# Patient Record
Sex: FEMALE | Race: WHITE | NOT HISPANIC OR LATINO | Employment: OTHER | URBAN - METROPOLITAN AREA
[De-identification: names, ages, dates, MRNs, and addresses within clinical notes are randomized per-mention and may not be internally consistent; named-entity substitution may affect disease eponyms.]

---

## 2017-03-08 ENCOUNTER — OFFICE VISIT (OUTPATIENT)
Dept: FAMILY MEDICINE | Facility: CLINIC | Age: 72
End: 2017-03-08
Payer: COMMERCIAL

## 2017-03-08 VITALS
HEIGHT: 64 IN | SYSTOLIC BLOOD PRESSURE: 121 MMHG | TEMPERATURE: 96.8 F | DIASTOLIC BLOOD PRESSURE: 71 MMHG | WEIGHT: 148 LBS | HEART RATE: 78 BPM | BODY MASS INDEX: 25.27 KG/M2 | OXYGEN SATURATION: 96 %

## 2017-03-08 DIAGNOSIS — Z12.11 SCREEN FOR COLON CANCER: ICD-10-CM

## 2017-03-08 DIAGNOSIS — Z86.39 H/O HYPOGLYCEMIA: ICD-10-CM

## 2017-03-08 DIAGNOSIS — J31.0 CHRONIC RHINITIS: ICD-10-CM

## 2017-03-08 DIAGNOSIS — B00.9 HERPES SIMPLEX VIRUS INFECTION: ICD-10-CM

## 2017-03-08 DIAGNOSIS — Z12.11 COLON CANCER SCREENING: ICD-10-CM

## 2017-03-08 DIAGNOSIS — M25.552 HIP PAIN, LEFT: ICD-10-CM

## 2017-03-08 DIAGNOSIS — H60.62 CHRONIC OTITIS EXTERNA OF LEFT EAR, UNSPECIFIED TYPE: ICD-10-CM

## 2017-03-08 DIAGNOSIS — Z12.31 VISIT FOR SCREENING MAMMOGRAM: ICD-10-CM

## 2017-03-08 DIAGNOSIS — R14.0 ABDOMINAL BLOATING: Primary | ICD-10-CM

## 2017-03-08 DIAGNOSIS — Z13.21 ENCOUNTER FOR VITAMIN DEFICIENCY SCREENING: ICD-10-CM

## 2017-03-08 DIAGNOSIS — F34.1 DYSTHYMIC DISORDER: ICD-10-CM

## 2017-03-08 LAB
ANION GAP SERPL CALCULATED.3IONS-SCNC: 7 MMOL/L (ref 3–14)
BUN SERPL-MCNC: 19 MG/DL (ref 7–30)
CALCIUM SERPL-MCNC: 8.8 MG/DL (ref 8.5–10.1)
CHLORIDE SERPL-SCNC: 107 MMOL/L (ref 94–109)
CO2 SERPL-SCNC: 30 MMOL/L (ref 20–32)
CREAT SERPL-MCNC: 0.72 MG/DL (ref 0.52–1.04)
DEPRECATED CALCIDIOL+CALCIFEROL SERPL-MC: 38 UG/L (ref 20–75)
GFR SERPL CREATININE-BSD FRML MDRD: 79 ML/MIN/1.7M2
GLUCOSE SERPL-MCNC: 87 MG/DL (ref 70–99)
POTASSIUM SERPL-SCNC: 4.2 MMOL/L (ref 3.4–5.3)
SODIUM SERPL-SCNC: 144 MMOL/L (ref 133–144)
TSH SERPL DL<=0.005 MIU/L-ACNC: 0.81 MU/L (ref 0.4–4)

## 2017-03-08 PROCEDURE — 99214 OFFICE O/P EST MOD 30 MIN: CPT | Performed by: PHYSICIAN ASSISTANT

## 2017-03-08 PROCEDURE — 99000 SPECIMEN HANDLING OFFICE-LAB: CPT | Performed by: PHYSICIAN ASSISTANT

## 2017-03-08 PROCEDURE — 36415 COLL VENOUS BLD VENIPUNCTURE: CPT | Performed by: PHYSICIAN ASSISTANT

## 2017-03-08 PROCEDURE — 82306 VITAMIN D 25 HYDROXY: CPT | Performed by: PHYSICIAN ASSISTANT

## 2017-03-08 PROCEDURE — 84443 ASSAY THYROID STIM HORMONE: CPT | Performed by: PHYSICIAN ASSISTANT

## 2017-03-08 PROCEDURE — 83825 ASSAY OF MERCURY: CPT | Mod: 90 | Performed by: PHYSICIAN ASSISTANT

## 2017-03-08 PROCEDURE — 80048 BASIC METABOLIC PNL TOTAL CA: CPT | Performed by: PHYSICIAN ASSISTANT

## 2017-03-08 RX ORDER — VALACYCLOVIR HYDROCHLORIDE 500 MG/1
500 TABLET, FILM COATED ORAL DAILY
Qty: 90 TABLET | Refills: 3 | Status: SHIPPED | OUTPATIENT
Start: 2017-03-08 | End: 2018-08-20

## 2017-03-08 RX ORDER — CITALOPRAM HYDROBROMIDE 20 MG/1
20 TABLET ORAL DAILY
Qty: 90 TABLET | Refills: 3 | Status: SHIPPED | OUTPATIENT
Start: 2017-03-08 | End: 2018-08-20

## 2017-03-08 RX ORDER — SIMETHICONE 125 MG
1 CAPSULE ORAL 2 TIMES DAILY PRN
Qty: 60 CAPSULE | Refills: 0 | Status: SHIPPED | OUTPATIENT
Start: 2017-03-08 | End: 2017-10-04

## 2017-03-08 RX ORDER — CIPROFLOXACIN AND DEXAMETHASONE 3; 1 MG/ML; MG/ML
4 SUSPENSION/ DROPS AURICULAR (OTIC) 2 TIMES DAILY
Qty: 7.5 ML | Refills: 0 | Status: SHIPPED | OUTPATIENT
Start: 2017-03-08 | End: 2018-08-20

## 2017-03-08 ASSESSMENT — ANXIETY QUESTIONNAIRES
1. FEELING NERVOUS, ANXIOUS, OR ON EDGE: NOT AT ALL
5. BEING SO RESTLESS THAT IT IS HARD TO SIT STILL: NOT AT ALL
2. NOT BEING ABLE TO STOP OR CONTROL WORRYING: NOT AT ALL
IF YOU CHECKED OFF ANY PROBLEMS ON THIS QUESTIONNAIRE, HOW DIFFICULT HAVE THESE PROBLEMS MADE IT FOR YOU TO DO YOUR WORK, TAKE CARE OF THINGS AT HOME, OR GET ALONG WITH OTHER PEOPLE: NOT DIFFICULT AT ALL
7. FEELING AFRAID AS IF SOMETHING AWFUL MIGHT HAPPEN: NOT AT ALL
6. BECOMING EASILY ANNOYED OR IRRITABLE: NOT AT ALL
GAD7 TOTAL SCORE: 0
3. WORRYING TOO MUCH ABOUT DIFFERENT THINGS: NOT AT ALL

## 2017-03-08 ASSESSMENT — PATIENT HEALTH QUESTIONNAIRE - PHQ9: 5. POOR APPETITE OR OVEREATING: NOT AT ALL

## 2017-03-08 NOTE — MR AVS SNAPSHOT
After Visit Summary   3/8/2017    Cely Ontiveros    MRN: [id number]           Patient Information     Date Of Birth          1945        Visit Information        Provider Department      3/8/2017 7:40 AM Sunita Andres PA-C [facility]        Today's Diagnoses     H/O hypoglycemia    -  1    Otalgia, left        Dysthymic disorder        Herpes simplex        Chronic rhinitis        Screen for colon cancer        Visit for screening mammogram        Need for prophylactic vaccination against Streptococcus pneumoniae (pneumococcus)        Abdominal bloating        Colon cancer screening        Hip pain, left          Care Instructions    Based on your medical history and these are the current health maintenance or preventive care services that you are due for (some may have been done at this visit)  Health Maintenance Due   Topic Date Due     PNEUMOCOCCAL (2 of 2 - PCV13) 03/09/2011     DEPRESSION ACTION PLAN Q1 YR (NO INBASKET)  02/24/2016     PHQ-9 Q6 MONTHS (NO INBASKET)  04/07/2016     FIT Q1 YR (NO INBASKET)  10/03/2016     MAMMO SCREEN Q2 YR (SYSTEM ASSIGNED)  10/06/2016     FALL RISK ASSESSMENT  10/07/2016         At [facility], we strive to deliver an exceptional experience to you, every time we see you.    If you receive a survey in the mail, please send us back your thoughts. We really do value your feedback.    Your care team's suggested websites for health information:  Www.[facility]org : Up to date and easily searchable information on multiple topics.  Www.medlineplus.gov : medication info, interactive tutorials, watch real surgeries online  Www.familydoctor.org : good info from the Academy of Family Physicians  Www.cdc.gov : public health info, travel advisories, epidemics (H1N1)  Www.aap.org : children's health info, normal development, vaccinations  Www.health.state[place]us : [place] dept of health, public health issues in [place], N1N1    How to  contact your care team:   Team [place] [phone]         Pharmacy [phone]    Dr. Caraballo, Venita Plaza PA-C, Dr. Espinosa, Li VENTURA CNP, Sunita Andres PA-C, Dr. Rosa, and AUDREY Edwards CNP    Team RNs: Jacque & Lacy      Clinic hours  M-Th 7 am-7 pm   Fri 7 am-5 pm.   Urgent care M-F 11 am-9 pm,   Sat/Sun 9 am-5 pm.  Pharmacy M-Th 8 am-8 pm Fri 8 am-6 pm  Sat/Sun 9 am-5 pm.     All password changes, disabled accounts, or ID changes in [employer]/MyHealth will be done by our Access Services Department.    If you need help with your account or password, call: [phone]. Clinic staff no longer has the ability to change passwords.           Follow-ups after your visit        Additional Services     GASTROENTEROLOGY ADULT REF PROCEDURE ONLY       Last Lab Result: Creatinine (mg/dL)       Date                     Value                 06/04/2013               0.64             ----------  Body mass index is 25.27 kg/(m^2).      Patient will be contacted to schedule procedure.     Please be aware that coverage of these services is subject to the terms and limitations of your health insurance plan.  Call member services at your health plan with any benefit or coverage questions.  Any procedures must be performed at a [facility] facility OR coordinated by your clinic's referral office.    Please bring the following with you to your appointment:    (1) Any X-Rays, CTs or MRIs which have been performed.  Contact the facility where they were done to arrange for  prior to your scheduled appointment.    (2) List of current medications   (3) This referral request   (4) Any documents/labs given to you for this referral            [facility], HAND, AND CHIR[facility]RACTIC REFERRAL       **This order will print in the [facility] Scheduling Office**    Physical Therapy, Hand Therapy and Chiropractic Care are available through:    *[facility]  *[facility]  *[facility]    Call one number to schedule at any of the above locations: [phone].    Your provider has referred you to: Physical Therapy at [facility] or [facility]    Indication/Reason for Referral: IT band tightening  Onset of Illness: few months  Therapy Orders: Evaluate and Treat  Special Programs: None  Special Request: None    Yesy Hayward      Additional Comments for the Therapist or Chiropractor:     Please be aware that coverage of these services is subject to the terms and limitations of your health insurance plan.  Call member services at your health plan with any benefit or coverage questions.      Please bring the following to your appointment:    *Your personal calendar for scheduling future appointments  *Comfortable clothing                  Future tests that were ordered for you today     Open Future Orders        Priority Expected Expires Ordered    MA SCREENING DIGITAL BILAT - Future  (s+30) Routine  3/8/2018 3/8/2017            Who to contact     If you have questions or need follow up information about today's clinic visit or your schedule please contact [facility] directly at [phone].  Normal or non-critical lab and imaging results will be communicated to you by evolsohart, letter or phone within 4 business days after the clinic has received the results. If you do not hear from us within 7 days, please contact the clinic through evolsohart or phone. If you have a critical or abnormal lab result, we will notify you by phone as soon as possible.  Submit refill requests through [employer] or call your pharmacy and they will forward the refill request to us. Please allow 3 business days for your refill to be completed.          Additional Information About Your Visit        evolsoharSolid Sound Information     [employer]ers App gives you secure access to your electronic health record. If you see a primary care provider, you can also send messages to your care team and make appointments. If you  "have questions, please call your primary care clinic.  If you do not have a primary care provider, please call [phone] and they will assist you.        Care EveryWhere ID     This is your Care EveryWhere ID. This could be used by other organizations to access your [facility] medical records  [id number]        Your Vitals Were     Pulse Temperature Height Pulse Oximetry Breastfeeding? BMI (Body Mass Index)    78 96.8  F (36  C) (Oral) 5' 4.17\" (1.63 m) 96% No 25.27 kg/m2       Blood Pressure from Last 3 Encounters:   03/08/17 121/71   04/17/16 126/66   01/22/16 127/67    Weight from Last 3 Encounters:   03/08/17 148 lb (67.1 kg)   04/17/16 156 lb 6.4 oz (70.9 kg)   01/22/16 156 lb (70.8 kg)              We Performed the Following     Basic metabolic panel     DEPRESSION ACTION PLAN (DAP) Order [[id number]]     GASTROENTEROLOGY ADULT REF PROCEDURE ONLY     [facility], AND CH[facility]RACTIC REFERRAL     Mercury, blood     TSH with free T4 reflex     Vitamin D Deficiency          Today's Medication Changes          These changes are accurate as of: 3/8/17  8:48 AM.  If you have any questions, ask your nurse or doctor.               Start taking these medicines.        Dose/Directions    Simethicone 125 MG Caps   Used for:  Abdominal bloating   Started by:  Sunita Andres PA-C        Dose:  1 capsule   Take 1 capsule by mouth 2 times daily as needed   Quantity:  60 capsule   Refills:  0            Where to get your medicines      These medications were sent to [id number] [place], [place] - [address]  [address] [address]     Phone:  [phone]     ciprofloxacin-dexamethasone otic suspension    citalopram 20 MG tablet    Simethicone 125 MG Caps    valACYclovir 500 MG tablet         Some of these will need a paper prescription and others can be bought over the counter.  Ask your nurse if you have questions.     Bring a paper prescription for each of these medications     " loratadine-pseudoePHEDrine  MG per 24 hr tablet                Primary Care Provider Office Phone # Fax #    Sunita Andres PA-C [phone]       [facility] [address]  [facility] [address]        Thank you!     Thank you for choosing [facility]  for your care. Our goal is always to provide you with excellent care. Hearing back from our patients is one way we can continue to improve our services. Please take a few minutes to complete the written survey that you may receive in the mail after your visit with us. Thank you!             Your Updated Medication List - Protect others around you: Learn how to safely use, store and throw away your medicines at www.disposemymeds.org.          This list is accurate as of: 3/8/17  8:48 AM.  Always use your most recent med list.                   Brand Name Dispense Instructions for use    ciprofloxacin-dexamethasone otic suspension    CIPRODEX    7.5 mL    Place 4 drops Into the left ear 2 times daily       citalopram 20 MG tablet    celeXA    90 tablet    Take 1 tablet (20 mg) by mouth daily       clotrimazole-betamethasone cream    LOTRISONE    15 g    Apply topically 2 times daily       fluticasone 50 MCG/ACT spray    FLONASE    48 g    Spray 1-2 sprays into both nostrils daily       loratadine-pseudoePHEDrine  MG per 24 hr tablet    EQL ALLERGY/CONGESTION RELIEF    90 tablet    Take 1 tablet by mouth daily       phenazopyridine 200 MG tablet    PYRIDIUM    6 tablet    Take 1 tablet (200 mg) by mouth 3 times daily as needed for irritation Expect your urine to appear bright orange       Simethicone 125 MG Caps     60 capsule    Take 1 capsule by mouth 2 times daily as needed       valACYclovir 500 MG tablet    VALTREX    90 tablet    Take 1 tablet (500 mg) by mouth daily

## 2017-03-08 NOTE — PROGRESS NOTES
SUBJECTIVE:                                                    Cely Ontiveros is a 71 year old female who presents to clinic today for the following health issues:      Would like to check for diabetes, she has a h/o hypoglycemic episodes.      Patient is checking blood sugars: not at all    Diabetic concerns: other - wants to make sure doesn't have diabetes     Symptoms of hypoglycemia (low blood sugar): very smelling under arm pit sweats, cold hands and cold fits and sometimes tingling sensations on hands and feet - saw those symptoms over the Internet.     Paresthesias (numbness or burning in feet) or sores: No     Date of last diabetic eye exam: no       Amount of exercise or physical activity: 6-7 days/week for an average of 45-60 minutes    Problems taking medications regularly: No    Medication side effects: none  Diet: regular (no restrictions)    Has h/o hypoglycemic episodes (started after pelvic surgery due to an ovarian torsion and the physicians at the time thought the blood sugar lows were due to trauma/manipulation from that surgery).  Symptoms have been stable and she controls it with diet by balancing protein/fat/carbs.  Has seen nutritionist for this in the past.     Valtrex:  Takes daily, if she does not she will get a blister on her left hip.     Left IT band tightens after sitting for long periods of time, wants some stretching exercises to try.    Follow up on the : Dysthymic disorder, symptoms stable on Celexa and she would like to continue that as is.       ABDOMINAL PAIN     Onset: 6 months     Description:   Character: Cramping  Location: right lower quadrant  Radiation: None    Intensity: moderate    Progression of Symptoms:  worsening and intermittent    Accompanying Signs & Symptoms:  Fever/Chills?: no   Gas/Bloating: YES  Nausea: no   Vomitting: no   Diarrhea?: no   Constipation:no   Dysuria or Hematuria: no    History:   Trauma: no   Previous similar pain: no    Previous tests done:  none    Precipitating factors:   Does the pain change with:     Food: YES     BM: no     Urination: no     Alleviating factors:  na    Therapies Tried and outcome: na    LMP:  not applicable     She c/o gas pain after eating (worse at night).  She c/o LLQ bloating sensation.  She does not eat after 5 pm.  No blood or black in stool.  She has noticed many dietary triggers:  Artificial sugar, popcorn, tilapia, hot peppers, caffeine, some fruits, nuts, bread, cereal, ect.    Pain is mild and relieved with heat.    Has tried a probiotic (acidophillus) with some improvement.      Sometimes if she eats chocolate she will get excessive gas/burping.      She c/o bumps with the deodorant she uses.      Wants to check for mercury because she eats fish almost everyday.      She has had a complete hysterectomy many years ago.         Problem list and histories reviewed & adjusted, as indicated.  Additional history: as documented    Patient Active Problem List   Diagnosis     Allergic rhinitis     Herpes simplex     CARDIOVASCULAR SCREENING; LDL GOAL LESS THAN 160     Degenerative joint disease     Advance care planning     Posterior vitreous detachment of both eyes     Gross hematuria     Right kidney stone     Seasonal allergic rhinitis     Hypermetropia     Astigmatism with presbyopia     Blepharitis of both eyes     Cataracts, bilateral     Plugged tear duct, od > os     Epiphora     H/O hypoglycemia     Chronic otitis externa of left ear, unspecified type     Hip pain, left     Abdominal bloating     Chronic rhinitis     Dysthymic disorder     Past Surgical History   Procedure Laterality Date     Hc enlarge breast with implant       Hc removal of breast implant       Hc lap,fulgurate/excise lesions       Hysterectomy, pap no longer indicated  1998     ovaries and uterus out for benign reasons(fibroids and ovarian cyst)       Social History   Substance Use Topics     Smoking status: Never Smoker     Smokeless tobacco: Never  Used     Alcohol use No     Family History   Problem Relation Age of Onset     Hypertension Mother      Arthritis Mother      Cardiovascular Mother      Circulatory Mother      HEART DISEASE Mother      Lipids Mother      Obesity Mother      OSTEOPOROSIS Mother      CANCER Mother      skin     Glaucoma Mother      Macular Degeneration Mother      Cancer - colorectal Father      CANCER Father      Macular Degeneration Father      DIABETES No family hx of      CEREBROVASCULAR DISEASE No family hx of      Thyroid Disease No family hx of          Current Outpatient Prescriptions   Medication Sig Dispense Refill     ciprofloxacin-dexamethasone (CIPRODEX) otic suspension Place 4 drops Into the left ear 2 times daily 7.5 mL 0     citalopram (CELEXA) 20 MG tablet Take 1 tablet (20 mg) by mouth daily 90 tablet 3     valACYclovir (VALTREX) 500 MG tablet Take 1 tablet (500 mg) by mouth daily 90 tablet 3     loratadine-pseudoePHEDrine (EQL ALLERGY/CONGESTION RELIEF)  MG per 24 hr tablet Take 1 tablet by mouth daily 90 tablet 1     Simethicone 125 MG CAPS Take 1 capsule by mouth 2 times daily as needed 60 capsule 0     fluticasone (FLONASE) 50 MCG/ACT nasal spray Spray 1-2 sprays into both nostrils daily 48 g 3     clotrimazole-betamethasone (LOTRISONE) cream Apply topically 2 times daily 15 g 1     phenazopyridine (PYRIDIUM) 200 MG tablet Take 1 tablet (200 mg) by mouth 3 times daily as needed for irritation Expect your urine to appear bright orange 6 tablet 0     [DISCONTINUED] citalopram (CELEXA) 20 MG tablet Take 1 tablet (20 mg) by mouth daily 90 tablet 3     [DISCONTINUED] valACYclovir (VALTREX) 500 MG tablet Take 1 tablet (500 mg) by mouth daily 90 tablet 3     BP Readings from Last 3 Encounters:   03/08/17 121/71   04/17/16 126/66   01/22/16 127/67    Wt Readings from Last 3 Encounters:   03/08/17 148 lb (67.1 kg)   04/17/16 156 lb 6.4 oz (70.9 kg)   01/22/16 156 lb (70.8 kg)                    Reviewed and updated  "as needed this visit by clinical staff  Tobacco  Meds  Med Hx  Surg Hx  Fam Hx  Soc Hx      Reviewed and updated as needed this visit by Provider         ROS:  Constitutional, HEENT, cardiovascular, pulmonary, GI, , musculoskeletal, neuro, skin, endocrine and psych systems are negative, except as otherwise noted.    OBJECTIVE:                                                    /71 (BP Location: Right arm, Patient Position: Chair, Cuff Size: Adult Regular)  Pulse 78  Temp 96.8  F (36  C) (Oral)  Ht 5' 4.17\" (1.63 m)  Wt 148 lb (67.1 kg)  SpO2 96%  Breastfeeding? No  BMI 25.27 kg/m2  Body mass index is 25.27 kg/(m^2).  GENERAL: healthy, alert and no distress  NECK: no adenopathy, no asymmetry, masses, or scars and thyroid normal to palpation  RESP: lungs clear to auscultation - no rales, rhonchi or wheezes  CV: regular rate and rhythm, normal S1 S2, no S3 or S4, no murmur, click or rub, no peripheral edema and peripheral pulses strong  ABDOMEN: soft, nontender, no hepatosplenomegaly, no masses and bowel sounds normal  MS: no gross musculoskeletal defects noted, no edema  SKIN: no suspicious lesions or rashes  NEURO: Normal strength and tone, mentation intact and speech normal  PSYCH: mentation appears normal, affect normal/bright    Diagnostic Test Results:  No results found for this or any previous visit (from the past 24 hour(s)).     ASSESSMENT/PLAN:                                                        1. Abdominal bloating  As she is s/p ALEJA, differentials at this time are limited to the large colon (given the location of her pain/bloating sensation in the LLQ).  Symptoms may be due to excessive gas, will try GasX to help symptomatically.  Her dietary triggers seem random and I do not see a pattern to suggest a specific intolerance (such as gluten/lactose/ect).  I do suggest we do a colonoscopy for a further look inside the large intestine, as she may have a colonic mass or diverticulitis.      She " reports eating excessive amounts of fish and would like to have a mercury level checked as well.   - Mercury, blood  - Simethicone 125 MG CAPS; Take 1 capsule by mouth 2 times daily as needed  Dispense: 60 capsule; Refill: 0    2. H/O hypoglycemia  Continue with present diet.   - Basic metabolic panel  - Mercury, blood    3. Herpes simplex  Stable on valtrex, however if she misses a dose it will flare (on her left hip).   - valACYclovir (VALTREX) 500 MG tablet; Take 1 tablet (500 mg) by mouth daily  Dispense: 90 tablet; Refill: 3    4. Dysthymic disorder  Stable.   - citalopram (CELEXA) 20 MG tablet; Take 1 tablet (20 mg) by mouth daily  Dispense: 90 tablet; Refill: 3  - DEPRESSION ACTION PLAN (DAP) Order [[id number]]  - TSH with free T4 reflex    5. Chronic rhinitis  Stable.   - loratadine-pseudoePHEDrine (EQL ALLERGY/CONGESTION RELIEF)  MG per 24 hr tablet; Take 1 tablet by mouth daily  Dispense: 90 tablet; Refill: 1    7. Hip pain, left  F/u with physical therapy for stretching exercises.   - [facility]RACT[facility] REFERRAL    8. Chronic otitis externa of left ear, unspecified type  Stable.   - ciprofloxacin-dexamethasone (CIPRODEX) otic suspension; Place 4 drops Into the left ear 2 times daily  Dispense: 7.5 mL; Refill: 0    9. Colon cancer screening  I discussed the importance of colorectal cancer screening, including the risks and benefits of the various procedures, including colonoscopy and annual FOB immunoassay test.  Given that she is having symptoms of bloating, I would prefer she do a colonoscopy versus a FIT test.      She voiced concern over her hypoglycemia episodes and is not sure if she wants to go through the prep for this.  I reassured her that we can still do the procedure, she may need to speak with the specialist first on how to do the prep with her history of hypoglycemia, I would think she could drink apple juice if sugars go low during the prep     - GASTROENTEROLOGY ADULT REF  PROCEDURE ONLY    10. Visit for screening mammogram  Due.   - MA SCREENING DIGITAL BILAT - Future  (s+30); Future    11. Encounter for vitamin deficiency screening  She would like to have this checked as well.   - Vitamin D Deficiency    FUTURE APPOINTMENTS:       - Follow-up visit in 3-6 months, sooner TWAN Andres PA-C  [facility]

## 2017-03-08 NOTE — PROGRESS NOTES
Faxed Rx for the Loratadine-pseudoephedrine to [facility], Kira Valles, [phone], right fax confirmed at 11:56 am today.  Leslie Louis MA/  For Teams [facility] and [place]

## 2017-03-08 NOTE — NURSING NOTE
"Chief Complaint   Patient presents with     RECHECK     Dysthymic disorder        Initial /71 (BP Location: Right arm, Patient Position: Chair, Cuff Size: Adult Regular)  Pulse 78  Temp 96.8  F (36  C) (Oral)  Ht 5' 4.17\" (1.63 m)  Wt 148 lb (67.1 kg)  SpO2 96%  Breastfeeding? No  BMI 25.27 kg/m2 Estimated body mass index is 25.27 kg/(m^2) as calculated from the following:    Height as of this encounter: 5' 4.17\" (1.63 m).    Weight as of this encounter: 148 lb (67.1 kg).  Medication Reconciliation: complete   An,CMA      "

## 2017-03-08 NOTE — PATIENT INSTRUCTIONS
Based on your medical history and these are the current health maintenance or preventive care services that you are due for (some may have been done at this visit)  Health Maintenance Due   Topic Date Due     PNEUMOCOCCAL (2 of 2 - PCV13) 03/09/2011     DEPRESSION ACTION PLAN Q1 YR (NO INBASKET)  02/24/2016     PHQ-9 Q6 MONTHS (NO INBASKET)  04/07/2016     FIT Q1 YR (NO INBASKET)  10/03/2016     MAMMO SCREEN Q2 YR (SYSTEM ASSIGNED)  10/06/2016     FALL RISK ASSESSMENT  10/07/2016         At [facility], we strive to deliver an exceptional experience to you, every time we see you.    If you receive a survey in the mail, please send us back your thoughts. We really do value your feedback.    Your care team's suggested websites for health information:  Www.[employer] : Up to date and easily searchable information on multiple topics.  Www.[employer].gov : medication info, interactive tutorials, watch real surgeries online  Www.familydoctor.org : good info from the Academy of Family Physicians  Www.cdc.gov : public health info, travel advisories, epidemics (H1N1)  Www.aap.org : children's health info, normal development, vaccinations  Www.health.[facility].[place].us : [place] dept of health, public health issues in [place], N1N1    How to contact your care team:   [facility] [place] [phone]         Pharmacy [phone]    Dr. Caraballo, Venita Plaza PA-C, Dr. Espinosa, Li VENTURA CNP, Sunita Andres PA-C, Dr. Rosa, and AUDREY Edwards CNP    Team RNs: Jacque & Lacy      Clinic hours  M-Th 7 am-7 pm   Fri 7 am-5 pm.   Urgent care M-F 11 am-9 pm,   Sat/Sun 9 am-5 pm.  Pharmacy M-Th 8 am-8 pm Fri 8 am-6 pm  Sat/Sun 9 am-5 pm.     All password changes, disabled accounts, or ID changes in [employer]/MyHealth will be done by our Access Services Department.    If you need help with your account or password, call: [phone]. Clinic staff no longer has the ability to change passwords.

## 2017-03-09 ASSESSMENT — ANXIETY QUESTIONNAIRES: GAD7 TOTAL SCORE: 0

## 2017-03-09 ASSESSMENT — PATIENT HEALTH QUESTIONNAIRE - PHQ9: SUM OF ALL RESPONSES TO PHQ QUESTIONS 1-9: 0

## 2017-03-10 LAB — MERCURY BLD-MCNC: 11 NG/ML

## 2017-03-29 ENCOUNTER — RADIANT APPOINTMENT (OUTPATIENT)
Dept: MAMMOGRAPHY | Facility: CLINIC | Age: 72
End: 2017-03-29
Attending: PHYSICIAN ASSISTANT
Payer: COMMERCIAL

## 2017-03-29 DIAGNOSIS — Z12.31 VISIT FOR SCREENING MAMMOGRAM: ICD-10-CM

## 2017-03-29 PROCEDURE — G0202 SCR MAMMO BI INCL CAD: HCPCS | Performed by: RADIOLOGY

## 2017-04-03 ENCOUNTER — THERAPY VISIT (OUTPATIENT)
Dept: PHYSICAL THERAPY | Facility: CLINIC | Age: 72
End: 2017-04-03
Payer: COMMERCIAL

## 2017-04-03 DIAGNOSIS — M25.552 HIP PAIN, LEFT: Primary | ICD-10-CM

## 2017-04-03 PROCEDURE — 97112 NEUROMUSCULAR REEDUCATION: CPT | Mod: GP | Performed by: PHYSICAL THERAPIST

## 2017-04-03 PROCEDURE — 97161 PT EVAL LOW COMPLEX 20 MIN: CPT | Mod: GP | Performed by: PHYSICAL THERAPIST

## 2017-04-03 PROCEDURE — 97110 THERAPEUTIC EXERCISES: CPT | Mod: GP | Performed by: PHYSICAL THERAPIST

## 2017-04-03 ASSESSMENT — ACTIVITIES OF DAILY LIVING (ADL)
PUTTING_ON_SOCKS_AND_SHOES: NO DIFFICULTY AT ALL
HOS_ADL_COUNT: 17
HEAVY_WORK: NO DIFFICULTY AT ALL
HOS_ADL_SCORE(%): 97.06
GETTING_INTO_AND_OUT_OF_A_BATHTUB: NO DIFFICULTY AT ALL
HOW_WOULD_YOU_RATE_YOUR_CURRENT_LEVEL_OF_FUNCTION_DURING_YOUR_USUAL_ACTIVITIES_OF_DAILY_LIVING_FROM_0_TO_100_WITH_100_BEING_YOUR_LEVEL_OF_FUNCTION_PRIOR_TO_YOUR_HIP_PROBLEM_AND_0_BEING_THE_INABILITY_TO_PERFORM_ANY_OF_YOUR_USUAL_DAILY_ACTIVITIES?: 90
WALKING_UP_STEEP_HILLS: NO DIFFICULTY AT ALL
DEEP_SQUATTING: MODERATE DIFFICULTY
WALKING_DOWN_STEEP_HILLS: NO DIFFICULTY AT ALL
STEPPING_UP_AND_DOWN_CURBS: NO DIFFICULTY AT ALL
GOING_UP_1_FLIGHT_OF_STAIRS: NO DIFFICULTY AT ALL
GOING_DOWN_1_FLIGHT_OF_STAIRS: NO DIFFICULTY AT ALL
WALKING_INITIALLY: NO DIFFICULTY AT ALL
STANDING_FOR_15_MINUTES: NO DIFFICULTY AT ALL
HOS_ADL_ITEM_SCORE_TOTAL: 66
TWISTING/PIVOTING_ON_INVOLVED_LEG: NO DIFFICULTY AT ALL
LIGHT_TO_MODERATE_WORK: NO DIFFICULTY AT ALL
SITTING_FOR_15_MINUTES: NO DIFFICULTY AT ALL
HOS_ADL_HIGHEST_POTENTIAL_SCORE: 68
RECREATIONAL_ACTIVITIES: NO DIFFICULTY AT ALL
ROLLING_OVER_IN_BED: NO DIFFICULTY AT ALL
GETTING_INTO_AND_OUT_OF_AN_AVERAGE_CAR: NO DIFFICULTY AT ALL
WALKING_15_MINUTES_OR_GREATER: NO DIFFICULTY AT ALL
WALKING_APPROXIMATELY_10_MINUTES: NO DIFFICULTY AT ALL

## 2017-04-03 NOTE — MR AVS SNAPSHOT
After Visit Summary   4/3/2017    Cely Ontiveros    MRN: [id number]           Patient Information     Date Of Birth          1945        Visit Information        Provider Department      4/3/2017 10:20 AM Mae Huerta PT [facility]        Today's Diagnoses     Hip pain, left    -  1       Follow-ups after your visit        Your next 10 appointments already scheduled     Apr 26, 2017 10:10 AM CDT   CANDIDO Extremity with Mae Huerta PT   [facility] ([place]O [place]  )    [address]  [facility] [id number] [phone]              Who to contact     If you have questions or need follow up information about today's clinic visit or your schedule please contact [facility] directly at [phone].  Normal or non-critical lab and imaging results will be communicated to you by MyChart, letter or phone within 4 business days after the clinic has received the results. If you do not hear from us within 7 days, please contact the clinic through [employer] or phone. If you have a critical or abnormal lab result, we will notify you by phone as soon as possible.  Submit refill requests through [employer] or call your pharmacy and they will forward the refill request to us. Please allow 3 business days for your refill to be completed.          Additional Information About Your Visit        MyChart Information     [employer] gives you secure access to your electronic health record. If you see a primary care provider, you can also send messages to your care team and make appointments. If you have questions, please call your primary care clinic.  If you do not have a primary care provider, please call [phone] and they will assist you.        Care EveryWhere ID     This is your Care EveryWhere ID. This could be used by other organizations to access your [facility]  records  [id number]         Blood Pressure from Last 3 Encounters:   03/08/17 121/71   04/17/16 126/66   01/22/16 127/67    Weight from Last 3 Encounters:   03/08/17 67.1 kg (148 lb)   04/17/16 70.9 kg (156 lb 6.4 oz)   01/22/16 70.8 kg (156 lb)              We Performed the Following     HC PT EVAL, LOW COMPLEXITY     CANDIDO INITIAL EVAL REPORT     NEUROMUSCULAR RE-EDUCATION     THERAPEUTIC EXERCISES        Primary Care Provider Office Phone # Fax #    Sunita Andres PA-C [phone]       [facility] [address]  [facility] [address]        Thank you!     Thank you for choosing [place] [facility]  for your care. Our goal is always to provide you with excellent care. Hearing back from our patients is one way we can continue to improve our services. Please take a few minutes to complete the written survey that you may receive in the mail after your visit with us. Thank you!             Your Updated Medication List - Protect others around you: Learn how to safely use, store and throw away your medicines at www.disposemymeds.org.          This list is accurate as of: 4/3/17 11:19 AM.  Always use your most recent med list.                   Brand Name Dispense Instructions for use    ciprofloxacin-dexamethasone otic suspension    CIPRODEX    7.5 mL    Place 4 drops Into the left ear 2 times daily       citalopram 20 MG tablet    celeXA    90 tablet    Take 1 tablet (20 mg) by mouth daily       clotrimazole-betamethasone cream    LOTRISONE    15 g    Apply topically 2 times daily       fluticasone 50 MCG/ACT spray    FLONASE    48 g    Spray 1-2 sprays into both nostrils daily       loratadine-pseudoePHEDrine  MG per 24 hr tablet    EQL ALLERGY/CONGESTION RELIEF    90 tablet    Take 1 tablet by mouth daily       phenazopyridine 200 MG tablet    PYRIDIUM    6 tablet    Take 1 tablet (200 mg) by mouth 3 times daily as needed for irritation Expect your  urine to appear bright orange       Simethicone 125 MG Caps     60 capsule    Take 1 capsule by mouth 2 times daily as needed       valACYclovir 500 MG tablet    VALTREX    90 tablet    Take 1 tablet (500 mg) by mouth daily

## 2017-04-03 NOTE — PROGRESS NOTES
[facility] Initial Evaluation      Subjective:    Cely Ontiveros is a 71 year old female with a left hip condition.  Condition occurred with:  Insidious onset.  Condition occurred: for unknown reasons.  This is a chronic condition  Pt notes that she has been having problems in her L hip for several months, ever since retiring and sitting more than usual.  She does yoga 3x/wk and massage therapy every other week, both of which help a lot.  She did consult w/her PCP on 3/8/17 for other issues and mentioned it to her, so did get a referral to PT..    Patient reports pain:  Lateral and greater trochanter.  Radiates to:  No radiation.  Pain is described as aching (more of a tightness than pain) and is intermittent and reported as 1/10 (3/10 at worst).  Associated symptoms:  Loss of motion/stiffness. Pain is the same all the time.  Symptoms are exacerbated by sitting and lying on extremity and relieved by activity/movement, heat and other (massage, sitting differently).  Since onset symptoms are gradually improving.        General health as reported by patient is excellent.  Pertinent medical history includes:  Osteoarthritis and menopausal.  Medical allergies: yes (listed in Epic).  Other surgeries include:  None reported.  Current medications:  Other (allergy med every day).  Current occupation is Retired.    Primary job tasks include:  Prolonged sitting (cleaning, laundry, works out on an elliptical machine, yoga 3x/wk, other days does other things at the gym).    Barriers include:  None as reported by the patient.    Red flags:  None as reported by the patient.                        Objective:      Gait:    Gait Type:  Normal   Assistive Devices:  None                                                   Hip Evaluation  HIP AROM:    Flexion: Left: WNL    Right:  WNL      Abduction: Left:  Mildly restricted    Right:  WNL      Internal Rotation: Left: WNL    Right: WNL  External Rotation: Left: WNL     Right: WNL        Hip Strength:    Flexion:   Left: 4-/5   -  Pain:  Right: 4-/5   -  Pain:                      Abduction:  Left: 4/5    -   Pain:    Internal Rotation:  Left: 4+/5   -   Pain:Right: 4+/5   -  Pain:  External Rotation:  Left: 4-/5   -  Pain:  Right: 4-/5   -  Pain:  Knee Flexion:  Left: 5-/5   -  Pain:Right: 5-/5   -  Pain:  Knee Extension:  Left: 4+/5   -  Pain:Right: 4+/5    -  Pain:        Hip Special Testing:      Left hip negative for the following special tests:  Ritchie; Fadir/Labrum or Janice's      Hip Palpation:    Left hip tenderness present at:   IT Band and Abductors  Left hip tenderness not present at:  Greater Trachanter; hip flexors; Piriformis; Gluteus Medius or Bursa               General     ROS    Assessment/Plan:      Patient is a 71 year old female with left side hip complaints.    Patient has the following significant findings with corresponding treatment plan.                Diagnosis 1:  L ITB syndrome  Pain -  hot/cold therapy, self management, education and home program  Decreased ROM/flexibility - therapeutic exercise  Decreased strength - therapeutic exercise and therapeutic activities  Impaired muscle performance - neuro re-education  Decreased function - therapeutic activities    Therapy Evaluation Codes:   1) History comprised of:   Personal factors that impact the plan of care:      None.    Comorbidity factors that impact the plan of care are:      Osteoarthritis.     Medications impacting care: None.  2) Examination of Body Systems comprised of:   Body structures and functions that impact the plan of care:      Hip.   Activity limitations that impact the plan of care are:      Sitting and Laying down.  3) Clinical presentation characteristics are:   Stable/Uncomplicated.  4) Decision-Making    Low complexity using standardized patient assessment instrument and/or measureable assessment of functional outcome.  Cumulative Therapy Evaluation is: Low  complexity.    Previous and current functional limitations:  (See Goal Flow Sheet for this information)    Short term and Long term goals: (See Goal Flow Sheet for this information)     Communication ability:  Patient appears to be able to clearly communicate and understand verbal and written communication and follow directions correctly.  Treatment Explanation - The following has been discussed with the patient:   RX ordered/plan of care  Anticipated outcomes  Possible risks and side effects  This patient would benefit from PT intervention to resume normal activities.   Rehab potential is excellent.    Frequency:  2 X a month, once daily  Duration:  for 2 months  Discharge Plan:  Achieve all LTG.  Independent in home treatment program.  Reach maximal therapeutic benefit.    Please refer to the daily flowsheet for treatment today, total treatment time and time spent performing 1:1 timed codes.

## 2017-04-26 ENCOUNTER — THERAPY VISIT (OUTPATIENT)
Dept: PHYSICAL THERAPY | Facility: CLINIC | Age: 72
End: 2017-04-26
Payer: COMMERCIAL

## 2017-04-26 DIAGNOSIS — M25.552 HIP PAIN, LEFT: ICD-10-CM

## 2017-04-26 PROCEDURE — 97110 THERAPEUTIC EXERCISES: CPT | Mod: GP | Performed by: PHYSICAL THERAPIST

## 2017-04-26 PROCEDURE — 97112 NEUROMUSCULAR REEDUCATION: CPT | Mod: GP | Performed by: PHYSICAL THERAPIST

## 2017-04-26 PROCEDURE — 97530 THERAPEUTIC ACTIVITIES: CPT | Mod: GP | Performed by: PHYSICAL THERAPIST

## 2017-04-26 NOTE — MR AVS SNAPSHOT
After Visit Summary   4/26/2017    Cely Ontiveros    MRN: [id number]           Patient Information     Date Of Birth          1945        Visit Information        Provider Department      4/26/2017 10:10 AM Mae Huerta PT [facility]        Today's Diagnoses     Hip pain, left           Follow-ups after your visit        Who to contact     If you have questions or need follow up information about today's clinic visit or your schedule please contact [place] [facility] directly at [phone].  Normal or non-critical lab and imaging results will be communicated to you by [employer] System[employer], letter or phone within 4 business days after the clinic has received the results. If you do not hear from us within 7 days, please contact the clinic through [employer] or phone. If you have a critical or abnormal lab result, we will notify you by phone as soon as possible.  Submit refill requests through [employer] or call your pharmacy and they will forward the refill request to us. Please allow 3 business days for your refill to be completed.          Additional Information About Your Visit        MyChart Information     [employer] gives you secure access to your electronic health record. If you see a primary care provider, you can also send messages to your care team and make appointments. If you have questions, please call your primary care clinic.  If you do not have a primary care provider, please call [phone] and they will assist you.        Care EveryWhere ID     This is your Care EveryWhere ID. This could be used by other organizations to access your [place] medical records  [id number]         Blood Pressure from Last 3 Encounters:   03/08/17 121/71   04/17/16 126/66   01/22/16 127/67    Weight from Last 3 Encounters:   03/08/17 67.1 kg (148 lb)   04/17/16 70.9 kg (156 lb 6.4 oz)   01/22/16 70.8 kg (156 lb)              We Performed the Following      CANDIDO PROGRESS NOTES REPORT     NEUROMUSCULAR RE-EDUCATION     THERAPEUTIC ACTIVITIES     THERAPEUTIC EXERCISES        Primary Care Provider Office Phone # Fax #    Sunita Andres PA-C [phone]       [facility] [address]  [facility] [address]        Thank you!     Thank you for choosing [place] [facility]  for your care. Our goal is always to provide you with excellent care. Hearing back from our patients is one way we can continue to improve our services. Please take a few minutes to complete the written survey that you may receive in the mail after your visit with us. Thank you!             Your Updated Medication List - Protect others around you: Learn how to safely use, store and throw away your medicines at www.disposemymeds.org.          This list is accurate as of: 4/26/17  1:37 PM.  Always use your most recent med list.                   Brand Name Dispense Instructions for use    ciprofloxacin-dexamethasone otic suspension    CIPRODEX    7.5 mL    Place 4 drops Into the left ear 2 times daily       citalopram 20 MG tablet    celeXA    90 tablet    Take 1 tablet (20 mg) by mouth daily       clotrimazole-betamethasone cream    LOTRISONE    15 g    Apply topically 2 times daily       fluticasone 50 MCG/ACT spray    FLONASE    48 g    Spray 1-2 sprays into both nostrils daily       loratadine-pseudoePHEDrine  MG per 24 hr tablet    EQL ALLERGY/CONGESTION RELIEF    90 tablet    Take 1 tablet by mouth daily       phenazopyridine 200 MG tablet    PYRIDIUM    6 tablet    Take 1 tablet (200 mg) by mouth 3 times daily as needed for irritation Expect your urine to appear bright orange       Simethicone 125 MG Caps     60 capsule    Take 1 capsule by mouth 2 times daily as needed       valACYclovir 500 MG tablet    VALTREX    90 tablet    Take 1 tablet (500 mg) by mouth daily

## 2017-04-26 NOTE — PROGRESS NOTES
Subjective:    HPI                    Objective:    System    Physical Exam    General     ROS    Assessment/Plan:      DISCHARGE REPORT    Progress reporting period is from 4/3/17 to 4/26/17.       SUBJECTIVE  Subjective changes noted by patient:   Pt notes vast improvements, really thinks the ex's have helped a lot.  She has been able to sit through longer programs w/o any pain in her hip since starting the ex's.    Current pain level is  0/10.     Previous pain level was   Initial Pain level: 3/10 (at worst).   Changes in function:  Yes (See Goal flowsheet attached for changes in current functional level)  Adverse reaction to treatment or activity: None    OBJECTIVE  Changes noted in objective findings:    Pt able to perform all ex's (past and new) w/o difficulty or pain in hip, but knee bending ex's did cause some knee pain. Pt was cautioned to not let knees go beyond the ankles to prevent aggravating her knees.      ASSESSMENT/PLAN  Updated problem list and treatment plan: Diagnosis 1:  L ITB syndrome Decreased ROM/flexibility - home program  Decreased strength - home program  Impaired muscle performance - home program  STG/LTGs have been met or progress has been made towards goals:  Yes (See Goal flow sheet completed today.)  Assessment of Progress: The patient's condition is improving.  The patient's condition has potential to improve.  Patient is meeting short term goals and is progressing towards long term goals.  Self Management Plans:  Patient has been instructed in a home treatment program.  Patient is independent in a home treatment program.  Patient  has been instructed in self management of symptoms.  Patient is independent in self management of symptoms.    Cely continues to require the following intervention to meet STG and LTG's:  PT intervention is no longer required to meet STG/LTG.    Recommendations:  This patient is ready to be discharged from therapy and continue their home treatment  program.    Please refer to the daily flowsheet for treatment today, total treatment time and time spent performing 1:1 timed codes.

## 2017-04-26 NOTE — Clinical Note
Please see the discharge summary report completed in PT today.  Thank you for referring Cely to us! Cordially,  TEO Huerta, MPT

## 2017-04-28 ENCOUNTER — TELEPHONE (OUTPATIENT)
Dept: FAMILY MEDICINE | Facility: CLINIC | Age: 72
End: 2017-04-28

## 2017-08-02 ENCOUNTER — OFFICE VISIT (OUTPATIENT)
Dept: FAMILY MEDICINE | Facility: CLINIC | Age: 72
End: 2017-08-02
Payer: COMMERCIAL

## 2017-08-02 VITALS
OXYGEN SATURATION: 95 % | SYSTOLIC BLOOD PRESSURE: 124 MMHG | TEMPERATURE: 97 F | HEART RATE: 82 BPM | DIASTOLIC BLOOD PRESSURE: 64 MMHG | BODY MASS INDEX: 24.82 KG/M2 | WEIGHT: 145.4 LBS

## 2017-08-02 DIAGNOSIS — B35.6 TINEA CRURIS: Primary | ICD-10-CM

## 2017-08-02 DIAGNOSIS — Z12.11 SCREEN FOR COLON CANCER: ICD-10-CM

## 2017-08-02 PROCEDURE — 99213 OFFICE O/P EST LOW 20 MIN: CPT | Performed by: PHYSICIAN ASSISTANT

## 2017-08-02 RX ORDER — CLOTRIMAZOLE 1 %
CREAM (GRAM) TOPICAL 2 TIMES DAILY
Qty: 30 G | Refills: 1 | Status: SHIPPED | OUTPATIENT
Start: 2017-08-02 | End: 2018-10-25

## 2017-08-02 ASSESSMENT — PAIN SCALES - GENERAL: PAINLEVEL: NO PAIN (0)

## 2017-08-02 NOTE — NURSING NOTE
"Chief Complaint   Patient presents with     Derm Problem       Initial /64 (BP Location: Left arm, Patient Position: Sitting, Cuff Size: Adult Regular)  Pulse 82  Temp 97  F (36.1  C) (Oral)  Wt 145 lb 6.4 oz (66 kg)  SpO2 95%  Breastfeeding? No  BMI 24.82 kg/m2 Estimated body mass index is 24.82 kg/(m^2) as calculated from the following:    Height as of 3/8/17: 5' 4.17\" (1.63 m).    Weight as of this encounter: 145 lb 6.4 oz (66 kg).  Medication Reconciliation: complete   Claudio STERLING    "

## 2017-08-02 NOTE — MR AVS SNAPSHOT
After Visit Summary   8/2/2017    Cely Ontiveros    MRN: [id number]           Patient Information     Date Of Birth          1945        Visit Information        Provider Department      8/2/2017 7:20 AM Sunita Andres PA-C [facility]        Today's Diagnoses     Screen for colon cancer    -  1    Tinea cruris          Care Instructions    Based on your medical history and these are the current health maintenance or preventive care services that you are due for (some may have been done at this visit)  Health Maintenance Due   Topic Date Due     FIT Q1 YR  10/03/2016     FALL RISK ASSESSMENT  10/07/2016         At [facility], we strive to deliver an exceptional experience to you, every time we see you.    If you receive a survey in the mail, please send us back your thoughts. We really do value your feedback.    Your care team's suggested websites for health information:  Www.[employer].MediaPlatform : Up to date and easily searchable information on multiple topics.  Www.medlineplus.gov : medication info, interactive tutorials, watch real surgeries online  Www.familydoctor.org : good info from the Academy of Family Physicians  Www.cdc.gov : public health info, travel advisories, epidemics (H1N1)  Www.aap.org : children's health info, normal development, vaccinations  Www.health.[facility].[place].us : [place] dept of health, public health issues in [place], N1N1    How to contact your care team:   Team [place] [phone]         Pharmacy [phone]    Dr. Caraballo, Venita Plaza PA-C, Dr. Espinosa, Li Moeller APRN CNP, Sunita Andres PA-C, Dr. Rosa, and AUDREY Edwards CNP    Team RNs: Jacque & Lacy      Clinic hours  M-Th 7 am-7 pm   Fri 7 am-5 pm.   Urgent care M-F 11 am-9 pm,   Sat/Sun 9 am-5 pm.  Pharmacy M-Th 8 am-8 pm Fri 8 am-6 pm  Sat/Sun 9 am-5 pm.     All password changes, disabled accounts, or ID changes in MyChart/MyHealth will be done by  our Access Services Department.    If you need help with your account or password, call: [phone]. Clinic staff no longer has the ability to change passwords.     Understanding Tinea Cruris  Tinea cruris is a type of fungal infection. The fungus infects the skin in the groin. It is more commonly called jock itch. Men are more prone to it than women.  How to say it  TIN-ee-a CROO-ris   What causes tinea cruris?  Tinea cruris occurs when certain types of fungus grow in the groin area. The infection often spreads from the feet to the groin. The fungal infection on the foot is called athlete s foot (tinea pedis). The infection can also be passed from person to person. You can get it if you touch an infected surface, such as a towel or bench in a locker room. It is more common if you have unusual heat, sweating, or friction in the groin, or are overweight.  What are the symptoms of tinea cruris?  Symptoms include:    Red patches along the upper thigh, butt, and groin. The patches have a well-marked border, and sometimes a clear central area.    Itchiness    Scaling skin along the border of the rash    Red bumps (pustules and papules)    Burning or stinging sensation  How is tinea cruris treated?  With proper treatment, tinea cruris will go away in 2 to 3 weeks. Treatments include:    Oral or skin medicines. These may help reduce itching.    Over-the-counter or prescription antifungal medicines for the skin. Thesedestroy the fungus and ease symptoms. You may need to take other fungal medicine by mouth if the infection does not go away.    Good hygiene.  Keep the groin area clean and dry. Take a bath or shower as soon as possible after physical activity. Don t wear sweaty clothes for an extended time. Wear loose cotton underwear. Drying powders may be helpful if you sweat a lot.  What are the possible complications of tinea cruris?  Possible complications include:    Repeated fungal infections    Bacterial infection  of the affected skin  When should I call my healthcare provider?  Call your healthcare provider right away if you have any of these:    Pain that gets worse    Symptoms that don t get better, or get worse    New symptoms   Date Last Reviewed: 3/30/2016    7027-6580 [employer]. [address] [place] [address]. All rights reserved. This information is not intended as a substitute for professional medical care. Always follow your healthcare professional's instructions.                Follow-ups after your visit        Who to contact     If you have questions or need follow up information about today's clinic visit or your schedule please contact [facility] directly at [phone].  Normal or non-critical lab and imaging results will be communicated to you by MyChart, letter or phone within 4 business days after the clinic has received the results. If you do not hear from us within 7 days, please contact the clinic through [employer] or phone. If you have a critical or abnormal lab result, we will notify you by phone as soon as possible.  Submit refill requests through [employer] or call your pharmacy and they will forward the refill request to us. Please allow 3 business days for your refill to be completed.          Additional Information About Your Visit        MyChart Information     [employer] gives you secure access to your electronic health record. If you see a primary care provider, you can also send messages to your care team and make appointments. If you have questions, please call your primary care clinic.  If you do not have a primary care provider, please call [phone] and they will assist you.        Care EveryWhere ID     This is your Care EveryWhere ID. This could be used by other organizations to access your [facility] medical records  [id number]        Your Vitals Were     Pulse Temperature Pulse Oximetry Breastfeeding? BMI (Body Mass Index)       82 97  F (36.1   C) (Oral) 95% No 24.82 kg/m2        Blood Pressure from Last 3 Encounters:   08/02/17 124/64   03/08/17 121/71   04/17/16 126/66    Weight from Last 3 Encounters:   08/02/17 145 lb 6.4 oz (66 kg)   03/08/17 148 lb (67.1 kg)   04/17/16 156 lb 6.4 oz (70.9 kg)              Today, you had the following     No orders found for display         Today's Medication Changes          These changes are accurate as of: 8/2/17  7:37 AM.  If you have any questions, ask your nurse or doctor.               Start taking these medicines.        Dose/Directions    clotrimazole 1 % cream   Commonly known as:  LOTRIMIN   Used for:  Tinea cruris   Started by:  Sunita Andres PA-C        Apply topically 2 times daily   Quantity:  30 g   Refills:  1            Where to get your medicines      These medications were sent to [id number] - [place], [place] - [address] [address]     Phone:  [phone]     clotrimazole 1 % cream                Primary Care Provider Office Phone # Fax #    Sunita Andres PA-C [phone]       [facility] [address]  [facility] [address]        Equal Access to Services     HAYDEE STOCK : Hadii aad ku hadasho Soomaali, waaxda luqadaha, qaybta kaalmada adeegyada, iona garrett. So [facility] [phone].    ATENCIÓN: Si habla español, tiene a carnes disposición servicios gratuitos de asistencia lingüística. Llame al [phone].    We comply with applicable federal civil rights laws and [place] laws. We do not discriminate on the basis of race, color, national origin, age, disability sex, sexual orientation or gender identity.            Thank you!     Thank you for choosing [facility]  for your care. Our goal is always to provide you with excellent care. Hearing back from our patients is one way we can continue to improve our services. Please take a few minutes to complete  the written survey that you may receive in the mail after your visit with us. Thank you!             Your Updated Medication List - Protect others around you: Learn how to safely use, store and throw away your medicines at www.disposemymeds.org.          This list is accurate as of: 8/2/17  7:37 AM.  Always use your most recent med list.                   Brand Name Dispense Instructions for use Diagnosis    ciprofloxacin-dexamethasone otic suspension    CIPRODEX    7.5 mL    Place 4 drops Into the left ear 2 times daily    Chronic otitis externa of left ear, unspecified type       citalopram 20 MG tablet    celeXA    90 tablet    Take 1 tablet (20 mg) by mouth daily    Dysthymic disorder       clotrimazole 1 % cream    LOTRIMIN    30 g    Apply topically 2 times daily    Tinea cruris       clotrimazole-betamethasone cream    LOTRISONE    15 g    Apply topically 2 times daily    Special screening for malignant neoplasms, colon       fluticasone 50 MCG/ACT spray    FLONASE    48 g    Spray 1-2 sprays into both nostrils daily    Chronic rhinitis       loratadine-pseudoePHEDrine  MG per 24 hr tablet    EQL ALLERGY/CONGESTION RELIEF    90 tablet    Take 1 tablet by mouth daily    Chronic rhinitis       phenazopyridine 200 MG tablet    PYRIDIUM    6 tablet    Take 1 tablet (200 mg) by mouth 3 times daily as needed for irritation Expect your urine to appear bright orange    Acute cystitis with hematuria       Simethicone 125 MG Caps     60 capsule    Take 1 capsule by mouth 2 times daily as needed    Abdominal bloating       valACYclovir 500 MG tablet    VALTREX    90 tablet    Take 1 tablet (500 mg) by mouth daily    Herpes simplex virus infection

## 2017-08-02 NOTE — PROGRESS NOTES
SUBJECTIVE:                                                    Cely Ontiveros is a 71 year old female who presents to clinic today for the following health issues:      Rash      Duration: 6 months    Description  Location: in the folds (inguinal, breast, gluteal, axilla)  Itching: mild    Intensity:  mild    Accompanying signs and symptoms: White raised itchy bumps    History (similar episodes/previous evaluation): None    Precipitating or alleviating factors:  New exposures:  None  Recent travel: no      Therapies tried and outcome: lotrisone and metrogel have helped, rash looks better today she states.             Problem list and histories reviewed & adjusted, as indicated.  Additional history: as documented    Patient Active Problem List   Diagnosis     Allergic rhinitis     Herpes simplex     CARDIOVASCULAR SCREENING; LDL GOAL LESS THAN 160     Degenerative joint disease     Advance care planning     Posterior vitreous detachment of both eyes     Gross hematuria     Right kidney stone     Seasonal allergic rhinitis     Hypermetropia     Astigmatism with presbyopia     Blepharitis of both eyes     Cataracts, bilateral     Plugged tear duct, od > os     Epiphora     H/O hypoglycemia     Chronic otitis externa of left ear, unspecified type     Abdominal bloating     Chronic rhinitis     Dysthymic disorder     Past Surgical History:   Procedure Laterality Date     HC ENLARGE BREAST WITH IMPLANT       HC LAP,FULGURATE/EXCISE LESIONS       HC REMOVAL OF BREAST IMPLANT       HYSTERECTOMY, PAP NO LONGER INDICATED  1998    ovaries and uterus out for benign reasons(fibroids and ovarian cyst)       Social History   Substance Use Topics     Smoking status: Never Smoker     Smokeless tobacco: Never Used     Alcohol use No     Family History   Problem Relation Age of Onset     Hypertension Mother      Arthritis Mother      Cardiovascular Mother      Circulatory Mother      HEART DISEASE Mother      Lipids Mother       Obesity Mother      OSTEOPOROSIS Mother      CANCER Mother      skin     Glaucoma Mother      Macular Degeneration Mother      Cancer - colorectal Father      CANCER Father      Macular Degeneration Father      DIABETES No family hx of      CEREBROVASCULAR DISEASE No family hx of      Thyroid Disease No family hx of          Current Outpatient Prescriptions   Medication Sig Dispense Refill     clotrimazole (LOTRIMIN) 1 % cream Apply topically 2 times daily 30 g 1     ciprofloxacin-dexamethasone (CIPRODEX) otic suspension Place 4 drops Into the left ear 2 times daily 7.5 mL 0     citalopram (CELEXA) 20 MG tablet Take 1 tablet (20 mg) by mouth daily 90 tablet 3     valACYclovir (VALTREX) 500 MG tablet Take 1 tablet (500 mg) by mouth daily 90 tablet 3     loratadine-pseudoePHEDrine (EQL ALLERGY/CONGESTION RELIEF)  MG per 24 hr tablet Take 1 tablet by mouth daily 90 tablet 1     fluticasone (FLONASE) 50 MCG/ACT nasal spray Spray 1-2 sprays into both nostrils daily 48 g 3     clotrimazole-betamethasone (LOTRISONE) cream Apply topically 2 times daily 15 g 1     Simethicone 125 MG CAPS Take 1 capsule by mouth 2 times daily as needed (Patient not taking: Reported on 8/2/2017) 60 capsule 0     BP Readings from Last 3 Encounters:   08/02/17 124/64   03/08/17 121/71   04/17/16 126/66    Wt Readings from Last 3 Encounters:   08/02/17 145 lb 6.4 oz (66 kg)   03/08/17 148 lb (67.1 kg)   04/17/16 156 lb 6.4 oz (70.9 kg)                        Reviewed and updated as needed this visit by clinical staffTobacco       Reviewed and updated as needed this visit by Provider         ROS:  Constitutional, HEENT, cardiovascular, pulmonary, gi and gu systems are negative, except as otherwise noted.      OBJECTIVE:   /64 (BP Location: Left arm, Patient Position: Sitting, Cuff Size: Adult Regular)  Pulse 82  Temp 97  F (36.1  C) (Oral)  Wt 145 lb 6.4 oz (66 kg)  SpO2 95%  Breastfeeding? No  BMI 24.82 kg/m2  Body mass index  is 24.82 kg/(m^2).  GENERAL: healthy, alert and no distress  Skin:  Scaling, raised/irritated flesh toned bumps noted in inguinal folds and axilla.  Gluteal cleft with an erythematous scaly rash.  No signs of secondary bacterial infection noted.Diagnostic Test Results:  none     ASSESSMENT/PLAN:       1. Tinea cruris    Will start antifungal cream to be applied BID x 14 days.  she is to keep area clean and dry, she is to avoid contact of this area with others, as it is contagious.  Patient education materials given during visit today (Adult Health Advisor-Jona Cabello).        - clotrimazole (LOTRIMIN) 1 % cream; Apply topically 2 times daily  Dispense: 30 g; Refill: 1    2. Screen for colon cancer  Due for screening  - Fecal colorectal cancer screen FIT; Future    FUTURE APPOINTMENTS:       - Follow-up visit if symptoms worsen or fail to improve as anticipated.     Sunita Andres PA-C  [facility]

## 2017-08-02 NOTE — PATIENT INSTRUCTIONS
Based on your medical history and these are the current health maintenance or preventive care services that you are due for (some may have been done at this visit)  Health Maintenance Due   Topic Date Due     FIT Q1 YR  10/03/2016     FALL RISK ASSESSMENT  10/07/2016         At [facility], we strive to deliver an exceptional experience to you, every time we see you.    If you receive a survey in the mail, please send us back your thoughts. We really do value your feedback.    Your care team's suggested websites for health information:  Www.[employer].org : Up to date and easily searchable information on multiple topics.  Www.medlineplus.gov : medication info, interactive tutorials, watch real surgeries online  Www.familydoctor.org : good info from the Academy of Family Physicians  Www.cdc.gov : public health info, travel advisories, epidemics (H1N1)  Www.aap.org : children's health info, normal development, vaccinations  Www.health.[facility].[place].us : [place] dept of health, public health issues in [place], N1N1    How to contact your care team:   Team [place] [phone]         Pharmacy [phone]    Dr. Caraballo, Venita Plaza PA-C, Dr. Espinosa, Li VENTURA CNP, Sunita Andres PA-C, Dr. Rosa, and AUDREY Edwards CNP    Team RNs: Jacque & Lacy      Clinic hours  M-Th 7 am-7 pm   Fri 7 am-5 pm.   Urgent care M-F 11 am-9 pm,   Sat/Sun 9 am-5 pm.  Pharmacy M-Th 8 am-8 pm Fri 8 am-6 pm  Sat/Sun 9 am-5 pm.     All password changes, disabled accounts, or ID changes in [employer] Indext/MyHealth will be done by our Access Services Department.    If you need help with your account or password, call: [phone]. Clinic staff no longer has the ability to change passwords.     Understanding Tinea Cruris  Tinea cruris is a type of fungal infection. The fungus infects the skin in the groin. It is more commonly called jock itch. Men are more prone to it than women.  How to say it  TIN-ee-a  MARTHAO-zeus   What causes tinea cruris?  Tinea cruris occurs when certain types of fungus grow in the groin area. The infection often spreads from the feet to the groin. The fungal infection on the foot is called athlete s foot (tinea pedis). The infection can also be passed from person to person. You can get it if you touch an infected surface, such as a towel or bench in a locker room. It is more common if you have unusual heat, sweating, or friction in the groin, or are overweight.  What are the symptoms of tinea cruris?  Symptoms include:    Red patches along the upper thigh, butt, and groin. The patches have a well-marked border, and sometimes a clear central area.    Itchiness    Scaling skin along the border of the rash    Red bumps (pustules and papules)    Burning or stinging sensation  How is tinea cruris treated?  With proper treatment, tinea cruris will go away in 2 to 3 weeks. Treatments include:    Oral or skin medicines. These may help reduce itching.    Over-the-counter or prescription antifungal medicines for the skin. Thesedestroy the fungus and ease symptoms. You may need to take other fungal medicine by mouth if the infection does not go away.    Good hygiene.  Keep the groin area clean and dry. Take a bath or shower as soon as possible after physical activity. Don t wear sweaty clothes for an extended time. Wear loose cotton underwear. Drying powders may be helpful if you sweat a lot.  What are the possible complications of tinea cruris?  Possible complications include:    Repeated fungal infections    Bacterial infection of the affected skin  When should I call my healthcare provider?  Call your healthcare provider right away if you have any of these:    Pain that gets worse    Symptoms that don t get better, or get worse    New symptoms   Date Last Reviewed: 3/30/2016    5330-6903 [employer]. [address] [place] [address], [place] [address]. All rights reserved. This information is not  intended as a substitute for professional medical care. Always follow your healthcare professional's instructions.

## 2017-08-04 PROCEDURE — 82274 ASSAY TEST FOR BLOOD FECAL: CPT | Performed by: PHYSICIAN ASSISTANT

## 2017-08-06 DIAGNOSIS — Z12.11 SCREEN FOR COLON CANCER: ICD-10-CM

## 2017-08-07 LAB — HEMOCCULT STL QL IA: NEGATIVE

## 2017-10-04 ENCOUNTER — OFFICE VISIT (OUTPATIENT)
Dept: FAMILY MEDICINE | Facility: CLINIC | Age: 72
End: 2017-10-04
Payer: COMMERCIAL

## 2017-10-04 VITALS
WEIGHT: 142.6 LBS | HEART RATE: 82 BPM | TEMPERATURE: 97.1 F | OXYGEN SATURATION: 96 % | DIASTOLIC BLOOD PRESSURE: 62 MMHG | SYSTOLIC BLOOD PRESSURE: 133 MMHG | BODY MASS INDEX: 24.35 KG/M2

## 2017-10-04 DIAGNOSIS — L91.8 SKIN TAG: Primary | ICD-10-CM

## 2017-10-04 DIAGNOSIS — J31.0 CHRONIC RHINITIS, UNSPECIFIED TYPE: ICD-10-CM

## 2017-10-04 PROCEDURE — 11200 RMVL SKIN TAGS UP TO&INC 15: CPT | Performed by: FAMILY MEDICINE

## 2017-10-04 PROCEDURE — 99207 ZZC NO CHARGE LOS: CPT | Performed by: FAMILY MEDICINE

## 2017-10-04 ASSESSMENT — ANXIETY QUESTIONNAIRES
6. BECOMING EASILY ANNOYED OR IRRITABLE: NOT AT ALL
2. NOT BEING ABLE TO STOP OR CONTROL WORRYING: NOT AT ALL
1. FEELING NERVOUS, ANXIOUS, OR ON EDGE: NOT AT ALL
GAD7 TOTAL SCORE: 0
7. FEELING AFRAID AS IF SOMETHING AWFUL MIGHT HAPPEN: NOT AT ALL
5. BEING SO RESTLESS THAT IT IS HARD TO SIT STILL: NOT AT ALL
3. WORRYING TOO MUCH ABOUT DIFFERENT THINGS: NOT AT ALL

## 2017-10-04 ASSESSMENT — PATIENT HEALTH QUESTIONNAIRE - PHQ9
5. POOR APPETITE OR OVEREATING: NOT AT ALL
SUM OF ALL RESPONSES TO PHQ QUESTIONS 1-9: 0

## 2017-10-04 NOTE — NURSING NOTE
"Chief Complaint   Patient presents with     Derm Problem     moles/skin tags       Initial /62 (BP Location: Left arm, Patient Position: Chair, Cuff Size: Adult Regular)  Pulse 82  Temp 97.1  F (36.2  C) (Oral)  Wt 142 lb 9.6 oz (64.7 kg)  SpO2 96%  BMI 24.35 kg/m2 Estimated body mass index is 24.35 kg/(m^2) as calculated from the following:    Height as of 3/8/17: 5' 4.17\" (1.63 m).    Weight as of this encounter: 142 lb 9.6 oz (64.7 kg).  Medication Reconciliation: complete   Lynette Engle CMA      "

## 2017-10-04 NOTE — MR AVS SNAPSHOT
After Visit Summary   10/4/2017    Cely Ontiveros    MRN: [id number]           Patient Information     Date Of Birth          1945        Visit Information        Provider Department      10/4/2017 9:40 AM Lewis Whitten MD [facility]        Today's Diagnoses     Skin tag    -  1    Chronic rhinitis, unspecified type           Follow-ups after your visit        Who to contact     If you have questions or need follow up information about today's clinic visit or your schedule please contact [facility] directly at [phone].  Normal or non-critical lab and imaging results will be communicated to you by [employer], letter or phone within 4 business days after the clinic has received the results. If you do not hear from us within 7 days, please contact the clinic through [employer] or phone. If you have a critical or abnormal lab result, we will notify you by phone as soon as possible.  Submit refill requests through [employer] or call your pharmacy and they will forward the refill request to us. Please allow 3 business days for your refill to be completed.          Additional Information About Your Visit        MyChart Information     [employer] gives you secure access to your electronic health record. If you see a primary care provider, you can also send messages to your care team and make appointments. If you have questions, please call your primary care clinic.  If you do not have a primary care provider, please call [phone] and they will assist you.        Care EveryWhere ID     This is your Care EveryWhere ID. This could be used by other organizations to access your [facility] medical records  [id number]        Your Vitals Were     Pulse Temperature Pulse Oximetry BMI (Body Mass Index)          82 97.1  F (36.2  C) (Oral) 96% 24.35 kg/m2         Blood Pressure from Last 3 Encounters:   10/04/17 133/62   08/02/17 124/64   03/08/17 121/71    Weight from Last 3  Encounters:   10/04/17 142 lb 9.6 oz (64.7 kg)   08/02/17 145 lb 6.4 oz (66 kg)   03/08/17 148 lb (67.1 kg)              We Performed the Following     REMOVAL OF SKIN TAGS, FIRST 15          Where to get your medicines      Some of these will need a paper prescription and others can be bought over the counter.  Ask your nurse if you have questions.     Bring a paper prescription for each of these medications     loratadine-pseudoePHEDrine  MG per 24 hr tablet          Primary Care Provider Office Phone # Fax #    Sunita Andres PA-C [phone]       [address]  [facility] [address]        Equal Access to Services     HAYDEE STOCK : Hadii aad ku hadasho Somiltonali, waaxda luqadaha, qaybta kaalmada adeegyada, iona garrett. So [facility] [phone].    ATENCIÓN: Si habla español, tiene a carnes disposición servicios gratuitos de asistencia lingüística. Llame al [phone].    We comply with applicable federal civil rights laws and [place] laws. We do not discriminate on the basis of race, color, national origin, age, disability, sex, sexual orientation, or gender identity.            Thank you!     Thank you for choosing [facility]  for your care. Our goal is always to provide you with excellent care. Hearing back from our patients is one way we can continue to improve our services. Please take a few minutes to complete the written survey that you may receive in the mail after your visit with us. Thank you!             Your Updated Medication List - Protect others around you: Learn how to safely use, store and throw away your medicines at www.disposemymeds.org.          This list is accurate as of: 10/4/17 10:15 AM.  Always use your most recent med list.                   Brand Name Dispense Instructions for use Diagnosis    ciprofloxacin-dexamethasone otic suspension    CIPRODEX    7.5 mL    Place 4 drops Into the left ear 2 times daily     Chronic otitis externa of left ear, unspecified type       citalopram 20 MG tablet    celeXA    90 tablet    Take 1 tablet (20 mg) by mouth daily    Dysthymic disorder       clotrimazole 1 % cream    LOTRIMIN    30 g    Apply topically 2 times daily    Tinea cruris       clotrimazole-betamethasone cream    LOTRISONE    15 g    Apply topically 2 times daily    Special screening for malignant neoplasms, colon       fluticasone 50 MCG/ACT spray    FLONASE    48 g    Spray 1-2 sprays into both nostrils daily    Chronic rhinitis       loratadine-pseudoePHEDrine  MG per 24 hr tablet    EQL ALLERGY/CONGESTION RELIEF    90 tablet    Take 1 tablet by mouth daily    Chronic rhinitis, unspecified type       valACYclovir 500 MG tablet    VALTREX    90 tablet    Take 1 tablet (500 mg) by mouth daily    Herpes simplex virus infection

## 2017-10-04 NOTE — PROGRESS NOTES
SUBJECTIVE:   Cely Ontiveros is a 71 year old female who presents to clinic today for the following health issues:  Moles/Skin tags  Onset: 1 year- gotten worse    Description:   Location: middle back, neck, abdominal area  Number of mole: 1 mole  Painful: no   Itching: mole on the back only    Accompanying Signs & Symptoms:  Signs of infection: no    History:   History of trauma: no  Prior mole: YES  Therapies Tried and outcome: none      Problem list and histories reviewed & adjusted, as indicated.  Additional history: as documented    Patient Active Problem List   Diagnosis     Allergic rhinitis     Herpes simplex     CARDIOVASCULAR SCREENING; LDL GOAL LESS THAN 160     Degenerative joint disease     Advance care planning     Posterior vitreous detachment of both eyes     Gross hematuria     Right kidney stone     Seasonal allergic rhinitis     Hypermetropia     Astigmatism with presbyopia     Blepharitis of both eyes     Cataracts, bilateral     Plugged tear duct, od > os     Epiphora     H/O hypoglycemia     Chronic otitis externa of left ear, unspecified type     Abdominal bloating     Chronic rhinitis     Dysthymic disorder     Past Surgical History:   Procedure Laterality Date     HC ENLARGE BREAST WITH IMPLANT       HC LAP,FULGURATE/EXCISE LESIONS       HC REMOVAL OF BREAST IMPLANT       HYSTERECTOMY, PAP NO LONGER INDICATED  1998    ovaries and uterus out for benign reasons(fibroids and ovarian cyst)       Social History   Substance Use Topics     Smoking status: Never Smoker     Smokeless tobacco: Never Used     Alcohol use No     Family History   Problem Relation Age of Onset     Hypertension Mother      Arthritis Mother      Cardiovascular Mother      Circulatory Mother      HEART DISEASE Mother      Lipids Mother      Obesity Mother      OSTEOPOROSIS Mother      CANCER Mother      skin     Glaucoma Mother      Macular Degeneration Mother      Cancer - colorectal Father      CANCER Father      Macular  Degeneration Father      DIABETES No family hx of      CEREBROVASCULAR DISEASE No family hx of      Thyroid Disease No family hx of              Reviewed and updated as needed this visit by clinical staffTobacco  Allergies  Med Hx  Surg Hx  Fam Hx  Soc Hx      Reviewed and updated as needed this visit by Provider         ROS:  Constitutional, HEENT, cardiovascular, pulmonary, GI, , musculoskeletal, neuro, skin, endocrine and psych systems are negative, except as otherwise noted.      OBJECTIVE:   /62 (BP Location: Left arm, Patient Position: Chair, Cuff Size: Adult Regular)  Pulse 82  Temp 97.1  F (36.2  C) (Oral)  Wt 142 lb 9.6 oz (64.7 kg)  SpO2 96%  BMI 24.35 kg/m2  Body mass index is 24.35 kg/(m^2).  GENERAL: healthy, alert and no distress  NECK: no adenopathy, no asymmetry, masses, or scars and thyroid normal to palpation  RESP: lungs clear to auscultation - no rales, rhonchi or wheezes  CV: regular rate and rhythm, normal S1 S2, no S3 or S4, no murmur, click or rub, no peripheral edema and peripheral pulses strong  ABDOMEN: soft, nontender, no hepatosplenomegaly, no masses and bowel sounds normal  MS: no gross musculoskeletal defects noted, no edema  SKIN: 1 skin tag right periumbilical area, 1 skin tag mid-back  Diagnostic Test Results:  none     ASSESSMENT/PLAN:     1. Skin tag  2 tags removed by Dermablade w/o complications.   - REMOVAL OF SKIN TAGS, FIRST 15    2. Chronic rhinitis, unspecified type  Needed refills.  - loratadine-pseudoePHEDrine (EQL ALLERGY/CONGESTION RELIEF)  MG per 24 hr tablet; Take 1 tablet by mouth daily  Dispense: 90 tablet; Refill: 3    See Patient Instructions    Lewis Whitten MD, MD  [facility]

## 2017-10-05 ASSESSMENT — ANXIETY QUESTIONNAIRES: GAD7 TOTAL SCORE: 0

## 2018-05-04 ENCOUNTER — PRE VISIT (OUTPATIENT)
Dept: UROLOGY | Facility: CLINIC | Age: 73
End: 2018-05-04

## 2018-05-04 NOTE — TELEPHONE ENCOUNTER
PREVISIT INFORMATION                                                    Cely Ontiveros scheduled for future visit at [facility] specialty clinics.  Patient is scheduled to see Dr. Rueda on 5/8/18  Reason for visit: Incontinence  Referring provider: None  Has patient seen previous specialist? No  Medical Records:  Available in chart.  Patient was previously seen at a [facility] or [facility] facility.     REVIEW                                                      New patient packet mailed to patient: Yes  Medication reconciliation complete: No      PLAN/FOLLOW-UP NEEDED                                                      Patient Reminders Given:    Informed patient to bring an updated list of allergies, medications, pharmacy details and insurance information. Directed patient to come to the 2nd floor, check-in D for their appointment. Informed patient to call back if appointment needs to be cancelled or rescheduled at [phone].    Reminded patient to bring any outside records regarding this appointment or have them faxed to clinic at [phone].    Reminded patient to complete and bring in urology questionnaire. Also for patient to please come with a full bladder and to ask , if early to get staff member for sample.        No answer. VM not set up. Will attempt to call back at a later time.     Sunita Bowen LPN

## 2018-05-08 ENCOUNTER — OFFICE VISIT (OUTPATIENT)
Dept: UROLOGY | Facility: CLINIC | Age: 73
End: 2018-05-08
Payer: COMMERCIAL

## 2018-05-08 VITALS — DIASTOLIC BLOOD PRESSURE: 59 MMHG | OXYGEN SATURATION: 96 % | SYSTOLIC BLOOD PRESSURE: 112 MMHG | HEART RATE: 77 BPM

## 2018-05-08 DIAGNOSIS — N39.41 URGE INCONTINENCE: ICD-10-CM

## 2018-05-08 DIAGNOSIS — N94.10 DYSPAREUNIA IN FEMALE: ICD-10-CM

## 2018-05-08 DIAGNOSIS — R35.1 NOCTURIA: ICD-10-CM

## 2018-05-08 DIAGNOSIS — N32.81 URGENCY-FREQUENCY SYNDROME: Primary | ICD-10-CM

## 2018-05-08 DIAGNOSIS — R82.90 NONSPECIFIC FINDING ON EXAMINATION OF URINE: ICD-10-CM

## 2018-05-08 DIAGNOSIS — M62.89 PELVIC FLOOR DYSFUNCTION: ICD-10-CM

## 2018-05-08 DIAGNOSIS — M79.18 MYALGIA OF PELVIC FLOOR: ICD-10-CM

## 2018-05-08 LAB
ALBUMIN UR-MCNC: NEGATIVE MG/DL
APPEARANCE UR: CLEAR
BACTERIA #/AREA URNS HPF: ABNORMAL /HPF
BILIRUB UR QL STRIP: NEGATIVE
COLOR UR AUTO: ABNORMAL
GLUCOSE UR STRIP-MCNC: NEGATIVE MG/DL
HGB UR QL STRIP: ABNORMAL
KETONES UR STRIP-MCNC: NEGATIVE MG/DL
LEUKOCYTE ESTERASE UR QL STRIP: ABNORMAL
MUCOUS THREADS #/AREA URNS LPF: PRESENT /LPF
NITRATE UR QL: NEGATIVE
NON-SQ EPI CELLS #/AREA URNS LPF: ABNORMAL /LPF
PH UR STRIP: 6 PH (ref 5–7)
RBC #/AREA URNS AUTO: ABNORMAL /HPF
SOURCE: ABNORMAL
SP GR UR STRIP: 1.01 (ref 1–1.03)
UROBILINOGEN UR STRIP-MCNC: NORMAL MG/DL (ref 0–2)
WBC #/AREA URNS AUTO: ABNORMAL /HPF

## 2018-05-08 PROCEDURE — 81001 URINALYSIS AUTO W/SCOPE: CPT | Performed by: UROLOGY

## 2018-05-08 PROCEDURE — 87109 MYCOPLASMA: CPT | Performed by: UROLOGY

## 2018-05-08 PROCEDURE — 87086 URINE CULTURE/COLONY COUNT: CPT | Performed by: UROLOGY

## 2018-05-08 PROCEDURE — 99203 OFFICE O/P NEW LOW 30 MIN: CPT | Performed by: UROLOGY

## 2018-05-08 ASSESSMENT — PAIN SCALES - GENERAL: PAINLEVEL: NO PAIN (0)

## 2018-05-08 NOTE — NURSING NOTE
"Cely Ontiveros's goals for this visit include:   Chief Complaint   Patient presents with     Consult     Frequency/Urgency       She requests these members of her care team be copied on today's visit information: Yes    PCP: Sunita Andres    Referring Provider:  No referring provider defined for this encounter.    Vital signs:      BP: 112/59 Pulse: 77     SpO2: 96 %          Estimated body mass index is 24.35 kg/(m^2) as calculated from the following:    Height as of 3/8/17: 1.63 m (5' 4.17\").    Weight as of 10/4/17: 64.7 kg (142 lb 9.6 oz).            Do you need any medication refills at today's visit? Yes    post void residual  18mL    "

## 2018-05-08 NOTE — PATIENT INSTRUCTIONS
We will let you know if there is blood in the urine and you need more testing    Websites with free information:    American Urogynec[employer] Society patient website: www.voicesforpfd.org    Total Control Program: www.totalcontrolprogram.com    Please see one of the dedicated pelvic floor physical therapist ([facility] [phone])    Please return to see me in 4 months, sooner if needed or if there is blood on the urine test    It was a pleasure meeting with you today.  Thank you for allowing me and my team the privilege of caring for you today.  YOU are the reason we are here, and I truly hope we provided you with the excellent service you deserve.  Please let us know if there is anything else we can do for you so that we can be sure you are leaving completely satisfied with your care experience.

## 2018-05-08 NOTE — LETTER
Patient:  Cely Ontiveros  :   1945  MRN:     [id number]        Ms.Carolyn JESSICA Ontiveros  [address]  [place] [facility] [address]        May 9, 2018    Dear ,    We are writing to inform you of your test results.  There is no significant blood in your urine today.  Please plan on doing the physical therapy and following up as scheduled.  We will wait until all the cultures come back to see if there is anything more to do.  Please let us know if you have any other questions or concerns    Resulted Orders   UA reflex to Microscopic and Culture   Result Value Ref Range    Color Urine Straw     Appearance Urine Clear     Glucose Urine Negative NEG^Negative mg/dL    Bilirubin Urine Negative NEG^Negative    Ketones Urine Negative NEG^Negative mg/dL    Specific Gravity Urine 1.015 1.003 - 1.035    Blood Urine Small (A) NEG^Negative    pH Urine 6.0 5.0 - 7.0 pH    Protein Albumin Urine Negative NEG^Negative mg/dL    Urobilinogen mg/dL Normal 0.0 - 2.0 mg/dL    Nitrite Urine Negative NEG^Negative    Leukocyte Esterase Urine Moderate (A) NEG^Negative    Source Midstream Urine    Urine Microscopic   Result Value Ref Range    WBC Urine 10-25 (A) OTO5^0 - 5 /HPF    RBC Urine O - 2 OTO2^O - 2 /HPF    Bacteria Urine Few (A) NEG^Negative /HPF    Squamous Epithelial /LPF Urine Moderate (A) FEW^Few /LPF    Mucous Urine Present (A) NEG^Negative /LPF

## 2018-05-08 NOTE — MR AVS SNAPSHOT
After Visit Summary   5/8/2018    Cely Ontiveros    MRN: [id number]           Patient Information     Date Of Birth          1945        Visit Information        Provider Department      5/8/2018 1:30 PM Dayan Rueda MD [facility]        Today's Diagnoses     Urgency-frequency syndrome    -  1    Dyspareunia in female        Nocturia        Urge incontinence        Myalgia of pelvic floor        Pelvic floor dysfunction        Nonspecific finding on examination of urine          Care Instructions    We will let you know if there is blood in the urine and you need more testing    Websites with free information:    American Urogynecologic Society patient website: www.voicesforpfd.org    Total Control Program: www.totalcontrolprogram.com    Please see one of the dedicated pelvic floor physical therapist ([facility] [phone])    Please return to see me in 4 months, sooner if needed or if there is blood on the urine test    It was a pleasure meeting with you today.  Thank you for allowing me and my team the privilege of caring for you today.  YOU are the reason we are here, and I truly hope we provided you with the excellent service you deserve.  Please let us know if there is anything else we can do for you so that we can be sure you are leaving completely satisfied with your care experience.              Follow-ups after your visit        Additional Services     [facility] Physical Therapy Referral       **This order will print in the [facility] Scheduling Office**    Physical Therapy, Hand Therapy and Chiropractic Care are available through:    *[facility]  *[facility]  *[facility]    Call one number to schedule at any of the above locations: [phone].    Your provider has referred you to: Physical Therapy at [facility] or [facility]    Indication/Reason for Referral: Women's Health (Please  Complete Special Programs SmartList)  Onset of Illness: years  Therapy Orders: Evaluate and Treat  Special Programs: None and Women's Health: Pelvic Dysfunction: dyspareunia, myofascial tenderness of the pelvic floor, pelvic floor dysfunction  Pelvic Floor Weakness: urgency frequency, urge incontinence and  Biofeedback, E-Stim/TENS/TENS Rental if deemed appropriate by therapist, Exercise/HEP and Myofascial Release/Massage (internal)  Special Request: Exercise: Home Exercise Program  Manual Therapy: Myofascial Release/Massage (internal)  Modalities: As Indicated E-Stim/TENS    Additional Comments for the Therapist or Chiropractor: No tayo please.  Core strengthening    Please be aware that coverage of these services is subject to the terms and limitations of your health insurance plan.  Call member services at your health plan with any benefit or coverage questions.      Please bring the following to your appointment:    *Your personal calendar for scheduling future appointments  *Comfortable clothing                  Follow-up notes from your care team     Return in about 4 months (around 9/8/2018).      Your next 10 appointments already scheduled     Sep 11, 2018  1:00 PM CDT   Return Visit with Dayan Rueda MD   [facility]    [id number] [place] [address] [phone]              Who to contact     If you have questions or need follow up information about today's clinic visit or your schedule please contact [facility] directly at [phone].  Normal or non-critical lab and imaging results will be communicated to you by MyChart, letter or phone within 4 business days after the clinic has received the results. If you do not hear from us within 7 days, please contact the clinic through MyChart or phone. If you have a critical or abnormal lab result, we will notify you by phone as soon as possible.  Submit refill  requests through SQMOS or call your pharmacy and they will forward the refill request to us. Please allow 3 business days for your refill to be completed.          Additional Information About Your Visit        SQMOS Information     SQMOS gives you secure access to your electronic health record. If you see a primary care provider, you can also send messages to your care team and make appointments. If you have questions, please call your primary care clinic.  If you do not have a primary care provider, please call [phone] and they will assist you.      SQMOS is an electronic gateway that provides easy, online access to your medical records. With SQMOS, you can request a clinic appointment, read your test results, renew a prescription or communicate with your care team.     To access your existing account, please contact your [facility] or call [phone] for assistance.        Care EveryWhere ID     This is your Care EveryWhere ID. This could be used by other organizations to access your [place] medical records  [id number]        Your Vitals Were     Pulse Pulse Oximetry                77 96%           Blood Pressure from Last 3 Encounters:   05/08/18 112/59   10/04/17 133/62   08/02/17 124/64    Weight from Last 3 Encounters:   10/04/17 64.7 kg (142 lb 9.6 oz)   08/02/17 66 kg (145 lb 6.4 oz)   03/08/17 67.1 kg (148 lb)              We Performed the Following     [facility] Referral     Mycoplasma large colony culture     UA reflex to Microscopic and Culture     Ureaplasma culture [[id number]]     Urine Culture Aerobic Bacterial     Urine Microscopic        Primary Care Provider Office Phone # Fax #    Sunita Andres PA-C [phone] [address] [place] [address]        Equal Access to Services     HAYDEE STOCK AH: Marley Dueñas, manjit winn, iona butler  ah. So [facility] [phone].    ATENCIÓN: Si meliton yeboah, tiene a carnes disposición servicios gratuitos de asistencia lingüística. Amy alicia [phone].    We comply with applicable federal civil rights laws and [place] laws. We do not discriminate on the basis of race, color, national origin, age, disability, sex, sexual orientation, or gender identity.            Thank you!     Thank you for choosing [facility]  for your care. Our goal is always to provide you with excellent care. Hearing back from our patients is one way we can continue to improve our services. Please take a few minutes to complete the written survey that you may receive in the mail after your visit with us. Thank you!             Your Updated Medication List - Protect others around you: Learn how to safely use, store and throw away your medicines at www.disposemymeds.org.          This list is accurate as of 5/8/18  1:50 PM.  Always use your most recent med list.                   Brand Name Dispense Instructions for use Diagnosis    ciprofloxacin-dexamethasone otic suspension    CIPRODEX    7.5 mL    Place 4 drops Into the left ear 2 times daily    Chronic otitis externa of left ear, unspecified type       citalopram 20 MG tablet    celeXA    90 tablet    Take 1 tablet (20 mg) by mouth daily    Dysthymic disorder       clotrimazole 1 % cream    LOTRIMIN    30 g    Apply topically 2 times daily    Tinea cruris       clotrimazole-betamethasone cream    LOTRISONE    15 g    Apply topically 2 times daily    Special screening for malignant neoplasms, colon       fluticasone 50 MCG/ACT spray    FLONASE    48 g    Spray 1-2 sprays into both nostrils daily    Chronic rhinitis       loratadine-pseudoePHEDrine  MG per 24 hr tablet    EQL ALLERGY/CONGESTION RELIEF    90 tablet    Take 1 tablet by mouth daily    Chronic rhinitis, unspecified type       valACYclovir 500 MG tablet    VALTREX    90 tablet    Take 1 tablet (500 mg) by  mouth daily    Herpes simplex virus infection

## 2018-05-08 NOTE — PROGRESS NOTES
May 8, 2018    Referring Provider: Self referred    Primary Care Provider: Sunita Andres    CC: Urinary incontinence    HPI:  Cely Ontiveros is a 72 year old  female retired  who presents for evaluation of her pelvic floor symptoms.  She has a long history of urgency frequency, urge incontinence and nocturia.  She saw Dr Carballo in about  for UTIs and hematuria.  She was then diagnosed with a 0.5cm kidney stone, was recommended ESWL and she did not do it.      Drinks about 64 oz of water.  Not currently sexually active - history of dyspareunia.    Denies gross hematuria, UTI, stress incontinence, vaginal bleeding, vaginal bulge, constipation.    2 .  ALEJA for a torsed ovary    Past Medical History:   Diagnosis Date     Actinic keratosis      Allergic rhinitis      Cataract      Degenerative joint disease     cervical disease     Depression with anxiety      Herpes simplex      Hypoglycemia     eats small meals and is fine     Impingement syndrome, shoulder      Past Surgical History:   Procedure Laterality Date     HC ENLARGE BREAST WITH IMPLANT       HC LAP,FULGURATE/EXCISE LESIONS       HC REMOVAL OF BREAST IMPLANT       HYSTERECTOMY, PAP NO LONGER INDICATED      ovaries and uterus out for benign reasons(fibroids and ovarian cyst)     Social History     Social History     Marital status: Single     Spouse name: N/A     Number of children: N/A     Years of education: N/A     Occupational History      [employer]     Social History Main Topics     Smoking status: Never Smoker     Smokeless tobacco: Never Used     Alcohol use No     Drug use: No     Sexual activity: Not Currently     Other Topics Concern     Parent/Sibling W/ Cabg, Mi Or Angioplasty Before 65f 55m? No     Social History Narrative     Family History   Problem Relation Age of Onset     Hypertension Mother      Arthritis Mother      Cardiovascular Mother      Circulatory Mother      HEART DISEASE Mother      Lipids  Mother      Obesity Mother      OSTEOPOROSIS Mother      CANCER Mother      skin     Glaucoma Mother      Macular Degeneration Mother      Cancer - colorectal Father      CANCER Father      Macular Degeneration Father      DIABETES No family hx of      CEREBROVASCULAR DISEASE No family hx of      Thyroid Disease No family hx of      ROS Negative x 16 aside from what mentioned in the HPI    Allergies   Allergen Reactions     Sulfa Drugs Swelling     Cats Swelling     Lips swollen     Wool Fiber      Current Outpatient Prescriptions   Medication     ciprofloxacin-dexamethasone (CIPRODEX) otic suspension     citalopram (CELEXA) 20 MG tablet     clotrimazole (LOTRIMIN) 1 % cream     clotrimazole-betamethasone (LOTRISONE) cream     fluticasone (FLONASE) 50 MCG/ACT nasal spray     loratadine-pseudoePHEDrine (EQL ALLERGY/CONGESTION RELIEF)  MG per 24 hr tablet     valACYclovir (VALTREX) 500 MG tablet     No current facility-administered medications for this visit.      /59 (BP Location: Left arm, Patient Position: Sitting, Cuff Size: Adult Regular)  Pulse 77  SpO2 96% No LMP recorded. Patient has had a hysterectomy. There is no height or weight on file to calculate BMI.  She is alert and oriented.  She is well groomed, comfortable in no acute distress. Normal mood and affect.   Non-labored breathing. Normocephalic without masses, lesions, obvious abnormalities. Abdomen is soft, non-tender, non-distended, no CVAT.  Normal external female genitalia with some evidence slight loss in labial architecture to suggest question of lichen sclerosis.  Negative ESST.  .  She has excellent support on supine strain.  0/5 kegels.  Skin dry, warm to touch. No lower extremity edema.  Full ROM in extremities with normal gait.      PVR 18 mL by bladder scan    A/P: Cely Ontiveros is a 72 year old F with urinary urgency frequency, dyspareunia, nocturia, urge incontinence, myofascial tenderness of the pelvic floor and pelvic  floor dysfunction with history of microscopic hematuria and nephrolithiasis    We discussed how her pelvic floor symptoms are related to the physical exam findings and her pelvic floor myofascial dysfunction.  We discussed how the recommended treatment is dedicated pelvic floor therapy.  We discussed how the pelvic floor physical therapy works and patient is agreeable.  Referral was placed.      Given the history of microscopic hematuria and kidney stone will get a formal urinalysis today.  If 3-5 rbc/hpf then will recommend hematuria evaluation with cytology given her age, CT urogram to assess kidney stone and return for an office cystoscopy    Ureaplasma, mycoplasma today    RTC 4 months unless microscopic hematuria on urinalysis today    35 minutes were spent with the patient today, > 50% in counseling and coordination of care    Dayan Rueda MD MPH    Urology    CC  Patient Care Team:  Sunita Andres PA-C as PCP - General (Physician Assistant)

## 2018-05-09 LAB
BACTERIA SPEC CULT: NORMAL
SPECIMEN SOURCE: NORMAL

## 2018-05-15 LAB
BACTERIA SPEC CULT: ABNORMAL
BACTERIA SPEC CULT: ABNORMAL
Lab: ABNORMAL
Lab: ABNORMAL
SPECIMEN SOURCE: ABNORMAL
SPECIMEN SOURCE: ABNORMAL

## 2018-05-16 ENCOUNTER — TELEPHONE (OUTPATIENT)
Dept: UROLOGY | Facility: CLINIC | Age: 73
End: 2018-05-16

## 2018-05-16 NOTE — TELEPHONE ENCOUNTER
Attempted to reach patient by phone, but no answer. Left generic message requesting a return call to clinic. When patient returns call, will inform her of the 5/8/18 ureaplasma and mycoplasma results, result note and recommendations from Dr. Rueda.     Lacy Nayak RN, BSN

## 2018-05-18 NOTE — TELEPHONE ENCOUNTER
5/8/18 mycoplasma and ureaplasma results and result note reviewed by patient via my chart. Attempted to reach patient by phone, but no answer. Left message for patient stating that writer was following up and to contact the clinic with any questions or concerns. Will close encounter at this time.    Lacy Nayak RN, BSN

## 2018-06-04 ENCOUNTER — THERAPY VISIT (OUTPATIENT)
Dept: PHYSICAL THERAPY | Facility: CLINIC | Age: 73
End: 2018-06-04
Payer: COMMERCIAL

## 2018-06-04 DIAGNOSIS — M99.05 SOMATIC DYSFUNCTION OF PELVIS REGION: Primary | ICD-10-CM

## 2018-06-04 DIAGNOSIS — N39.41 URGENCY INCONTINENCE: ICD-10-CM

## 2018-06-04 DIAGNOSIS — R35.0 URINARY FREQUENCY: ICD-10-CM

## 2018-06-04 DIAGNOSIS — R39.15 URINARY URGENCY: ICD-10-CM

## 2018-06-04 PROBLEM — R10.2 PELVIC PAIN IN FEMALE: Status: ACTIVE | Noted: 2018-06-04

## 2018-06-04 PROCEDURE — G8987 SELF CARE CURRENT STATUS: HCPCS | Mod: GP | Performed by: PHYSICAL THERAPIST

## 2018-06-04 PROCEDURE — 97112 NEUROMUSCULAR REEDUCATION: CPT | Mod: GP | Performed by: PHYSICAL THERAPIST

## 2018-06-04 PROCEDURE — 97161 PT EVAL LOW COMPLEX 20 MIN: CPT | Mod: GP | Performed by: PHYSICAL THERAPIST

## 2018-06-04 PROCEDURE — G8988 SELF CARE GOAL STATUS: HCPCS | Mod: GP | Performed by: PHYSICAL THERAPIST

## 2018-06-04 PROCEDURE — 97110 THERAPEUTIC EXERCISES: CPT | Mod: GP | Performed by: PHYSICAL THERAPIST

## 2018-06-04 PROCEDURE — 97535 SELF CARE MNGMENT TRAINING: CPT | Mod: GP | Performed by: PHYSICAL THERAPIST

## 2018-06-04 NOTE — MR AVS SNAPSHOT
After Visit Summary   6/4/2018    Cely Ontiveros    MRN: [id number]           Patient Information     Date Of Birth          1945        Visit Information        Provider Department      6/4/2018 10:50 AM Kathy Unger PT [facility]        Today's Diagnoses     Somatic dysfunction of pelvis region    -  1    Urinary urgency        Urinary frequency        Urgency incontinence           Follow-ups after your visit        Your next 10 appointments already scheduled     Jun 18, 2018 10:50 AM CDT   CANDIDO For Women Only with Kathy Unger PT   [facility]] [facility] ([facility])    [address]  [facility] [address]   [phone]            Sep 11, 2018  1:00 PM CDT   Return Visit with Dayan Rueda MD   [facility]    [address] [place] [id number] [phone]              Who to contact     If you have questions or need follow up information about today's clinic visit or your schedule please contact [facility] PHYSICAL THERAPY directly at [phone].  Normal or non-critical lab and imaging results will be communicated to you by MyChart, letter or phone within 4 business days after the clinic has received the results. If you do not hear from us within 7 days, please contact the clinic through MyChart or phone. If you have a critical or abnormal lab result, we will notify you by phone as soon as possible.  Submit refill requests through [employer] or call your pharmacy and they will forward the refill request to us. Please allow 3 business days for your refill to be completed.          Additional Information About Your Visit        Skydeckhart Information     [employer] gives you secure access to your electronic health record. If you see a primary care provider, you can also send messages to  your care team and make appointments. If you have questions, please call your primary care clinic.  If you do not have a primary care provider, please call [phone] and they will assist you.        Care EveryWhere ID     This is your Care EveryWhere ID. This could be used by other organizations to access your [place] medical records  [id number]         Blood Pressure from Last 3 Encounters:   05/08/18 112/59   10/04/17 133/62   08/02/17 124/64    Weight from Last 3 Encounters:   10/04/17 64.7 kg (142 lb 9.6 oz)   08/02/17 66 kg (145 lb 6.4 oz)   03/08/17 67.1 kg (148 lb)              We Performed the Following     CANDIDO Inital Eval Report     Neuromuscular Re-Education     PT Bola, Low Complexity (23547)     Self Care Management Training     Therapeutic Exercises        Primary Care Provider Office Phone # Fax #    Sunita Prema Andres PA-C [phone]       [address] [place] [address]        Equal Access to Services     MARCELO STOCK : Hadii aad ku hadasho Soomaali, waaxda luqadaha, qaybta kaalmada adeegyada, waxay harmony haytasha vicente . So [facility] [phone].    ATENCIÓN: Si habla español, tiene a carnes disposición servicios gratuitos de asistencia lingüística. LlUniversity [facility] [phone].    We comply with applicable federal civil rights laws and [place] laws. We do not discriminate on the basis of race, color, national origin, age, disability, sex, sexual orientation, or gender identity.            Thank you!     Thank you for choosing [facility] PHYSICAL THERAP[facility]  for your care. Our goal is always to provide you with excellent care. Hearing back from our patients is one way we can continue to improve our services. Please take a few minutes to complete the written survey that you may receive in the mail after your visit with us. Thank you!             Your Updated Medication List - Protect others around you: Learn how to safely use, store and  throw away your medicines at www.disposemymeds.org.          This list is accurate as of 6/4/18 12:08 PM.  Always use your most recent med list.                   Brand Name Dispense Instructions for use Diagnosis    ciprofloxacin-dexamethasone otic suspension    CIPRODEX    7.5 mL    Place 4 drops Into the left ear 2 times daily    Chronic otitis externa of left ear, unspecified type       citalopram 20 MG tablet    celeXA    90 tablet    Take 1 tablet (20 mg) by mouth daily    Dysthymic disorder       clotrimazole 1 % cream    LOTRIMIN    30 g    Apply topically 2 times daily    Tinea cruris       clotrimazole-betamethasone cream    LOTRISONE    15 g    Apply topically 2 times daily    Special screening for malignant neoplasms, colon       fluticasone 50 MCG/ACT spray    FLONASE    48 g    Spray 1-2 sprays into both nostrils daily    Chronic rhinitis       loratadine-pseudoePHEDrine  MG per 24 hr tablet    EQL ALLERGY/CONGESTION RELIEF    90 tablet    Take 1 tablet by mouth daily    Chronic rhinitis, unspecified type       valACYclovir 500 MG tablet    VALTREX    90 tablet    Take 1 tablet (500 mg) by mouth daily    Herpes simplex virus infection

## 2018-06-04 NOTE — PROGRESS NOTES
[facility] Initial Evaluation  Subjective:  Patient is a 72 year old female presenting with rehab pelvic hpi. The history is provided by the patient. No  was used.   Cely Ontiveros is a 72 year old female with a pelvic dysfunction ( frequency/urgency) condition.  Condition occurred with:  Insidious onset.  Condition occurred: for unknown reasons.  This is a chronic condition  Reports onset of increased bladder urgency/frequency about 1 year ago. She reports voiding about 20 x daily and 3x per night. She did have urge incontinence about 1x per week, moderate amount of leakage however this has been better in the past month. No leakage for past month since trying to do the kegals. MD order dated 5/8/18.    Patient reports pain:  Other (none).           Symptoms are exacerbated by other (diet/ fluid amounts) and relieved by other (doing kegals).  Since onset symptoms are gradually improving.        General health as reported by patient is excellent.  Pertinent medical history includes:  None.  Medical allergies: none.  Other surgeries include:  Other (hysterectomy 1998).  Current medications:  Anti-depressants.  Current occupation is retired.            Red flags:  None as reported by the patient.                        Objective:  System                                 Pelvic Dysfunction Evaluation:    Bladder/Pelvic Problems:    Storage Problem:  Frequency, urgency, urge incontinence and nocturia  Emptying Problem:  Postvoid dribbling      Diagnostic Tests:      UA/Urine Sample:  No infections in past year                    Flexibility:  normal      Abdominal Wall:  Abdominal wall pelvic: fair TrA recruitment in supine.  Diastasis Recti:  Normal      Pelvic Clock Exam:    Ischiocavernosis pain:  -  Bulbocavernosis pain:  -  Transverse Perineal:  +/-  Levator ANI:  +/-  Perineal Body:  -      External Assessment:    Skin Condition:  Normal    Bearing Down/Coughing:   "Normal  Tissue Symmetry:  Normal  Introitus:  Normal  Muscle Contraction/Perineal Mobility:  No movement and substitution  Internal Assessment:    Sensory Exam:  Normal  Contraction/Grade:  Fllicker muscles stretched (1)  Accessory Muscle use-Abdominals:  Yes  Accessory Muscle use-Gluteals:  Yes  Accessory Muscle use-Adductors:  Yes    SEMG Biofeedback:    Equipment:  Orphazyme electrode placement--Perianal:  Yes  Baseline EMG PM:  1.2 mv    Peak pelvic muscle contraction:  4.2 mv  Sustained contraction:  <2-3\"    Position:  SupineAdditional History:  Delivery History:  Vaginal delivery  Number of Pregnancies: 2  Number of Live Births: 2  Caffeine Consumption:  None                     General     ROS    Assessment/Plan:    Patient is a 72 year old female with pelvic complaints.    Patient has the following significant findings with corresponding treatment plan.                Diagnosis 1:  PFM dysfunction, bladder urgency/frequency, urge incontinence  Decreased strength - therapeutic exercise, therapeutic activities and home program  Decreased proprioception - neuro re-education, therapeutic activities and home program  Impaired muscle performance - biofeedback and neuro re-education home program  Decreased function - therapeutic activities, home program Impaired posture - neuro re-education and home program    Therapy Evaluation Codes:   1) History comprised of:   Personal factors that impact the plan of care:      None.    Comorbidity factors that impact the plan of care are:      None.     Medications impacting care: None.  2) Examination of Body Systems comprised of:   Body structures and functions that impact the plan of care:      Pelvis.   Activity limitations that impact the plan of care are:      Frequency, Urgency and Urge incontinence.  3) Clinical presentation characteristics are:   Stable/Uncomplicated.  4) Decision-Making    Low complexity using standardized patient assessment instrument " and/or measureable assessment of functional outcome.  Cumulative Therapy Evaluation is: Low complexity.    Previous and current functional limitations:  (See Goal Flow Sheet for this information)    Short term and Long term goals: (See Goal Flow Sheet for this information)     Communication ability:  Patient appears to be able to clearly communicate and understand verbal and written communication and follow directions correctly.  Treatment Explanation - The following has been discussed with the patient:   RX ordered/plan of care  Anticipated outcomes  Possible risks and side effects  This patient would benefit from PT intervention to resume normal activities.   Rehab potential is good.    Frequency:  1 X week, once daily  Duration:  for 2 weeks then 2x month for 2 months  Discharge Plan:  Achieve all LTG.  Independent in home treatment program.  Reach maximal therapeutic benefit.    Please refer to the daily flowsheet for treatment today, total treatment time and time spent performing 1:1 timed codes.

## 2018-06-18 ENCOUNTER — THERAPY VISIT (OUTPATIENT)
Dept: PHYSICAL THERAPY | Facility: CLINIC | Age: 73
End: 2018-06-18
Payer: COMMERCIAL

## 2018-06-18 DIAGNOSIS — R10.2 PELVIC PAIN IN FEMALE: ICD-10-CM

## 2018-06-18 DIAGNOSIS — R39.15 URINARY URGENCY: ICD-10-CM

## 2018-06-18 DIAGNOSIS — N39.41 URGENCY INCONTINENCE: ICD-10-CM

## 2018-06-18 DIAGNOSIS — M99.05 SOMATIC DYSFUNCTION OF PELVIS REGION: ICD-10-CM

## 2018-06-18 DIAGNOSIS — R35.0 URINARY FREQUENCY: ICD-10-CM

## 2018-06-18 PROCEDURE — 97110 THERAPEUTIC EXERCISES: CPT | Mod: GP | Performed by: PHYSICAL THERAPIST

## 2018-06-18 PROCEDURE — 97112 NEUROMUSCULAR REEDUCATION: CPT | Mod: GP | Performed by: PHYSICAL THERAPIST

## 2018-06-18 PROCEDURE — 97530 THERAPEUTIC ACTIVITIES: CPT | Mod: GP | Performed by: PHYSICAL THERAPIST

## 2018-06-18 NOTE — PROGRESS NOTES
Subjective:  HPI                    Objective:  System    Physical Exam    General     ROS    Assessment/Plan:    SUBJECTIVE  Subjective changes as noted by pt:  No urine leakage in the past 2 weeks. I'm doing the kegal 5x daily and think I'm doing it correctly.     Current pain level: NA     Changes in function:  Yes (See Goal flowsheet attached for changes in current functional level)     Adverse reaction to treatment or activity:  None    OBJECTIVE  Changes in objective findings:  Minimal change in PFM strength with MMT : 1/5  Advanced to core training exercises( good TrA engagement in supine, roll outs/plie and instructed in fluid diet management.         ASSESSMENT  Cely continues to require intervention to meet STG and LTG's: PT  Patient is progressing as expected.  Patient is becoming more independent in home exercise program  Response to therapy has shown an improvement in  incontinence and function  Progress made towards STG/LTG?  Yes (See Goal flowsheet attached for updates on achievement of STG and LTG)    PLAN  Current treatment program is being advanced to more complex exercises.  The following procedures have been added:  strengthening, neuromuscular re-education and therapeutic activities    PTA/ATC plan:  N/A    Please refer to the daily flowsheet for treatment today, total treatment time and time spent performing 1:1 timed codes.

## 2018-06-18 NOTE — MR AVS SNAPSHOT
After Visit Summary   6/18/2018    Cely Ontiveros    MRN: [id number]           Patient Information     Date Of Birth          1945        Visit Information        Provider Department      6/18/2018 10:50 AM Kathy Unger, PT [facility]        Today's Diagnoses     Somatic dysfunction of pelvis region        Pelvic pain in female        Urinary urgency        Urinary frequency        Urgency incontinence           Follow-ups after your visit        Your next 10 appointments already scheduled     Jul 02, 2018 10:50 AM CDT   CANDIDO For Women Only with Kathy Unger PT   [facility] Physical Therapy ([facility])    [address]  [facility] [address]   [phone]            Jul 16, 2018 10:10 AM CDT   CANDIDO For Women Only with Kathy Unger PT   [facility] Therapy ([facility])    [address]  [facility] [address]   [phone]            Sep 11, 2018  1:00 PM CDT   Return Visit with Dayan Rueda MD   [facility] ([facility])    [address] [place] [address] [phone]              Who to contact     If you have questions or need follow up information about today's clinic visit or your schedule please contact [facility] directly at [phone].  Normal or non-critical lab and imaging results will be communicated to you by MyChart, letter or phone within 4 business days after the clinic has received the results. If you do not hear from us within 7 days, please contact the clinic through MyChart or phone. If you have a critical or abnormal lab result, we will notify you by phone as soon as possible.  Submit refill requests through EverySignal or call your pharmacy and they will forward the refill request to us. Please allow  3 business days for your refill to be completed.          Additional Information About Your Visit        MyChart Information     [facility]hart gives you secure access to your electronic health record. If you see a primary care provider, you can also send messages to your care team and make appointments. If you have questions, please call your primary care clinic.  If you do not have a primary care provider, please call [phone] and they will assist you.        Care EveryWhere ID     This is your Care EveryWhere ID. This could be used by other organizations to access your [place] medical records  [id number]         Blood Pressure from Last 3 Encounters:   05/08/18 112/59   10/04/17 133/62   08/02/17 124/64    Weight from Last 3 Encounters:   10/04/17 64.7 kg (142 lb 9.6 oz)   08/02/17 66 kg (145 lb 6.4 oz)   03/08/17 67.1 kg (148 lb)              We Performed the Following     Neuromuscular Re-Education     Therapeutic Activities     Therapeutic Exercises        Primary Care Provider Office Phone # Fax #    Sunita Andres PA-C [phone] [address] [place] [address]        Equal Access to Services     [facility]: Hadii aad ku hadasho Somiltonali, waaxda luqadaha, qaybta kaalmada adeegyada, iona garrett. So [facility] [phone].    ATENCIÓN: Si habla español, tiene a carnes disposición servicios gratuitos de asistencia lingüística. Llame al [phone].    We comply with applicable federal civil rights laws and [place] laws. We do not discriminate on the basis of race, color, national origin, age, disability, sex, sexual orientation, or gender identity.            Thank you!     Thank you for choosing [facility] PHYSICAL THERAPY  for your care. Our goal is always to provide you with excellent care. Hearing back from our patients is one way we can continue to improve our services. Please take a few minutes to complete the  written survey that you may receive in the mail after your visit with us. Thank you!             Your Updated Medication List - Protect others around you: Learn how to safely use, store and throw away your medicines at www.disposemymeds.org.          This list is accurate as of 6/18/18 11:40 AM.  Always use your most recent med list.                   Brand Name Dispense Instructions for use Diagnosis    ciprofloxacin-dexamethasone otic suspension    CIPRODEX    7.5 mL    Place 4 drops Into the left ear 2 times daily    Chronic otitis externa of left ear, unspecified type       citalopram 20 MG tablet    celeXA    90 tablet    Take 1 tablet (20 mg) by mouth daily    Dysthymic disorder       clotrimazole 1 % cream    LOTRIMIN    30 g    Apply topically 2 times daily    Tinea cruris       clotrimazole-betamethasone cream    LOTRISONE    15 g    Apply topically 2 times daily    Special screening for malignant neoplasms, colon       fluticasone 50 MCG/ACT spray    FLONASE    48 g    Spray 1-2 sprays into both nostrils daily    Chronic rhinitis       loratadine-pseudoePHEDrine  MG per 24 hr tablet    EQL ALLERGY/CONGESTION RELIEF    90 tablet    Take 1 tablet by mouth daily    Chronic rhinitis, unspecified type       valACYclovir 500 MG tablet    VALTREX    90 tablet    Take 1 tablet (500 mg) by mouth daily    Herpes simplex virus infection

## 2018-07-02 ENCOUNTER — THERAPY VISIT (OUTPATIENT)
Dept: PHYSICAL THERAPY | Facility: CLINIC | Age: 73
End: 2018-07-02
Payer: COMMERCIAL

## 2018-07-02 DIAGNOSIS — M99.05 SOMATIC DYSFUNCTION OF PELVIS REGION: ICD-10-CM

## 2018-07-02 DIAGNOSIS — R10.2 PELVIC PAIN IN FEMALE: ICD-10-CM

## 2018-07-02 DIAGNOSIS — R39.15 URINARY URGENCY: ICD-10-CM

## 2018-07-02 DIAGNOSIS — N39.41 URGENCY INCONTINENCE: ICD-10-CM

## 2018-07-02 DIAGNOSIS — R35.0 URINARY FREQUENCY: ICD-10-CM

## 2018-07-02 PROCEDURE — 97112 NEUROMUSCULAR REEDUCATION: CPT | Mod: GP | Performed by: PHYSICAL THERAPIST

## 2018-07-02 PROCEDURE — 97110 THERAPEUTIC EXERCISES: CPT | Mod: GP | Performed by: PHYSICAL THERAPIST

## 2018-07-02 NOTE — PROGRESS NOTES
Subjective:  HPI                    Objective:  System    Physical Exam    General     ROS    Assessment/Plan:    SUBJECTIVE  Subjective changes as noted by pt:  No urine leakage in past 2 weeks and more time between voids/less urgency. HEP consistent on daily basis.     Current pain level: NA     Changes in function:  Yes (See Goal flowsheet attached for changes in current functional level)     Adverse reaction to treatment or activity:  None    OBJECTIVE  Changes in objective findings: yes, increased peak PFM strength with biofeedback in supine and trial in sitting  MMT PFM's: 1+/5 - 2-/5    Advanced with core strengthening and added gluteal strength ex. with bridge- ayesha well        ASSESSMENT  Cely continues to require intervention to meet STG and LTG's: PT  Patient's symptoms are resolving.  Patient is progressing as expected.  Patient is becoming more independent in home exercise program  Response to therapy has shown an improvement in  muscle control, posture, incontinence and function  Progress made towards STG/LTG?  Yes (See Goal flowsheet attached for updates on achievement of STG and LTG)    PLAN  Current treatment program is being advanced to more complex exercises.  The following procedures have been added:  strengthening and neuromuscular re-education    PTA/ATC plan:  N/A    Please refer to the daily flowsheet for treatment today, total treatment time and time spent performing 1:1 timed codes.

## 2018-07-02 NOTE — MR AVS SNAPSHOT
After Visit Summary   7/2/2018    Cely Ontiveros    MRN: [id number]           Patient Information     Date Of Birth          1945        Visit Information        Provider Department      7/2/2018 10:50 AM Kathy Unger PT [facility] Therapy        Today's Diagnoses     Somatic dysfunction of pelvis region        Pelvic pain in female        Urinary urgency        Urinary frequency        Urgency incontinence           Follow-ups after your visit        Your next 10 appointments already scheduled     Jul 16, 2018 10:10 AM CDT   CANDIDO For Women Only with Kathy Unger PT   [facility] Athletic [facility] Therapy ([facility])    [address]  [facility] [address]   [phone]            Sep 11, 2018  1:00 PM CDT   Return Visit with Dayan Rueda MD   [facility] ([facility])    [address] [place] [id number] [phone]              Who to contact     If you have questions or need follow up information about today's clinic visit or your schedule please contact [facility] ATHLETIC [facility] PHYSICAL THERAPY directly at [phone].  Normal or non-critical lab and imaging results will be communicated to you by MyChart, letter or phone within 4 business days after the clinic has received the results. If you do not hear from us within 7 days, please contact the clinic through MyChart or phone. If you have a critical or abnormal lab result, we will notify you by phone as soon as possible.  Submit refill requests through [employer] or call your pharmacy and they will forward the refill request to us. Please allow 3 business days for your refill to be completed.          Additional Information About Your Visit        [employer]hart Information     [employer] gives you secure access to your electronic health record. If you see a primary care provider, you  can also send messages to your care team and make appointments. If you have questions, please call your primary care clinic.  If you do not have a primary care provider, please call [phone] and they will assist you.        Care EveryWhere ID     This is your Care EveryWhere ID. This could be used by other organizations to access your [place] medical records  [id number]         Blood Pressure from Last 3 Encounters:   05/08/18 112/59   10/04/17 133/62   08/02/17 124/64    Weight from Last 3 Encounters:   10/04/17 64.7 kg (142 lb 9.6 oz)   08/02/17 66 kg (145 lb 6.4 oz)   03/08/17 67.1 kg (148 lb)              We Performed the Following     Neuromuscular Re-Education     Therapeutic Exercises        Primary Care Provider Office Phone # Fax #    Sunita Andres PA-C [phone] [address] [place] [address]        Equal Access to Services     [facility]: Hadii aad ku hadasho Soomaali, waaxda luqadaha, qaybta kaalmada adeegyada, waxay idiin haycatrachitan radha vicente . So [facility] [phone].    ATENCIÓN: Si habla español, tiene a carnes disposición servicios gratuitos de asistencia lingüística. LlDayton [facility] Medical [facility] [phone].    We comply with applicable federal civil rights laws and [place] laws. We do not discriminate on the basis of race, color, national origin, age, disability, sex, sexual orientation, or gender identity.            Thank you!     Thank you for choosing [facility] PHYSICAL THERAP[facility]  for your care. Our goal is always to provide you with excellent care. Hearing back from our patients is one way we can continue to improve our services. Please take a few minutes to complete the written survey that you may receive in the mail after your visit with us. Thank you!             Your Updated Medication List - Protect others around you: Learn how to safely use, store and throw away your medicines at www.disposemymeds.org.          This list is  accurate as of 7/2/18 11:34 AM.  Always use your most recent med list.                   Brand Name Dispense Instructions for use Diagnosis    ciprofloxacin-dexamethasone otic suspension    CIPRODEX    7.5 mL    Place 4 drops Into the left ear 2 times daily    Chronic otitis externa of left ear, unspecified type       citalopram 20 MG tablet    celeXA    90 tablet    Take 1 tablet (20 mg) by mouth daily    Dysthymic disorder       clotrimazole 1 % cream    LOTRIMIN    30 g    Apply topically 2 times daily    Tinea cruris       clotrimazole-betamethasone cream    LOTRISONE    15 g    Apply topically 2 times daily    Special screening for malignant neoplasms, colon       fluticasone 50 MCG/ACT spray    FLONASE    48 g    Spray 1-2 sprays into both nostrils daily    Chronic rhinitis       loratadine-pseudoePHEDrine  MG per 24 hr tablet    EQL ALLERGY/CONGESTION RELIEF    90 tablet    Take 1 tablet by mouth daily    Chronic rhinitis, unspecified type       valACYclovir 500 MG tablet    VALTREX    90 tablet    Take 1 tablet (500 mg) by mouth daily    Herpes simplex virus infection

## 2018-07-16 ENCOUNTER — THERAPY VISIT (OUTPATIENT)
Dept: PHYSICAL THERAPY | Facility: CLINIC | Age: 73
End: 2018-07-16
Payer: COMMERCIAL

## 2018-07-16 DIAGNOSIS — M99.05 SOMATIC DYSFUNCTION OF PELVIS REGION: ICD-10-CM

## 2018-07-16 DIAGNOSIS — R39.15 URINARY URGENCY: ICD-10-CM

## 2018-07-16 DIAGNOSIS — R10.2 PELVIC PAIN IN FEMALE: ICD-10-CM

## 2018-07-16 DIAGNOSIS — N39.41 URGENCY INCONTINENCE: ICD-10-CM

## 2018-07-16 DIAGNOSIS — R35.0 URINARY FREQUENCY: ICD-10-CM

## 2018-07-16 PROCEDURE — 97110 THERAPEUTIC EXERCISES: CPT | Mod: GP | Performed by: PHYSICAL THERAPIST

## 2018-07-16 PROCEDURE — 97530 THERAPEUTIC ACTIVITIES: CPT | Mod: GP | Performed by: PHYSICAL THERAPIST

## 2018-07-16 PROCEDURE — 97112 NEUROMUSCULAR REEDUCATION: CPT | Mod: GP | Performed by: PHYSICAL THERAPIST

## 2018-07-16 NOTE — PROGRESS NOTES
Subjective:  HPI                    Objective:  System    Physical Exam    General     ROS    Assessment/Plan:    SUBJECTIVE  Subjective changes as noted by pt:  No urine leakage in the past month and voiding every 2 hours. HEP is going well.  Doing the kegel exercise about 4x daily not in standing though sit or laying.    Current pain level: NA     Changes in function:  Yes (See Goal flowsheet attached for changes in current functional level)     Adverse reaction to treatment or activity:  None    OBJECTIVE  Changes in objective findings:  Yes, improved TrA recruitment and pelvic stabilization with core and bridging exercises, advanced with standing side step and squats, body mechanics with bending/lifting.  Advised 5x daily with kegel exercise.  Re-check with biofeedback next visit with AR      ASSESSMENT  Cely continues to require intervention to meet STG and LTG's: PT  Patient's symptoms are resolving.  Patient is progressing as expected.  Patient is becoming more independent in home exercise program  Response to therapy has shown an improvement in  strength, muscle control, posture, incontinence and function  Progress made towards STG/LTG?  Yes (See Goal flowsheet attached for updates on achievement of STG and LTG)    PLAN  Current treatment program is being advanced to more complex exercises.  The following procedures have been added:  strengthening, neuromuscular re-education, body mechanics and therapeutic activities    PTA/ATC plan:  N/A    Please refer to the daily flowsheet for treatment today, total treatment time and time spent performing 1:1 timed codes.

## 2018-07-16 NOTE — MR AVS SNAPSHOT
After Visit Summary   7/16/2018    Cely Ontiveros    MRN: [id number]           Patient Information     Date Of Birth          1945        Visit Information        Provider Department      7/16/2018 10:10 AM Kathy Unger PT [facility] Physical Therapy        Today's Diagnoses     Somatic dysfunction of pelvis region        Pelvic pain in female        Urinary urgency        Urinary frequency        Urgency incontinence           Follow-ups after your visit        Your next 10 appointments already scheduled     Aug 08, 2018 10:10 AM CDT   [facility] For Women Only with Kathy Unger PT   [facility] Physical Therapy ([facility])    [address]  [facility] [id number] [phone]            Sep 11, 2018  1:00 PM CDT   [facility] Visit with Dayan [facility]ueda MD   [facility]    [address] [place] [address] [phone]              Who to contact     If you have questions or need follow up information about today's clinic visit or your schedule please contact [facility] PHYSICAL THERAPY directly at [phone].  Normal or non-critical lab and imaging results will be communicated to you by MyChart, letter or phone within 4 business days after the clinic has received the results. If you do not hear from us within 7 days, please contact the clinic through [employer] or phone. If you have a critical or abnormal lab result, we will notify you by phone as soon as possible.  Submit refill requests through [employer] or call your pharmacy and they will forward the refill request to us. Please allow 3 business days for your refill to be completed.          Additional Information About Your Visit        [employer] Information     [employer] gives you secure access to your electronic health record. If you see a primary care provider,  you can also send messages to your care team and make appointments. If you have questions, please call your primary care clinic.  If you do not have a primary care provider, please call [phone] and they will assist you.        Care EveryWhere ID     This is your Care EveryWhere ID. This could be used by other organizations to access your [facility] medical records  [id number]         Blood Pressure from Last 3 Encounters:   05/08/18 112/59   10/04/17 133/62   08/02/17 124/64    Weight from Last 3 Encounters:   10/04/17 64.7 kg (142 lb 9.6 oz)   08/02/17 66 kg (145 lb 6.4 oz)   03/08/17 67.1 kg (148 lb)              We Performed the Following     Neuromuscular Re-Education     Therapeutic Activities     Therapeutic Exercises        Primary Care Provider Office Phone # Fax #    Sunita Andres PA-C [phone] [address] [place] [address]        Equal Access to Services     MARCELO STOCK : Hadii aad ku hadasho Soomaali, waaxda luqadaha, qaybta kaalmada adeegyada, waxay idiin haycatrachitan radha vicente . So [facility] [phone].    ATENCIÓN: Si habla español, tiene a carnes disposición servicios gratuitos de asistencia lingüística. [facility] [phone].    We comply with applicable federal civil rights laws and [place] laws. We do not discriminate on the basis of race, color, national origin, age, disability, sex, sexual orientation, or gender identity.            Thank you!     Thank you for choosing [facility] PHYSICAL THERAP[facility]  for your care. Our goal is always to provide you with excellent care. Hearing back from our patients is one way we can continue to improve our services. Please take a few minutes to complete the written survey that you may receive in the mail after your visit with us. Thank you!             Your Updated Medication List - Protect others around you: Learn how to safely use, store and throw away your medicines at  www.disposemymeds.org.          This list is accurate as of 7/16/18 10:51 AM.  Always use your most recent med list.                   Brand Name Dispense Instructions for use Diagnosis    ciprofloxacin-dexamethasone otic suspension    CIPRODEX    7.5 mL    Place 4 drops Into the left ear 2 times daily    Chronic otitis externa of left ear, unspecified type       citalopram 20 MG tablet    celeXA    90 tablet    Take 1 tablet (20 mg) by mouth daily    Dysthymic disorder       clotrimazole 1 % cream    LOTRIMIN    30 g    Apply topically 2 times daily    Tinea cruris       clotrimazole-betamethasone cream    LOTRISONE    15 g    Apply topically 2 times daily    Special screening for malignant neoplasms, colon       fluticasone 50 MCG/ACT spray    FLONASE    48 g    Spray 1-2 sprays into both nostrils daily    Chronic rhinitis       loratadine-pseudoePHEDrine  MG per 24 hr tablet    EQL ALLERGY/CONGESTION RELIEF    90 tablet    Take 1 tablet by mouth daily    Chronic rhinitis, unspecified type       valACYclovir 500 MG tablet    VALTREX    90 tablet    Take 1 tablet (500 mg) by mouth daily    Herpes simplex virus infection

## 2018-08-08 ENCOUNTER — THERAPY VISIT (OUTPATIENT)
Dept: PHYSICAL THERAPY | Facility: CLINIC | Age: 73
End: 2018-08-08
Payer: COMMERCIAL

## 2018-08-08 DIAGNOSIS — R10.2 PELVIC PAIN IN FEMALE: ICD-10-CM

## 2018-08-08 DIAGNOSIS — R39.15 URINARY URGENCY: ICD-10-CM

## 2018-08-08 DIAGNOSIS — M99.05 SOMATIC DYSFUNCTION OF PELVIS REGION: ICD-10-CM

## 2018-08-08 DIAGNOSIS — R35.0 URINARY FREQUENCY: ICD-10-CM

## 2018-08-08 DIAGNOSIS — N39.41 URGENCY INCONTINENCE: ICD-10-CM

## 2018-08-08 PROCEDURE — 97112 NEUROMUSCULAR REEDUCATION: CPT | Mod: GP | Performed by: PHYSICAL THERAPIST

## 2018-08-08 PROCEDURE — G8987 SELF CARE CURRENT STATUS: HCPCS | Mod: GP | Performed by: PHYSICAL THERAPIST

## 2018-08-08 PROCEDURE — 97110 THERAPEUTIC EXERCISES: CPT | Mod: GP | Performed by: PHYSICAL THERAPIST

## 2018-08-08 PROCEDURE — G8988 SELF CARE GOAL STATUS: HCPCS | Mod: GP | Performed by: PHYSICAL THERAPIST

## 2018-08-08 NOTE — PROGRESS NOTES
"Subjective:  HPI                    Objective:  System    Physical Exam    General     ROS    Assessment/Plan:    PROGRESS  REPORT    Progress reporting period is from 6/4/18 to 8/8/18.       SUBJECTIVE  Subjective changes noted by patient:  Patient reports less bladder urgency, and frequency of voiding in day and night. She usually voids 1-2 times per night and every 2-3 hours in the day. She denies any pelvic discomfort or urge incontinence in the past 2 months. She is doing the home exercises consistently and feels more control of her pelvic floor muscles. She is also doing the core exercises 3 times per week.       Current pain level is NA  .     Previous pain level was  NA  .   Changes in function:  Yes (See Goal flowsheet attached for changes in current functional level)  Adverse reaction to treatment or activity: None    OBJECTIVE  Changes noted in objective findings:  Yes, increased pelvic floor muscle awareness on internal assessment- MMT levator ani 2-/5 with 2-3\" hold and good relaxation phase with PFMC.  Biofeedback assessment showed slight improvement/strength of the pelvic floor muscles and improved ability to relax the pelvic floor muscles.  Minimal point tenderness with internal palpation of the levator ani.  Pt displays good neutral sitting and standing posture with good recruitment of the transversus abdominus with bending and lifting.        ASSESSMENT/PLAN  Updated problem list and treatment plan: Diagnosis 1:  PFM dysfunction, urgency/frequency  Decreased strength - therapeutic exercise, therapeutic activities and home program  Impaired muscle performance - biofeedback, neuro re-education and home program  Decreased function - therapeutic activities and home program  STG/LTGs have been met or progress has been made towards goals:  Yes (See Goal flow sheet completed today.)  Assessment of Progress: The patient's condition is improving.  Patient is meeting short term goals and is progressing " towards long term goals.  Self Management Plans:  Patient is independent in a home treatment program.  Patient is independent in self management of symptoms.  I have re-evaluated this patient and find that the nature, scope, duration and intensity of the therapy is appropriate for the medical condition of the patient.  Cely continues to require the following intervention to meet STG and LTG's:  PT    Recommendations:  Patient will continue HEP and Follow-up with MD in September.  discontinue with HEP  Please refer to the daily flowsheet for treatment today, total treatment time and time spent performing 1:1 timed codes.

## 2018-08-08 NOTE — MR AVS SNAPSHOT
After Visit Summary   8/8/2018    Cely Ontiveros    MRN: [id number]           Patient Information     Date Of Birth          1945        Visit Information        Provider Department      8/8/2018 10:10 AM Kathy Unger PT [place] [facility] Physical Therapy        Today's Diagnoses     Somatic dysfunction of pelvis region        Pelvic pain in female        Urinary urgency        Urinary frequency        Urgency incontinence           Follow-ups after your visit        Your next 10 appointments already scheduled     Sep 11, 2018  1:00 PM CDT   Return Visit with Dayan Rueda MD   [facility]    [address] [place] [address] [phone]              Who to contact     If you have questions or need follow up information about today's clinic visit or your schedule please contact [place] [facility] PHYSICAL THERAP[facility] directly at [phone].  Normal or non-critical lab and imaging results will be communicated to you by MyChart, letter or phone within 4 business days after the clinic has received the results. If you do not hear from us within 7 days, please contact the clinic through [employer] or phone. If you have a critical or abnormal lab result, we will notify you by phone as soon as possible.  Submit refill requests through [employer] or call your pharmacy and they will forward the refill request to us. Please allow 3 business days for your refill to be completed.          Additional Information About Your Visit        MyChart Information     [employer] gives you secure access to your electronic health record. If you see a primary care provider, you can also send messages to your care team and make appointments. If you have questions, please call your primary care clinic.  If you do not have a primary care provider, please call [phone] and they will assist you.        Care  EveryWhere ID     This is your Care EveryWhere ID. This could be used by other organizations to access your [facility] medical records  [id number]         Blood Pressure from Last 3 Encounters:   05/08/18 112/59   10/04/17 133/62   08/02/17 124/64    Weight from Last 3 Encounters:   10/04/17 64.7 kg (142 lb 9.6 oz)   08/02/17 66 kg (145 lb 6.4 oz)   03/08/17 67.1 kg (148 lb)              We Performed the Following     [facility] Progress Notes Report     Neuromuscular Re-Education     Therapeutic Exercises        Primary Care Provider Office Phone # Fax #    Sunita Prema Andres PA-C [phone] [address] [place] [address]        Equal Access to Services     HAYDEE STOCK : Hadii johnathon asho Sojenna, waaxda luqadaha, qaybta kaalmada adeegyada, waxay harmony haytasha vicente . So [facility] [phone].    ATENCIÓN: Si habla español, tiene a carnes disposición servicios gratuitos de asistencia lingüística. [facility] [phone].    We comply with applicable federal civil rights laws and [place] laws. We do not discriminate on the basis of race, color, national origin, age, disability, sex, sexual orientation, or gender identity.            Thank you!     Thank you for choosing [facility] PHYSICAL THERAPY  for your care. Our goal is always to provide you with excellent care. Hearing back from our patients is one way we can continue to improve our services. Please take a few minutes to complete the written survey that you may receive in the mail after your visit with us. Thank you!             Your Updated Medication List - Protect others around you: Learn how to safely use, store and throw away your medicines at www.disposemymeds.org.          This list is accurate as of 8/8/18 12:40 PM.  Always use your most recent med list.                   Brand Name Dispense Instructions for use Diagnosis    ciprofloxacin-dexamethasone otic suspension    CIPRODEX     7.5 mL    Place 4 drops Into the left ear 2 times daily    Chronic otitis externa of left ear, unspecified type       citalopram 20 MG tablet    celeXA    90 tablet    Take 1 tablet (20 mg) by mouth daily    Dysthymic disorder       clotrimazole 1 % cream    LOTRIMIN    30 g    Apply topically 2 times daily    Tinea cruris       clotrimazole-betamethasone cream    LOTRISONE    15 g    Apply topically 2 times daily    Special screening for malignant neoplasms, colon       fluticasone 50 MCG/ACT spray    FLONASE    48 g    Spray 1-2 sprays into both nostrils daily    Chronic rhinitis       loratadine-pseudoePHEDrine  MG per 24 hr tablet    EQL ALLERGY/CONGESTION RELIEF    90 tablet    Take 1 tablet by mouth daily    Chronic rhinitis, unspecified type       valACYclovir 500 MG tablet    VALTREX    90 tablet    Take 1 tablet (500 mg) by mouth daily    Herpes simplex virus infection

## 2018-08-16 ENCOUNTER — TELEPHONE (OUTPATIENT)
Dept: UROLOGY | Facility: CLINIC | Age: 73
End: 2018-08-16

## 2018-08-16 ENCOUNTER — OFFICE VISIT (OUTPATIENT)
Dept: UROLOGY | Facility: CLINIC | Age: 73
End: 2018-08-16
Payer: COMMERCIAL

## 2018-08-16 VITALS
OXYGEN SATURATION: 98 % | TEMPERATURE: 98.3 F | DIASTOLIC BLOOD PRESSURE: 71 MMHG | SYSTOLIC BLOOD PRESSURE: 142 MMHG | HEART RATE: 84 BPM

## 2018-08-16 DIAGNOSIS — R82.90 NONSPECIFIC FINDING ON EXAMINATION OF URINE: ICD-10-CM

## 2018-08-16 DIAGNOSIS — N20.1 RIGHT URETERAL STONE: Primary | ICD-10-CM

## 2018-08-16 DIAGNOSIS — N20.0 RIGHT KIDNEY STONE: Primary | ICD-10-CM

## 2018-08-16 LAB
ALBUMIN UR-MCNC: 30 MG/DL
APPEARANCE UR: ABNORMAL
BACTERIA #/AREA URNS HPF: ABNORMAL /HPF
BILIRUB UR QL STRIP: NEGATIVE
COLOR UR AUTO: YELLOW
GLUCOSE UR STRIP-MCNC: NEGATIVE MG/DL
HGB UR QL STRIP: ABNORMAL
KETONES UR STRIP-MCNC: NEGATIVE MG/DL
LEUKOCYTE ESTERASE UR QL STRIP: ABNORMAL
NITRATE UR QL: NEGATIVE
NON-SQ EPI CELLS #/AREA URNS LPF: ABNORMAL /LPF
PH UR STRIP: 6.5 PH (ref 5–7)
RBC #/AREA URNS AUTO: ABNORMAL /HPF
SOURCE: ABNORMAL
SP GR UR STRIP: 1.02 (ref 1–1.03)
TRANS CELLS #/AREA URNS HPF: ABNORMAL /HPF
UROBILINOGEN UR STRIP-MCNC: NORMAL MG/DL (ref 0–2)
WBC #/AREA URNS AUTO: ABNORMAL /HPF

## 2018-08-16 PROCEDURE — 87088 URINE BACTERIA CULTURE: CPT | Performed by: UROLOGY

## 2018-08-16 PROCEDURE — 87086 URINE CULTURE/COLONY COUNT: CPT | Performed by: UROLOGY

## 2018-08-16 PROCEDURE — 81001 URINALYSIS AUTO W/SCOPE: CPT | Performed by: UROLOGY

## 2018-08-16 PROCEDURE — 87186 SC STD MICRODIL/AGAR DIL: CPT | Performed by: UROLOGY

## 2018-08-16 PROCEDURE — 99214 OFFICE O/P EST MOD 30 MIN: CPT | Performed by: UROLOGY

## 2018-08-16 RX ORDER — TAMSULOSIN HYDROCHLORIDE 0.4 MG/1
0.4 CAPSULE ORAL DAILY
Qty: 30 CAPSULE | Refills: 0 | Status: SHIPPED | OUTPATIENT
Start: 2018-08-16 | End: 2018-10-25

## 2018-08-16 RX ORDER — DOCUSATE SODIUM 100 MG/1
100 CAPSULE, LIQUID FILLED ORAL 2 TIMES DAILY
Qty: 60 CAPSULE | Refills: 0 | Status: SHIPPED | OUTPATIENT
Start: 2018-08-16 | End: 2018-08-20

## 2018-08-16 RX ORDER — OXYCODONE HYDROCHLORIDE 5 MG/1
5 TABLET ORAL EVERY 6 HOURS PRN
Qty: 9 TABLET | Refills: 0 | Status: SHIPPED | OUTPATIENT
Start: 2018-08-16 | End: 2018-08-23

## 2018-08-16 ASSESSMENT — PAIN SCALES - GENERAL: PAINLEVEL: SEVERE PAIN (7)

## 2018-08-16 NOTE — NURSING NOTE
Cely Ontiveros's goals for this visit include:   Chief Complaint   Patient presents with     Consult     Kidney stones       She requests these members of her care team be copied on today's visit information: Yes    PCP: Sunita Andres    Referring Provider:  Referred Self, MD  No address on file    /71 (BP Location: Left arm, Patient Position: Sitting, Cuff Size: Adult Regular)  Pulse 84  Temp 98.3  F (36.8  C) (Oral)  SpO2 98%    Do you need any medication refills at today's visit? No

## 2018-08-16 NOTE — TELEPHONE ENCOUNTER
Procedure: right ureteroscopy with laser lithotripsy and stent placement  Facility: [facility]  Length: 60minute(s)  Surgeon: Pino Hawk MD  Anesthesia: General  BMI: There is no height or weight on file to calculate BMI. (If over 43 for general or 45 for MAC cannot be scheduled at [facility])   Pre-op Appointments needed:   Post-op appointments needed:   needed:  No  Surgery packet/instructions given to patient?  Yes   Special Considerations: Holmium laser needed

## 2018-08-16 NOTE — PROGRESS NOTES
Urology Consult History and Physical    Name: Cely Ontiveros    MRN: [id number]   YOB: 1945       We were asked to see Cely Ontiveros at the request of Self for evaluation and treatment of Right ureteral stone.        Chief Complaint:   Right ureteral stone    History is obtained from the patient          History of Present Illness:   Cely Ontiveros is a 72 year old female who is being seen for evaluation of a right ureteral stone. She presented to the [facility] ER on 8/11 with right flank pain and was found to have a right 4mm proximal ureteral stone. She does have history of ureteral stone which required surgical intervention 10 years ago. She denies fever. She is having some chills but equates that to the cold exam room - reports being fine outside.            Past Medical History:     Past Medical History:   Diagnosis Date     Actinic keratosis      Allergic rhinitis      Cataract      Degenerative joint disease     cervical disease     Depression with anxiety      Herpes simplex      Hypoglycemia     eats small meals and is fine     Impingement syndrome, shoulder             Past Surgical History:     Past Surgical History:   Procedure Laterality Date     HC ENLARGE BREAST WITH IMPLANT       HC LAP,FULGURATE/EXCISE LESIONS       HC REMOVAL OF BREAST IMPLANT       HYSTERECTOMY, PAP NO LONGER INDICATED  1998    ovaries and uterus out for benign reasons(fibroids and ovarian cyst)            Social History:     Social History   Substance Use Topics     Smoking status: Never Smoker     Smokeless tobacco: Never Used     Alcohol use No            Family History:     Family History   Problem Relation Age of Onset     Hypertension Mother      Arthritis Mother      Cardiovascular Mother      Circulatory Mother      HEART DISEASE Mother      Lipids Mother      Obesity Mother      Osteoperosis Mother      Cancer Mother      skin     Glaucoma Mother      Macular Degeneration Mother      Cancer -  colorectal Father      Cancer Father      Macular Degeneration Father      Diabetes No family hx of      Cerebrovascular Disease No family hx of      Thyroid Disease No family hx of               Allergies:     Allergies   Allergen Reactions     Sulfa Drugs Swelling     Cats Swelling     Lips swollen     Wool Fiber             Medications:     Current Outpatient Prescriptions   Medication Sig     ciprofloxacin-dexamethasone (CIPRODEX) otic suspension Place 4 drops Into the left ear 2 times daily     citalopram (CELEXA) 20 MG tablet Take 1 tablet (20 mg) by mouth daily     clotrimazole (LOTRIMIN) 1 % cream Apply topically 2 times daily     clotrimazole-betamethasone (LOTRISONE) cream Apply topically 2 times daily     docusate sodium (COLACE) 100 MG capsule Take 1 capsule (100 mg) by mouth 2 times daily Take while using narcotic pain medicine.     fluticasone (FLONASE) 50 MCG/ACT nasal spray Spray 1-2 sprays into both nostrils daily     loratadine-pseudoePHEDrine (EQL ALLERGY/CONGESTION RELIEF)  MG per 24 hr tablet Take 1 tablet by mouth daily     oxyCODONE IR (ROXICODONE) 5 MG tablet Take 1 tablet (5 mg) by mouth every 6 hours as needed for breakthrough pain     tamsulosin (FLOMAX) 0.4 MG capsule Take 1 capsule (0.4 mg) by mouth daily     valACYclovir (VALTREX) 500 MG tablet Take 1 tablet (500 mg) by mouth daily     No current facility-administered medications for this visit.              Review of Systems:     Skin: negative  Eyes: negative  Ears/Nose/Throat: negative  Respiratory: No shortness of breath, dyspnea on exertion, cough, or hemoptysis  Cardiovascular: negative  Gastrointestinal: negative  Genitourinary: as above  Musculoskeletal: negative  Neurologic: negative  Psychiatric: negative  Hematologic/Lymphatic/Immunologic: negative  Endocrine: negative          Physical Exam:     Patient Vitals for the past 24 hrs:   BP Temp Temp src Pulse SpO2   08/16/18 1001 142/71 98.3  F (36.8  C) Oral 84 98 %      There is no height or weight on file to calculate BMI.     General: age-appropriate appearing female in NAD  HEENT: Head AT/NC, EOMI, CN Grossly intact  Lungs: CTAB  Heart: RRR, S1 and S2  Back: no bony midline tenderness, no CVAT bilaterally.  Abdomen: soft, non-distended, non-tender. No organomegaly  Flank: Right CVA tenderness to gentle percussion  Lymph: no palpable inguinal lymphadenopathy.  LE: no edema.   Musculoskeltal: extremities normal, no peripheral edema  Skin: no suspicious lesions or rashes  Neuro: Alert, oriented, speech and mentation normal;  moving all 4 extremities equally.  Psych: affect and mood normal          Data:   All laboratory data reviewed:    UA RESULTS:  Recent Labs   Lab Test  08/16/18   1005   01/22/16   0843   COLOR  Yellow   < >  Yellow   APPEARANCE  Slightly Cloudy   < >  Clear   URINEGLC  Negative   < >  Negative   URINEBILI  Negative   < >  Negative   URINEKETONE  Negative   < >  Trace*   SG  1.017   < >  1.015   UBLD  Moderate*   < >  Small*   URINEPH  6.5   < >  7.5*   PROTEIN  30*   < >  Negative   UROBILINOGEN   --    --   0.2   NITRITE  Negative   < >  Negative   LEUKEST  Large*   < >  Moderate*   RBCU  2-5*   < >  5-10*   WBCU  *   < >  5-10*    < > = values in this interval not displayed.     Lab Results   Component Value Date    CR 0.72 03/08/2017       All pertinent imaging reviewed:  OUTSIDE CT record from 8/11:  Imaging:   CT Abdomen & Pelvis w/o Contrast:  IMPRESSION:  1.  Stone measuring 4 mm in short axis in the proximal right ureter about 2 cm beyond the ureteropelvic junction with slight right hydronephrosis and slight perinephric fat stranding associated.  2.  Aside from right kidney, no significant solid organ abnormality.  3.  No biliary tract abnormality.  4.  Colonic diverticulosis without evidence of diverticulitis. Appendix not seen but no suggestion of acute appendicitis.  5.  Chronic bilateral L5 pars defects with grade 1 anterolisthesis  associated.  Reading per radiology.            Impression and Plan:   Impression:   71 y/o female with symptomatic right ureteral stone. We discussed treatment options which include MET vs ureteroscopy. We discussed that based on her stone size and location she has a ~60% chance of spontaneous stone passage in 4 weeks. We discussed the risks and benefits of surgery and the need for a ureteral stent. She would like to proceed with surgery at the first available date.      Plan:   Right ureteral stone  - Plan for ureteroscopy with laser lithotripsy and stent placement on 8/21  - U/A today Nitrite neg, Leuk Est pos - UCx ordered  - Script for Flomax, Oxycodone, and colace written  - Pt instructed to obtain a CD of her CT from [facility]     Time spent: 45 of which >50% was spent counseling.    Pino Hawk MD   Urology  [facility]  [facility] Phone: [phone]  [facility] Phone: [phone]

## 2018-08-16 NOTE — PATIENT INSTRUCTIONS
Discharge Medications/instructions:     - Flomax (tamsulosin) to be taken daily until stent is removed    - Take Tylenol 1000mg every 6 hours for pain    - Take Ibuprofen 600mg every 6 hours as needed for additional pain control    - Take Oxycodone 5mg every 4-6 hours only for break through pain    - Take Colace while taking Oxycodone to prevent constipation  [facility]: [phone]

## 2018-08-16 NOTE — MR AVS SNAPSHOT
After Visit Summary   8/16/2018    Cely Ontiveros    MRN: [id number]           Patient Information     Date Of Birth          1945        Visit Information        Provider Department      8/16/2018 10:00 AM Pino Hawk MD [facility]        Today's Diagnoses     Right ureteral stone    -  1      Care Instructions      Discharge Medications/instructions:     - Flomax (tamsulosin) to be taken daily until stent is removed    - Take Tylenol 1000mg every 6 hours for pain    - Take Ibuprofen 600mg every 6 hours as needed for additional pain control    - Take Oxycodone 5mg every 4-6 hours only for break through pain    - Take Colace while taking Oxycodone to prevent constipation  [facility]: [phone]            Follow-ups after your visit        Your next 10 appointments already scheduled     Sep 11, 2018  1:00 PM CDT   Return Visit with Dayan Rueda MD   [facility] ([facility])    [address] [place] [id number] [phone]              Who to contact     If you have questions or need follow up information about today's clinic visit or your schedule please contact [facility] directly at [phone].  Normal or non-critical lab and imaging results will be communicated to you by [employer], letter or phone within 4 business days after the clinic has received the results. If you do not hear from us within 7 days, please contact the clinic through [employer] or phone. If you have a critical or abnormal lab result, we will notify you by phone as soon as possible.  Submit refill requests through [employer] or call your pharmacy and they will forward the refill request to us. Please allow 3 business days for your refill to be completed.          Additional Information About Your Visit        [employer] Information     [employer] gives you secure access to your electronic health record. If you see a  primary care provider, you can also send messages to your care team and make appointments. If you have questions, please call your primary care clinic.  If you do not have a primary care provider, please call [phone] and they will assist you.      eleni is an electronic gateway that provides easy, online access to your medical records. With eleni, you can request a clinic appointment, read your test results, renew a prescription or communicate with your care team.     To access your existing account, please contact your [facility] or call [phone] for assistance.        Care EveryWhere ID     This is your Care EveryWhere ID. This could be used by other organizations to access your [facility] medical records  [id number]        Your Vitals Were     Pulse Temperature Pulse Oximetry             84 98.3  F (36.8  C) (Oral) 98%          Blood Pressure from Last 3 Encounters:   08/16/18 142/71   05/08/18 112/59   10/04/17 133/62    Weight from Last 3 Encounters:   10/04/17 64.7 kg (142 lb 9.6 oz)   08/02/17 66 kg (145 lb 6.4 oz)   03/08/17 67.1 kg (148 lb)              We Performed the Following     [facility]-Operative Worksheet Urology          Today's Medication Changes          These changes are accurate as of 8/16/18 10:23 AM.  If you have any questions, ask your nurse or doctor.               Start taking these medicines.        Dose/Directions    docusate sodium 100 MG capsule   Commonly known as:  COLACE   Used for:  Right ureteral stone   Started by:  Pino Hawk MD        Dose:  100 mg   Take 1 capsule (100 mg) by mouth 2 times daily Take while using narcotic pain medicine.   Quantity:  60 capsule   Refills:  0       oxyCODONE IR 5 MG tablet   Commonly known as:  ROXICODONE   Used for:  Right ureteral stone   Started by:  Pino Hawk MD        Dose:  5 mg   Take 1 tablet (5 mg) by mouth every 6 hours as needed for breakthrough pain   Quantity:  9 tablet   Refills:  0        tamsulosin 0.4 MG capsule   Commonly known as:  FLOMAX   Used for:  Right ureteral stone   Started by:  Pino Hawk MD        Dose:  0.4 mg   Take 1 capsule (0.4 mg) by mouth daily   Quantity:  30 capsule   Refills:  0            Where to get your medicines      These medications were sent to [facility] - [place], [place] - [address]  [address], [address], [facility] [address]     Phone:  [phone]     docusate sodium 100 MG capsule    tamsulosin 0.4 MG capsule         Some of these will need a paper prescription and others can be bought over the counter.  Ask your nurse if you have questions.     Bring a paper prescription for each of these medications     oxyCODONE IR 5 MG tablet               Information about OPIOIDS     PRESCRIPTION OPIOIDS: WHAT YOU NEED TO KNOW   We gave you an opioid (narcotic) pain medicine. It is important to manage your pain, but opioids are not always the best choice. You should first try all the other options your care team gave you. Take this medicine for as short a time (and as few doses) as possible.    Some activities can increase your pain, such as bandage changes or therapy sessions. It may help to take your pain medicine 30 to 60 minutes before these activities. Reduce your stress by getting enough sleep, working on hobbies you enjoy and practicing relaxation or meditation. Talk to your care team about ways to manage your pain beyond prescription opioids.    These medicines have risks:    DO NOT drive when on new or higher doses of pain medicine. These medicines can affect your alertness and reaction times, and you could be arrested for driving under the influence (DUI). If you need to use opioids long-term, talk to your care team about driving.    DO NOT operate heavy machinery    DO NOT do any other dangerous activities while taking these medicines.    DO NOT drink any alcohol while taking these medicines.     If the  opioid prescribed includes acetaminophen, DO NOT take with any other medicines that contain acetaminophen. Read all labels carefully. Look for the word  acetaminophen  or  Tylenol.  Ask your pharmacist if you have questions or are unsure.    You can get addicted to pain medicines, especially if you have a history of addiction (chemical, alcohol or substance dependence). Talk to your care team about ways to reduce this risk.    All opioids tend to cause constipation. Drink plenty of water and eat foods that have a lot of fiber, such as fruits, vegetables, prune juice, apple juice and high-fiber cereal. Take a laxative (Miralax, milk of magnesia, Colace, Senna) if you don t move your bowels at least every other day. Other side effects include upset stomach, sleepiness, dizziness, throwing up, tolerance (needing more of the medicine to have the same effect), physical dependence and slowed breathing.    Store your pills in a secure place, locked if possible. We will not replace any lost or stolen medicine. If you don t finish your medicine, please throw away (dispose) as directed by your pharmacist. The [place] Pollution Control Agency has more information about safe disposal: https://www.pca.[facility].[place]/living-green/managing-unwanted-medications         Primary Care Provider Office Phone # Fax #    Sunita Andres PA-C [phone] [address] [facility] [address]        Equal Access to Services     HAYDEE STOCK : Marley Dueñas, waaxda luqadaha, qaybta kaalmaiona aj. So [facility] [phone].    ATENCIÓN: Si habla español, tiene a carnes disposición servicios gratuitos de asistencia lingüística. Amy alicia [phone].    We comply with applicable federal civil rights laws and [place] laws. We do not discriminate on the basis of race, color, national origin, age, disability, sex, sexual orientation, or gender identity.             Thank you!     Thank you for choosing [facility]  for your care. Our goal is always to provide you with excellent care. Hearing back from our patients is one way we can continue to improve our services. Please take a few minutes to complete the written survey that you may receive in the mail after your visit with us. Thank you!             Your Updated Medication List - Protect others around you: Learn how to safely use, store and throw away your medicines at www.disposemymeds.org.          This list is accurate as of 8/16/18 10:23 AM.  Always use your most recent med list.                   Brand Name Dispense Instructions for use Diagnosis    ciprofloxacin-dexamethasone otic suspension    CIPRODEX    7.5 mL    Place 4 drops Into the left ear 2 times daily    Chronic otitis externa of left ear, unspecified type       citalopram 20 MG tablet    celeXA    90 tablet    Take 1 tablet (20 mg) by mouth daily    Dysthymic disorder       clotrimazole 1 % cream    LOTRIMIN    30 g    Apply topically 2 times daily    Tinea cruris       clotrimazole-betamethasone cream    LOTRISONE    15 g    Apply topically 2 times daily    Special screening for malignant neoplasms, colon       docusate sodium 100 MG capsule    COLACE    60 capsule    Take 1 capsule (100 mg) by mouth 2 times daily Take while using narcotic pain medicine.    Right ureteral stone       fluticasone 50 MCG/ACT spray    FLONASE    48 g    Spray 1-2 sprays into both nostrils daily    Chronic rhinitis       loratadine-pseudoePHEDrine  MG per 24 hr tablet    EQL ALLERGY/CONGESTION RELIEF    90 tablet    Take 1 tablet by mouth daily    Chronic rhinitis, unspecified type       oxyCODONE IR 5 MG tablet    ROXICODONE    9 tablet    Take 1 tablet (5 mg) by mouth every 6 hours as needed for breakthrough pain    Right ureteral stone       tamsulosin 0.4 MG capsule    FLOMAX    30 capsule    Take 1 capsule (0.4 mg) by mouth daily    Right ureteral  stone       valACYclovir 500 MG tablet    VALTREX    90 tablet    Take 1 tablet (500 mg) by mouth daily    Herpes simplex virus infection

## 2018-08-17 ENCOUNTER — TELEPHONE (OUTPATIENT)
Dept: UROLOGY | Facility: CLINIC | Age: 73
End: 2018-08-17

## 2018-08-17 DIAGNOSIS — N20.1 CALCULUS OF URETER: Primary | ICD-10-CM

## 2018-08-17 RX ORDER — CIPROFLOXACIN 500 MG/1
500 TABLET, FILM COATED ORAL 2 TIMES DAILY
Qty: 14 TABLET | Refills: 0 | Status: SHIPPED | OUTPATIENT
Start: 2018-08-17 | End: 2018-08-24

## 2018-08-17 NOTE — TELEPHONE ENCOUNTER
Cely Ontiveros  [id number]  August 17, 2018  3:59 PM    I called the patient to discuss the preliminary urine culture result from yesterday which showed >100k GN rods. She reports feeling ok, pain is controlled primarily with NSAIDs and Tylenol. She has had intermittent chills but no fever. No N/V. We discussed that a script for Cirpo 500mg BID for 7 days was sent to her pharmacy and that she should begin this today. Planning for surgery for ureteroscopy on 8/23. We discussed that should she develop worsening fever >101 or N/V that she should present to an ER for possible urgent stent placement. She expressed understand and was thankful for the call.    Pino Hawk MD

## 2018-08-17 NOTE — TELEPHONE ENCOUNTER
Date Scheduled: 8-23-18  Facility: [facility]  Surgeon: Dr. Hawk   Post-op appointment scheduled:    scheduled?: No  Surgery packet/instructions confirmed received?  No  Special Considerations:   Libertad Dorado, Surgery Scheduling Coordinator

## 2018-08-18 LAB
BACTERIA SPEC CULT: ABNORMAL
BACTERIA SPEC CULT: ABNORMAL
SPECIMEN SOURCE: ABNORMAL

## 2018-08-20 ENCOUNTER — OFFICE VISIT (OUTPATIENT)
Dept: FAMILY MEDICINE | Facility: CLINIC | Age: 73
End: 2018-08-20
Payer: COMMERCIAL

## 2018-08-20 VITALS
OXYGEN SATURATION: 96 % | DIASTOLIC BLOOD PRESSURE: 71 MMHG | BODY MASS INDEX: 24.28 KG/M2 | WEIGHT: 142.2 LBS | HEART RATE: 91 BPM | SYSTOLIC BLOOD PRESSURE: 139 MMHG | HEIGHT: 64 IN | TEMPERATURE: 98.6 F

## 2018-08-20 DIAGNOSIS — B00.9 HERPES SIMPLEX VIRUS INFECTION: ICD-10-CM

## 2018-08-20 DIAGNOSIS — Z86.39 H/O HYPOGLYCEMIA: ICD-10-CM

## 2018-08-20 DIAGNOSIS — F34.1 DYSTHYMIC DISORDER: ICD-10-CM

## 2018-08-20 DIAGNOSIS — N20.1 CALCULUS OF URETER: ICD-10-CM

## 2018-08-20 DIAGNOSIS — J31.0 CHRONIC RHINITIS, UNSPECIFIED TYPE: ICD-10-CM

## 2018-08-20 DIAGNOSIS — Z01.818 PREOP GENERAL PHYSICAL EXAM: Primary | ICD-10-CM

## 2018-08-20 DIAGNOSIS — Z12.11 SCREEN FOR COLON CANCER: ICD-10-CM

## 2018-08-20 PROCEDURE — 93000 ELECTROCARDIOGRAM COMPLETE: CPT | Performed by: NURSE PRACTITIONER

## 2018-08-20 PROCEDURE — 99214 OFFICE O/P EST MOD 30 MIN: CPT | Performed by: NURSE PRACTITIONER

## 2018-08-20 RX ORDER — CITALOPRAM HYDROBROMIDE 20 MG/1
20 TABLET ORAL DAILY
Qty: 90 TABLET | Refills: 3 | Status: SHIPPED | OUTPATIENT
Start: 2018-08-20 | End: 2019-09-09

## 2018-08-20 RX ORDER — VALACYCLOVIR HYDROCHLORIDE 500 MG/1
500 TABLET, FILM COATED ORAL DAILY
Qty: 90 TABLET | Refills: 3 | Status: SHIPPED | OUTPATIENT
Start: 2018-08-20 | End: 2019-09-09

## 2018-08-20 ASSESSMENT — ANXIETY QUESTIONNAIRES
IF YOU CHECKED OFF ANY PROBLEMS ON THIS QUESTIONNAIRE, HOW DIFFICULT HAVE THESE PROBLEMS MADE IT FOR YOU TO DO YOUR WORK, TAKE CARE OF THINGS AT HOME, OR GET ALONG WITH OTHER PEOPLE: NOT DIFFICULT AT ALL
1. FEELING NERVOUS, ANXIOUS, OR ON EDGE: NOT AT ALL
6. BECOMING EASILY ANNOYED OR IRRITABLE: NOT AT ALL
3. WORRYING TOO MUCH ABOUT DIFFERENT THINGS: NOT AT ALL
5. BEING SO RESTLESS THAT IT IS HARD TO SIT STILL: NOT AT ALL
7. FEELING AFRAID AS IF SOMETHING AWFUL MIGHT HAPPEN: NOT AT ALL
GAD7 TOTAL SCORE: 0
2. NOT BEING ABLE TO STOP OR CONTROL WORRYING: NOT AT ALL

## 2018-08-20 ASSESSMENT — PATIENT HEALTH QUESTIONNAIRE - PHQ9: 5. POOR APPETITE OR OVEREATING: NOT AT ALL

## 2018-08-20 NOTE — PATIENT INSTRUCTIONS
At [facility], we strive to deliver an exceptional experience to you, every time we see you.  If you receive a survey in the mail, please send us back your thoughts. We really do value your feedback.    Based on your medical history, these are the current health maintenance/preventive care services that you are due for (some may have been done at this visit.)  Health Maintenance Due   Topic Date Due     EYE EXAM Q1 YEAR  10/11/2017     DEPRESSION ACTION PLAN Q1 YR  03/08/2018     PHQ-9 Q6 MONTHS  04/04/2018     FALL RISK ASSESSMENT  08/02/2018     FIT Q1 YR  08/04/2018       Suggested websites for health information:  Www.[place].org : Up to date and easily searchable information on multiple topics.  Www.medlineplus.gov : medication info, interactive tutorials, watch real surgeries online  Www.familydoctor.org : good info from the Academy of Family Physicians  Www.cdc.gov : public health info, travel advisories, epidemics (H1N1)  Www.aap.org : children's health info, normal development, vaccinations  Www.health.[facility].[place] : [place] dept of health, public health issues in [place], N1N1    Your care team:                            Family Medicine Internal Medicine   MD Mao Duran MD Shantel Branch-Fleming, MD Katya Georgiev PA-C Megan Hill, APRBRAN Whitten MD Pediatrics   Jefferson Gallardo, MICHELLE Varma, MD Li Tompkins APRN CNP   MD Johanne Hannon MD Deborah Mielke, MD Kim Thein, APRN [facility]      Clinic hours: Monday - Thursday 7 am-7 pm; Fridays 7 am-5 pm.   Urgent care: Monday - Friday 11 am-9 pm; Saturday and Sunday 9 am-5 pm.  Pharmacy : Monday -Thursday 8 am-8 pm; Friday 8 am-6 pm; Saturday and Sunday 9 am-5 pm.     Clinic: [phone]   Pharmacy: [phone]      Before Your Surgery      Call your surgeon if there is any change in your health. This includes signs of a cold or flu (such as a sore throat, runny nose,  cough, rash or fever).    Do not smoke, drink alcohol or take over the counter medicine (unless your surgeon or primary care doctor tells you to) for the 24 hours before and after surgery.    If you take prescribed drugs: Follow your doctor s orders about which medicines to take and which to stop until after surgery.    Eating and drinking prior to surgery: follow the instructions from your surgeon    Take a shower or bath the night before surgery. Use the soap your surgeon gave you to gently clean your skin. If you do not have soap from your surgeon, use your regular soap. Do not shave or scrub the surgery site.  Wear clean pajamas and have clean sheets on your bed.

## 2018-08-20 NOTE — PROGRESS NOTES
[address] [address]  [phone]  Dept: [phone]    PRE-OP EVALUATION:  Today's date: 2018    Cely Ontiveros (: 1945) presents for pre-operative evaluation assessment as requested by Dr. Hawk.  She requires evaluation and anesthesia risk assessment prior to undergoing surgery/procedure for treatment of Kidney .    Proposed Surgery/ Procedure: COMBINED CYSTOSCOPY, URETEROSCOPY, LASER HOLMIUM LITHOTRIPSY URETER  Date of Surgery/ Procedure: 18  Time of Surgery/ Procedure: 11:30am  Hospital/Surgical Facility: [facility]  Primary Physician: Sunita Andres  Type of Anesthesia Anticipated: to be determined    Patient has a Health Care Directive or Living Will:  YES     1. NO - DO YOU HAVE A HISTORY OF HEART ATTACK, STROKE, STENT, BYPASS OR SURGERY ON AN ARTERY IN THE HEAD, NECK, HEART OR LEG?   1. NO - Do you have a history of heart attack, stroke, stent, bypass or surgery on an artery in the head, neck, heart or legs?  2. NO - Do you ever have any pain or discomfort in your chest?  3. NO - Do you have a history of  Heart Failure?  4. NO - Are you troubled by shortness of breath when: walking on the level, up a slight hill or at night?  5. NO - Do you currently have a cold, bronchitis or other respiratory infection?  6. NO - Do you have a cough, shortness of breath or wheezing?  7. NO - Do you sometimes get pains in the calves of your legs when you walk?  8. NO - Do you or anyone in your family have previous history of blood clots?  9. NO - Do you or does anyone in your family have a serious bleeding problem such as prolonged bleeding following surgeries or cuts?  10. NO - Have you ever had problems with anemia or been told to take iron pills?  11. NO - Have you had any abnormal blood loss such as black, tarry or bloody stools, or abnormal vaginal bleeding?  12. NO - Have you ever had a blood transfusion?  13. NO -  Have you or any of your relatives ever had problems with anesthesia?  14. NO - Do you have sleep apnea, excessive snoring or daytime drowsiness?  15. NO - Do you have any prosthetic heart valves?  16. NO - Do you have prosthetic joints?  17. NO - Is there any chance that you may be pregnant?      HPI:     HPI related to upcoming procedure: Patient has 4 mm proximal right ureteral stone.  She has history of ureteral stone requiring surgical intervention 10 years ago. She continues to have nausea but denies chills ,fever, abdominal pain, vomiting.      See problem list for active medical problems.  Problems all longstanding and stable, except as noted/documented.  See ROS for pertinent symptoms related to these conditions.                                                                                                                                                          .  DEPRESSION - Patient has a long history of Depression of moderate severity requiring medication for control with recent symptoms being stable..Current symptoms of depression include none.                                                                                                                                                                                    .    MEDICAL HISTORY:     Patient Active Problem List    Diagnosis Date Noted     Somatic dysfunction of pelvis region 06/04/2018     Priority: Medium     Pelvic pain in female 06/04/2018     Priority: Medium     Urinary urgency 06/04/2018     Priority: Medium     Urinary frequency 06/04/2018     Priority: Medium     Urgency incontinence 06/04/2018     Priority: Medium     H/O hypoglycemia 03/08/2017     Priority: Medium     Chronic otitis externa of left ear, unspecified type 03/08/2017     Priority: Medium     Abdominal bloating 03/08/2017     Priority: Medium     Chronic rhinitis 03/08/2017     Priority: Medium     Dysthymic disorder 03/08/2017     Priority: Medium     Plugged tear  "duct, od > os 10/12/2014     Priority: Medium     Epiphora 10/12/2014     Priority: Medium     Hypermetropia 10/10/2013     Priority: Medium     Astigmatism with presbyopia 10/10/2013     Priority: Medium     Blepharitis of both eyes 10/10/2013     Priority: Medium     Cataracts, bilateral 10/10/2013     Priority: Medium     Seasonal allergic rhinitis 08/16/2013     Priority: Medium     Gross hematuria 01/02/2013     Priority: Medium     Right kidney stone 01/02/2013     Priority: Medium     Posterior vitreous detachment of both eyes 10/09/2012     Priority: Medium     Advance care planning 05/20/2011     Priority: Medium     Advance Care Planning 01/22/2015: ACP Review and Resources Provided:  Reviewed chart for advance care plan.  Cely LOPEZ Weston has no plan or code status on file. Mailed available resources and provided with information. Confirmed code status reflects current choices pending further ACP discussions.  Confirmed/documented designated decision maker(s). See permanent comments section of demographics in clinical tab. Added by Madonna Marques on 1/22/2015  Advance Care Planning 05/20/2011: Patient does not have an Advance/Health Care Directive (HCD), given \"What is Advance Care Planning?\" flyer. Mailed to patient.Jacqui Scherer.May 20, 2011       Degenerative joint disease      Priority: Medium     CARDIOVASCULAR SCREENING; LDL GOAL LESS THAN 160 10/31/2010     Priority: Medium     Herpes simplex      Priority: Medium     Recurs left buttock  Zostavax 2/10  IMO update changed this record. Please review for accuracy       Allergic rhinitis      Priority: Medium      Past Medical History:   Diagnosis Date     Actinic keratosis      Allergic rhinitis      Cataract      Degenerative joint disease     cervical disease     Depression with anxiety      Herpes simplex      Hypoglycemia     eats small meals and is fine     Impingement syndrome, shoulder      Past Surgical History:   Procedure Laterality Date     " HC ENLARGE BREAST WITH IMPLANT       HC LAP,FULGURATE/EXCISE LESIONS       HC REMOVAL OF BREAST IMPLANT       HYSTERECTOMY, PAP NO LONGER INDICATED  1998    ovaries and uterus out for benign reasons(fibroids and ovarian cyst)     SINUS SURGERY       Current Outpatient Prescriptions   Medication Sig Dispense Refill     ciprofloxacin (CIPRO) 500 MG tablet Take 1 tablet (500 mg) by mouth 2 times daily for 7 days 14 tablet 0     ciprofloxacin-dexamethasone (CIPRODEX) otic suspension Place 4 drops Into the left ear 2 times daily 7.5 mL 0     citalopram (CELEXA) 20 MG tablet Take 1 tablet (20 mg) by mouth daily 90 tablet 3     clotrimazole (LOTRIMIN) 1 % cream Apply topically 2 times daily 30 g 1     clotrimazole-betamethasone (LOTRISONE) cream Apply topically 2 times daily 15 g 1     loratadine-pseudoePHEDrine (EQL ALLERGY/CONGESTION RELIEF)  MG per 24 hr tablet Take 1 tablet by mouth daily 90 tablet 3     oxyCODONE IR (ROXICODONE) 5 MG tablet Take 1 tablet (5 mg) by mouth every 6 hours as needed for breakthrough pain 9 tablet 0     tamsulosin (FLOMAX) 0.4 MG capsule Take 1 capsule (0.4 mg) by mouth daily 30 capsule 0     valACYclovir (VALTREX) 500 MG tablet Take 1 tablet (500 mg) by mouth daily 90 tablet 3     [DISCONTINUED] citalopram (CELEXA) 20 MG tablet Take 1 tablet (20 mg) by mouth daily 90 tablet 3     [DISCONTINUED] valACYclovir (VALTREX) 500 MG tablet Take 1 tablet (500 mg) by mouth daily 90 tablet 3     OTC products: None, except as noted above.    Allergies   Allergen Reactions     Sulfa Drugs Swelling     Cats Swelling     Lips swollen     Wool Fiber       Latex Allergy: NO    Social History   Substance Use Topics     Smoking status: Never Smoker     Smokeless tobacco: Never Used     Alcohol use No     History   Drug Use No       REVIEW OF SYSTEMS:   Constitutional, HEENT, cardiovascular, pulmonary, gi and gu systems are negative, except as otherwise noted.    EXAM:   /71 (BP Location: Left arm,  "Patient Position: Chair, Cuff Size: Adult Regular)  Pulse 91  Temp 98.6  F (37  C) (Oral)  Ht 5' 4\" (1.626 m)  Wt 142 lb 3.2 oz (64.5 kg)  SpO2 96%  BMI 24.41 kg/m2    GENERAL APPEARANCE: healthy, alert and no distress     EYES: EOMI, PERRL     HENT: ear canals and TM's normal and nose and mouth without ulcers or lesions     NECK: no adenopathy, no asymmetry, masses, or scars and thyroid normal to palpation     RESP: lungs clear to auscultation - no rales, rhonchi or wheezes     CV: regular rates and rhythm, normal S1 S2, no S3 or S4 and no murmur, click or rub     ABDOMEN:  soft, nontender, no HSM or masses and bowel sounds normal     MS: extremities normal- no gross deformities noted, no evidence of inflammation in joints, FROM in all extremities.     SKIN: no suspicious lesions or rashes     NEURO: Normal strength and tone, sensory exam grossly normal, mentation intact and speech normal     PSYCH: mentation appears normal. and affect normal/bright     LYMPHATICS: No cervical adenopathy    DIAGNOSTICS:   EKG:   Patient Name Sex Cely Bishop ([id number]) Female 1945   Results   EKG 12-lead complete w/read - Clinics (Order [id number])   Result Information   Status Provider Status      Final result (Resulted: 2018 10:32 AM) Reviewed    Impression   Sinus rhythm, rate 76, left atrial enlargement, negative precordial T waves- probably normal, consider anteroseptal ischemia, borderline EKG.  No prior EKG's available for comparison.    Allyson VENTURA, CNP           Recent Labs   Lab Test  17   0856  13   0821  13   0820   12   1452  08/27/10   1517   HGB   --    --   12.8   --   13.4  13.3   PLT   --    --    --    --   274  237   NA  144  141   --    --    --    --    POTASSIUM  4.2  4.2   --    --    --    --    CR  0.72  0.64   --    < >   --    --     < > = values in this interval not displayed.      She had BMP, CBC, UA done 18 at [facility].  Hgb was " normal at 13.1m BUN slightly elevated at 22, Cr normal at 0.93 with normal electrolytes.  IMPRESSION:   Reason for surgery/procedure: Right ureteral calculus/ COMBINED CYSTOSCOPY, URETEROSCOPY, LASER HOLMIUM LITHOTRIPSY URETER  Diagnosis/reason for consult: preoperative exam    The proposed surgical procedure is considered INTERMEDIATE risk.    REVISED CARDIAC RISK INDEX  The patient has the following serious cardiovascular risks for perioperative complications such as (MI, PE, VFib and 3  AV Block):  No serious cardiac risks  INTERPRETATION: 0 risks: Class I (very low risk - 0.4% complication rate)    The patient has the following additional risks for perioperative complications:  No identified additional risks      ICD-10-CM    1. Preop general physical exam Z01.818 EKG 12-lead complete w/read - Clinics   2. Calculus of ureter N20.1    3. H/O hypoglycemia Z86.39    4. Dysthymic disorder F34.1 citalopram (CELEXA) 20 MG tablet   5. Herpes simplex B00.9 valACYclovir (VALTREX) 500 MG tablet   6. Chronic rhinitis, unspecified type J31.0 loratadine-pseudoePHEDrine (EQL ALLERGY/CONGESTION RELIEF)  MG per 24 hr tablet   7. Screen for colon cancer Z12.11        RECOMMENDATIONS:       --Patient is to take all scheduled medications on the day of surgery EXCEPT for modifications listed below.    APPROVAL GIVEN to proceed with proposed procedure, without further diagnostic evaluation       Signed Electronically by: AUDREY Nixon CNP    Copy of this evaluation report is provided to requesting physician.    [facility] Preop Guidelines    Revised Cardiac Risk Index

## 2018-08-20 NOTE — LETTER
My Depression Action Plan  Name: Cely Ontiveros   Date of Birth 1945  Date: 8/20/2018    My doctor: Sunita Andres   My clinic: [address] [id number] [phone]          GREEN    ZONE   Good Control    What it looks like:     Things are going generally well. You have normal up s and down s. You may even feel depressed from time to time, but bad moods usually last less than a day.   What you need to do:  1. Continue to care for yourself (see self care plan)  2. Check your depression survival kit and update it as needed  3. Follow your physician s recommendations including any medication.  4. Do not stop taking medication unless you consult with your physician first.           YELLOW         ZONE Getting Worse    What it looks like:     Depression is starting to interfere with your life.     It may be hard to get out of bed; you may be starting to isolate yourself from others.    Symptoms of depression are starting to last most all day and this has happened for several days.     You may have suicidal thoughts but they are not constant.   What you need to do:     1. Call your care team, your response to treatment will improve if you keep your care team informed of your progress. Yellow periods are signs an adjustment may need to be made.     2. Continue your self-care, even if you have to fake it!    3. Talk to someone in your support network    4. Open up your depression survival kit           RED    ZONE Medical Alert - Get Help    What it looks like:     Depression is seriously interfering with your life.     You may experience these or other symptoms: You can t get out of bed most days, can t work or engage in other necessary activities, you have trouble taking care of basic hygiene, or basic responsibilities, thoughts of suicide or death that will not go away, self-injurious behavior.     What you need to do:  1. Call your  care team and request a same-day appointment. If they are not available (weekends or after hours) call your local crisis line, emergency room or 911.            Depression Self Care Plan / Survival Kit    Self-Care for Depression  Here s the deal. Your body and mind are really not as separate as most people think.  What you do and think affects how you feel and how you feel influences what you do and think. This means if you do things that people who feel good do, it will help you feel better.  Sometimes this is all it takes.  There is also a place for medication and therapy depending on how severe your depression is, so be sure to consult with your medical provider and/ or Behavioral Health Consultant if your symptoms are worsening or not improving.     In order to better manage my stress, I will:    Exercise  Get some form of exercise, every day. This will help reduce pain and release endorphins, the  feel good  chemicals in your brain. This is almost as good as taking antidepressants!  This is not the same as joining a gym and then never going! (they count on that by the way ) It can be as simple as just going for a walk or doing some gardening, anything that will get you moving.      Hygiene   Maintain good hygiene (Get out of bed in the morning, Make your bed, Brush your teeth, Take a shower, and Get dressed like you were going to work, even if you are unemployed).  If your clothes don't fit try to get ones that do.    Diet  I will strive to eat foods that are good for me, drink plenty of water, and avoid excessive sugar, caffeine, alcohol, and other mood-altering substances.  Some foods that are helpful in depression are: complex carbohydrates, B vitamins, flaxseed, fish or fish oil, fresh fruits and vegetables.    Psychotherapy  I agree to participate in Individual Therapy (if recommended).    Medication  If prescribed medications, I agree to take them.  Missing doses can result in serious side effects.  I  understand that drinking alcohol, or other illicit drug use, may cause potential side effects.  I will not stop my medication abruptly without first discussing it with my provider.    Staying Connected With Others  I will stay in touch with my friends, family members, and my primary care provider/team.    Use your imagination  Be creative.  We all have a creative side; it doesn t matter if it s oil painting, sand castles, or mud pies! This will also kick up the endorphins.    Witness Beauty  (AKA stop and smell the roses) Take a look outside, even in mid-winter. Notice colors, textures. Watch the squirrels and birds.     Service to others  Be of service to others.  There is always someone else in need.  By helping others we can  get out of ourselves  and remember the really important things.  This also provides opportunities for practicing all the other parts of the program.    Humor  Laugh and be silly!  Adjust your TV habits for less news and crime-drama and more comedy.    Control your stress  Try breathing deep, massage therapy, biofeedback, and meditation. Find time to relax each day.     My support system    Clinic Contact:  Phone number:    Contact 1:  Phone number:    Contact 2:  Phone number:    Confucianist/:  Phone number:    Therapist:  Phone number:    Local crisis center:    Phone number:    Other community support:  Phone number:

## 2018-08-20 NOTE — PROGRESS NOTES
Faxed Pre-op notes, Ekg and no labs to [facility], [phone], right fax confirmed at 3:43 pm today.  Leslie Louis MA/  For Teams Treva

## 2018-08-20 NOTE — LETTER
August 20, 2018      Cely Ontiveros  [address]  [facility] [id number]

## 2018-08-20 NOTE — MR AVS SNAPSHOT
After Visit Summary   8/20/2018    Cely Ontiveros    MRN: [id number]           Patient Information     Date Of Birth          1945        Visit Information        Provider Department      8/20/2018 9:40 AM Allyson Sarabia APRN CNP [facility]        Today's Diagnoses     Preop general physical exam    -  1    Calculus of ureter        H/O hypoglycemia        Dysthymic disorder        Herpes simplex        Chronic rhinitis, unspecified type        Screen for colon cancer          Care Instructions    At [facility], we strive to deliver an exceptional experience to you, every time we see you.  If you receive a survey in the mail, please send us back your thoughts. We really do value your feedback.    Based on your medical history, these are the current health maintenance/preventive care services that you are due for (some may have been done at this visit.)  Health Maintenance Due   Topic Date Due     EYE EXAM Q1 YEAR  10/11/2017     DEPRESSION ACTION PLAN Q1 YR  03/08/2018     PHQ-9 Q6 MONTHS  04/04/2018     FALL RISK ASSESSMENT  08/02/2018     FIT Q1 YR  08/04/2018       Suggested websites for health information:  Www.[employer]ist.[employer] : Up to date and easily searchable information on multiple topics.  Www.medlineplus.gov : medication info, interactive tutorials, watch real surgeries online  Www.familydoctor.org : good info from the Academy of Family Physicians  Www.cdc.gov : public health info, travel advisories, epidemics (H1N1)  Www.aap.org : children's health info, normal development, vaccinations  Www.health.state.[place].us : [place] dept of health, public health issues in [place], N1N1    Your care team:                            Family Medicine Internal Medicine   MD Mao Duran MD Shantel Branch-Fleming, MD Katya Georgiev PA-C Megan Hill, APRN CNP Nam Ho, MD Pediatrics   MICHELLE Lorenzo, MD Li Tompkins  MD Johanne Figueroa CNP, MD Deborah Mielke, MD Kim Thein, APRN CNP      Clinic hours: Monday - Thursday 7 am-7 pm; Fridays 7 am-5 pm.   Urgent care: Monday - Friday 11 am-9 pm; Saturday and Sunday 9 am-5 pm.  Pharmacy : Monday -Thursday 8 am-8 pm; Friday 8 am-6 pm; Saturday and Sunday 9 am-5 pm.     Clinic: [phone]   Pharmacy: [phone]      Before Your Surgery      Call your surgeon if there is any change in your health. This includes signs of a cold or flu (such as a sore throat, runny nose, cough, rash or fever).    Do not smoke, drink alcohol or take over the counter medicine (unless your surgeon or primary care doctor tells you to) for the 24 hours before and after surgery.    If you take prescribed drugs: Follow your doctor s orders about which medicines to take and which to stop until after surgery.    Eating and drinking prior to surgery: follow the instructions from your surgeon    Take a shower or bath the night before surgery. Use the soap your surgeon gave you to gently clean your skin. If you do not have soap from your surgeon, use your regular soap. Do not shave or scrub the surgery site.  Wear clean pajamas and have clean sheets on your bed.           Follow-ups after your visit        Your next 10 appointments already scheduled     Aug 23, 2018   Procedure with Pino Hawk MD   [facility] (--)    [address] [place], [place] [address]e [facility] [address] [phone]            Aug 23, 2018 11:30 AM CDT   XR SURGERY BRANDEN FLUORO G/T 5 MIN with [id number]   [facility] ([facility])    [id number] [place] [address] [phone]           Please bring a list of your current medicines to your exam. (Include vitamins, minerals and over-thecounter medicines.) Leave your valuables at home.  Tell your doctor if there is a chance you may be pregnant.  You do not need to do anything special for this exam.  "             Who to contact     If you have questions or need follow up information about today's clinic visit or your schedule please contact [facility] directly at [phone].  Normal or non-critical lab and imaging results will be communicated to you by MyChart, letter or phone within 4 business days after the clinic has received the results. If you do not hear from us within 7 days, please contact the clinic through Langohart or phone. If you have a critical or abnormal lab result, we will notify you by phone as soon as possible.  Submit refill requests through frestyl or call your pharmacy and they will forward the refill request to us. Please allow 3 business days for your refill to be completed.          Additional Information About Your Visit        LangoharBloggerce Information     frestyl gives you secure access to your electronic health record. If you see a primary care provider, you can also send messages to your care team and make appointments. If you have questions, please call your primary care clinic.  If you do not have a primary care provider, please call [phone] and they will assist you.        Care EveryWhere ID     This is your Care EveryWhere ID. This could be used by other organizations to access your [facility] medical records  [id number]        Your Vitals Were     Pulse Temperature Height Pulse Oximetry BMI (Body Mass Index)       91 98.6  F (37  C) (Oral) 5' 4\" (1.626 m) 96% 24.41 kg/m2        Blood Pressure from Last 3 Encounters:   08/20/18 139/71   08/16/18 142/71   05/08/18 112/59    Weight from Last 3 Encounters:   08/20/18 142 lb 3.2 oz (64.5 kg)   08/17/18 140 lb (63.5 kg)   10/04/17 142 lb 9.6 oz (64.7 kg)              We Performed the Following     EKG 12-lead complete w/read - Clinics          Today's Medication Changes          These changes are accurate as of 8/20/18 10:43 AM.  If you have any questions, ask your nurse or doctor.               Stop taking these " medicines if you haven't already. Please contact your care team if you have questions.     docusate sodium 100 MG capsule   Commonly known as:  COLACE   Stopped by:  Allyson Sarabia APRN CNP           fluticasone 50 MCG/ACT spray   Commonly known as:  FLONASE   Stopped by:  Allyson Sarabia APRN CNP                Where to get your medicines      These medications were sent to [id number] - [place], [place] - [address]  [address] [facility] [address] [facility] [address]     Phone:  [phone]     citalopram 20 MG tablet    valACYclovir 500 MG tablet         Some of these will need a paper prescription and others can be bought over the counter.  Ask your nurse if you have questions.     Bring a paper prescription for each of these medications     loratadine-pseudoePHEDrine  MG per 24 hr tablet                Primary Care Provider Office Phone # Fax #    Sunita Prema Andres PA-C [phone] [address] [place] [address]        Equal Access to Services     [facility]: Hadii aad ku hadasho Soomaali, waaxda luqadaha, qaybta kaalmada adeegyada, waxay idiin haytasha vicente . So [facility] [phone].    ATENCIÓN: Si habla español, tiene a carnes disposición servicios gratuitos de asistencia lingüística. Llame al [phone].    We comply with applicable federal civil rights laws and [place] laws. We do not discriminate on the basis of race, color, national origin, age, disability, sex, sexual orientation, or gender identity.            Thank you!     Thank you for choosing [facility]  for your care. Our goal is always to provide you with excellent care. Hearing back from our patients is one way we can continue to improve our services. Please take a few minutes to complete the written survey that you may receive in the mail after your visit with us. Thank you!             Your Updated Medication List - Protect others around you: Learn how to  safely use, store and throw away your medicines at www.disposemymeds.org.          This list is accurate as of 8/20/18 10:43 AM.  Always use your most recent med list.                   Brand Name Dispense Instructions for use Diagnosis    ciprofloxacin 500 MG tablet    CIPRO    14 tablet    Take 1 tablet (500 mg) by mouth 2 times daily for 7 days    Calculus of ureter       ciprofloxacin-dexamethasone otic suspension    CIPRODEX    7.5 mL    Place 4 drops Into the left ear 2 times daily    Chronic otitis externa of left ear, unspecified type       citalopram 20 MG tablet    celeXA    90 tablet    Take 1 tablet (20 mg) by mouth daily    Dysthymic disorder       clotrimazole 1 % cream    LOTRIMIN    30 g    Apply topically 2 times daily    Tinea cruris       clotrimazole-betamethasone cream    LOTRISONE    15 g    Apply topically 2 times daily    Special screening for malignant neoplasms, colon       loratadine-pseudoePHEDrine  MG per 24 hr tablet    EQL ALLERGY/CONGESTION RELIEF    90 tablet    Take 1 tablet by mouth daily    Chronic rhinitis, unspecified type       oxyCODONE IR 5 MG tablet    ROXICODONE    9 tablet    Take 1 tablet (5 mg) by mouth every 6 hours as needed for breakthrough pain    Right ureteral stone       tamsulosin 0.4 MG capsule    FLOMAX    30 capsule    Take 1 capsule (0.4 mg) by mouth daily    Right ureteral stone       valACYclovir 500 MG tablet    VALTREX    90 tablet    Take 1 tablet (500 mg) by mouth daily    Herpes simplex virus infection

## 2018-08-21 ASSESSMENT — PATIENT HEALTH QUESTIONNAIRE - PHQ9: SUM OF ALL RESPONSES TO PHQ QUESTIONS 1-9: 0

## 2018-08-21 ASSESSMENT — ANXIETY QUESTIONNAIRES: GAD7 TOTAL SCORE: 0

## 2018-08-22 ENCOUNTER — ANESTHESIA EVENT (OUTPATIENT)
Dept: SURGERY | Facility: AMBULATORY SURGERY CENTER | Age: 73
End: 2018-08-22

## 2018-08-23 ENCOUNTER — SURGERY (OUTPATIENT)
Age: 73
End: 2018-08-23
Payer: COMMERCIAL

## 2018-08-23 ENCOUNTER — RADIANT APPOINTMENT (OUTPATIENT)
Dept: GENERAL RADIOLOGY | Facility: CLINIC | Age: 73
End: 2018-08-23
Attending: UROLOGY
Payer: COMMERCIAL

## 2018-08-23 ENCOUNTER — HOSPITAL ENCOUNTER (OUTPATIENT)
Facility: AMBULATORY SURGERY CENTER | Age: 73
Discharge: HOME | End: 2018-08-23
Attending: UROLOGY | Admitting: UROLOGY
Payer: COMMERCIAL

## 2018-08-23 ENCOUNTER — ANESTHESIA (OUTPATIENT)
Dept: SURGERY | Facility: AMBULATORY SURGERY CENTER | Age: 73
End: 2018-08-23

## 2018-08-23 VITALS
BODY MASS INDEX: 23.9 KG/M2 | HEIGHT: 64 IN | SYSTOLIC BLOOD PRESSURE: 143 MMHG | RESPIRATION RATE: 16 BRPM | WEIGHT: 140 LBS | DIASTOLIC BLOOD PRESSURE: 68 MMHG | TEMPERATURE: 98.7 F | OXYGEN SATURATION: 96 %

## 2018-08-23 DIAGNOSIS — N20.1 RIGHT URETERAL STONE: ICD-10-CM

## 2018-08-23 DIAGNOSIS — N20.1 RIGHT URETERAL STONE: Primary | ICD-10-CM

## 2018-08-23 PROCEDURE — 52356 CYSTO/URETERO W/LITHOTRIPSY: CPT | Mod: RT

## 2018-08-23 PROCEDURE — G8916 PT W IV AB GIVEN ON TIME: HCPCS

## 2018-08-23 PROCEDURE — 74420 UROGRAPHY RTRGR +-KUB: CPT | Mod: 26 | Performed by: UROLOGY

## 2018-08-23 PROCEDURE — G8907 PT DOC NO EVENTS ON DISCHARG: HCPCS

## 2018-08-23 PROCEDURE — 99000 SPECIMEN HANDLING OFFICE-LAB: CPT | Performed by: UROLOGY

## 2018-08-23 PROCEDURE — 52356 CYSTO/URETERO W/LITHOTRIPSY: CPT | Performed by: UROLOGY

## 2018-08-23 PROCEDURE — 82365 CALCULUS SPECTROSCOPY: CPT | Mod: 90 | Performed by: UROLOGY

## 2018-08-23 DEVICE — STENT URETERAL DBL PIGTAIL INLAY 6FRX24CM 778624: Type: IMPLANTABLE DEVICE | Site: URETER | Status: FUNCTIONAL

## 2018-08-23 RX ORDER — DEXAMETHASONE SODIUM PHOSPHATE 4 MG/ML
INJECTION, SOLUTION INTRA-ARTICULAR; INTRALESIONAL; INTRAMUSCULAR; INTRAVENOUS; SOFT TISSUE PRN
Status: DISCONTINUED | OUTPATIENT
Start: 2018-08-23 | End: 2018-08-23

## 2018-08-23 RX ORDER — SODIUM CHLORIDE, SODIUM LACTATE, POTASSIUM CHLORIDE, CALCIUM CHLORIDE 600; 310; 30; 20 MG/100ML; MG/100ML; MG/100ML; MG/100ML
INJECTION, SOLUTION INTRAVENOUS CONTINUOUS
Status: DISCONTINUED | OUTPATIENT
Start: 2018-08-23 | End: 2018-08-24 | Stop reason: HOSPADM

## 2018-08-23 RX ORDER — ACETAMINOPHEN 325 MG/1
975 TABLET ORAL ONCE
Status: COMPLETED | OUTPATIENT
Start: 2018-08-23 | End: 2018-08-23

## 2018-08-23 RX ORDER — PROPOFOL 10 MG/ML
INJECTION, EMULSION INTRAVENOUS PRN
Status: DISCONTINUED | OUTPATIENT
Start: 2018-08-23 | End: 2018-08-23

## 2018-08-23 RX ORDER — PROPOFOL 10 MG/ML
INJECTION, EMULSION INTRAVENOUS CONTINUOUS PRN
Status: DISCONTINUED | OUTPATIENT
Start: 2018-08-23 | End: 2018-08-23

## 2018-08-23 RX ORDER — DEXAMETHASONE SODIUM PHOSPHATE 4 MG/ML
4 INJECTION, SOLUTION INTRA-ARTICULAR; INTRALESIONAL; INTRAMUSCULAR; INTRAVENOUS; SOFT TISSUE EVERY 10 MIN PRN
Status: DISCONTINUED | OUTPATIENT
Start: 2018-08-23 | End: 2018-08-24 | Stop reason: HOSPADM

## 2018-08-23 RX ORDER — LIDOCAINE HYDROCHLORIDE 20 MG/ML
INJECTION, SOLUTION INFILTRATION; PERINEURAL PRN
Status: DISCONTINUED | OUTPATIENT
Start: 2018-08-23 | End: 2018-08-23

## 2018-08-23 RX ORDER — LIDOCAINE 40 MG/G
CREAM TOPICAL
Status: DISCONTINUED | OUTPATIENT
Start: 2018-08-23 | End: 2018-08-24 | Stop reason: HOSPADM

## 2018-08-23 RX ORDER — FUROSEMIDE 10 MG/ML
INJECTION INTRAMUSCULAR; INTRAVENOUS PRN
Status: DISCONTINUED | OUTPATIENT
Start: 2018-08-23 | End: 2018-08-23

## 2018-08-23 RX ORDER — ONDANSETRON 2 MG/ML
4 INJECTION INTRAMUSCULAR; INTRAVENOUS EVERY 30 MIN PRN
Status: DISCONTINUED | OUTPATIENT
Start: 2018-08-23 | End: 2018-08-24 | Stop reason: HOSPADM

## 2018-08-23 RX ORDER — HYDROMORPHONE HYDROCHLORIDE 1 MG/ML
.3-.5 INJECTION, SOLUTION INTRAMUSCULAR; INTRAVENOUS; SUBCUTANEOUS EVERY 10 MIN PRN
Status: DISCONTINUED | OUTPATIENT
Start: 2018-08-23 | End: 2018-08-24 | Stop reason: HOSPADM

## 2018-08-23 RX ORDER — ALBUTEROL SULFATE 0.83 MG/ML
2.5 SOLUTION RESPIRATORY (INHALATION) EVERY 4 HOURS PRN
Status: DISCONTINUED | OUTPATIENT
Start: 2018-08-23 | End: 2018-08-24 | Stop reason: HOSPADM

## 2018-08-23 RX ORDER — CEFAZOLIN SODIUM 2 G/100ML
2 INJECTION, SOLUTION INTRAVENOUS
Status: COMPLETED | OUTPATIENT
Start: 2018-08-23 | End: 2018-08-23

## 2018-08-23 RX ORDER — OXYBUTYNIN CHLORIDE 5 MG/1
5 TABLET, EXTENDED RELEASE ORAL DAILY
Qty: 10 TABLET | Refills: 0 | Status: SHIPPED | OUTPATIENT
Start: 2018-08-23 | End: 2018-09-02

## 2018-08-23 RX ORDER — CEFAZOLIN SODIUM 1 G/3ML
1 INJECTION, POWDER, FOR SOLUTION INTRAMUSCULAR; INTRAVENOUS SEE ADMIN INSTRUCTIONS
Status: DISCONTINUED | OUTPATIENT
Start: 2018-08-23 | End: 2018-08-24 | Stop reason: HOSPADM

## 2018-08-23 RX ORDER — FENTANYL CITRATE 50 UG/ML
INJECTION, SOLUTION INTRAMUSCULAR; INTRAVENOUS PRN
Status: DISCONTINUED | OUTPATIENT
Start: 2018-08-23 | End: 2018-08-23

## 2018-08-23 RX ORDER — FENTANYL CITRATE 50 UG/ML
25-50 INJECTION, SOLUTION INTRAMUSCULAR; INTRAVENOUS
Status: DISCONTINUED | OUTPATIENT
Start: 2018-08-23 | End: 2018-08-24 | Stop reason: HOSPADM

## 2018-08-23 RX ORDER — OXYCODONE HYDROCHLORIDE 5 MG/1
5-10 TABLET ORAL EVERY 4 HOURS PRN
Status: DISCONTINUED | OUTPATIENT
Start: 2018-08-23 | End: 2018-08-24 | Stop reason: HOSPADM

## 2018-08-23 RX ORDER — OXYCODONE HYDROCHLORIDE 5 MG/1
5 TABLET ORAL EVERY 6 HOURS PRN
Qty: 9 TABLET | Refills: 0 | Status: SHIPPED | OUTPATIENT
Start: 2018-08-23 | End: 2018-10-25

## 2018-08-23 RX ORDER — NALOXONE HYDROCHLORIDE 0.4 MG/ML
.1-.4 INJECTION, SOLUTION INTRAMUSCULAR; INTRAVENOUS; SUBCUTANEOUS
Status: DISCONTINUED | OUTPATIENT
Start: 2018-08-23 | End: 2018-08-24 | Stop reason: HOSPADM

## 2018-08-23 RX ORDER — ONDANSETRON 4 MG/1
4 TABLET, ORALLY DISINTEGRATING ORAL EVERY 30 MIN PRN
Status: DISCONTINUED | OUTPATIENT
Start: 2018-08-23 | End: 2018-08-24 | Stop reason: HOSPADM

## 2018-08-23 RX ORDER — MEPERIDINE HYDROCHLORIDE 25 MG/ML
12.5 INJECTION INTRAMUSCULAR; INTRAVENOUS; SUBCUTANEOUS
Status: COMPLETED | OUTPATIENT
Start: 2018-08-23 | End: 2018-08-23

## 2018-08-23 RX ADMIN — FUROSEMIDE 20 MG: 10 INJECTION INTRAMUSCULAR; INTRAVENOUS at 12:36

## 2018-08-23 RX ADMIN — DEXAMETHASONE SODIUM PHOSPHATE 4 MG: 4 INJECTION, SOLUTION INTRA-ARTICULAR; INTRALESIONAL; INTRAMUSCULAR; INTRAVENOUS; SOFT TISSUE at 11:40

## 2018-08-23 RX ADMIN — CEFAZOLIN SODIUM 2 G: 2 INJECTION, SOLUTION INTRAVENOUS at 11:29

## 2018-08-23 RX ADMIN — ACETAMINOPHEN 975 MG: 325 TABLET ORAL at 10:58

## 2018-08-23 RX ADMIN — MEPERIDINE HYDROCHLORIDE 12.5 MG: 25 INJECTION INTRAMUSCULAR; INTRAVENOUS; SUBCUTANEOUS at 13:21

## 2018-08-23 RX ADMIN — PROPOFOL 100 MG: 10 INJECTION, EMULSION INTRAVENOUS at 11:31

## 2018-08-23 RX ADMIN — SODIUM CHLORIDE, SODIUM LACTATE, POTASSIUM CHLORIDE, CALCIUM CHLORIDE: 600; 310; 30; 20 INJECTION, SOLUTION INTRAVENOUS at 10:59

## 2018-08-23 RX ADMIN — FENTANYL CITRATE 50 MCG: 50 INJECTION, SOLUTION INTRAMUSCULAR; INTRAVENOUS at 11:49

## 2018-08-23 RX ADMIN — PROPOFOL 175 MCG/KG/MIN: 10 INJECTION, EMULSION INTRAVENOUS at 11:33

## 2018-08-23 RX ADMIN — LIDOCAINE HYDROCHLORIDE 40 MG: 20 INJECTION, SOLUTION INFILTRATION; PERINEURAL at 11:31

## 2018-08-23 RX ADMIN — FENTANYL CITRATE 50 MCG: 50 INJECTION, SOLUTION INTRAMUSCULAR; INTRAVENOUS at 11:31

## 2018-08-23 RX ADMIN — MEPERIDINE HYDROCHLORIDE 12.5 MG: 25 INJECTION INTRAMUSCULAR; INTRAVENOUS; SUBCUTANEOUS at 13:36

## 2018-08-23 ASSESSMENT — LIFESTYLE VARIABLES: TOBACCO_USE: 0

## 2018-08-23 NOTE — BRIEF OP NOTE
[facility] Brief Operative Note    Pre-operative diagnosis: Right ureteral stone   Post-operative diagnosis: Right kidney stone   Procedure: Procedure(s):   Cystoscopy,Right ureteroscopy with laser lithotripsy and stent placement - Wound Class: II-Clean Contaminated   Surgeon: Pino Hawk MD   Assistant(s): None   Anesthesia: General endotracheal anesthesia   Estimated blood loss: Less than 10 ml   Total IV fluids: (See anesthesia record)   Blood transfusion: No transfusion was given during surgery   Total urine output: Not measured   Drains: Cobian catheter   Specimens:   ID Type Source Tests Collected by Time Destination   1 : right kidney stone Calculus/Stone Kidney, Right STONE ANALYSIS Pino Hawk MD 8/23/2018 12:36 PM        Implants: 6fr x 22cm RIGHT ureteral stent   Findings: 5mm right ureteral stone displaced to the kidney, fragmented and removed    Complications: None   Condition: Stable   Comments: See dictated operative report for full details    Pino Hawk MD

## 2018-08-23 NOTE — IP AVS SNAPSHOT
[facility]    [address] [place] [address]    Phone:  [phone]                                       After Visit Summary   8/23/2018    Cely Ontiveros    MRN: [id number]           After Visit Summary Signature Page     I have received my discharge instructions, and my questions have been answered. I have discussed any challenges I see with this plan with the nurse or doctor.    ..........................................................................................................................................  Patient/Patient Representative Signature      ..........................................................................................................................................  Patient Representative Print Name and Relationship to Patient    ..................................................               ................................................  Date                                            Time    ..........................................................................................................................................  Reviewed by Signature/Title    ...................................................              ..............................................  Date                                                            Time          22EPIC Rev 08/18

## 2018-08-23 NOTE — ANESTHESIA CARE TRANSFER NOTE
Patient: Cely Ontiveros    Procedure(s):   Cystoscopy,Right ureteroscopy with laser lithotripsy and stent placement - Wound Class: II-Clean Contaminated    Diagnosis: Right ureteral stone  Diagnosis Additional Information: No value filed.    Anesthesia Type:   General, LMA     Note:  Airway :Room Air  Patient transferred to:PACU  Comments: Prior to atraumatic LMA removal, patient awake, good aeration, SpO2 99%, equal bilateral breath sounds.  Transported to PACU on room air.  Patient awake, alert, SpO2 96% in PACU No apparent anesthesia complications.         Vitals: (Last set prior to Anesthesia Care Transfer)    CRNA VITALS  8/23/2018 1217 - 8/23/2018 1253      8/23/2018             Pulse: 75    SpO2: 100 %                Electronically Signed By: AUDREY Wyatt CRNA  August 23, 2018  12:53 PM

## 2018-08-23 NOTE — ANESTHESIA PREPROCEDURE EVALUATION
Anesthesia Evaluation     . Pt has had prior anesthetic.     No history of anesthetic complications          ROS/MED HX    ENT/Pulmonary:     (+)allergic rhinitis, , . .   (-) tobacco use   Neurologic:  - neg neurologic ROS     Cardiovascular:  - neg cardiovascular ROS   (+) ----. : . . . :. . Previous cardiac testing date:results:date: results:ECG reviewed date: results:Sinus rhythm, rate 76, left atrial enlargement, negative precordial T waves- probably normal, consider anteroseptal ischemia, borderline EKG. No prior EKG's available for comparison. date: results:          METS/Exercise Tolerance:  >4 METS   Hematologic:  - neg hematologic  ROS       Musculoskeletal:   (+) arthritis, , , -       GI/Hepatic:  - neg GI/hepatic ROS       Renal/Genitourinary:  - ROS Renal section negative       Endo:  - neg endo ROS       Psychiatric:     (+) psychiatric history depression      Infectious Disease:  - neg infectious disease ROS       Malignancy:      - no malignancy   Other:    - neg other ROS                 Physical Exam  Normal systems: cardiovascular, pulmonary and dental    Airway   Mallampati: I  TM distance: >3 FB  Neck ROM: full    Dental     Cardiovascular   Rhythm and rate: regular and normal      Pulmonary    breath sounds clear to auscultation                    Anesthesia Plan      History & Physical Review  History and physical reviewed and following examination; no interval change.    ASA Status:  2 .    NPO Status:  > 8 hours    Plan for General and LMA with Intravenous and Propofol induction. Maintenance will be Balanced.    PONV prophylaxis:  Ondansetron (or other 5HT-3) and Dexamethasone or Solumedrol       Postoperative Care  Postoperative pain management:  Multi-modal analgesia and Oral pain medications.      Consents  Anesthetic plan, risks, benefits and alternatives discussed with:  Patient.  Use of blood products discussed: No .   .                          .

## 2018-08-23 NOTE — DISCHARGE INSTRUCTIONS
POSTOPERATIVE INSTRUCTIONS    Diagnosis-------------------------------   Right ureteral stone    Procedure-------------------------------  Procedure(s) (LRB):  COMBINED CYSTOSCOPY, URETEROSCOPY, LASER HOLMIUM LITHOTRIPSY URETER(S) (Right)      Findings--------------------------------  Your right ureteral stone was fragmented and removed. A stent was placed.     Home-going instructions-----------------         Activity Limitation:     - No driving or operating heavy machinery while on narcotic pain medication.     FOLLOW THESE INSTRUCTIONS AS INDICATED BELOW:  - Observe operative area for signs of excessive bleeding.  - You may shower.  - Increase fluid intake to promote clear urine.  - Resume usual diet as tolerated    What to expect while recovering-----------  - You may experience some intermittent bleeding that makes your urine pink or cherry colored. This is normal.  - However, if you are unable to urinate, passing large amount of clots, have sara blood in your urine, or have a temperature >101 degrees, call the urology nurse on call, or present to your nearest emergency department.  - You are encouraged to walk daily, and have no activity restrictions.   - A URETERAL STENT has been placed that allows urine to flow unobstructed from your kidney into your bladder.  The stent has a curl in the kidney and a curl in the bladder.  The curl in the bladder can cause some urgency and frequency of urination as well as some mild blood in the urine.  The curl in the kidney can cause some mild flank discomfort.  This may be more noticeable when you urinate.  A URETERAL STENT is meant to be left in temporarily.  It must be removed or changed no later than 3 months after it's insertion.  If it's not removed it can result in stone overgrowth on the stent that can cause pain, infection, and can be very difficult to remove.      Discharge Medications/instructions:     - Flomax (tamsulosin) to be taken daily until stent is  removed    - Ditropan daily until stent is removed    - Antibiotics - finish current prescription    - Take Tylenol 1000mg every 6 hours for pain    - Take Ibuprofen 600mg every 6 hours as needed for additional pain control    - Take Oxycodone 5mg every 4-6 hours only for break through pain    - Take Colace while taking Oxycodone to prevent constipation      Questions/concerns------------------------  [facility]: [phone]  [facility]: [phone]    Future appointments  You will return next week Thursday to have your stent removed    Pino Hawk MD      [facility]  Same-Day Surgery   Adult Discharge Orders & Instructions   For 24 hours after surgery  1. Get plenty of rest.  A responsible adult must stay with you for at least 24 hours after you leave the hospital.   2. Do not drive or use heavy equipment.  If you have weakness or tingling, don't drive or use heavy equipment until this feeling goes away.  3. Do not drink alcohol.  4. Avoid strenuous or risky activities.  Ask for help when climbing stairs.   5. You may feel lightheaded.  IF so, sit for a few minutes before standing.  Have someone help you get up.   6. If you have nausea (feel sick to your stomach): Drink only clear liquids such as apple juice, ginger ale, broth or 7-Up.  Rest may also help.  Be sure to drink enough fluids.  Move to a regular diet as you feel able.  7. You may have a slight fever. Call the doctor if your fever is over 100 F (37.7 C) (taken under the tongue) or lasts longer than 24 hours.  8. You may have a dry mouth, a sore throat, muscle aches or trouble sleeping.  These should go away after 24 hours.  9. Do not make important or legal decisions.   Call your doctor for any of the followin.  Signs of infection (fever, growing tenderness at the surgery site, a large amount of drainage or bleeding, severe pain, foul-smelling drainage, redness, swelling).    2. It  has been over 8 to 10 hours since surgery and you are still not able to urinate (pass water).    3.  Headache for over 24 hours.

## 2018-08-23 NOTE — IP AVS SNAPSHOT
MRN:[id number]                      After Visit Summary   8/23/2018    Cely Ontiveros    MRN: [id number]           Thank you!     Thank you for choosing [facility] for your care. Our goal is always to provide you with excellent care. Hearing back from our patients is one way we can continue to improve our services. Please take a few minutes to complete the written survey that you may receive in the mail after you visit with us. Thank you!        Patient Information     Date Of Birth          1945        About your hospital stay     You were admitted on:  August 23, 2018 You last received care in the:  [facility]    You were discharged on:  August 23, 2018       Who to Call     For medical emergencies, please call 911.  For non-urgent questions about your medical care, please call your primary care provider or clinic, [phone]  For questions related to your surgery, please call your surgery clinic        Attending Provider     Provider Pino Dash MD Urology       Primary Care Provider Office Phone # Fax #    Sunita Prema Andres PA-C [phone]      After Care Instructions     Discontinue indwelling urinary catheter (Cobian)                 Further instructions from your care team       POSTOPERATIVE INSTRUCTIONS    Diagnosis-------------------------------   Right ureteral stone    Procedure-------------------------------  Procedure(s) (LRB):  COMBINED CYSTOSCOPY, URETEROSCOPY, LASER HOLMIUM LITHOTRIPSY URETER(S) (Right)      Findings--------------------------------  Your right ureteral stone was fragmented and removed. A stent was placed.     Home-going instructions-----------------         Activity Limitation:     - No driving or operating heavy machinery while on narcotic pain medication.     FOLLOW THESE INSTRUCTIONS AS INDICATED BELOW:  - Observe operative area for signs of excessive bleeding.  - You may shower.  - Increase fluid intake  to promote clear urine.  - Resume usual diet as tolerated    What to expect while recovering-----------  - You may experience some intermittent bleeding that makes your urine pink or cherry colored. This is normal.  - However, if you are unable to urinate, passing large amount of clots, have sara blood in your urine, or have a temperature >101 degrees, call the urology nurse on call, or present to your nearest emergency department.  - You are encouraged to walk daily, and have no activity restrictions.   - A URETERAL STENT has been placed that allows urine to flow unobstructed from your kidney into your bladder.  The stent has a curl in the kidney and a curl in the bladder.  The curl in the bladder can cause some urgency and frequency of urination as well as some mild blood in the urine.  The curl in the kidney can cause some mild flank discomfort.  This may be more noticeable when you urinate.  A URETERAL STENT is meant to be left in temporarily.  It must be removed or changed no later than 3 months after it's insertion.  If it's not removed it can result in stone overgrowth on the stent that can cause pain, infection, and can be very difficult to remove.      Discharge Medications/instructions:     - Flomax (tamsulosin) to be taken daily until stent is removed    - Ditropan daily until stent is removed    - Antibiotics - finish current prescription    - Take Tylenol 1000mg every 6 hours for pain    - Take Ibuprofen 600mg every 6 hours as needed for additional pain control    - Take Oxycodone 5mg every 4-6 hours only for break through pain    - Take Colace while taking Oxycodone to prevent constipation      Questions/concerns------------------------  [facility]: [phone]  [facility]: [phone]    Future appointments  You will return next week Thursday to have your stent removed    Pino Hawk MD      [facility]  Same-Day Surgery   Adult  "Discharge Orders & Instructions   For 24 hours after surgery  1. Get plenty of rest.  A responsible adult must stay with you for at least 24 hours after you leave the hospital.   2. Do not drive or use heavy equipment.  If you have weakness or tingling, don't drive or use heavy equipment until this feeling goes away.  3. Do not drink alcohol.  4. Avoid strenuous or risky activities.  Ask for help when climbing stairs.   5. You may feel lightheaded.  IF so, sit for a few minutes before standing.  Have someone help you get up.   6. If you have nausea (feel sick to your stomach): Drink only clear liquids such as apple juice, ginger ale, broth or 7-Up.  Rest may also help.  Be sure to drink enough fluids.  Move to a regular diet as you feel able.  7. You may have a slight fever. Call the doctor if your fever is over 100 F (37.7 C) (taken under the tongue) or lasts longer than 24 hours.  8. You may have a dry mouth, a sore throat, muscle aches or trouble sleeping.  These should go away after 24 hours.  9. Do not make important or legal decisions.   Call your doctor for any of the followin.  Signs of infection (fever, growing tenderness at the surgery site, a large amount of drainage or bleeding, severe pain, foul-smelling drainage, redness, swelling).    2. It has been over 8 to 10 hours since surgery and you are still not able to urinate (pass water).    3.  Headache for over 24 hours.          Pending Results     No orders found from 2018 to 2018.            Admission Information     Date & Time Provider Department Dept. Phone    2018 Pino Hawk MD [facility] [phone]      Your Vitals Were     Blood Pressure Temperature Respirations Height Weight Pulse Oximetry    135/65 97.7  F (36.5  C) (Temporal) 16 1.626 m (5' 4\") 63.5 kg (140 lb) 94%    BMI (Body Mass Index)                   24.03 kg/m2           MyChart Information     [facility] gives you secure access to your " electronic health record. If you see a primary care provider, you can also send messages to your care team and make appointments. If you have questions, please call your primary care clinic.  If you do not have a primary care provider, please call [phone] and they will assist you.        Care EveryWhere ID     This is your Care EveryWhere ID. This could be used by other organizations to access your [facility] medical records  [id number]        Equal Access to Services     HAYDEE STOCK : Hadii johnathon berger Sojenna, wadonaldoda lufranciscoadaha, qaybta kaalmada adegordon, iona sanchesmarbellarichard garrett. So [facility] [phone].    ATENCIÓN: Si habla espalyssa, tiene a carnes disposición servicios gratuitos de asistencia lingüística. Llame al [phone].    We comply with applicable federal civil rights laws and [place] laws. We do not discriminate on the basis of race, color, national origin, age, disability, sex, sexual orientation, or gender identity.               Review of your medicines      START taking        Dose / Directions    oxybutynin 5 MG 24 hr tablet   Commonly known as:  DITROPAN-XL   Used for:  Right ureteral stone        Dose:  5 mg   Take 1 tablet (5 mg) by mouth daily for 10 days Take daily until your stent is removed.   Quantity:  10 tablet   Refills:  0         CONTINUE these medicines which have NOT CHANGED        Dose / Directions    ciprofloxacin 500 MG tablet   Commonly known as:  CIPRO   Used for:  Calculus of ureter        Dose:  500 mg   Take 1 tablet (500 mg) by mouth 2 times daily for 7 days   Quantity:  14 tablet   Refills:  0       citalopram 20 MG tablet   Commonly known as:  celeXA   Used for:  Dysthymic disorder        Dose:  20 mg   Take 1 tablet (20 mg) by mouth daily   Quantity:  90 tablet   Refills:  3       clotrimazole 1 % cream   Commonly known as:  LOTRIMIN   Used for:  Tinea cruris        Apply topically 2 times daily   Quantity:  30 g   Refills:  1        clotrimazole-betamethasone cream   Commonly known as:  LOTRISONE   Used for:  Special screening for malignant neoplasms, colon        Apply topically 2 times daily   Quantity:  15 g   Refills:  1       loratadine-pseudoePHEDrine  MG per 24 hr tablet   Commonly known as:  EQL ALLERGY/CONGESTION RELIEF   Used for:  Chronic rhinitis, unspecified type        Dose:  1 tablet   Take 1 tablet by mouth daily   Quantity:  90 tablet   Refills:  3       oxyCODONE IR 5 MG tablet   Commonly known as:  ROXICODONE   Used for:  Right ureteral stone        Dose:  5 mg   Take 1 tablet (5 mg) by mouth every 6 hours as needed for breakthrough pain   Quantity:  9 tablet   Refills:  0       tamsulosin 0.4 MG capsule   Commonly known as:  FLOMAX   Used for:  Right ureteral stone        Dose:  0.4 mg   Take 1 capsule (0.4 mg) by mouth daily   Quantity:  30 capsule   Refills:  0       valACYclovir 500 MG tablet   Commonly known as:  VALTREX   Used for:  Herpes simplex virus infection        Dose:  500 mg   Take 1 tablet (500 mg) by mouth daily   Quantity:  90 tablet   Refills:  3            Where to get your medicines      These medications were sent to [facility] - [place], [place] - [address], [address]  [address], [address], [facility] [address]     Phone:  [phone]     oxybutynin 5 MG 24 hr tablet         Some of these will need a paper prescription and others can be bought over the counter. Ask your nurse if you have questions.     Bring a paper prescription for each of these medications     oxyCODONE IR 5 MG tablet                Protect others around you: Learn how to safely use, store and throw away your medicines at www.disposemymeds.org.        Information about OPIOIDS     PRESCRIPTION OPIOIDS: WHAT YOU NEED TO KNOW   We gave you an opioid (narcotic) pain medicine. It is important to manage your pain, but opioids are not always the best choice. You should first try all the other  options your care team gave you. Take this medicine for as short a time (and as few doses) as possible.    Some activities can increase your pain, such as bandage changes or therapy sessions. It may help to take your pain medicine 30 to 60 minutes before these activities. Reduce your stress by getting enough sleep, working on hobbies you enjoy and practicing relaxation or meditation. Talk to your care team about ways to manage your pain beyond prescription opioids.    These medicines have risks:    DO NOT drive when on new or higher doses of pain medicine. These medicines can affect your alertness and reaction times, and you could be arrested for driving under the influence (DUI). If you need to use opioids long-term, talk to your care team about driving.    DO NOT operate heavy machinery    DO NOT do any other dangerous activities while taking these medicines.    DO NOT drink any alcohol while taking these medicines.     If the opioid prescribed includes acetaminophen, DO NOT take with any other medicines that contain acetaminophen. Read all labels carefully. Look for the word  acetaminophen  or  Tylenol.  Ask your pharmacist if you have questions or are unsure.    You can get addicted to pain medicines, especially if you have a history of addiction (chemical, alcohol or substance dependence). Talk to your care team about ways to reduce this risk.    All opioids tend to cause constipation. Drink plenty of water and eat foods that have a lot of fiber, such as fruits, vegetables, prune juice, apple juice and high-fiber cereal. Take a laxative (Miralax, milk of magnesia, Colace, Senna) if you don t move your bowels at least every other day. Other side effects include upset stomach, sleepiness, dizziness, throwing up, tolerance (needing more of the medicine to have the same effect), physical dependence and slowed breathing.    Store your pills in a secure place, locked if possible. We will not replace any lost or  stolen medicine. If you don t finish your medicine, please throw away (dispose) as directed by your pharmacist. The [place] Pollution Control Agency has more information about safe disposal: https://www.pca.[facility].[place].us/living-green/managing-unwanted-medications             Medication List: This is a list of all your medications and when to take them. Check marks below indicate your daily home schedule. Keep this list as a reference.      Medications           Morning Afternoon Evening Bedtime As Needed    ciprofloxacin 500 MG tablet   Commonly known as:  CIPRO   Take 1 tablet (500 mg) by mouth 2 times daily for 7 days                                citalopram 20 MG tablet   Commonly known as:  celeXA   Take 1 tablet (20 mg) by mouth daily                                clotrimazole 1 % cream   Commonly known as:  LOTRIMIN   Apply topically 2 times daily                                clotrimazole-betamethasone cream   Commonly known as:  LOTRISONE   Apply topically 2 times daily                                loratadine-pseudoePHEDrine  MG per 24 hr tablet   Commonly known as:  EQL ALLERGY/CONGESTION RELIEF   Take 1 tablet by mouth daily                                oxybutynin 5 MG 24 hr tablet   Commonly known as:  DITROPAN-XL   Take 1 tablet (5 mg) by mouth daily for 10 days Take daily until your stent is removed.                                oxyCODONE IR 5 MG tablet   Commonly known as:  ROXICODONE   Take 1 tablet (5 mg) by mouth every 6 hours as needed for breakthrough pain                                tamsulosin 0.4 MG capsule   Commonly known as:  FLOMAX   Take 1 capsule (0.4 mg) by mouth daily                                valACYclovir 500 MG tablet   Commonly known as:  VALTREX   Take 1 tablet (500 mg) by mouth daily

## 2018-08-23 NOTE — OP NOTE
OPERATIVE REPORT  DATE OF SURGERY: 08/23/18  PREOPERATIVE DIAGNOSIS:  (N20.1) Right ureteral stone  (primary encounter diagnosis)  POSTOPERATIVE DIAGNOSIS: (N20.1) Right ureteral stone displaced to the kidney  (primary encounter diagnosis)  PROCEDURE PERFORMED:   1. Cystoscopy  2. RIGHT retrograde pyelogram  3. RIGHT ureteroscopy with laser lithotripsy  4. RIGHT ureteroscopy with basketing of stones  5. RIGHT JJ stent placement  6. <1hr physician fluoroscopy time     STAFF SURGEON: Pino Hawk MD  ANESTHESIA: General.   ESTIMATED BLOOD LOSS: 5 mL.   DRAINS AND TUBES: RIGHT 6fr x 22cm JJ ureteral stent  COMPLICATIONS: None.   DISPOSITION: PACU.   SPECIMENS OBTAINED:   ID Type Source Tests Collected by Time Destination   1 : right kidney stone Calculus/Stone Kidney, Right STONE ANALYSIS Pino Hawk MD 8/23/2018 12:36 PM       SIGNIFICANT FINDINGS: 5mm right ureteral stone displaced to the kidney, fragmented and removed      HISTORY OF PRESENT ILLNESS: 71 y/o female who was found to have a 5mm right proximal ureteral stone with associated flank pain. Pre-op urine culture was positive for >100k citrobacter which was pan sensitive. She received a 7 day course of Cipro.     OPERATION PERFORMED:   Informed consent was obtained and the patient was brought to the operating room where general anesthesia was induced. The patient was given appropriate preoperative antibiotics and positioned supine. The patient was then repositioned in dorsal lithotomy with all pressure points padded. We then performed a timeout, verifying the correct patient's site and procedure to be performed.    A 22fr cystoscope was inserted atraumatically into the bladder. Complete cystoscopy was performed with no evidence of a stone within the bladder. The RIGHT UO was identified and cannulated with a 0.035 Sensor wire which was advanced up the RIGHT kidney under fluoroscopic guidance. The cystoscope was removed. A semi-rigid ureteroscope was  "assembled and inserted atraumatically into the bladder. An Amplatz Super-stiff wire was used to cannulate the UO and guide the semi-rigid scope into the UO in a \"rail-road\" technique. The scope was advanced up the ureter to the level of the mid ureter. The scope would not advanced beyond this point. A gentle retrograde pyelogram was completed which demonstrated that the stone had washed up to the renal pelvis. The super-stiff wire was advanced up to the renal pelvis and the scope was removed. A 11/13 36cm ureteral access sheath was advanced over the super-stiff wire under fluoroscopic guidance to the level of the UPJ and the wire and the inner sheath were removed. The flexible ureteroscope was assembled and advanced up the renal pelvis and complete pyeloscopy was performed and the 5mm ureteral stone was noted to have been displaced to the upper pole. The stone was fragmented with the laser fiber. All fragments >1mm were basketed and removed. The scope and the sheath were removed en bloc with complete visualization of the ureter. There was no evidence of ureteral injury. A 6fr x 22cm JJ stent was advanced over the Sensor wire with good curl noted in the renal pelvis and in the bladder fluoroscopically. A 16fr olsen was placed.   The patient was emerged from anesthesia and recovered in the PACU.     Pino Hawk MD   Urology  [facility]  Clinic Phone [phone]     "

## 2018-08-23 NOTE — ANESTHESIA POSTPROCEDURE EVALUATION
Patient: Cely Ontiveros    Procedure(s):   Cystoscopy,Right ureteroscopy with laser lithotripsy and stent placement - Wound Class: II-Clean Contaminated    Diagnosis:Right ureteral stone  Diagnosis Additional Information: No value filed.    Anesthesia Type:  General, LMA    Note:  Anesthesia Post Evaluation    Patient location during evaluation: Phase 2  Patient participation: Able to fully participate in evaluation  Level of consciousness: awake and alert  Pain management: adequate  Airway patency: patent  Cardiovascular status: acceptable  Respiratory status: acceptable  Hydration status: acceptable  PONV: none     Anesthetic complications: None          Last vitals:  Vitals:    08/23/18 1300 08/23/18 1315 08/23/18 1330   BP: 145/87 145/80 143/68   Resp: 16 16 16   Temp:   98.7  F (37.1  C)   SpO2:  97% 96%         Electronically Signed By: Sha Almendarez DO  August 23, 2018  2:27 PM

## 2018-08-28 LAB
APPEARANCE STONE: NORMAL
COMPN STONE: NORMAL
NUMBER STONE: 3
SIZE STONE: NORMAL MM
WT STONE: 20 MG

## 2018-08-29 ENCOUNTER — TELEPHONE (OUTPATIENT)
Dept: FAMILY MEDICINE | Facility: CLINIC | Age: 73
End: 2018-08-29

## 2018-08-29 DIAGNOSIS — J31.0 CHRONIC RHINITIS, UNSPECIFIED TYPE: ICD-10-CM

## 2018-08-29 NOTE — TELEPHONE ENCOUNTER
It may have been given to her.  Can you find out if she has it?  Otherwise, I can write another for her.  Allyson VENTURA, CNP

## 2018-08-29 NOTE — TELEPHONE ENCOUNTER
Pharmacy is faxing request for loratadine-pseudoePHEDrine (EQL ALLERGY/CONGESTION RELIEF)  MG per 24 hr tablet.    It looks like script was printed at patient's apt on 08/20/18, but I do not see it sent from us.  Unsure if it was given to pt to fill?    Please send script to [facility] if appropriate.

## 2018-08-30 ENCOUNTER — OFFICE VISIT (OUTPATIENT)
Dept: UROLOGY | Facility: CLINIC | Age: 73
End: 2018-08-30
Payer: COMMERCIAL

## 2018-08-30 DIAGNOSIS — N20.1 RIGHT URETERAL STONE: Primary | ICD-10-CM

## 2018-08-30 PROCEDURE — 52310 CYSTOSCOPY AND TREATMENT: CPT | Performed by: UROLOGY

## 2018-08-30 NOTE — TELEPHONE ENCOUNTER
Patient did not get a prescription at her visit   her two other medications were at the pharmacy   just not the allergy medication    Can you call or send that over    Thank you

## 2018-08-30 NOTE — PROGRESS NOTES
CYSTOSCOPY AND STENT REMOVAL PROCEDURE NOTE:    Cely Ontiveros is a 72 year old female who presents following ureteroscopy and stent placement for a ureteral stone on 8/23 for a cystoscopy and stent removal.    Pt ID verified with patient: yes    Procedure verified with patient: yes    Procedure confirmed with physician and support staff: yes    Consent confirmed with physician and support staff.    Sign In:  History and Physical Exam reviewed  Primary Diagnosis: right ureteral stent  Informed Consent Discussed: yes  Sign in Communication: yes  Time Out:  Team Confirms the Correct Patient, Correct Procedure; yes, Correct Site and Site Marking, Correct Position (if applicable).  Affirmation of Time Out: yes  Sign Out:  Sign Out Discussion: yes  Physician: Pino Hawk  Indications for procedure:   Cely Ontiveros is a 72 year old female with an indwelling ureteral stent in need of removal.    CYSTOSCOPY PROCEDURE:  After sterile preparation and draping of the patient,  a 17-Nepali flexible cystoscope was introduced via the urethra.  It was passed without difficulty into the bladder.  The urethra was open without evidence of stricture.  The ureteral orifices were orthotopic.  The double J stent was seen coming out the right side.  It was grasped with an alligator forceps and extracted intact without difficulty.  The patient tolerated the procedure well    A/P Successful stent removal  Prophylactic antibiotic ordered   Stone prevention counseling provided today  We discussed the stone analysis results which showed Calculi composed primarily of   calcium oxalate monohydrate.     Watch for any new onset fevers, signs of UTI.  May expect some pain after removal.  If this is severe, or last many hours, you may need to return for replacement of stent.    Pino Hawk MD   Urology  [facility]

## 2018-08-30 NOTE — MR AVS SNAPSHOT
"              After Visit Summary   8/30/2018    Cely Ontiveros    MRN: [id number]           Patient Information     Date Of Birth          1945        Visit Information        Provider Department      8/30/2018 10:30 AM Pino Hawk MD; [facility]        Today's Diagnoses     Right ureteral stone    -  1      Care Instructions      AFTER YOUR CYSTOSCOPY        You have just completed a cystoscopy, or \"cysto\", which allowed your physician to learn more about your bladder (or to remove a stent placed after surgery). We suggest that you continue to avoid caffeine, fruit juice, and alcohol for the next 24 hours, however, you are encouraged to return to your normal activities.         A few things that are considered normal after your cystoscopy:     * Small amount of bleeding (or spotting) that clears within the next 24 hours     * Slight burning sensation with urination     * Sensation to of needing to avoid more frequently     * The feeling of \"air\" in your urine     * Mild discomfort that is relieved with Tylenol        Please contact our office promptly if you:     * Develop a fever above 101 degrees     * Are unable to urinate     * Develop bright red blood that does not stop     * Severe pain or swelling         Please contact our office with any concerns or questions @[facility].          Follow-ups after your visit        Follow-up notes from your care team     Return if symptoms worsen or fail to improve.      Who to contact     If you have questions or need follow up information about today's clinic visit or your schedule please contact [facility] directly at [phone].  Normal or non-critical lab and imaging results will be communicated to you by MyChart, letter or phone within 4 business days after the clinic has received the results. If you do not hear from us within 7 days, please contact the clinic through MyChart or phone. If you have a " critical or abnormal lab result, we will notify you by phone as soon as possible.  Submit refill requests through Kontera or call your pharmacy and they will forward the refill request to us. Please allow 3 business days for your refill to be completed.          Additional Information About Your Visit        Scanbuyhart Information     Kontera gives you secure access to your electronic health record. If you see a primary care provider, you can also send messages to your care team and make appointments. If you have questions, please call your primary care clinic.  If you do not have a primary care provider, please call [phone] and they will assist you.      Kontera is an electronic gateway that provides easy, online access to your medical records. With Kontera, you can request a clinic appointment, read your test results, renew a prescription or communicate with your care team.     To access your existing account, please contact your [facility] or call [phone] for assistance.        Care EveryWhere ID     This is your Care EveryWhere ID. This could be used by other organizations to access your [place] medical records  [id number]         Blood Pressure from Last 3 Encounters:   08/23/18 143/68   08/20/18 139/71   08/16/18 142/71    Weight from Last 3 Encounters:   08/17/18 63.5 kg (140 lb)   08/20/18 64.5 kg (142 lb 3.2 oz)   10/04/17 64.7 kg (142 lb 9.6 oz)              We Performed the Following     CYSTOURETHROSCOPY        Primary Care Provider Office Phone # Fax #    Sunita Prema Andres PA-C [phone] [address] [place] [address]        Equal Access to Services     [facility]: Hadii aad ku hadasho Soomaali, waaxda luqadaha, qaybta kaalmada adeegyada, iona garrett. So [facility] [phone].    ATENCIÓN: Si habla español, tiene a cranes disposición servicios gratuitos de asistencia lingüística. Llame al [phone].    We  comply with applicable federal civil rights laws and [place] laws. We do not discriminate on the basis of race, color, national origin, age, disability, sex, sexual orientation, or gender identity.            Thank you!     Thank you for choosing [facility]  for your care. Our goal is always to provide you with excellent care. Hearing back from our patients is one way we can continue to improve our services. Please take a few minutes to complete the written survey that you may receive in the mail after your visit with us. Thank you!             Your Updated Medication List - Protect others around you: Learn how to safely use, store and throw away your medicines at www.disposemymeds.org.          This list is accurate as of 8/30/18  2:31 PM.  Always use your most recent med list.                   Brand Name Dispense Instructions for use Diagnosis    citalopram 20 MG tablet    celeXA    90 tablet    Take 1 tablet (20 mg) by mouth daily    Dysthymic disorder       clotrimazole 1 % cream    LOTRIMIN    30 g    Apply topically 2 times daily    Tinea cruris       clotrimazole-betamethasone cream    LOTRISONE    15 g    Apply topically 2 times daily    Special screening for malignant neoplasms, colon       loratadine-pseudoePHEDrine  MG per 24 hr tablet    EQL ALLERGY/CONGESTION RELIEF    90 tablet    Take 1 tablet by mouth daily    Chronic rhinitis, unspecified type       oxybutynin 5 MG 24 hr tablet    DITROPAN-XL    10 tablet    Take 1 tablet (5 mg) by mouth daily for 10 days Take daily until your stent is removed.    Right ureteral stone       oxyCODONE IR 5 MG tablet    ROXICODONE    9 tablet    Take 1 tablet (5 mg) by mouth every 6 hours as needed for breakthrough pain    Right ureteral stone       tamsulosin 0.4 MG capsule    FLOMAX    30 capsule    Take 1 capsule (0.4 mg) by mouth daily    Right ureteral stone       valACYclovir 500 MG tablet    VALTREX    90 tablet    Take 1 tablet (500  mg) by mouth daily    Herpes simplex virus infection

## 2018-08-30 NOTE — TELEPHONE ENCOUNTER
This writer attempted to contact patient on 08/30/18      Reason for call medication and left message.      If patient calls back:   Patient contacted by 2nd floor [facility] Team (MA/TC). Inform patient that someone from the team will contact them, document that pt called and route to care team.         Evangelina Crowder MA

## 2018-08-31 NOTE — TELEPHONE ENCOUNTER
Faxed Rx for the loratadine-pseudoephedrine to [facility], Kira Valles, [phone], right fax confirmed at 3:04 pm today.  Leslie Louis MA/  For [facility] [place]

## 2018-09-02 ENCOUNTER — OFFICE VISIT (OUTPATIENT)
Dept: URGENT CARE | Facility: URGENT CARE | Age: 73
End: 2018-09-02
Payer: COMMERCIAL

## 2018-09-02 VITALS
TEMPERATURE: 98 F | RESPIRATION RATE: 16 BRPM | BODY MASS INDEX: 23.96 KG/M2 | DIASTOLIC BLOOD PRESSURE: 76 MMHG | HEART RATE: 83 BPM | OXYGEN SATURATION: 96 % | SYSTOLIC BLOOD PRESSURE: 149 MMHG | WEIGHT: 139.6 LBS

## 2018-09-02 DIAGNOSIS — Z98.890 S/P CYSTOSCOPY: ICD-10-CM

## 2018-09-02 DIAGNOSIS — R82.90 NONSPECIFIC FINDING ON EXAMINATION OF URINE: ICD-10-CM

## 2018-09-02 DIAGNOSIS — R30.0 DYSURIA: Primary | ICD-10-CM

## 2018-09-02 DIAGNOSIS — R31.0 GROSS HEMATURIA: ICD-10-CM

## 2018-09-02 LAB
ALBUMIN UR-MCNC: ABNORMAL MG/DL
APPEARANCE UR: ABNORMAL
BACTERIA #/AREA URNS HPF: ABNORMAL /HPF
BILIRUB UR QL STRIP: NEGATIVE
COLOR UR AUTO: YELLOW
GLUCOSE UR STRIP-MCNC: 100 MG/DL
HGB UR QL STRIP: ABNORMAL
KETONES UR STRIP-MCNC: NEGATIVE MG/DL
LEUKOCYTE ESTERASE UR QL STRIP: ABNORMAL
NITRATE UR QL: POSITIVE
NON-SQ EPI CELLS #/AREA URNS LPF: ABNORMAL /LPF
PH UR STRIP: 6 PH (ref 5–7)
RBC #/AREA URNS AUTO: ABNORMAL /HPF
SOURCE: ABNORMAL
SP GR UR STRIP: 1.01 (ref 1–1.03)
UROBILINOGEN UR STRIP-ACNC: 1 EU/DL (ref 0.2–1)
WBC #/AREA URNS AUTO: ABNORMAL /HPF

## 2018-09-02 PROCEDURE — 81001 URINALYSIS AUTO W/SCOPE: CPT | Performed by: FAMILY MEDICINE

## 2018-09-02 PROCEDURE — 99213 OFFICE O/P EST LOW 20 MIN: CPT | Performed by: FAMILY MEDICINE

## 2018-09-02 PROCEDURE — 87086 URINE CULTURE/COLONY COUNT: CPT | Performed by: FAMILY MEDICINE

## 2018-09-02 RX ORDER — CEPHALEXIN 500 MG/1
500 CAPSULE ORAL 3 TIMES DAILY
Qty: 30 CAPSULE | Refills: 0 | Status: SHIPPED | OUTPATIENT
Start: 2018-09-02 | End: 2018-10-25

## 2018-09-02 NOTE — PATIENT INSTRUCTIONS
"   * BLADDER INFECTION,Female (Adult)    A bladder infection (\"cystitis\" or \"UTI\") usually causes a constant urge to urinate and a burning when passing urine. Urine may be cloudy, smelly or dark. There may be pain in the lower abdomen. A bladder infection occurs when bacteria from the vaginal area enter the bladder opening (urethra). This can occur from sexual intercourse, wearing tight clothing, dehydration and other factors.  HOME CARE:  1. Drink lots of fluids (at least 6-8 glasses a day, unless you must restrict fluids for other medical reasons). This will force the medicine into your urinary system and flush the bacteria out of your body. Cranberry juice has been shown to help clear out the bacteria.  2. Avoid sexual intercourse until your symptoms are gone.  3. A bladder infection is treated with antibiotics. You may also be given Pyridium (generic = phenazopyridine) to reduce the burning sensation. This medicine will cause your urine to become a bright orange color. The orange urine may stain clothing. You may wear a pad or panty-liner to protect clothing.  PREVENTING FUTURE INFECTIONS:  1. Always wipe from front to back after a bowel movement.  2. Keep the genital area clean and dry.  3. Drink plenty of fluids each day to avoid dehydration.  4. Urinate right after intercourse to flush out the bladder.  5. Wear cotton underwear and cotton-lined panty hose; avoid tight-fitting pants.  6. If you are on birth control pills and are having frequent bladder infections, discuss with your doctor.  FOLLOW UP: Return to this facility or see your doctor if ALL symptoms are not gone after three days of treatment.  GET PROMPT MEDICAL ATTENTION if any of the following occur:    Fever over 101 F (38.3 C)    No improvement by the third day of treatment    Increasing back or abdominal pain    Repeated vomiting; unable to keep medicine down    Weakness, dizziness or fainting    Vaginal discharge    Pain, redness or swelling in " the labia (outer vaginal area)    4614-2032 [employer]. [address] [place] [address], [place] [address]. All rights reserved. This information is not intended as a substitute for professional medical care. Always follow your healthcare professional's instructions.  This information has been modified by your health care provider with permission from the publisher.

## 2018-09-02 NOTE — MR AVS SNAPSHOT
"              After Visit Summary   9/2/2018    Cely Ontiveros    MRN: [id number]           Patient Information     Date Of Birth          1945        Visit Information        Provider Department      9/2/2018 9:25 AM Gabby Mota MD [facility]        Today's Diagnoses     Dysuria    -  1    Nonspecific finding on examination of urine        Gross hematuria        S/P cystoscopy          Care Instructions       * BLADDER INFECTION,Female (Adult)    A bladder infection (\"cystitis\" or \"UTI\") usually causes a constant urge to urinate and a burning when passing urine. Urine may be cloudy, smelly or dark. There may be pain in the lower abdomen. A bladder infection occurs when bacteria from the vaginal area enter the bladder opening (urethra). This can occur from sexual intercourse, wearing tight clothing, dehydration and other factors.  HOME CARE:  1. Drink lots of fluids (at least 6-8 glasses a day, unless you must restrict fluids for other medical reasons). This will force the medicine into your urinary system and flush the bacteria out of your body. Cranberry juice has been shown to help clear out the bacteria.  2. Avoid sexual intercourse until your symptoms are gone.  3. A bladder infection is treated with antibiotics. You may also be given Pyridium (generic = phenazopyridine) to reduce the burning sensation. This medicine will cause your urine to become a bright orange color. The orange urine may stain clothing. You may wear a pad or panty-liner to protect clothing.  PREVENTING FUTURE INFECTIONS:  1. Always wipe from front to back after a bowel movement.  2. Keep the genital area clean and dry.  3. Drink plenty of fluids each day to avoid dehydration.  4. Urinate right after intercourse to flush out the bladder.  5. Wear cotton underwear and cotton-lined panty hose; avoid tight-fitting pants.  6. If you are on birth control pills and are having frequent bladder infections, discuss " with your doctor.  FOLLOW UP: Return to this facility or see your doctor if ALL symptoms are not gone after three days of treatment.  GET PROMPT MEDICAL ATTENTION if any of the following occur:    Fever over 101 F (38.3 C)    No improvement by the third day of treatment    Increasing back or abdominal pain    Repeated vomiting; unable to keep medicine down    Weakness, dizziness or fainting    Vaginal discharge    Pain, redness or swelling in the labia (outer vaginal area)    0673-4570 [employer]. [address] [place] [address]. All rights reserved. This information is not intended as a substitute for professional medical care. Always follow your healthcare professional's instructions.  This information has been modified by your health care provider with permission from the publisher.            Follow-ups after your visit        Who to contact     If you have questions or need follow up information about today's clinic visit or your schedule please contact [facility] directly at [phone].  Normal or non-critical lab and imaging results will be communicated to you by [employer]hart, letter or phone within 4 business days after the clinic has received the results. If you do not hear from us within 7 days, please contact the clinic through Clouderat or phone. If you have a critical or abnormal lab result, we will notify you by phone as soon as possible.  Submit refill requests through VTEX or call your pharmacy and they will forward the refill request to us. Please allow 3 business days for your refill to be completed.          Additional Information About Your Visit        [employer]hart Information     VTEX gives you secure access to your electronic health record. If you see a primary care provider, you can also send messages to your care team and make appointments. If you have questions, please call your primary care clinic.  If you do not have a primary care provider, please  call [phone] and they will assist you.        Care EveryWhere ID     This is your Care EveryWhere ID. This could be used by other organizations to access your [place] medical records  [id number]        Your Vitals Were     Pulse Temperature Respirations Pulse Oximetry BMI (Body Mass Index)       83 98  F (36.7  C) (Oral) 16 96% 23.96 kg/m2        Blood Pressure from Last 3 Encounters:   09/02/18 149/76   08/23/18 143/68   08/20/18 139/71    Weight from Last 3 Encounters:   09/02/18 139 lb 9.6 oz (63.3 kg)   08/17/18 140 lb (63.5 kg)   08/20/18 142 lb 3.2 oz (64.5 kg)              We Performed the Following     *UA reflex to Microscopic and Culture ([place] [facility] [place] [facility] (except [place] and [place])     Urine Culture Aerobic Bacterial     Urine Microscopic          Today's Medication Changes          These changes are accurate as of 9/2/18  9:50 AM.  If you have any questions, ask your nurse or doctor.               Start taking these medicines.        Dose/Directions    cephALEXin 500 MG capsule   Commonly known as:  KEFLEX   Used for:  Gross hematuria, S/P cystoscopy   Started by:  Gabby Mota MD        Dose:  500 mg   Take 1 capsule (500 mg) by mouth 3 times daily   Quantity:  30 capsule   Refills:  0            Where to get your medicines      These medications were sent to [facility] - [place], [place] - [address]  [address], [facility] [address]     Phone:  [phone]     cephALEXin 500 MG capsule                Primary Care Provider Office Phone # Fax #    Sunita Andres PA-C [phone] [address] [place] [address]        Equal Access to Services     [facility]: Marley Dueñas, wadonaldoda luqadaha, qaybta kaalmada adeegyada, iona Torres [facility] [phone].    ATENCIÓN: Si habla español, tiene a carnes disposición servicios gratuitos de asistencia lingüística. Llame al  [phone].    We comply with applicable federal civil rights laws and [place] laws. We do not discriminate on the basis of race, color, national origin, age, disability, sex, sexual orientation, or gender identity.            Thank you!     Thank you for choosing [facility]  for your care. Our goal is always to provide you with excellent care. Hearing back from our patients is one way we can continue to improve our services. Please take a few minutes to complete the written survey that you may receive in the mail after your visit with us. Thank you!             Your Updated Medication List - Protect others around you: Learn how to safely use, store and throw away your medicines at www.disposemymeds.org.          This list is accurate as of 9/2/18  9:50 AM.  Always use your most recent med list.                   Brand Name Dispense Instructions for use Diagnosis    cephALEXin 500 MG capsule    KEFLEX    30 capsule    Take 1 capsule (500 mg) by mouth 3 times daily    Gross hematuria, S/P cystoscopy       citalopram 20 MG tablet    celeXA    90 tablet    Take 1 tablet (20 mg) by mouth daily    Dysthymic disorder       clotrimazole 1 % cream    LOTRIMIN    30 g    Apply topically 2 times daily    Tinea cruris       clotrimazole-betamethasone cream    LOTRISONE    15 g    Apply topically 2 times daily    Special screening for malignant neoplasms, colon       loratadine-pseudoePHEDrine  MG per 24 hr tablet    EQL ALLERGY/CONGESTION RELIEF    90 tablet    Take 1 tablet by mouth daily    Chronic rhinitis, unspecified type       oxybutynin 5 MG 24 hr tablet    DITROPAN-XL    10 tablet    Take 1 tablet (5 mg) by mouth daily for 10 days Take daily until your stent is removed.    Right ureteral stone       oxyCODONE IR 5 MG tablet    ROXICODONE    9 tablet    Take 1 tablet (5 mg) by mouth every 6 hours as needed for breakthrough pain    Right ureteral stone       tamsulosin 0.4 MG capsule     FLOMAX    30 capsule    Take 1 capsule (0.4 mg) by mouth daily    Right ureteral stone       valACYclovir 500 MG tablet    VALTREX    90 tablet    Take 1 tablet (500 mg) by mouth daily    Herpes simplex virus infection

## 2018-09-03 LAB
BACTERIA SPEC CULT: NORMAL
SPECIMEN SOURCE: NORMAL

## 2018-09-03 NOTE — PROGRESS NOTES
SUBJECTIVE:  Chief Complaint   Patient presents with     UTI     Dysuria and frequency. Patient had surgery 2 weeks ago for kidney stone and had stent removed on Thursday.      Cely Ontiveros is a 72 year old female who  presents today for blood in the urine  Symptoms of dysuria, frequency and burning have been going on for 1day(s).    She had cystoscopy 2 weeks ago for kidney stone- stent placed and removed 2 days ago.  Had difficult stent removal with bleeding/ hematuria-  Initially pain free, now worsing pain. frequency   worseningand moderate.  There is no history of fever, chills, nausea or vomiting.  No history of vaginal  discharge. This patient does   have a history of urinary tract infections.  Has right CVA pain-  She thinks due to the problems removing stent that side.   Patient denies long duration, rigors, temperature > 101 degrees F. and Vomiting, significant nausea or diarrhea or vaginal discharge, vaginal odor and vaginal itching     Past Medical History:   Diagnosis Date     Actinic keratosis      Allergic rhinitis      Cataract      Degenerative joint disease     cervical disease     Depression with anxiety      Herpes simplex      Hypoglycemia     eats small meals and is fine     Impingement syndrome, shoulder      Patient Active Problem List   Diagnosis     Allergic rhinitis     Herpes simplex     CARDIOVASCULAR SCREENING; LDL GOAL LESS THAN 160     Degenerative joint disease     Advance care planning     Posterior vitreous detachment of both eyes     Gross hematuria     Right kidney stone     Seasonal allergic rhinitis     Hypermetropia     Astigmatism with presbyopia     Blepharitis of both eyes     Cataracts, bilateral     Plugged tear duct, od > os     Epiphora     H/O hypoglycemia     Chronic otitis externa of left ear, unspecified type     Abdominal bloating     Chronic rhinitis     Dysthymic disorder     Somatic dysfunction of pelvis region     Pelvic pain in female     Urinary urgency      Urinary frequency     Urgency incontinence       ALLERGIES:  Sulfa drugs; Cats; and Wool fiber      Current Outpatient Prescriptions on File Prior to Visit:  citalopram (CELEXA) 20 MG tablet Take 1 tablet (20 mg) by mouth daily   clotrimazole-betamethasone (LOTRISONE) cream Apply topically 2 times daily   loratadine-pseudoePHEDrine (EQL ALLERGY/CONGESTION RELIEF)  MG per 24 hr tablet Take 1 tablet by mouth daily   valACYclovir (VALTREX) 500 MG tablet Take 1 tablet (500 mg) by mouth daily   clotrimazole (LOTRIMIN) 1 % cream Apply topically 2 times daily (Patient not taking: Reported on 9/2/2018)   oxybutynin (DITROPAN-XL) 5 MG 24 hr tablet Take 1 tablet (5 mg) by mouth daily for 10 days Take daily until your stent is removed. (Patient not taking: Reported on 9/2/2018)   oxyCODONE IR (ROXICODONE) 5 MG tablet Take 1 tablet (5 mg) by mouth every 6 hours as needed for breakthrough pain (Patient not taking: Reported on 9/2/2018)   tamsulosin (FLOMAX) 0.4 MG capsule Take 1 capsule (0.4 mg) by mouth daily (Patient not taking: Reported on 9/2/2018)     No current facility-administered medications on file prior to visit.     Social History   Substance Use Topics     Smoking status: Never Smoker     Smokeless tobacco: Never Used     Alcohol use No       Family History   Problem Relation Age of Onset     Hypertension Mother      Arthritis Mother      Cardiovascular Mother      Circulatory Mother      HEART DISEASE Mother      Lipids Mother      Obesity Mother      Osteoporosis Mother      Cancer Mother      skin     Glaucoma Mother      Macular Degeneration Mother      Cancer - colorectal Father      Cancer Father      Macular Degeneration Father      Diabetes No family hx of      Cerebrovascular Disease No family hx of      Thyroid Disease No family hx of        ROS:   CONSTITUTIONAL:NEGATIVE for fever, chills,    INTEGUMENTARY/SKIN: NEGATIVE for worrisome rashes  EYES: NEGATIVE for vision changes or  irritation  ENT/MOUTH: NEGATIVE for ear, mouth and throat problems  RESP:NEGATIVE for significant cough or SOB  GI: NEGATIVE for nausea, abdominal pain,   or change in bowel habits    OBJECTIVE:  /76  Pulse 83  Temp 98  F (36.7  C) (Oral)  Resp 16  Wt 139 lb 9.6 oz (63.3 kg)  SpO2 96%  BMI 23.96 kg/m2  GENERAL APPEARANCE: healthy, alert and no distress  RESP: lungs clear to auscultation - no rales, rhonchi or wheezes  CV: regular rates and rhythm, normal S1 S2, no murmur noted  ABDOMEN:  soft, nontender, no HSM or masses and bowel sounds normal  BACK: Right CVA tenderness  SKIN: no suspicious lesions or rashes    Results for orders placed or performed in visit on 09/02/18   *UA reflex to Microscopic and Culture ([facility] (except [place] and [place])   Result Value Ref Range    Color Urine Yellow     Appearance Urine Slightly Cloudy     Glucose Urine 100 (A) NEG^Negative mg/dL    Bilirubin Urine Negative NEG^Negative    Ketones Urine Negative NEG^Negative mg/dL    Specific Gravity Urine 1.015 1.003 - 1.035    Blood Urine Large (A) NEG^Negative    pH Urine 6.0 5.0 - 7.0 pH    Protein Albumin Urine Trace (A) NEG^Negative mg/dL    Urobilinogen Urine 1.0 0.2 - 1.0 EU/dL    Nitrite Urine Positive (A) NEG^Negative    Leukocyte Esterase Urine Trace (A) NEG^Negative    Source Midstream Urine    Urine Microscopic   Result Value Ref Range    WBC Urine 0 - 5 OTO5^0 - 5 /HPF    RBC Urine  (A) OTO2^O - 2 /HPF    Squamous Epithelial /LPF Urine Few FEW^Few /LPF    Bacteria Urine Moderate (A) NEG^Negative /HPF         ASSESSMENT:   Dysuria     - *UA reflex to Microscopic and Culture ([place] [facility] [place] [facility] (except [place] and [place])  - Urine Microscopic    Nonspecific finding on examination of urine     - Urine Culture Aerobic Bacterial    Gross hematuria     - cephALEXin (KEFLEX) 500 MG capsule; Take 1 capsule (500 mg) by mouth 3 times daily    S/P cystoscopy     - cephALEXin  (KEFLEX) 500 MG capsule; Take 1 capsule (500 mg) by mouth 3 times daily     We discussed the different possible causes of hematuria and that a UTI is the most common cause, though her history of stent and cystoscopy could also cause hematuria.         Patient will be emprically started on antibiotics and will monitor if hematuria resolves, since she has bacteria, though no wbc's in the urine     Urine culture will also be performed to look for urinary tract infection.    Patient advised to check/ call for urine culture results.     If persistent concerns about hematuria, pt. Will f/u with primary care and/ or arrange further urology evaluation of other possible causes of hematuria

## 2018-09-24 ENCOUNTER — TELEPHONE (OUTPATIENT)
Dept: FAMILY MEDICINE | Facility: CLINIC | Age: 73
End: 2018-09-24

## 2018-09-24 NOTE — TELEPHONE ENCOUNTER
Panel Management Review        Last Office Visit with this department:     Fail List measure:       Patient is due/failing the following:   COLONOSCOPY    Action needed:   none    Type of outreach:    Sent letter.    Questions for provider review:    None                                                                                                                                    Yoni Villasenor MA

## 2018-09-24 NOTE — LETTER
September 24, 2018      Cely Ontiveros  [address]  [facility] [address]    Dear Cely Ontiveros,     At [facility] we care about your health and are committed to providing quality patient care.    Which includes staying current on preventive cancer screenings.  You can increase your chances of finding and treating cancers through regular screenings.      Our records indicate you may be due for the following preventive screening(s):    Colonoscopy    Colonoscopy is recommended every ten years for everyone age 50 and older. Please take a moment to read over the enclosed information packet about colon cancer screening. We strongly urge our patient's to consider having a colonoscopy done, which is the best screening test available and only needs to be done every 10 years if normal. If you are unwilling or unable to have a colonoscopy then we recommend the annual stool testing for blood. This test is called a FIT test and it looks for blood in the stool.       To schedule an appointment or discuss this screening further, you may contact us by phone at the [facility] at [phone] or online through the patient [email or web address] @ [email or web address]    If you have had any of the screenings listed above at another facility, please call us so that we may update your chart.      Your partners in health,      Quality Committee at [facility]

## 2018-10-25 ENCOUNTER — RADIANT APPOINTMENT (OUTPATIENT)
Dept: GENERAL RADIOLOGY | Facility: CLINIC | Age: 73
End: 2018-10-25
Attending: NURSE PRACTITIONER
Payer: COMMERCIAL

## 2018-10-25 ENCOUNTER — OFFICE VISIT (OUTPATIENT)
Dept: FAMILY MEDICINE | Facility: CLINIC | Age: 73
End: 2018-10-25
Payer: COMMERCIAL

## 2018-10-25 VITALS
SYSTOLIC BLOOD PRESSURE: 103 MMHG | WEIGHT: 138 LBS | OXYGEN SATURATION: 97 % | TEMPERATURE: 97.5 F | DIASTOLIC BLOOD PRESSURE: 62 MMHG | RESPIRATION RATE: 16 BRPM | HEART RATE: 81 BPM | BODY MASS INDEX: 23.69 KG/M2

## 2018-10-25 DIAGNOSIS — G89.29 CHRONIC PAIN OF BOTH KNEES: ICD-10-CM

## 2018-10-25 DIAGNOSIS — L91.8 ACROCHORDON: ICD-10-CM

## 2018-10-25 DIAGNOSIS — M25.562 ACUTE PAIN OF LEFT KNEE: Primary | ICD-10-CM

## 2018-10-25 DIAGNOSIS — M25.561 CHRONIC PAIN OF BOTH KNEES: ICD-10-CM

## 2018-10-25 DIAGNOSIS — M25.562 CHRONIC PAIN OF BOTH KNEES: ICD-10-CM

## 2018-10-25 PROCEDURE — 99213 OFFICE O/P EST LOW 20 MIN: CPT | Mod: 25 | Performed by: NURSE PRACTITIONER

## 2018-10-25 PROCEDURE — 11200 RMVL SKIN TAGS UP TO&INC 15: CPT | Performed by: NURSE PRACTITIONER

## 2018-10-25 PROCEDURE — 73560 X-RAY EXAM OF KNEE 1 OR 2: CPT | Mod: LT

## 2018-10-25 ASSESSMENT — PAIN SCALES - GENERAL: PAINLEVEL: NO PAIN (0)

## 2018-10-25 NOTE — PATIENT INSTRUCTIONS
For the injured joint:  R-Rest, avoid the activity that causes pain  I- Ice the area for 15 minutes a few times a day  C- Compression; use the knee sleeve as much as possible for comfort, compression, and stability  E- Elevate the injured extremity to help with swelling    Recommend ibuprofen 600 mg every 8 hours for the next 3-4 days to help reduce pain and inflammation.    Schedule with physical therapy, number and information below.    At [facility], we strive to deliver an exceptional experience to you, every time we see you.  If you receive a survey in the mail, please send us back your thoughts. We really do value your feedback.    Your care team:                            Family Medicine Internal Medicine   MD Mao Duran MD Shantel Branch-Fleming, MD Katya Georgiev PA-C Megan Hill, APRN CNP Nam Ho, MD Pediatrics   Jefferson Gallardo, PABARB Varma, MD Li Tompkins APRN CNP   MD Johanne Hannon MD Deborah Mielke, MD Kim Thein, APR[facility]      Clinic hours: Monday - Thursday 7 am-7 pm; Fridays 7 am-5 pm.   Urgent care: Monday - Friday 11 am-9 pm; Saturday and Sunday 9 am-5 pm.  Pharmacy : Monday -Thursday 8 am-8 pm; Friday 8 am-6 pm; Saturday and Sunday 9 am-5 pm.     Clinic: [phone]   Pharmacy: [phone]

## 2018-10-25 NOTE — MR AVS SNAPSHOT
After Visit Summary   10/25/2018    Cely Ontiveros    MRN: [id number]           Patient Information     Date Of Birth          1945        Visit Information        Provider Department      10/25/2018 9:00 AM Mara Varma APRN CNP [facility]        Today's Diagnoses     Acute pain of left knee    -  1    Chronic pain of both knees          Care Instructions    For the injured joint:  R-Rest, avoid the activity that causes pain  I- Ice the area for 15 minutes a few times a day  C- Compression; use the knee sleeve as much as possible for comfort, compression, and stability  E- Elevate the injured extremity to help with swelling    Recommend ibuprofen 600 mg every 8 hours for the next 3-4 days to help reduce pain and inflammation.    Schedule with physical therapy, number and information below.    At [facility], we strive to deliver an exceptional experience to you, every time we see you.  If you receive a survey in the mail, please send us back your thoughts. We really do value your feedback.    Your care team:                            Family Medicine Internal Medicine   MD Mao Duran MD Shantel Branch-Fleming, MD Katya Georgiev PA-C Megan Hill, APRN CNP Nam Ho, MD Pediatrics   MICHELLE Lorenzo CNP Paula Brito, MD Amelia Massimini APRN CNP Shaista Malik, MD Bethany Templen, MD Deborah Mielke, MD Kim Thein, APRN CNP      Clinic hours: Monday - Thursday 7 am-7 pm; Fridays 7 am-5 pm.   Urgent care: Monday - Friday 11 am-9 pm; Saturday and Sunday 9 am-5 pm.  Pharmacy : Monday -Thursday 8 am-8 pm; Friday 8 am-6 pm; Saturday and Sunday 9 am-5 pm.     Clinic: [phone]   Pharmacy: [phone]                Follow-ups after your visit        Additional Services     [facility], [facility]CT[facility] REFERRAL       Physical Therapy, Hand Therapy and Chiropractic Care are available  through:  *[facility]  *Hand Therapy (Occupational Therapy or Physical Therapy)  *[facility]    Call one number to schedule at any of the above locations: [phone].    Physical therapy, Hand therapy and/or Chiropractic care has been recommended by your physician as an excellent treatment option to reduce pain and help people return to normal activities, including sports.  Therapy and/or chiropractic care services are a great complement or alternative to expensive and invasive surgery, injections, or long-term use of prescription medications. The primary goal is to identify the underlying problem and provide you the tools to manage your condition on your own.     Please be aware that coverage of these services is subject to the terms and limitations of your health insurance plan.  Call member services at your health plan with any benefit or coverage questions.      Please bring the following to your appointment:  *Your personal calendar for scheduling future appointments  *Comfortable clothing                  Future tests that were ordered for you today     Open Future Orders        Priority Expected Expires Ordered    XR Knee Bilateral 1/2 Views Routine 10/25/2018 10/25/2019 10/25/2018    [facility]CT[facility] REFERRAL Routine  10/25/2019 10/25/2018            Who to contact     If you have questions or need follow up information about today's clinic visit or your schedule please contact [facility] directly at [phone].  Normal or non-critical lab and imaging results will be communicated to you by MyChart, letter or phone within 4 business days after the clinic has received the results. If you do not hear from us within 7 days, please contact the clinic through MyChart or phone. If you have a critical or abnormal lab result, we will notify you by phone as soon as possible.  Submit refill requests through [employer] or call your  pharmacy and they will forward the refill request to us. Please allow 3 business days for your refill to be completed.          Additional Information About Your Visit        [employer] Information     [employer] gives you secure access to your electronic health record. If you see a primary care provider, you can also send messages to your care team and make appointments. If you have questions, please call your primary care clinic.  If you do not have a primary care provider, please call [phone] and they will assist you.        Care EveryWhere ID     This is your Care EveryWhere ID. This could be used by other organizations to access your [facility] medical records  [id number]        Your Vitals Were     Pulse Temperature Respirations Pulse Oximetry BMI (Body Mass Index)       81 97.5  F (36.4  C) (Oral) 16 97% 23.69 kg/m2        Blood Pressure from Last 3 Encounters:   10/25/18 103/62   09/02/18 149/76   08/23/18 143/68    Weight from Last 3 Encounters:   10/25/18 138 lb (62.6 kg)   09/02/18 139 lb 9.6 oz (63.3 kg)   08/17/18 140 lb (63.5 kg)                 Today's Medication Changes          These changes are accurate as of 10/25/18  9:43 AM.  If you have any questions, ask your nurse or doctor.               Start taking these medicines.        Dose/Directions    order for DME   Used for:  Acute pain of left knee   Started by:  Mara Varma APRN CNP        Equipment being ordered: left knee sleeve   Quantity:  1 each   Refills:  0         Stop taking these medicines if you haven't already. Please contact your care team if you have questions.     cephALEXin 500 MG capsule   Commonly known as:  KEFLEX   Stopped by:  Mara Varma APRN CNP           clotrimazole 1 % cream   Commonly known as:  LOTRIMIN   Stopped by:  Mara Varma APRN CNP           oxyCODONE IR 5 MG tablet   Commonly known as:  ROXICODONE   Stopped by:  Mara Varma APRN CNP           tamsulosin  0.4 MG capsule   Commonly known as:  FLOMAX   Stopped by:  Mara Varma, AUDREY SALAZAR                Where to get your medicines      Some of these will need a paper prescription and others can be bought over the counter.  Ask your nurse if you have questions.     Bring a paper prescription for each of these medications     order for DME                Primary Care Provider Office Phone # Fax #    Sunita Prema Andres PA-C [phone] [address] [place] [address]        Equal Access to Services     [facility]: Hadii aad ku hadasho Soomaali, waaxda luqadaha, qaybta kaalmada adeegyada, waxay idiin hayaan adeeg kharash lapaula . So [facility] [phone].    ATENCIÓN: Si habla español, tiene a carnes disposición servicios gratuitos de asistencia lingüística. [facility] [phone].    We comply with applicable federal civil rights laws and [place] laws. We do not discriminate on the basis of race, color, national origin, age, disability, sex, sexual orientation, or gender identity.            Thank you!     Thank you for choosing [facility]  for your care. Our goal is always to provide you with excellent care. Hearing back from our patients is one way we can continue to improve our services. Please take a few minutes to complete the written survey that you may receive in the mail after your visit with us. Thank you!             Your Updated Medication List - Protect others around you: Learn how to safely use, store and throw away your medicines at www.disposemymeds.org.          This list is accurate as of 10/25/18  9:43 AM.  Always use your most recent med list.                   Brand Name Dispense Instructions for use Diagnosis    citalopram 20 MG tablet    celeXA    90 tablet    Take 1 tablet (20 mg) by mouth daily    Dysthymic disorder       clotrimazole-betamethasone cream    LOTRISONE    15 g    Apply topically 2 times daily    Special screening for malignant  neoplasms, colon       loratadine-pseudoePHEDrine  MG per 24 hr tablet    EQL ALLERGY/CONGESTION RELIEF    90 tablet    Take 1 tablet by mouth daily    Chronic rhinitis, unspecified type       order for DME     1 each    Equipment being ordered: left knee sleeve    Acute pain of left knee       valACYclovir 500 MG tablet    VALTREX    90 tablet    Take 1 tablet (500 mg) by mouth daily    Herpes simplex virus infection

## 2018-10-25 NOTE — PROGRESS NOTES
"  SUBJECTIVE:   Cely Ontiveros is a 72 year old female who presents to clinic today for the following health issues: left knee pain    Musculoskeletal problem/pain       Duration: x 2 days    Description  Location: left knee    Intensity:  mild    Accompanying signs and symptoms: swelling    History  Previous similar problem: YES, off and on  Previous evaluation:  none    Precipitating or alleviating factors:  Trauma or overuse: no   Aggravating factors include: stretching    Therapies tried and outcome: ice and Ibuprofen  Injured 2 years walking, fell and twisted it. Chiropractor at that time stated it was strained, he did some adjustments and it felt better. She did not seek further treatment and it has been fine since then.    It felt irritated a little before going to Yoga 2 days ago, pain got worse at Yoga with pigeon pose  It has been \"puffy\" for about a month and does not recall an injury but was not \"achy\"  Using ice 20 minutes about 6 times a day and takes some of the swelling away.   Iced this morning one time  Tried wrapping which initially helped but thinks it was too tight and felt worse shortly after  Took ibuprofen one time yesterday but does not like taking it because it makes her stomach upset  Aching feeling, was consistent yesterday. Does not hurt as much with sitting still. Pain more so with activity.  No giving out or locking up.    Skin tags:  Has 2 skin tags that bother her.  One on each side of neck      Problem list and histories reviewed & adjusted, as indicated.  Additional history: as documented    Patient Active Problem List   Diagnosis     Allergic rhinitis     Herpes simplex     CARDIOVASCULAR SCREENING; LDL GOAL LESS THAN 160     Degenerative joint disease     Advance care planning     Posterior vitreous detachment of both eyes     Gross hematuria     Right kidney stone     Seasonal allergic rhinitis     Hypermetropia     Astigmatism with presbyopia     Blepharitis of both eyes     " Cataracts, bilateral     Plugged tear duct, od > os     Epiphora     H/O hypoglycemia     Chronic otitis externa of left ear, unspecified type     Abdominal bloating     Chronic rhinitis     Dysthymic disorder     Somatic dysfunction of pelvis region     Pelvic pain in female     Urinary urgency     Urinary frequency     Urgency incontinence     Past Surgical History:   Procedure Laterality Date     COMBINED CYSTOSCOPY, URETEROSCOPY, LASER HOLMIUM LITHOTRIPSY URETER(S) Right 8/23/2018    Procedure: COMBINED CYSTOSCOPY, URETEROSCOPY, LASER HOLMIUM LITHOTRIPSY URETER(S);   Cystoscopy,Right ureteroscopy with laser lithotripsy and stent placement;  Surgeon: Pino Hawk MD;  Location: [facility] OR     HC ENLARGE BREAST WITH IMPLANT       HC LAP,FULGURATE/EXCISE LESIONS       HC REMOVAL OF BREAST IMPLANT       HYSTERECTOMY, PAP NO LONGER INDICATED  1998    ovaries and uterus out for benign reasons(fibroids and ovarian cyst)     SINUS SURGERY         Social History   Substance Use Topics     Smoking status: Never Smoker     Smokeless tobacco: Never Used     Alcohol use No     Family History   Problem Relation Age of Onset     Hypertension Mother      Arthritis Mother      Cardiovascular Mother      Circulatory Mother      HEART DISEASE Mother      Lipids Mother      Obesity Mother      Osteoporosis Mother      Cancer Mother      skin     Glaucoma Mother      Macular Degeneration Mother      Cancer - colorectal Father      Cancer Father      Macular Degeneration Father      Diabetes No family hx of      Cerebrovascular Disease No family hx of      Thyroid Disease No family hx of          Current Outpatient Prescriptions   Medication Sig Dispense Refill     citalopram (CELEXA) 20 MG tablet Take 1 tablet (20 mg) by mouth daily 90 tablet 3     clotrimazole-betamethasone (LOTRISONE) cream Apply topically 2 times daily 15 g 1     loratadine-pseudoePHEDrine (EQL ALLERGY/CONGESTION RELIEF)  MG per 24 hr tablet Take 1 tablet by  mouth daily 90 tablet 3     order for DME Equipment being ordered: left knee sleeve 1 each 0     valACYclovir (VALTREX) 500 MG tablet Take 1 tablet (500 mg) by mouth daily 90 tablet 3     Allergies   Allergen Reactions     Sulfa Drugs Swelling     Cats Swelling     Lips swollen     Wool Fiber        Reviewed and updated as needed this visit by clinical staff  Tobacco  Allergies  Meds  Med Hx  Surg Hx  Fam Hx  Soc Hx      Reviewed and updated as needed this visit by Provider  Tobacco  Allergies  Meds  Med Hx  Surg Hx  Fam Hx  Soc Hx        ROS:  Constitutional, HEENT, cardiovascular, pulmonary, gi and gu systems are negative, except as otherwise noted.    OBJECTIVE:     /62 (BP Location: Left arm, Patient Position: Chair, Cuff Size: Adult Regular)  Pulse 81  Temp 97.5  F (36.4  C) (Oral)  Resp 16  Wt 138 lb (62.6 kg)  SpO2 97%  BMI 23.69 kg/m2  Body mass index is 23.69 kg/(m^2).  GENERAL: healthy, alert and no distress  EYES: Eyes grossly normal to inspection, PERRL and conjunctivae and sclerae normal  NECK: no adenopathy, no asymmetry, masses, or scars and thyroid normal to palpation  RESP: lungs clear to auscultation - no rales, rhonchi or wheezes  CV: regular rate and rhythm, normal S1 S2, no S3 or S4, no murmur, click or rub, no peripheral edema and peripheral pulses strong  ABDOMEN: soft, nontender, no hepatosplenomegaly, no masses and bowel sounds normal  MS: right knee:  Normal ROM, no pain with ROM, negative McMurrays, no instability noted, mild parapatellar effusion and mild tenderness along lateral joint line.  Left knee:  Normal ROM though some pain with full flexion.  Negative McMurrays, no instability though mild pain with medial pressure, mild parapatellar effusion and mild tenderness along medialjoint line.  SKIN:  2 acrochordons located on neck, one on each side of neck. Left acrochordon is not able to be excised due to no pedunculation. Right acrochordon is stalking and able  to be removed.    Diagnostic Test Results:  Xray of bilateral knees pending    ASSESSMENT/PLAN:     1. Acute pain of left knee  Appears to be mild sprain of MCL.  Educated on use of Ibuprofen for antiinflammatory  Refer to Physical Therapy  Avoid activities that cause pain  Educated on ice and compression through knee sleeve, pt will  at local retailer  - [facility] PT, HAND, AND CHIROPRACTIC REFERRAL; Future  - order for DME; Equipment being ordered: left knee sleeve  Dispense: 1 each; Refill: 0    2. Chronic pain of both knees  Educated on arthritis and inflammation process  Educated on use of Ibuprofen for antiinflammatory and advised to continue her yoga practice as well as other physical activity  - XR Knee Bilateral 1/2 Views; Future    3. Acrochordon  Procedure:  Skin tag removal  Skin tag located right neck.  1 lesion removed.  Tag cleaned with alcohol swab, grasped with forceps and cut at base with scissors.  No bleeding.  No complications.  Patient tolerated procedure well.    Removed right neck acrochordon as above  Monitor left neck acrochordon for changes, can remove at clinic if becomes stalked  Follow up with dermatology for full skin check and inspection of left breast lesion      Patient Instructions     For the injured joint:  R-Rest, avoid the activity that causes pain  I- Ice the area for 15 minutes a few times a day  C- Compression; use the knee sleeve as much as possible for comfort, compression, and stability  E- Elevate the injured extremity to help with swelling    Recommend ibuprofen 600 mg every 8 hours for the next 3-4 days to help reduce pain and inflammation.    Schedule with physical therapy, number and information below.    At [facility], we strive to deliver an exceptional experience to you, every time we see you.  If you receive a survey in the mail, please send us back your thoughts. We really do value your feedback.    Your care team:                             Family Medicine Internal Medicine   MD Mao Duran MD Shantel Branch-Fleming, MD Katya Georgiev PA-C Megan Hill, APRN CNP Nam Ho, MD Pediatrics   Jefferson Gallardo, MICHELLE Varma, MD Li Tompkins CNP, MD Bethany Templen, MD Deborah Mielke, MD Kim Thein, APRN CNP      Clinic hours: Monday - Thursday 7 am-7 pm; Fridays 7 am-5 pm.   Urgent care: Monday - Friday 11 am-9 pm; Saturday and Sunday 9 am-5 pm.  Pharmacy : Monday -Thursday 8 am-8 pm; Friday 8 am-6 pm; Saturday and Sunday 9 am-5 pm.     Clinic: [phone]   Pharmacy: [phone]        Seen in collaboration with Mara Goncalves Student NP    AUDREY Mcneil CNP  [facility]

## 2018-10-29 ENCOUNTER — TRANSFERRED RECORDS (OUTPATIENT)
Dept: HEALTH INFORMATION MANAGEMENT | Facility: CLINIC | Age: 73
End: 2018-10-29

## 2018-11-14 ENCOUNTER — THERAPY VISIT (OUTPATIENT)
Dept: PHYSICAL THERAPY | Facility: CLINIC | Age: 73
End: 2018-11-14
Payer: COMMERCIAL

## 2018-11-14 DIAGNOSIS — M25.562 ACUTE PAIN OF LEFT KNEE: ICD-10-CM

## 2018-11-14 PROCEDURE — G8978 MOBILITY CURRENT STATUS: HCPCS | Mod: GP | Performed by: PHYSICAL THERAPIST

## 2018-11-14 PROCEDURE — 97110 THERAPEUTIC EXERCISES: CPT | Mod: GP | Performed by: PHYSICAL THERAPIST

## 2018-11-14 PROCEDURE — 97161 PT EVAL LOW COMPLEX 20 MIN: CPT | Mod: GP | Performed by: PHYSICAL THERAPIST

## 2018-11-14 PROCEDURE — G8979 MOBILITY GOAL STATUS: HCPCS | Mod: GP | Performed by: PHYSICAL THERAPIST

## 2018-11-14 PROCEDURE — 97112 NEUROMUSCULAR REEDUCATION: CPT | Mod: GP | Performed by: PHYSICAL THERAPIST

## 2018-11-14 ASSESSMENT — ACTIVITIES OF DAILY LIVING (ADL)
HOW_WOULD_YOU_RATE_THE_CURRENT_FUNCTION_OF_YOUR_KNEE_DURING_YOUR_USUAL_DAILY_ACTIVITIES_ON_A_SCALE_FROM_0_TO_100_WITH_100_BEING_YOUR_LEVEL_OF_KNEE_FUNCTION_PRIOR_TO_YOUR_INJURY_AND_0_BEING_THE_INABILITY_TO_PERFORM_ANY_OF_YOUR_USUAL_DAILY_ACTIVITIES?: 90
LIMPING: I DO NOT HAVE THE SYMPTOM
STAND: ACTIVITY IS NOT DIFFICULT
GO UP STAIRS: ACTIVITY IS MINIMALLY DIFFICULT
WEAKNESS: I DO NOT HAVE THE SYMPTOM
HOW_WOULD_YOU_RATE_THE_OVERALL_FUNCTION_OF_YOUR_KNEE_DURING_YOUR_USUAL_DAILY_ACTIVITIES?: NEARLY NORMAL
WALK: ACTIVITY IS NOT DIFFICULT
RISE FROM A CHAIR: ACTIVITY IS NOT DIFFICULT
STIFFNESS: I HAVE THE SYMPTOM BUT IT DOES NOT AFFECT MY ACTIVITY
GO DOWN STAIRS: ACTIVITY IS MINIMALLY DIFFICULT
KNEE_ACTIVITY_OF_DAILY_LIVING_SCORE: 88.57
PAIN: I HAVE THE SYMPTOM BUT IT DOES NOT AFFECT MY ACTIVITY
GIVING WAY, BUCKLING OR SHIFTING OF KNEE: I HAVE THE SYMPTOM BUT IT DOES NOT AFFECT MY ACTIVITY
KNEE_ACTIVITY_OF_DAILY_LIVING_SUM: 62
SIT WITH YOUR KNEE BENT: ACTIVITY IS NOT DIFFICULT
KNEEL ON THE FRONT OF YOUR KNEE: ACTIVITY IS NOT DIFFICULT
AS_A_RESULT_OF_YOUR_KNEE_INJURY,_HOW_WOULD_YOU_RATE_YOUR_CURRENT_LEVEL_OF_DAILY_ACTIVITY?: NEARLY NORMAL
SQUAT: ACTIVITY IS SOMEWHAT DIFFICULT
RAW_SCORE: 62
SWELLING: I HAVE THE SYMPTOM BUT IT DOES NOT AFFECT MY ACTIVITY

## 2018-11-14 NOTE — MR AVS SNAPSHOT
After Visit Summary   11/14/2018    Cely Ontiveros    MRN: [id number]           Patient Information     Date Of Birth          1945        Visit Information        Provider Department      11/14/2018 9:40 AM Farrah Wild, PT [facility]        Today's Diagnoses     Acute pain of left knee           Follow-ups after your visit        Your next 10 appointments already scheduled     Nov 21, 2018  9:40 AM CST   CANDIDO Extremity with Farrah Wild PT   [facility] ([place]  )    [address]  [place] [address] [phone]            Dec 03, 2018  9:40 AM CST   New Visit with Brandt Richardson OD   [facility] ([facility])    [address] [facility] [address] [phone]              Who to contact     If you have questions or need follow up information about today's clinic visit or your schedule please contact [facility] directly at [phone].  Normal or non-critical lab and imaging results will be communicated to you by MyChart, letter or phone within 4 business days after the clinic has received the results. If you do not hear from us within 7 days, please contact the clinic through [employer] or phone. If you have a critical or abnormal lab result, we will notify you by phone as soon as possible.  Submit refill requests through [employer] or call your pharmacy and they will forward the refill request to us. Please allow 3 business days for your refill to be completed.          Additional Information About Your Visit        MyChart Information     [employer] gives you secure access to your electronic health record. If you see a primary care provider, you can also send messages to your care team and make appointments. If you have questions, please call your primary care clinic.  If you do not have a primary care  provider, please call [phone] and they will assist you.        Care EveryWhere ID     This is your Care EveryWhere ID. This could be used by other organizations to access your [place] medical records  [id number]         Blood Pressure from Last 3 Encounters:   10/25/18 103/62   09/02/18 149/76   08/23/18 143/68    Weight from Last 3 Encounters:   10/25/18 62.6 kg (138 lb)   09/02/18 63.3 kg (139 lb 9.6 oz)   08/17/18 63.5 kg (140 lb)              We Performed the Following     HC PT EVAL, LOW COMPLEXITY     CANDIDO INITIAL EVAL REPORT     CANDIDO PT, HAND, AND CHIROPRACTIC REFERRAL     NEUROMUSCULAR RE-EDUCATION     THERAPEUTIC EXERCISES        Primary Care Provider Office Phone # Fax #    Sunita Andres PA-C [phone] [address] [place] [address]        Equal Access to Services     MARCELO STOCK : Hadii aad ku mihiro Sojenna, waaxda luqadaha, qaybta kaalmada adeegyada, waxay vasquezin haycatrachitan radha vicente . So [facility] [phone].    ATENCIÓN: Si jigarla obinna, tiene a carnes disposición servicios gratuitos de asistencia lingüística. Llame al [phone].    We comply with applicable federal civil rights laws and [place] laws. We do not discriminate on the basis of race, color, national origin, age, disability, sex, sexual orientation, or gender identity.            Thank you!     Thank you for choosing [facility]  for your care. Our goal is always to provide you with excellent care. Hearing back from our patients is one way we can continue to improve our services. Please take a few minutes to complete the written survey that you may receive in the mail after your visit with us. Thank you!             Your Updated Medication List - Protect others around you: Learn how to safely use, store and throw away your medicines at www.disposemymeds.org.          This list is accurate as of 11/14/18  2:20 PM.  Always use your most recent med list.                    Brand Name Dispense Instructions for use Diagnosis    citalopram 20 MG tablet    celeXA    90 tablet    Take 1 tablet (20 mg) by mouth daily    Dysthymic disorder       clotrimazole-betamethasone cream    LOTRISONE    15 g    Apply topically 2 times daily    Special screening for malignant neoplasms, colon       loratadine-pseudoePHEDrine  MG per 24 hr tablet    EQL ALLERGY/CONGESTION RELIEF    90 tablet    Take 1 tablet by mouth daily    Chronic rhinitis, unspecified type       order for DME     1 each    Equipment being ordered: left knee sleeve    Acute pain of left knee       valACYclovir 500 MG tablet    VALTREX    90 tablet    Take 1 tablet (500 mg) by mouth daily    Herpes simplex virus infection

## 2018-11-14 NOTE — PROGRESS NOTES
[facility] Initial Evaluation  Subjective:  Patient is a 72 year old female presenting with rehab left knee hpi. The history is provided by the patient. No  was used.   Cely Ontiveros is a 72 year old female with a left knee condition.  Condition occurred with:  Insidious onset.  Condition occurred: during recreation/sport.  This is a new condition  Patient presents to PT today with c/o L knee pain.  Patient stated she has on and off again L knee pain for ~ 5 years ago;  Patient stated the pain started ~ 3 weeks ago after doing Yoga.  Pt stopped doing Yoga for a few weeks.  Referred to PT: 10/25/18..    Patient reports pain:  Medial.  Radiates to: none.  Pain is described as sharp and aching and is intermittent and reported as 1/10.  Associated symptoms:  Buckling/giving out, loss of motion/stiffness and catching (clicking in knee with movement;  Edema initially but not currently). Pain is worse during the day.  Symptoms are exacerbated by activity, walking and kneeling and relieved by bracing/immobilizing and analgesics.  Since onset symptoms are gradually improving.  Special tests:  X-ray (see epic).      General health as reported by patient is excellent.  Pertinent medical history includes:  None.  Medical allergies: yes (Sulfa).  Other surgeries include:  Other (Kidney stones/Hysterectomy).  Current medications:  Anti-depressants.  Current occupation is Retired; performs Yoga multiple times/day.        Barriers include:  None as reported by the patient.    Red flags:  None as reported by the patient.           Knee Activity of Daily Living Score: 88.57            Objective:    Gait:    Gait Type:  Antalgic   Assistive Devices:  Brace  Deviations:  Lumbar:  Trunk flexionKnee:  Knee flexion decr L                                                 Hip Evaluation  Hip PROM:  Hip PROM:  Left Hip:    Normal  Right Hip:  Normal                          Hip Strength:    Flexion:    Left: 4/5   Pain:  Right: 4+/5   Pain:                    Extension:  Left: 4/5  Pain:Right: 4/5    Pain:    Abduction:  Left: 4+/5     Pain:Right: 4+/5    Pain:                           Knee Evaluation:  ROM:  Arom wnl knee: solid extension endfeels.    AROM    Hyperextension:  Left:  2    Right: 3  Extension:  Left: 0    Right:  0  Flexion: Left: 138 (pain)    Right: 142        Strength:     Extension:  Left: 4+/5   Pain:      Right: 5/5   Pain:  Flexion:  Left: 4+/5   Pain:      Right: 4+/5   Pain:    Quad Set Left: Fair    Pain:   Quad Set Right: Good    Pain:    Special Tests:   Left knee positive for the following special tests:  Patellar Compression and Patellar Tracking-Abduction Lateral    Palpation:    Left knee tenderness present at:  Medial Joint Line; Patellar Medial; Patellar Lateral and Patellar Inferior    Edema:    Circumference:      Joint Line:  Left:  39.7 cm   Right:  39.5 cm    Mobility Testing:  Normal                  General     ROS    Assessment/Plan:    Patient is a 72 year old female with left side knee complaints.    Patient has the following significant findings with corresponding treatment plan.                Diagnosis 1:  L knee pain  Pain -  hot/cold therapy, manual therapy and splint/taping/bracing/orthotics  Decreased strength - therapeutic exercise, therapeutic activities and home program  Decreased proprioception - neuro re-education, therapeutic activities and home program  Impaired gait - gait training and home program  Impaired muscle performance - neuro re-education  Decreased function - therapeutic activities    Therapy Evaluation Codes:   1) History comprised of:   Personal factors that impact the plan of care:      None.    Comorbidity factors that impact the plan of care are:      None.     Medications impacting care: None.  2) Examination of Body Systems comprised of:   Body structures and functions that impact the plan of care:      Knee.   Activity limitations that  impact the plan of care are:      Lifting, Squatting/kneeling and performing yoga/exercises.  3) Clinical presentation characteristics are:   Stable/Uncomplicated.  4) Decision-Making    Low complexity using standardized patient assessment instrument and/or measureable assessment of functional outcome.  Cumulative Therapy Evaluation is: Low complexity.    Previous and current functional limitations:  (See Goal Flow Sheet for this information)    Short term and Long term goals: (See Goal Flow Sheet for this information)     Communication ability:  Patient appears to be able to clearly communicate and understand verbal and written communication and follow directions correctly.  Treatment Explanation - The following has been discussed with the patient:   RX ordered/plan of care  Anticipated outcomes  Possible risks and side effects  This patient would benefit from PT intervention to resume normal activities.   Rehab potential is good.    Frequency:  1 X week, once daily  Duration:  for 2 weeks tapering to 1 X a week over 2-3 weeks  Discharge Plan:  Achieve all LTG.  Independent in home treatment program.  Reach maximal therapeutic benefit.    Please refer to the daily flowsheet for treatment today, total treatment time and time spent performing 1:1 timed codes.

## 2018-11-21 ENCOUNTER — THERAPY VISIT (OUTPATIENT)
Dept: PHYSICAL THERAPY | Facility: CLINIC | Age: 73
End: 2018-11-21
Payer: COMMERCIAL

## 2018-11-21 DIAGNOSIS — M25.562 ACUTE PAIN OF LEFT KNEE: ICD-10-CM

## 2018-11-21 PROCEDURE — 97112 NEUROMUSCULAR REEDUCATION: CPT | Mod: GP | Performed by: PHYSICAL THERAPIST

## 2018-11-21 PROCEDURE — 97110 THERAPEUTIC EXERCISES: CPT | Mod: GP | Performed by: PHYSICAL THERAPIST

## 2018-12-03 ENCOUNTER — OFFICE VISIT (OUTPATIENT)
Dept: OPTOMETRY | Facility: CLINIC | Age: 73
End: 2018-12-03
Payer: COMMERCIAL

## 2018-12-03 DIAGNOSIS — H02.889 MEIBOMIAN GLAND DYSFUNCTION: ICD-10-CM

## 2018-12-03 DIAGNOSIS — Z83.511 FAMILY HISTORY OF GLAUCOMA: ICD-10-CM

## 2018-12-03 DIAGNOSIS — Z01.00 EXAMINATION OF EYES AND VISION: Primary | ICD-10-CM

## 2018-12-03 DIAGNOSIS — H52.203 ASTIGMATISM OF BOTH EYES WITH PRESBYOPIA: ICD-10-CM

## 2018-12-03 DIAGNOSIS — H40.053 RAISED INTRAOCULAR PRESSURE OF BOTH EYES: ICD-10-CM

## 2018-12-03 DIAGNOSIS — H25.13 AGE-RELATED NUCLEAR CATARACT OF BOTH EYES: ICD-10-CM

## 2018-12-03 DIAGNOSIS — H52.03 HYPERMETROPIA OF BOTH EYES: ICD-10-CM

## 2018-12-03 DIAGNOSIS — H52.4 ASTIGMATISM OF BOTH EYES WITH PRESBYOPIA: ICD-10-CM

## 2018-12-03 PROCEDURE — 92015 DETERMINE REFRACTIVE STATE: CPT | Performed by: OPTOMETRIST

## 2018-12-03 PROCEDURE — 92014 COMPRE OPH EXAM EST PT 1/>: CPT | Performed by: OPTOMETRIST

## 2018-12-03 ASSESSMENT — TONOMETRY
OS_IOP_MMHG: 22
IOP_METHOD: TONOPEN
OD_IOP_MMHG: 22

## 2018-12-03 ASSESSMENT — REFRACTION_MANIFEST
OD_SPHERE: +0.75
OS_CYLINDER: +0.75
OD_AXIS: 010
OS_AXIS: 010
OS_SPHERE: +0.50
OS_ADD: +2.50
OD_CYLINDER: +0.75
OD_ADD: +2.50

## 2018-12-03 ASSESSMENT — TEAR MENISCUS
OD_TEAR_MENISCUS: DECREASED
OS_TEAR_MENISCUS: DECREASED

## 2018-12-03 ASSESSMENT — REFRACTION_WEARINGRX
OD_ADD: +2.25
OS_ADD: +2.50
OS_CYLINDER: +0.75
OD_ADD: +2.50
OS_AXIS: 007
OD_CYLINDER: +0.75
OD_SPHERE: +1.00
OD_AXIS: 175
SPECS_TYPE: PAL
OD_SPHERE: +1.00
OS_AXIS: 007
OS_SPHERE: +1.25
OS_CYLINDER: +0.50
OS_ADD: +2.25
OD_CYLINDER: +1.00
OS_SPHERE: +1.25
OD_AXIS: 005

## 2018-12-03 ASSESSMENT — VISUAL ACUITY
OD_CC: 20/60
OS_CC: 20/40
OS_CC: 20/60
OS_CC+: -2
OS_SC: 20/30
OD_SC: 20/30
OD_CC: 20/25
METHOD: SNELLEN - LINEAR
OS_SC+: -2
OD_SC+: -2
CORRECTION_TYPE: GLASSES

## 2018-12-03 ASSESSMENT — CONF VISUAL FIELD
OD_NORMAL: 1
OS_NORMAL: 1

## 2018-12-03 ASSESSMENT — CUP TO DISC RATIO
OD_RATIO: 0.2
OS_RATIO: 0.3

## 2018-12-03 ASSESSMENT — EXTERNAL EXAM - LEFT EYE: OS_EXAM: NORMAL

## 2018-12-03 ASSESSMENT — PUNCTA - ASSESSMENT
OS_PUNCTA: NORMAL
OD_PUNCTA: NORMAL

## 2018-12-03 ASSESSMENT — EXTERNAL EXAM - RIGHT EYE: OD_EXAM: NORMAL

## 2018-12-03 NOTE — MR AVS SNAPSHOT
After Visit Summary   12/3/2018    Cely Ontiveros    MRN: [id number]           Patient Information     Date Of Birth          1945        Visit Information        Provider Department      12/3/2018 9:40 AM Brandt Richardson OD [facility]        Today's Diagnoses     Examination of eyes and vision    -  1    Hypermetropia of both eyes        Astigmatism of both eyes with presbyopia        Age-related nuclear cataract of both eyes        Meibomian gland dysfunction        Family history of glaucoma        Raised intraocular pressure of both eyes          Care Instructions    Referral to Dr. Aguilera at [facility] for cataract and glaucoma evaluation.    Cliradex eyelid scrubs 2 x day for 8 weeks.  Systane gel/Balance 2-4 x day.    Return in 1 year for a complete eye exam or sooner if needed.    Brandt Richardson OD    The affects of the dilating drops last for 4- 6 hours.  You will be more sensitive to light and vision will be blurry up close.  Mydriatic sunglasses were given if needed.      Optometry Providers       Clinic Locations                                 Telephone Number   Dr. Saira Crowder [facility] [phone]     [facility] Hours:                [facility] Hours:       [facility] Hours:   [address]   [address] [place] [address]   [place] [address]    [place] [address]  Phone: [phone]                    Phone: [phone]     Phone: [phone]                      Monday 8:00-7:00                          Monday 8:00-7:00                          Monday 8:00-7:00              Tuesday 8:00-6:00                          Tuesday 8:00-7:00                          Tuesday 8:00-7:00              Wednesday 8:00-6:00                  Wednesday 8:00-7:00                    Wednesday 8:00-7:00 Thursday 8:00-6:00                        Thursday 8:00-7:00                         Thursday 8:00-7:00            Friday 8:00-5:00                              Friday 8:00-5:00                              Friday 8:00-5:00    [facility] Hours:   [address] [place] [place] [address]  [phone]    Monday 8:00-7:00  Tuesday 8:00-7:00  Wednesday 8:00-7:00 Thursday 8:00-7:00  Friday 8:00-5:00  Please log on to [email or web address] to order your contact lenses.  The link is found on the Eye Care and Vision Services page.  As always, Thank you for trusting us with your health care needs!                Follow-ups after your visit        Additional Services     OPHTHALMOLOGY ADULT REFERRAL       Your provider has referred you to:  [facility]: [facility] [phone]   [email or web address]      Please be aware that coverage of these services is subject to the terms and limitations of your health insurance plan.  Call member services at your health plan with any benefit or coverage questions.      Please bring the following to your appointment:  >>   Any x-rays, CTs or MRIs which have been performed.  Contact the facility where they were done to arrange for  prior to your scheduled appointment.  Any new CT, MRI or other procedures ordered by your specialist must be performed at a [facility] or coordinated by your clinic's referral office.    >>   List of current medications   >>   This referral request   >>   Any documents/labs given to you for this referral                  Follow-up notes from your care team     Return in about 1 year (around 12/3/2019) for Annual Visit.      Your next 10 appointments already scheduled     Dec 05, 2018  9:40 AM ARIN Jack with Farrah Wild PT   [facility] [place] ([place]  )    [address]  [place] [address] [phone]              Who to contact     If you have questions or need follow up information about today's clinic visit or your schedule please contact [facility] directly at [phone].  Normal or non-critical lab and imaging results will be communicated to you by MyChart, letter or phone within 4 business days after the clinic has received the results. If you do not hear from us within 7 days, please contact the clinic through MyChart or phone. If you have a critical or abnormal lab result, we will notify you by phone as soon as possible.  Submit refill requests through [employer] or call your pharmacy and they will forward the refill request to us. Please allow 3 business days for your refill to be completed.          Additional Information About Your Visit        MyChart Information     [employer] gives you secure access to your electronic health record. If you see a primary care provider, you can also send messages to your care team and make appointments. If you have questions, please call your primary care clinic.  If you do not have a primary care provider, please call [phone] and they will assist you.        Care EveryWhere ID     This is your Care EveryWhere ID. This could be used by other organizations to access your [place] medical records  [id number]         Blood Pressure from Last 3 Encounters:   10/25/18 103/62   09/02/18 149/76   08/23/18 143/68    Weight from Last 3 Encounters:   10/25/18 62.6 kg (138 lb)   09/02/18 63.3 kg (139 lb 9.6 oz)   08/17/18 63.5 kg (140 lb)              We Performed the Following     EYE EXAM (SIMPLE-NONBILLABLE)     OPHTHALMOLOGY ADULT REFERRAL     REFRACTION        Primary Care Provider Office Phone # Fax #    Sunita Andres PA-C [phone] [address] [place] [address]        Equal Access to Services     HAYDEE STOCK : manjit Dominguez, reji  iona winklerblake vicente ah. [facility] [phone].    ATENCIÓN: Si meliton yeboah, tiene a carnes disposición servicios gratuitos de asistencia lingüística. Amy al [phone].    We comply with applicable federal civil rights laws and [place] laws. We do not discriminate on the basis of race, color, national origin, age, disability, sex, sexual orientation, or gender identity.            Thank you!     Thank you for choosing [facility]  for your care. Our goal is always to provide you with excellent care. Hearing back from our patients is one way we can continue to improve our services. Please take a few minutes to complete the written survey that you may receive in the mail after your visit with us. Thank you!             Your Updated Medication List - Protect others around you: Learn how to safely use, store and throw away your medicines at www.disposemymeds.org.          This list is accurate as of 12/3/18  1:45 PM.  Always use your most recent med list.                   Brand Name Dispense Instructions for use Diagnosis    citalopram 20 MG tablet    celeXA    90 tablet    Take 1 tablet (20 mg) by mouth daily    Dysthymic disorder       clotrimazole-betamethasone 1-0.05 % external cream    LOTRISONE    15 g    Apply topically 2 times daily    Special screening for malignant neoplasms, colon       loratadine-pseudoePHEDrine  MG 24 hr tablet    EQL ALLERGY/CONGESTION RELIEF    90 tablet    Take 1 tablet by mouth daily    Chronic rhinitis, unspecified type       order for DME     1 each    Equipment being ordered: left knee sleeve    Acute pain of left knee       valACYclovir 500 MG tablet    VALTREX    90 tablet    Take 1 tablet (500 mg) by mouth daily    Herpes simplex virus infection

## 2018-12-03 NOTE — PATIENT INSTRUCTIONS
Referral to Dr. Aguilera at [facility] for cataract and glaucoma evaluation.    Cliradex eyelid scrubs 2 x day for 8 weeks.  Systane gel/Balance 2-4 x day.    Return in 1 year for a complete eye exam or sooner if needed.    Brandt Richardson, OD    The affects of the dilating drops last for 4- 6 hours.  You will be more sensitive to light and vision will be blurry up close.  Mydriatic sunglasses were given if needed.      Optometry Providers       Clinic Locations                                 Telephone Number   Dr. Saira Crowder [place]   [facility]   [place] [phone]     [facility] Hours:                [facility] Hours:       [facility] Hours:   [address]   [address] [facility]     [address] [place] [address]   [place] [address]    [place] [address]  Phone: [phone]                    Phone: [phone]     Phone: [phone]                      Monday 8:00-7:00                          Monday 8:00-7:00                          Monday 8:00-7:00              Tuesday 8:00-6:00                          Tuesday 8:00-7:00                          Tuesday 8:00-7:00              Wednesday 8:00-6:00                  Wednesday 8:00-7:00                   Wednesday 8:00-7:00      Thursday 8:00-6:00                        Thursday 8:00-7:00                         Thursday 8:00-7:00            Friday 8:00-5:00                              Friday 8:00-5:00                              Friday 8:00-5:00    [facility] Hours:   [address] [place] [id number] [phone]    Monday 8:00-7:00  Tuesday 8:00-7:00  Wednesday 8:00-7:00  Thursday 8:00-7:00  Friday 8:00-5:00  Please log on to [email or web address] to order your contact lenses.  The link is found on the Eye Care and Vision Services page.  As always, Thank you for trusting us with  your health care needs!

## 2018-12-03 NOTE — PROGRESS NOTES
Chief Complaint   Patient presents with     COMPREHENSIVE EYE EXAM         Last Eye Exam: 10-  Dilated Previously: Yes    What are you currently using to see?  glasses       Distance Vision Acuity: Satisfied with vision    Near Vision Acuity: Satisfied with vision while reading  with glasses    Eye Comfort: good  Do you use eye drops? : Yes: od eye uses gel, od eye only dry  Occupation or Hobbies: retired    Mother has glaucoma  Dad macular degeneration    Shruthi Cantu Optometric Assistant, A.B.O.C.          Medical, surgical and family histories reviewed and updated 12/3/2018.       OBJECTIVE: See Ophthalmology exam    ASSESSMENT:    ICD-10-CM    1. Examination of eyes and vision Z01.00 REFRACTION   2. Hypermetropia of both eyes H52.03 REFRACTION   3. Astigmatism of both eyes with presbyopia H52.203 REFRACTION    H52.4    4. Age-related nuclear cataract of both eyes H25.13 EYE EXAM (SIMPLE-NONBILLABLE)     OPHTHALMOLOGY ADULT REFERRAL   5. Meibomian gland dysfunction H02.889 EYE EXAM (SIMPLE-NONBILLABLE)   6. Family history of glaucoma Z83.511 EYE EXAM (SIMPLE-NONBILLABLE)     OPHTHALMOLOGY ADULT REFERRAL   7. Raised intraocular pressure of both eyes H40.053 EYE EXAM (SIMPLE-NONBILLABLE)     OPHTHALMOLOGY ADULT REFERRAL      PLAN:     Patient Instructions   Referral to Dr. Aguilera at [facility] for cataract and glaucoma evaluation.    Cliradex eyelid scrubs 2 x day for 8 weeks.  Systane gel/Balance 2-4 x day.    Return in 1 year for a complete eye exam or sooner if needed.    Brandt Richardson, OD

## 2018-12-03 NOTE — LETTER
12/3/2018         RE: Cely Ontiveros  [address]  [facility] [address]        Dear Colleague,    Thank you for referring your patient, Cely Ontiveros, to the [facility]. Please see a copy of my visit note below.    Chief Complaint   Patient presents with     COMPREHENSIVE EYE EXAM         Last Eye Exam: 10-  Dilated Previously: Yes    What are you currently using to see?  glasses       Distance Vision Acuity: Satisfied with vision    Near Vision Acuity: Satisfied with vision while reading  with glasses    Eye Comfort: good  Do you use eye drops? : Yes: od eye uses gel, od eye only dry  Occupation or Hobbies: retired    Mother has glaucoma  Dad macular degeneration    hSruthi Cantu Optometric Assistant, A.B.O.C.          Medical, surgical and family histories reviewed and updated 12/3/2018.       OBJECTIVE: See Ophthalmology exam    ASSESSMENT:    ICD-10-CM    1. Examination of eyes and vision Z01.00 REFRACTION   2. Hypermetropia of both eyes H52.03 REFRACTION   3. Astigmatism of both eyes with presbyopia H52.203 REFRACTION    H52.4    4. Age-related nuclear cataract of both eyes H25.13 EYE EXAM (SIMPLE-NONBILLABLE)     OPHTHALMOLOGY ADULT REFERRAL   5. Meibomian gland dysfunction H02.889 EYE EXAM (SIMPLE-NONBILLABLE)   6. Family history of glaucoma Z83.511 EYE EXAM (SIMPLE-NONBILLABLE)     OPHTHALMOLOGY ADULT REFERRAL   7. Raised intraocular pressure of both eyes H40.053 EYE EXAM (SIMPLE-NONBILLABLE)     OPHTHALMOLOGY ADULT REFERRAL      PLAN:     Patient Instructions   Referral to Dr. Aguilera at [facility] for cataract and glaucoma evaluation.    Cliradex eyelid scrubs 2 x day for 8 weeks.  Systane gel/Balance 2-4 x day.    Return in 1 year for a complete eye exam or sooner if needed.    Brandt Richardson, GENNY               Again, thank you for allowing me to participate in the care of your patient.        Sincerely,        Brandt Richardson, OD

## 2018-12-05 ENCOUNTER — THERAPY VISIT (OUTPATIENT)
Dept: PHYSICAL THERAPY | Facility: CLINIC | Age: 73
End: 2018-12-05
Payer: COMMERCIAL

## 2018-12-05 DIAGNOSIS — M25.562 ACUTE PAIN OF LEFT KNEE: ICD-10-CM

## 2018-12-05 PROCEDURE — 97112 NEUROMUSCULAR REEDUCATION: CPT | Mod: GP | Performed by: PHYSICAL THERAPIST

## 2018-12-05 PROCEDURE — 97140 MANUAL THERAPY 1/> REGIONS: CPT | Mod: GP | Performed by: PHYSICAL THERAPIST

## 2018-12-05 PROCEDURE — 97110 THERAPEUTIC EXERCISES: CPT | Mod: GP | Performed by: PHYSICAL THERAPIST

## 2018-12-05 NOTE — MR AVS SNAPSHOT
After Visit Summary   12/5/2018    Cely Ontiveros    MRN: [id number]           Patient Information     Date Of Birth          1945        Visit Information        Provider Department      12/5/2018 9:40 AM Farrah Wild, PT [facility]        Today's Diagnoses     Acute pain of left knee           Follow-ups after your visit        Your next 10 appointments already scheduled     Dec 19, 2018  9:40 AM CST   [facility] Extremity with Farrah Wild PT   [facility] ([facility] [place]  )    [address] [facility] [address]   [phone]            Jan 14, 2019 10:30 AM CST   New Visit with Wagner Aguilera MD, [facility] NURSE ONLY   [facility]    [id number] [place] [id number] [phone]              Who to contact     If you have questions or need follow up information about today's clinic visit or your schedule please contact [facility] directly at [phone].  Normal or non-critical lab and imaging results will be communicated to you by MyChart, letter or phone within 4 business days after the clinic has received the results. If you do not hear from us within 7 days, please contact the clinic through [place] or phone. If you have a critical or abnormal lab result, we will notify you by phone as soon as possible.  Submit refill requests through [employer] or call your pharmacy and they will forward the refill request to us. Please allow 3 business days for your refill to be completed.          Additional Information About Your Visit        MyChart Information     [employer] gives you secure access to your electronic health record. If you see a primary care provider, you can also send messages to your care team and make appointments. If you have questions, please call your primary care clinic.  If you do not have a  primary care provider, please call [phone] and they will assist you.        Care EveryWhere ID     This is your Care EveryWhere ID. This could be used by other organizations to access your [place] medical records  [id number]         Blood Pressure from Last 3 Encounters:   10/25/18 103/62   09/02/18 149/76   08/23/18 143/68    Weight from Last 3 Encounters:   10/25/18 62.6 kg (138 lb)   09/02/18 63.3 kg (139 lb 9.6 oz)   08/17/18 63.5 kg (140 lb)              We Performed the Following     MANUAL THER TECH,1+REGIONS,EA 15 MIN     NEUROMUSCULAR RE-EDUCATION     THERAPEUTIC EXERCISES        Primary Care Provider Office Phone # Fax #    Mara Audelia Varma, APRN [phone]  [facility] [address]        Equal Access to Services     [facility] : Hadii aad ku hadasho Soomaali, waaxda luqadaha, qaybta kaalmada adeegyada, waxay idiin hayaan radha sanchesararichard vicente . So [facility] [phone].    ATENCIÓN: Si habla español, tiene a carnes disposición servicios gratuitos de asistencia lingüística. Llame al [phone].    We comply with applicable federal civil rights laws and [place] laws. We do not discriminate on the basis of race, color, national origin, age, disability, sex, sexual orientation, or gender identity.            Thank you!     Thank you for choosing [facility]  for your care. Our goal is always to provide you with excellent care. Hearing back from our patients is one way we can continue to improve our services. Please take a few minutes to complete the written survey that you may receive in the mail after your visit with us. Thank you!             Your Updated Medication List - Protect others around you: Learn how to safely use, store and throw away your medicines at www.disposemymeds.org.          This list is accurate as of 12/5/18 11:41 AM.  Always use your most recent med list.                   Brand Name Dispense  Instructions for use Diagnosis    citalopram 20 MG tablet    celeXA    90 tablet    Take 1 tablet (20 mg) by mouth daily    Dysthymic disorder       clotrimazole-betamethasone 1-0.05 % external cream    LOTRISONE    15 g    Apply topically 2 times daily    Special screening for malignant neoplasms, colon       loratadine-pseudoePHEDrine  MG 24 hr tablet    EQL ALLERGY/CONGESTION RELIEF    90 tablet    Take 1 tablet by mouth daily    Chronic rhinitis, unspecified type       order for DME     1 each    Equipment being ordered: left knee sleeve    Acute pain of left knee       valACYclovir 500 MG tablet    VALTREX    90 tablet    Take 1 tablet (500 mg) by mouth daily    Herpes simplex virus infection

## 2018-12-12 ENCOUNTER — OFFICE VISIT (OUTPATIENT)
Dept: ORTHOPEDICS | Facility: CLINIC | Age: 73
End: 2018-12-12
Payer: COMMERCIAL

## 2018-12-12 VITALS
OXYGEN SATURATION: 95 % | WEIGHT: 138 LBS | TEMPERATURE: 98.1 F | BODY MASS INDEX: 23.69 KG/M2 | SYSTOLIC BLOOD PRESSURE: 126 MMHG | DIASTOLIC BLOOD PRESSURE: 71 MMHG | HEART RATE: 80 BPM

## 2018-12-12 DIAGNOSIS — G89.29 CHRONIC PAIN OF LEFT KNEE: Primary | ICD-10-CM

## 2018-12-12 DIAGNOSIS — M25.562 CHRONIC PAIN OF LEFT KNEE: Primary | ICD-10-CM

## 2018-12-12 PROCEDURE — 99204 OFFICE O/P NEW MOD 45 MIN: CPT | Performed by: ORTHOPAEDIC SURGERY

## 2018-12-12 ASSESSMENT — PAIN SCALES - GENERAL: PAINLEVEL: MILD PAIN (2)

## 2018-12-12 NOTE — PROGRESS NOTES
"CHIEF COMPLAINT:   Chief Complaint   Patient presents with     Left Knee - Pain     Pt originally hurt knee from falling on ice.  Pain seems to have gotten worse 3 months ago.  Going downstairs, or bending it too far creates more pain.  Pt states the other day knee has been buckling. Pt purchased a brace for her knee which she was told      Knee Pain     Pt originally hurt knee from falling on ice.  Pain seems to have gotten worse 3 months ago.  Going downstairs, or bending it too far creates more pain.  Pt states the other day knee has been buckling. Pt purchased a brace for her knee which she was told by PT to wear the brace with activities.  It feels good to wear brace, but was told not to rely on it.     Cely Ontiveros is seen today in the [facility] for evaluation of left knee pain at the request of Mara Browning        HISTORY OF PRESENT ILLNESS    Cely Ontiveros is a 72 year old female seen for evaluation of ongoing left knee pain with no known recent injury. Recent pain has been present about 3 months ago, but mentions possibly \"tearing something\" 5 years ago with an injury. Has had pain off/on but worse for some reason recently. Mild pain today, rated a 2/10. She notes medial knee pain, but bilateral anterior knee pain. Pain is worsened with activities like flexion, and going up and down stairs. No pain with standing. Has had buckling episodes in the past. For treatment, she has tried ice, physical therapy and a knee brace.    Of note: reports low back pain, does yoga x3 per week.    Present symptoms: mild pain.  Anterior/medial. No swelling now but states has in the past. No locking, catching. ?buckling  Pain severity: 2/10  Frequency of symptoms: frequently  Exacerbating Factors: weight bearing, stairs  Relieving Factors: rest  Night Pain: No  Pain while at rest: No   Numbness or tingling: No   Patient has tried:     NSAIDS: yes, ibuprofen      Physical " Therapy: Yes      Activity modification: Yes      Bracing: No      Injections: No     Ice: Yes     Assistive device:  No     Other: none    Orthopaedic PMH: history of adhesive capsulitis in the past, had a reaction to cortisone in the past (couldnt sleep, felt anxious).     Other PMH:  has a past medical history of Actinic keratosis, Allergic rhinitis, Cataract, Degenerative joint disease, Depression with anxiety, Herpes simplex, Hypoglycemia, and Impingement syndrome, shoulder. She also has no past medical history of Acne vulgaris, Allergies, Basal cell carcinoma, Eczema, Heart valve disorder, Malignant melanoma nos, Pacemaker, Photosensitive contact dermatitis, Psoriasis, Skin cancer, Squamous cell carcinoma, Unspecified asthma(493.90), or Urticaria.  Patient Active Problem List    Diagnosis Date Noted     Meibomian gland dysfunction 12/03/2018     Priority: Medium     Acute pain of left knee 11/14/2018     Priority: Medium     Somatic dysfunction of pelvis region 06/04/2018     Priority: Medium     Pelvic pain in female 06/04/2018     Priority: Medium     Urinary urgency 06/04/2018     Priority: Medium     Urinary frequency 06/04/2018     Priority: Medium     Urgency incontinence 06/04/2018     Priority: Medium     H/O hypoglycemia 03/08/2017     Priority: Medium     Chronic otitis externa of left ear, unspecified type 03/08/2017     Priority: Medium     Abdominal bloating 03/08/2017     Priority: Medium     Chronic rhinitis 03/08/2017     Priority: Medium     Dysthymic disorder 03/08/2017     Priority: Medium     Plugged tear duct, od > os 10/12/2014     Priority: Medium     Epiphora 10/12/2014     Priority: Medium     Hypermetropia 10/10/2013     Priority: Medium     Astigmatism with presbyopia 10/10/2013     Priority: Medium     Blepharitis of both eyes 10/10/2013     Priority: Medium     Cataracts, bilateral 10/10/2013     Priority: Medium     Seasonal allergic rhinitis 08/16/2013     Priority: Medium      "Gross hematuria 01/02/2013     Priority: Medium     Right kidney stone 01/02/2013     Priority: Medium     Posterior vitreous detachment of both eyes 10/09/2012     Priority: Medium     Advance care planning 05/20/2011     Priority: Medium     Advance Care Planning 01/22/2015: ACP Review and Resources Provided:  Reviewed chart for advance care plan.  Cely Ontiveros has no plan or code status on file. Mailed available resources and provided with information. Confirmed code status reflects current choices pending further ACP discussions.  Confirmed/documented designated decision maker(s). See permanent comments section of demographics in clinical tab. Added by Madonna Marques on 1/22/2015  Advance Care Planning 05/20/2011: Patient does not have an Advance/Health Care Directive (HCD), given \"What is Advance Care Planning?\" flyer. Mailed to patient.Jacqui Scherer.May 20, 2011       Degenerative joint disease      Priority: Medium     CARDIOVASCULAR SCREENING; LDL GOAL LESS THAN 160 10/31/2010     Priority: Medium     Herpes simplex      Priority: Medium     Recurs left buttock  Zostavax 2/10  IMO update changed this record. Please review for accuracy       Allergic rhinitis      Priority: Medium       Surgical Hx:  has a past surgical history that includes ENLARGE BREAST WITH IMPLANT; REMOVAL OF BREAST IMPLANT; LAP,FULGURATE/EXCISE LESIONS; hysterectomy, pap no longer indicated (1998); sinus surgery; and Combined Cystoscopy, Ureteroscopy, Laser Holmium Lithotripsy Ureter(S) (Right, 8/23/2018).    Medications:   Current Outpatient Medications:      citalopram (CELEXA) 20 MG tablet, Take 1 tablet (20 mg) by mouth daily, Disp: 90 tablet, Rfl: 3     clotrimazole-betamethasone (LOTRISONE) cream, Apply topically 2 times daily, Disp: 15 g, Rfl: 1     loratadine-pseudoePHEDrine (EQL ALLERGY/CONGESTION RELIEF)  MG per 24 hr tablet, Take 1 tablet by mouth daily, Disp: 90 tablet, Rfl: 3     order for DME, Equipment being " ordered: left knee sleeve, Disp: 1 each, Rfl: 0     valACYclovir (VALTREX) 500 MG tablet, Take 1 tablet (500 mg) by mouth daily, Disp: 90 tablet, Rfl: 3    Allergies:   Allergies   Allergen Reactions     Sulfa Drugs Swelling     Cats Swelling     Lips swollen     Wool Fiber        Social Hx: retired .   reports that  has never smoked. she has never used smokeless tobacco. She reports that she does not drink alcohol or use drugs.    Family Hx: family history includes Arthritis in her mother; Cancer in her father and mother; Cancer - colorectal in her father; Cardiovascular in her mother; Circulatory in her mother; Glaucoma in her mother; Heart Disease in her mother; Hypertension in her mother; Lipids in her mother; Macular Degeneration in her father; Obesity in her mother; Osteoporosis in her mother.    REVIEW OF SYSTEMS: 10 point ROS neg other than the symptoms noted above in the HPI and PMH. Notables include  CONSTITUTIONAL:NEGATIVE for fever, chills, change in weight  INTEGUMENTARY/SKIN: NEGATIVE for worrisome rashes, moles or lesions  MUSCULOSKELETAL:See HPI above  NEURO: NEGATIVE for weakness, dizziness or paresthesias    This document serves as a record of the services and decisions personally performed and made by Nic Singh MD. It was created on his behalf by Cammy Guerrero, a trained medical scribe. The creation of this document is based the provider's statements to the medical scribe.    Melina Guerrero 2:51 PM 12/12/2018    PHYSICAL EXAM:  /71 (BP Location: Left arm, Patient Position: Sitting, Cuff Size: Adult Regular)   Pulse 80   Temp 98.1  F (36.7  C) (Oral)   Wt 62.6 kg (138 lb)   SpO2 95%   BMI 23.69 kg/m     GENERAL APPEARANCE: healthy, alert, no distress  SKIN: no suspicious lesions or rashes  NEURO: Normal strength and tone, mentation intact and speech normal  PSYCH:  mentation appears normal and affect normal, not anxious  RESPIRATORY: No increased work of  breathing.  HANDS: no clubbing, nail pitting.    BILATERAL LOWER EXTREMITIES:  Gait: normal  Alignment: varus  No gross deformities or masses.  No Quad atrophy, strength normal.  Intact sensation deep peroneal nerve, superficial peroneal nerve, med/lat tibial nerve, sural nerve, saphenous nerve  Intact EHL, EDL, TA, FHL, GS, quadriceps hamstrings and hip flexors  Toes warm and well perfused, brisk capillary refill. Palpable 2+ dp pulses.  Bilateral calf soft and nttp or squeeze.  No palpable popliteal lymphadenopathy.  DTRs: achilles 2+, patella 2+.  Edema: none    LEFT KNEE EXAM:    Skin: intact, no ecchymosis or erythema  Squat: 100 %, anterior knee pain.  ROM: 0 extension to 135+ flexion, anterior discomfort with knee flexion  Tight hamstrings on straight leg raise.  Effusion: none  Tender: pes anserine bursa, lateral joint line, medial joint line, medial collateral ligament    NTTP anterior or posterior knee  McMurrays: positive - medially    MCL: stable at both 0 and 30 degrees knee flexion, painful  Varus stress: stable, and non-painful at both 0 and 30 degrees knee flexion  Lachmans: neg, firm endpoint  Posterior Drawer stable  Patellofemoral joint:                Apprehension: negative              Crepitations: mild    Grind: positive     RIGHT KNEE EXAM:    Skin: intact, no ecchymosis or erythema  Squat: 100 %, anterior knee pain.     ROM: 0 extension to 135+ flexion  Tight hamstrings on straight leg raise.  Effusion: none  Tender: pes anserine bursa, lateral joint line  NTTP med joint line, anterior or posterior knee  McMurrays: negative    MCL: stable, and non-painful at both 0 and 30 degrees knee flexion  Varus stress: stable, and non-painful at both 0 and 30 degrees knee flexion  Lachmans: neg, firm endpoint  Posterior Drawer stable  Patellofemoral joint:                Apprehension: negative              Crepitations: mild   Grind: positive     X-RAY:  AP/lateral views bilateral knee from 10/25/2018  were reviewed in clinic today. On my review, no obvious fractures or dislocations. Fairly preserved joint spaces. No sunrise views available.    Impression:   72 year old female with acute on chronic left knee pain, patello-femoral chondromalacia and pes anserine bursitis, possible medial meniscus tear.     Plan:   * Reviewed imaging with patient. Also, clinical exam findings. Likely patello-femoral pain and chondromalacia as well as pes bursitis/tendonitis. This appears bilateral.  * Discussed with patient that based on physical exam findings, it is not possible to completely rule out medial meniscus tear left knee.  * An MRI of the left knee was ordered for further evaluation.     * Rest  * Activity modification - avoid activities that aggravate symptoms.  * NSAIDS - regular use for inflammation, with food, as long as no contra-indications. Please discuss with pcp if needed.  * Ice twice daily to three times daily.  * Compression wrap  * Elevation of extremity to reduce swelling  * Tylenol as needed for pain    * After having the MRI, I would like the patient to call me so that we can discuss the results as well as how to proceed.         The information in this document, created by a scribe for me, accurately reflects the services I personally performed and the decisions made by me. I have reviewed and approved this document for accuracy.     Nic Singh M.D., M.S.  Dept. of Orthopaedic Surgery  [facility]

## 2018-12-12 NOTE — LETTER
"    12/12/2018         RE: Cely Ontiveros  [address]  [facility] [id number]        Dear Colleague,    Thank you for referring your patient, Cely Ontiveros, to the [facility]. Please see a copy of my visit note below.    CHIEF COMPLAINT:   Chief Complaint   Patient presents with     Left Knee - Pain     Pt originally hurt knee from falling on ice.  Pain seems to have gotten worse 3 months ago.  Going downstairs, or bending it too far creates more pain.  Pt states the other day knee has been buckling. Pt purchased a brace for her knee which she was told      Knee Pain     Pt originally hurt knee from falling on ice.  Pain seems to have gotten worse 3 months ago.  Going downstairs, or bending it too far creates more pain.  Pt states the other day knee has been buckling. Pt purchased a brace for her knee which she was told by PT to wear the brace with activities.  It feels good to wear brace, but was told not to rely on it.     Cely Ontiveros is seen today in the [facility] for evaluation of left knee pain at the request of Mara Browning        HISTORY OF PRESENT ILLNESS    Cely Ontiveros is a 72 year old female seen for evaluation of ongoing left knee pain with no known recent injury. Recent pain has been present about 3 months ago, but mentions possibly \"tearing something\" 5 years ago with an injury. Has had pain off/on but worse for some reason recently. Mild pain today, rated a 2/10. She notes medial knee pain, but bilateral anterior knee pain. Pain is worsened with activities like flexion, and going up and down stairs. No pain with standing. Has had buckling episodes in the past. For treatment, she has tried ice, physical therapy and a knee brace.    Of note: reports low back pain, does yoga x3 per week.    Present symptoms: mild pain.  Anterior/medial. No swelling now but states has in the past. No locking, catching. ?buckling  Pain " severity: 2/10  Frequency of symptoms: frequently  Exacerbating Factors: weight bearing, stairs  Relieving Factors: rest  Night Pain: No  Pain while at rest: No   Numbness or tingling: No   Patient has tried:     NSAIDS: yes, ibuprofen      Physical Therapy: Yes      Activity modification: Yes      Bracing: No      Injections: No     Ice: Yes     Assistive device:  No     Other: none    Orthopaedic PMH: history of adhesive capsulitis in the past, had a reaction to cortisone in the past (couldnt sleep, felt anxious).     Other PMH:  has a past medical history of Actinic keratosis, Allergic rhinitis, Cataract, Degenerative joint disease, Depression with anxiety, Herpes simplex, Hypoglycemia, and Impingement syndrome, shoulder. She also has no past medical history of Acne vulgaris, Allergies, Basal cell carcinoma, Eczema, Heart valve disorder, Malignant melanoma nos, Pacemaker, Photosensitive contact dermatitis, Psoriasis, Skin cancer, Squamous cell carcinoma, Unspecified asthma(493.90), or Urticaria.  Patient Active Problem List    Diagnosis Date Noted     Meibomian gland dysfunction 12/03/2018     Priority: Medium     Acute pain of left knee 11/14/2018     Priority: Medium     Somatic dysfunction of pelvis region 06/04/2018     Priority: Medium     Pelvic pain in female 06/04/2018     Priority: Medium     Urinary urgency 06/04/2018     Priority: Medium     Urinary frequency 06/04/2018     Priority: Medium     Urgency incontinence 06/04/2018     Priority: Medium     H/O hypoglycemia 03/08/2017     Priority: Medium     Chronic otitis externa of left ear, unspecified type 03/08/2017     Priority: Medium     Abdominal bloating 03/08/2017     Priority: Medium     Chronic rhinitis 03/08/2017     Priority: Medium     Dysthymic disorder 03/08/2017     Priority: Medium     Plugged tear duct, od > os 10/12/2014     Priority: Medium     Epiphora 10/12/2014     Priority: Medium     Hypermetropia 10/10/2013     Priority: Medium  "    Astigmatism with presbyopia 10/10/2013     Priority: Medium     Blepharitis of both eyes 10/10/2013     Priority: Medium     Cataracts, bilateral 10/10/2013     Priority: Medium     Seasonal allergic rhinitis 08/16/2013     Priority: Medium     Gross hematuria 01/02/2013     Priority: Medium     Right kidney stone 01/02/2013     Priority: Medium     Posterior vitreous detachment of both eyes 10/09/2012     Priority: Medium     Advance care planning 05/20/2011     Priority: Medium     Advance Care Planning 01/22/2015: ACP Review and Resources Provided:  Reviewed chart for advance care plan.  Cely Ontiveros has no plan or code status on file. Mailed available resources and provided with information. Confirmed code status reflects current choices pending further ACP discussions.  Confirmed/documented designated decision maker(s). See permanent comments section of demographics in clinical tab. Added by Madonna Marques on 1/22/2015  Advance Care Planning 05/20/2011: Patient does not have an Advance/Health Care Directive (HCD), given \"What is Advance Care Planning?\" flyer. Mailed to patient.Jacqui Scherer.May 20, 2011       Degenerative joint disease      Priority: Medium     CARDIOVASCULAR SCREENING; LDL GOAL LESS THAN 160 10/31/2010     Priority: Medium     Herpes simplex      Priority: Medium     Recurs left buttock  Zostavax 2/10  IMO update changed this record. Please review for accuracy       Allergic rhinitis      Priority: Medium       Surgical Hx:  has a past surgical history that includes ENLARGE BREAST WITH IMPLANT; REMOVAL OF BREAST IMPLANT; LAP,FULGURATE/EXCISE LESIONS; hysterectomy, pap no longer indicated (1998); sinus surgery; and Combined Cystoscopy, Ureteroscopy, Laser Holmium Lithotripsy Ureter(S) (Right, 8/23/2018).    Medications:   Current Outpatient Medications:      citalopram (CELEXA) 20 MG tablet, Take 1 tablet (20 mg) by mouth daily, Disp: 90 tablet, Rfl: 3     clotrimazole-betamethasone " (LOTRISONE) cream, Apply topically 2 times daily, Disp: 15 g, Rfl: 1     loratadine-pseudoePHEDrine (EQL ALLERGY/CONGESTION RELIEF)  MG per 24 hr tablet, Take 1 tablet by mouth daily, Disp: 90 tablet, Rfl: 3     order for DME, Equipment being ordered: left knee sleeve, Disp: 1 each, Rfl: 0     valACYclovir (VALTREX) 500 MG tablet, Take 1 tablet (500 mg) by mouth daily, Disp: 90 tablet, Rfl: 3    Allergies:   Allergies   Allergen Reactions     Sulfa Drugs Swelling     Cats Swelling     Lips swollen     Wool Fiber        Social Hx: retired .   reports that  has never smoked. she has never used smokeless tobacco. She reports that she does not drink alcohol or use drugs.    Family Hx: family history includes Arthritis in her mother; Cancer in her father and mother; Cancer - colorectal in her father; Cardiovascular in her mother; Circulatory in her mother; Glaucoma in her mother; Heart Disease in her mother; Hypertension in her mother; Lipids in her mother; Macular Degeneration in her father; Obesity in her mother; Osteoporosis in her mother.    REVIEW OF SYSTEMS: 10 point ROS neg other than the symptoms noted above in the HPI and PMH. Notables include  CONSTITUTIONAL:NEGATIVE for fever, chills, change in weight  INTEGUMENTARY/SKIN: NEGATIVE for worrisome rashes, moles or lesions  MUSCULOSKELETAL:See HPI above  NEURO: NEGATIVE for weakness, dizziness or paresthesias    This document serves as a record of the services and decisions personally performed and made by Nic Singh MD. It was created on his behalf by Cammy Guerrero, a trained medical scribe. The creation of this document is based the provider's statements to the medical scribe.    Sabasibscott Guerrero 2:51 PM 12/12/2018    PHYSICAL EXAM:  /71 (BP Location: Left arm, Patient Position: Sitting, Cuff Size: Adult Regular)   Pulse 80   Temp 98.1  F (36.7  C) (Oral)   Wt 62.6 kg (138 lb)   SpO2 95%   BMI 23.69 kg/m      GENERAL  APPEARANCE: healthy, alert, no distress  SKIN: no suspicious lesions or rashes  NEURO: Normal strength and tone, mentation intact and speech normal  PSYCH:  mentation appears normal and affect normal, not anxious  RESPIRATORY: No increased work of breathing.  HANDS: no clubbing, nail pitting.    BILATERAL LOWER EXTREMITIES:  Gait: normal  Alignment: varus  No gross deformities or masses.  No Quad atrophy, strength normal.  Intact sensation deep peroneal nerve, superficial peroneal nerve, med/lat tibial nerve, sural nerve, saphenous nerve  Intact EHL, EDL, TA, FHL, GS, quadriceps hamstrings and hip flexors  Toes warm and well perfused, brisk capillary refill. Palpable 2+ dp pulses.  Bilateral calf soft and nttp or squeeze.  No palpable popliteal lymphadenopathy.  DTRs: achilles 2+, patella 2+.  Edema: none    LEFT KNEE EXAM:    Skin: intact, no ecchymosis or erythema  Squat: 100 %, anterior knee pain.  ROM: 0 extension to 135+ flexion, anterior discomfort with knee flexion  Tight hamstrings on straight leg raise.  Effusion: none  Tender: pes anserine bursa, lateral joint line, medial joint line, medial collateral ligament    NTTP anterior or posterior knee  McMurrays: positive - medially    MCL: stable at both 0 and 30 degrees knee flexion, painful  Varus stress: stable, and non-painful at both 0 and 30 degrees knee flexion  Lachmans: neg, firm endpoint  Posterior Drawer stable  Patellofemoral joint:                Apprehension: negative              Crepitations: mild    Grind: positive     RIGHT KNEE EXAM:    Skin: intact, no ecchymosis or erythema  Squat: 100 %, anterior knee pain.     ROM: 0 extension to 135+ flexion  Tight hamstrings on straight leg raise.  Effusion: none  Tender: pes anserine bursa, lateral joint line  NTTP med joint line, anterior or posterior knee  McMurrays: negative    MCL: stable, and non-painful at both 0 and 30 degrees knee flexion  Varus stress: stable, and non-painful at both 0 and 30  degrees knee flexion  Lachmans: neg, firm endpoint  Posterior Drawer stable  Patellofemoral joint:                Apprehension: negative              Crepitations: mild   Grind: positive     X-RAY:  AP/lateral views bilateral knee from 10/25/2018 were reviewed in clinic today. On my review, no obvious fractures or dislocations. Fairly preserved joint spaces. No sunrise views available.    Impression:   72 year old female with acute on chronic left knee pain, patello-femoral chondromalacia and pes anserine bursitis, possible medial meniscus tear.     Plan:   * Reviewed imaging with patient. Also, clinical exam findings. Likely patello-femoral pain and chondromalacia as well as pes bursitis/tendonitis. This appears bilateral.  * Discussed with patient that based on physical exam findings, it is not possible to completely rule out medial meniscus tear left knee.  * An MRI of the left knee was ordered for further evaluation.     * Rest  * Activity modification - avoid activities that aggravate symptoms.  * NSAIDS - regular use for inflammation, with food, as long as no contra-indications. Please discuss with pcp if needed.  * Ice twice daily to three times daily.  * Compression wrap  * Elevation of extremity to reduce swelling  * Tylenol as needed for pain    * After having the MRI, I would like the patient to call me so that we can discuss the results as well as how to proceed.         The information in this document, created by a scribe for me, accurately reflects the services I personally performed and the decisions made by me. I have reviewed and approved this document for accuracy.     Nic Singh M.D., M.S.  Dept. of Orthopaedic Surgery  [facility]        Again, thank you for allowing me to participate in the care of your patient.        Sincerely,        Nic Singh MD

## 2018-12-31 ENCOUNTER — ANCILLARY PROCEDURE (OUTPATIENT)
Dept: MRI IMAGING | Facility: CLINIC | Age: 73
End: 2018-12-31
Attending: PHYSICIAN ASSISTANT
Payer: COMMERCIAL

## 2018-12-31 DIAGNOSIS — G89.29 CHRONIC PAIN OF LEFT KNEE: ICD-10-CM

## 2018-12-31 DIAGNOSIS — M25.562 CHRONIC PAIN OF LEFT KNEE: ICD-10-CM

## 2018-12-31 PROCEDURE — 73721 MRI JNT OF LWR EXTRE W/O DYE: CPT | Mod: LT | Performed by: RADIOLOGY

## 2019-01-04 ENCOUNTER — TELEPHONE (OUTPATIENT)
Dept: ORTHOPEDICS | Facility: CLINIC | Age: 74
End: 2019-01-04

## 2019-01-04 NOTE — TELEPHONE ENCOUNTER
Called and left  for call back regarding left knee MRI. No details were left on voicemail. Call back number left.    Nic Singh M.D., M.S.  Dept. of Orthopaedic Surgery  [facility]

## 2019-01-04 NOTE — TELEPHONE ENCOUNTER
Briefly reviewed MRI results with patient and advised her to wait for further instruction from Dr. Singh for next steps. She will await our call when MD returns next week. Afternoon is best to call ([email or web address] [email or web address])    Fab Rodriguez RN....1/4/2019 12:38 PM

## 2019-01-07 NOTE — TELEPHONE ENCOUNTER
I left a Vm for Cely and asked that she call back to discuss her recent radiographic exam and treatment options.    Jake Johnson PA-C, CAQ (Ortho)  Supervising Physician: Nic Singh M.D., M.S.  Dept. of Orthopaedic Surgery  [facility]

## 2019-01-08 ENCOUNTER — TELEPHONE (OUTPATIENT)
Dept: ORTHOPEDICS | Facility: CLINIC | Age: 74
End: 2019-01-08

## 2019-01-08 NOTE — TELEPHONE ENCOUNTER
Patient returned call today. States she is home now today and works in the mornings and is home after noon every day. Please call. Thank you

## 2019-01-09 NOTE — TELEPHONE ENCOUNTER
"I spoke with Cely and discussed MRi findings consistent with tricompartmental osteoarthritis and medial meniscus tear. We discussed options from cortisone, \"gel shots\" to arthroscopy. She had a stimulating effect after a cortisone injection in her shoulder and was unable to sleep in the past. She is considering a \"gel shot\" and possibly cortisone in the pes bursa, but will discuss further with Dr. Nic Singh on 1/16/19 at 1:15pm. DON Paz made her appointment while we were on the phone. She thanked me for the call.    Jake Johnson PA-C, CAQ (Ortho)  Supervising Physician: Nic Singh M.D., M.S.  Dept. of Orthopaedic Surgery  [facility]    "

## 2019-01-14 ENCOUNTER — OFFICE VISIT (OUTPATIENT)
Dept: OPHTHALMOLOGY | Facility: CLINIC | Age: 74
End: 2019-01-14
Attending: OPTOMETRIST
Payer: COMMERCIAL

## 2019-01-14 ENCOUNTER — TELEPHONE (OUTPATIENT)
Dept: OPHTHALMOLOGY | Facility: CLINIC | Age: 74
End: 2019-01-14

## 2019-01-14 DIAGNOSIS — H25.13 AGE-RELATED NUCLEAR CATARACT OF BOTH EYES: Primary | ICD-10-CM

## 2019-01-14 DIAGNOSIS — Z83.511 FAMILY HISTORY OF GLAUCOMA: ICD-10-CM

## 2019-01-14 DIAGNOSIS — H26.9 CATARACT, BILATERAL: Primary | ICD-10-CM

## 2019-01-14 PROCEDURE — 92012 INTRM OPH EXAM EST PATIENT: CPT | Performed by: OPHTHALMOLOGY

## 2019-01-14 PROCEDURE — 92136 OPHTHALMIC BIOMETRY: CPT | Performed by: OPHTHALMOLOGY

## 2019-01-14 ASSESSMENT — TONOMETRY
OD_IOP_MMHG: 23
OS_IOP_MMHG: 28
IOP_METHOD: ICARE
OS_IOP_MMHG: 26
IOP_METHOD: ICARE
OD_IOP_MMHG: 23

## 2019-01-14 ASSESSMENT — REFRACTION_WEARINGRX
OD_SPHERE: +1.00
OS_CYLINDER: +0.50
OS_AXIS: 007
OS_SPHERE: +1.25
OD_CYLINDER: +1.00
OD_ADD: +2.50
OS_CYLINDER: +0.75
OD_AXIS: 005
OD_ADD: +2.25
OS_AXIS: 007
OS_SPHERE: +1.25
OD_CYLINDER: +0.75
OD_AXIS: 175
OD_SPHERE: +1.00
OS_ADD: +2.25
OS_ADD: +2.50
SPECS_TYPE: PAL

## 2019-01-14 ASSESSMENT — EXTERNAL EXAM - LEFT EYE: OS_EXAM: NORMAL

## 2019-01-14 ASSESSMENT — VISUAL ACUITY
METHOD: SNELLEN - LINEAR
OS_SC: 20/30
OS_PH_SC: 20/20
OS_SC+: -2
OD_SC+: -2
OD_PH_SC: 20/20
OD_SC: 20/25

## 2019-01-14 ASSESSMENT — EXTERNAL EXAM - RIGHT EYE: OD_EXAM: NORMAL

## 2019-01-14 ASSESSMENT — CUP TO DISC RATIO
OS_RATIO: 0.3
OD_RATIO: 0.2

## 2019-01-14 NOTE — TELEPHONE ENCOUNTER
"Procedure: left Cataract  Facility: [facility]  Length: 0.5 hour(s)  Surgeon:Ken Aguilera MD  Anesthesia: Local with MAC  Diagnosis: Cataract  Out Patient or AM admit:  (Same day)  BMI:Estimated body mass index is 23.69 kg/m  as calculated from the following:    Height as of 8/20/18: 1.626 m (5' 4\").    Weight as of 12/12/18: 62.6 kg (138 lb). (If over 43 for general or 45 for MAC cannot be scheduled at [facility])   Pre-op Appointments needed: History & Physical within 30 days of surgery  Post-op appointments needed: Cataract 1 day 1 week   needed:No   Surgery packet/instructions given to patient?:  Yes  Pre op teaching done:  Yes  Lens Orders Needed: Yes: ZCB00 23.5, with incision at  90    Referring provider: Dr. Richardson  Special Considerations:     Procedure: right Cataract  Facility: [facility]  Length: 0.5 hour(s)  Surgeon:Ken Aguilera MD  Anesthesia: Local with MAC  Diagnosis: Cataract  Out Patient or AM admit:  (Same day)  BMI:Estimated body mass index is 23.69 kg/m  as calculated from the following:    Height as of 8/20/18: 1.626 m (5' 4\").    Weight as of 12/12/18: 62.6 kg (138 lb). (If over 43 for general or 45 for MAC cannot be scheduled at [facility])   Pre-op Appointments needed: History & Physical within 30 days of surgery  Post-op appointments needed: Cataract 1 day 1 week 4 week   needed:No   Surgery packet/instructions given to patient?:  Yes  Pre op teaching done:  Yes  Lens Orders Needed: Yes: ZCB00 23.0, with incision at  90    Referring provider: Dr. Richardson  Special Considerations:                 "

## 2019-01-14 NOTE — PROGRESS NOTES
Assessment & Plan   Cely Ontiveros is a 73 year old female who presents with:   Review of systems for the eyes was negative other than the pertinent positives and negatives noted in the HPI.  History is obtained from the patient.    Chief Complaint   Patient presents with     Cataract     Ref by Dr Richardson patient notes decrease in DVA L>R and      Glaucoma     Ref by Dr Richardson Family history mother glaucoma and father AMD  Patient is using systane balance and gel gtts and lid hygiene       Lab Results   Component Value Date    A1C 5.3 05/07/1999     Age-related nuclear cataract of both eyes  - visually significant left eye greater than right eye.  - A's and K's today - results reviewed and appropriate lenses chosen, see scanned documentation.  - Schedule surgery - at [facility]   Discussed lens options. Standard lens monofocal vs monovision. Multifocal vs Toric     Risks, benefits, an alternatives of intended procedure discussed. She wishes to proceed.    Pt would like to proceed with surgery starting with the left eye using a standard lens settingboth eyes for distance.    She understands that She may still need to wear glasses after surgery.    - OPHTHALMIC BIOMETY W IOL POWER CALC  - Krista-Operative Worksheet    Family history of glaucoma  - No signs of glaucoma, continue yearly observation  - OCT Optic Nerve RNFL Optovue OU (both eyes)    Return in about 1 year (around 1/14/2020) for Annual Eye Exam, Surgery.    Documentation for today's encounter was performed by Stefania Nava COA. OSC. Acting as a scribe in my presence. I have reviewed and verified that it is an accurate recording of today's encounter.    Attending Physician Attestation:  Complete documentation of historical and exam elements from today's encounter can be found in the full encounter summary report (not reduplicated in this progress note).  I personally obtained the chief complaint(s) and history of present illness.  I  confirmed and edited as necessary the review of systems, past medical/surgical history, family history, social history, and examination findings as documented by others; and I examined the patient myself.  I personally reviewed the relevant tests, images, and reports as documented above.  I formulated and edited as necessary the assessment and plan and discussed the findings and management plan with the patient and family. - Wagner Aguilera MD

## 2019-01-15 NOTE — TELEPHONE ENCOUNTER
Date Scheduled:1-24 and 2-14  Facility: [facility]  Surgeon: Dr. Aguilera   Post-op appointment scheduled:   Feb 15, 2019 12:20 PM CST  Return Visit with Brandt Richardson OD  [facility]) [address] [place] [address] [id number] [phone]   Feb 20, 2019 12:20 PM CST  Return Visit with Brandt Richardson OD  [facility]) [address] [place] [address] [id number] [phone]           scheduled?: No  Surgery packet/instructions confirmed received?  Yes  Special Considerations:   Libertad Dorado, Surgery Scheduling Coordinator

## 2019-01-16 ENCOUNTER — OFFICE VISIT (OUTPATIENT)
Dept: ORTHOPEDICS | Facility: CLINIC | Age: 74
End: 2019-01-16
Payer: COMMERCIAL

## 2019-01-16 VITALS
HEART RATE: 85 BPM | DIASTOLIC BLOOD PRESSURE: 78 MMHG | RESPIRATION RATE: 16 BRPM | WEIGHT: 146 LBS | OXYGEN SATURATION: 97 % | SYSTOLIC BLOOD PRESSURE: 128 MMHG | BODY MASS INDEX: 25.06 KG/M2

## 2019-01-16 DIAGNOSIS — M22.42 CHONDROMALACIA OF PATELLOFEMORAL JOINT, LEFT: ICD-10-CM

## 2019-01-16 DIAGNOSIS — S83.232D COMPLEX TEAR OF MEDIAL MENISCUS OF LEFT KNEE, SUBSEQUENT ENCOUNTER: Primary | ICD-10-CM

## 2019-01-16 DIAGNOSIS — M70.52 PES ANSERINUS BURSITIS OF LEFT KNEE: ICD-10-CM

## 2019-01-16 PROCEDURE — 20610 DRAIN/INJ JOINT/BURSA W/O US: CPT | Mod: 59 | Performed by: ORTHOPAEDIC SURGERY

## 2019-01-16 PROCEDURE — 99213 OFFICE O/P EST LOW 20 MIN: CPT | Mod: 25 | Performed by: ORTHOPAEDIC SURGERY

## 2019-01-16 RX ORDER — LIDOCAINE HYDROCHLORIDE 10 MG/ML
3 INJECTION, SOLUTION INFILTRATION; PERINEURAL
Status: DISCONTINUED | OUTPATIENT
Start: 2019-01-16 | End: 2019-05-28

## 2019-01-16 RX ORDER — METHYLPREDNISOLONE ACETATE 80 MG/ML
80 INJECTION, SUSPENSION INTRA-ARTICULAR; INTRALESIONAL; INTRAMUSCULAR; SOFT TISSUE
Status: DISCONTINUED | OUTPATIENT
Start: 2019-01-16 | End: 2019-05-28

## 2019-01-16 RX ORDER — LIDOCAINE HYDROCHLORIDE 10 MG/ML
4 INJECTION, SOLUTION INFILTRATION; PERINEURAL
Status: DISCONTINUED | OUTPATIENT
Start: 2019-01-16 | End: 2019-05-28

## 2019-01-16 RX ORDER — KETOROLAC TROMETHAMINE 5 MG/ML
1 SOLUTION OPHTHALMIC 4 TIMES DAILY
Qty: 1 BOTTLE | Refills: 1 | Status: SHIPPED | OUTPATIENT
Start: 2019-01-16 | End: 2019-02-25

## 2019-01-16 RX ORDER — OFLOXACIN 3 MG/ML
1 SOLUTION/ DROPS OPHTHALMIC 4 TIMES DAILY
Qty: 1 BOTTLE | Refills: 1 | Status: SHIPPED | OUTPATIENT
Start: 2019-01-16 | End: 2019-05-17

## 2019-01-16 RX ORDER — PREDNISOLONE ACETATE 10 MG/ML
1 SUSPENSION/ DROPS OPHTHALMIC 4 TIMES DAILY
Qty: 1 BOTTLE | Refills: 1 | Status: SHIPPED | OUTPATIENT
Start: 2019-01-16 | End: 2019-02-25

## 2019-01-16 RX ORDER — BUPIVACAINE HYDROCHLORIDE 2.5 MG/ML
3 INJECTION, SOLUTION INFILTRATION; PERINEURAL
Status: DISCONTINUED | OUTPATIENT
Start: 2019-01-16 | End: 2019-05-28

## 2019-01-16 RX ADMIN — METHYLPREDNISOLONE ACETATE 80 MG: 80 INJECTION, SUSPENSION INTRA-ARTICULAR; INTRALESIONAL; INTRAMUSCULAR; SOFT TISSUE at 14:25

## 2019-01-16 RX ADMIN — BUPIVACAINE HYDROCHLORIDE 3 ML: 2.5 INJECTION, SOLUTION INFILTRATION; PERINEURAL at 14:25

## 2019-01-16 RX ADMIN — LIDOCAINE HYDROCHLORIDE 3 ML: 10 INJECTION, SOLUTION INFILTRATION; PERINEURAL at 14:26

## 2019-01-16 RX ADMIN — METHYLPREDNISOLONE ACETATE 80 MG: 80 INJECTION, SUSPENSION INTRA-ARTICULAR; INTRALESIONAL; INTRAMUSCULAR; SOFT TISSUE at 14:26

## 2019-01-16 RX ADMIN — LIDOCAINE HYDROCHLORIDE 4 ML: 10 INJECTION, SOLUTION INFILTRATION; PERINEURAL at 14:25

## 2019-01-16 ASSESSMENT — PAIN SCALES - GENERAL: PAINLEVEL: NO PAIN (1)

## 2019-01-16 NOTE — LETTER
"    1/16/2019         RE: Cely Ontiveros  [address]  [facility] [address]        Dear Colleague,    Thank you for referring your patient, Cely Ontiveros, to the [facility]. Please see a copy of my visit note below.    CHIEF COMPLAINT:   Chief Complaint   Patient presents with     Knee left     f/u ongoing left knee pain with no known injury.  Symptoms have improved since last office visit. Treatment fo far she has tried ice, physical therapy and a knee brace.. Wants injection today on the left knee.      HISTORY OF PRESENT ILLNESS    Cely Ontiveros is a 73 year old female seen for evaluation of ongoing left knee pain with no known recent injury. Recent pain has been present about 3 months ago, but mentions possibly \"tearing something\" 5 years ago with an injury. Has had pain off/on but worse for some reason recently. She was seen by us 12/12/2018 and referred for an MRI to further evaluation. Noted to have medial meniscus tear, patello-femoral chondromalacia as we discussed with her over the phone. Returns today overall improved some since her previous visit.  Mild pain today, rated a 1/10. She notes medial knee pain, but bilateral anterior knee pain. Pain is worsened with activities like flexion, and going up and down stairs. No pain with standing. Has had buckling episodes in the past. For treatment, she has tried ice, physical therapy and a knee brace. She'd like to try an injection today. Has been taking Aleve regularly so thinks that is making a difference now.    Of note: reports low back pain, does yoga x3 per week.    Present symptoms: mild pain.  Anterior/medial. No swelling now but states has in the past. No locking, catching. ?buckling  Pain severity: 1/10  Frequency of symptoms: frequently  Exacerbating Factors: weight bearing, stairs  Relieving Factors: rest  Night Pain: No  Pain while at rest: No   Numbness or tingling: No   Patient has tried:     NSAIDS: yes, " ibuprofen and now Aleve regularly     Physical Therapy: Yes      Activity modification: Yes      Bracing: No      Injections: No     Ice: Yes     Assistive device:  No     Other: none    Orthopaedic PMH: history of adhesive capsulitis in the past, had a reaction to cortisone in the past (couldnt sleep, felt anxious).     Other PMH:  has a past medical history of Actinic keratosis, Allergic rhinitis, Cataract, Degenerative joint disease, Depression with anxiety, Herpes simplex, Hypoglycemia, and Impingement syndrome, shoulder. She also has no past medical history of Acne vulgaris, Allergies, Basal cell carcinoma, Eczema, Heart valve disorder, Malignant melanoma nos, Pacemaker, Photosensitive contact dermatitis, Psoriasis, Skin cancer, Squamous cell carcinoma, Unspecified asthma(493.90), or Urticaria.  Patient Active Problem List    Diagnosis Date Noted     Meibomian gland dysfunction 12/03/2018     Priority: Medium     Acute pain of left knee 11/14/2018     Priority: Medium     Somatic dysfunction of pelvis region 06/04/2018     Priority: Medium     Pelvic pain in female 06/04/2018     Priority: Medium     Urinary urgency 06/04/2018     Priority: Medium     Urinary frequency 06/04/2018     Priority: Medium     Urgency incontinence 06/04/2018     Priority: Medium     H/O hypoglycemia 03/08/2017     Priority: Medium     Chronic otitis externa of left ear, unspecified type 03/08/2017     Priority: Medium     Abdominal bloating 03/08/2017     Priority: Medium     Chronic rhinitis 03/08/2017     Priority: Medium     Dysthymic disorder 03/08/2017     Priority: Medium     Plugged tear duct, od > os 10/12/2014     Priority: Medium     Epiphora 10/12/2014     Priority: Medium     Hypermetropia 10/10/2013     Priority: Medium     Astigmatism with presbyopia 10/10/2013     Priority: Medium     Blepharitis of both eyes 10/10/2013     Priority: Medium     Cataracts, bilateral 10/10/2013     Priority: Medium     Seasonal allergic  "rhinitis 08/16/2013     Priority: Medium     Gross hematuria 01/02/2013     Priority: Medium     Right kidney stone 01/02/2013     Priority: Medium     Posterior vitreous detachment of both eyes 10/09/2012     Priority: Medium     Advance care planning 05/20/2011     Priority: Medium     Advance Care Planning 01/22/2015: ACP Review and Resources Provided:  Reviewed chart for advance care plan.  Cely Ontiveros has no plan or code status on file. Mailed available resources and provided with information. Confirmed code status reflects current choices pending further ACP discussions.  Confirmed/documented designated decision maker(s). See permanent comments section of demographics in clinical tab. Added by Madonna Marques on 1/22/2015  Advance Care Planning 05/20/2011: Patient does not have an Advance/Health Care Directive (HCD), given \"What is Advance Care Planning?\" flyer. Mailed to patient.Jacqui Figueredoers.May 20, 2011       Degenerative joint disease      Priority: Medium     CARDIOVASCULAR SCREENING; LDL GOAL LESS THAN 160 10/31/2010     Priority: Medium     Herpes simplex      Priority: Medium     Recurs left buttock  Zostavax 2/10  IMO update changed this record. Please review for accuracy       Allergic rhinitis      Priority: Medium       Surgical Hx:  has a past surgical history that includes ENLARGE BREAST WITH IMPLANT; REMOVAL OF BREAST IMPLANT; LAP,FULGURATE/EXCISE LESIONS; hysterectomy, pap no longer indicated (1998); sinus surgery; and Combined Cystoscopy, Ureteroscopy, Laser Holmium Lithotripsy Ureter(S) (Right, 8/23/2018).    Medications:   Current Outpatient Medications:      citalopram (CELEXA) 20 MG tablet, Take 1 tablet (20 mg) by mouth daily, Disp: 90 tablet, Rfl: 3     clotrimazole-betamethasone (LOTRISONE) cream, Apply topically 2 times daily, Disp: 15 g, Rfl: 1     loratadine-pseudoePHEDrine (EQL ALLERGY/CONGESTION RELIEF)  MG per 24 hr tablet, Take 1 tablet by mouth daily, Disp: 90 tablet, " Rfl: 3     order for DME, Equipment being ordered: left knee sleeve, Disp: 1 each, Rfl: 0     valACYclovir (VALTREX) 500 MG tablet, Take 1 tablet (500 mg) by mouth daily, Disp: 90 tablet, Rfl: 3    Allergies:   Allergies   Allergen Reactions     Sulfa Drugs Swelling     Cats Swelling     Lips swollen     Wool Fiber        Social Hx: retired .   reports that  has never smoked. she has never used smokeless tobacco. She reports that she does not drink alcohol or use drugs.    Family Hx: family history includes Arthritis in her mother; Cancer in her father and mother; Cancer - colorectal in her father; Cardiovascular in her mother; Circulatory in her mother; Glaucoma in her mother; Heart Disease in her mother; Hypertension in her mother; Lipids in her mother; Macular Degeneration in her father; Obesity in her mother; Osteoporosis in her mother.    REVIEW OF SYSTEMS:   CONSTITUTIONAL:NEGATIVE for fever, chills, change in weight  INTEGUMENTARY/SKIN: NEGATIVE for worrisome rashes, moles or lesions  MUSCULOSKELETAL:See HPI above  NEURO: NEGATIVE for weakness, dizziness or paresthesias      PHYSICAL EXAM:  /78   Pulse 85   Resp 16   Wt 66.2 kg (146 lb)   SpO2 97%   BMI 25.06 kg/m      GENERAL APPEARANCE: healthy, alert, no distress  SKIN: no suspicious lesions or rashes  NEURO: Normal strength and tone, mentation intact and speech normal  PSYCH:  mentation appears normal and affect normal, not anxious  RESPIRATORY: No increased work of breathing.    BILATERAL LOWER EXTREMITIES:  Gait: normal  Alignment: varus  No gross deformities or masses.  No Quad atrophy, strength normal.  Intact sensation deep peroneal nerve, superficial peroneal nerve, med/lat tibial nerve, sural nerve, saphenous nerve  Intact EHL, EDL, TA, FHL, GS, quadriceps hamstrings and hip flexors  Toes warm and well perfused, brisk capillary refill. Palpable 2+ dp pulses.  Bilateral calf soft and nttp or squeeze.  Edema: none    LEFT  KNEE EXAM:    Skin: intact, no ecchymosis or erythema  Squat: 100 %, anterior knee pain.  ROM: 0 extension to 135+ flexion, anterior discomfort with knee flexion  Tight hamstrings on straight leg raise.  Effusion: none  Tender: pes anserine bursa, lateral joint line, medial joint line, medial collateral ligament    NTTP anterior or posterior knee  McMurrays: positive - medially    MCL: stable at both 0 and 30 degrees knee flexion, painful  Varus stress: stable, and non-painful at both 0 and 30 degrees knee flexion  Lachmans: neg, firm endpoint  Posterior Drawer stable  Patellofemoral joint:                Apprehension: negative              Crepitations: mild    Grind: positive     RIGHT KNEE EXAM:    Skin: intact, no ecchymosis or erythema  Squat: 100 %, anterior knee pain.     ROM: 0 extension to 135+ flexion  Tight hamstrings on straight leg raise.  Effusion: none  Tender: pes anserine bursa, lateral joint line  NTTP med joint line, anterior or posterior knee  McMurrays: negative    MCL: stable, and non-painful at both 0 and 30 degrees knee flexion  Varus stress: stable, and non-painful at both 0 and 30 degrees knee flexion  Lachmans: neg, firm endpoint  Posterior Drawer stable  Patellofemoral joint:                Apprehension: negative              Crepitations: mild   Grind: positive     X-RAY:  AP/lateral views bilateral knee from 10/25/2018 were reviewed in clinic today. On my review, no obvious fractures or dislocations. Fairly preserved joint spaces. No sunrise views available.      MRI left knee 12/31/2018:  1. Horizontal oblique tear involving the body and posterior horn of  the the left knee medial meniscus, extending to the inferior articular  surface.  2. Diffuse thinning of the articular cartilage in all 3 joint  compartments of the left knee,most notable patello-femoral joint with a focal area of  high-grade cartilage loss centered at the median ridge with adjacent subchondral edema.   3. The  anterior and posterior cruciate ligaments, medial and lateral  supporting structures, and lateral meniscus are intact.  4. Small popliteal cyst.  5. Multiloculated cystic structure adjacent to the cruciate ligaments  and posterior horn of the medial meniscus, possibly a parameniscal or  pericruciate cyst.        Impression:   73 year old female with acute on chronic left knee pain, patello-femoral chondromalacia and pes anserine bursitis, complex medial meniscus tear.     Plan:   * Reviewed imaging with patient. Also, clinical exam findings. Likely patello-femoral pain and chondromalacia as well as pes bursitis/tendonitis. This appears bilateral.  * MRI shows complex medial meniscus tear.    * discussed imaging with patient, what appears to be a complex meniscal tear on MRI, as well as some underlying arthritic changes, which is consistent with symptoms and physical examination findings. Most notable arthritis is anterior under the knee cap.    * Discussed treatment options including nonoperative treatment with continued rest, ice, elevation, activity modification, NSAIDS and Physical Therapy, bracing and potential injections versus surgical treatment with arthroscopy and meniscal repair versus debridement, possible chondral debridement. Risks and benefits of each discussed in detail.  * in the setting of underlying chondrosis, predictability of arthroscopy is uncertain, unless mechanical symptoms present due to the meniscus tear.    * surgical risks discussed: bleeding, infection, pain, scar, damage to adjacent structures (nerve, vessels, cartilage), stiffness, post-traumatic arthritis, failure to relieve symptoms, recurrence of symptoms, blood clots (DVT), pulmonary emolism, risks of anesthesia and death. This surgery is not intended nor expected to alleviate arthritic pain symptoms, nor will it treat or correct underlying arthritic changes. Arthritis and symptoms related to arthritis could worsen with arthroscopy  and meniscal and/or chondral debridement. Patient understands.    * understanding the risks of surgery, patient elected to proceed with injections today, both intra-articular and pes bursa.  * all questions addressed and answered prior to discharge from clinic today.  * patient to call if any questions or concerns in the meantime.    * Rest  * Activity modification - avoid activities that aggravate symptoms.  * NSAIDS - regular use for inflammation, with food, as long as no contra-indications. Please discuss with pcp if needed.  * Ice twice daily to three times daily.  * Compression wrap  * Elevation of extremity to reduce swelling  * Tylenol as needed for pain      Nic Singh M.D., M.S.  Dept. of Orthopaedic Surgery  [facility]      Large Joint Injection/Arthocentesis: R knee joint  Date/Time: 1/16/2019 2:25 PM  Performed by: Jake Johnson PA  Authorized by: Nic Snigh MD     Indications:  Pain and osteoarthritis  Needle Size:  22 G  Approach:  Anteromedial  Location:  Knee  Site:  R knee joint  Medications:  3 mL bupivacaine 0.25 %; 4 mL lidocaine 1 %; 80 mg methylPREDNISolone 80 MG/ML  Outcome:  Tolerated well, no immediate complications  Procedure discussed: discussed risks, benefits, and alternatives    Consent Given by:  Patient  Prep: patient was prepped and draped in usual sterile fashion    Large Joint Injection/Arthocentesis: L anserine bursa  Date/Time: 1/16/2019 2:26 PM  Performed by: Jake Johnson PA  Authorized by: Nic Singh MD     Indications:  Pain  Indications comment:  Pes anserine bursa   Needle Size:  25 G  Guidance: landmark guided    Approach:  Medial  Location:  Knee  Site:  L anserine bursa  Medications:  3 mL lidocaine 1 %; 80 mg methylPREDNISolone 80 MG/ML  Outcome:  Tolerated well, no immediate complications  Procedure discussed: discussed risks, benefits, and alternatives    Consent Given by:  Patient  Prep: patient was prepped and draped in  usual sterile fashion              Again, thank you for allowing me to participate in the care of your patient.        Sincerely,        Nic Singh MD

## 2019-01-16 NOTE — PROGRESS NOTES
"CHIEF COMPLAINT:   Chief Complaint   Patient presents with     Knee left     f/u ongoing left knee pain with no known injury.  Symptoms have improved since last office visit. Treatment fo far she has tried ice, physical therapy and a knee brace.. Wants injection today on the left knee.      HISTORY OF PRESENT ILLNESS    Cely Ontiveros is a 73 year old female seen for evaluation of ongoing left knee pain with no known recent injury. Recent pain has been present about 3 months ago, but mentions possibly \"tearing something\" 5 years ago with an injury. Has had pain off/on but worse for some reason recently. She was seen by us 12/12/2018 and referred for an MRI to further evaluation. Noted to have medial meniscus tear, patello-femoral chondromalacia as we discussed with her over the phone. Returns today overall improved some since her previous visit.  Mild pain today, rated a 1/10. She notes medial knee pain, but bilateral anterior knee pain. Pain is worsened with activities like flexion, and going up and down stairs. No pain with standing. Has had buckling episodes in the past. For treatment, she has tried ice, physical therapy and a knee brace. She'd like to try an injection today. Has been taking Aleve regularly so thinks that is making a difference now.    Of note: reports low back pain, does yoga x3 per week.    Present symptoms: mild pain.  Anterior/medial. No swelling now but states has in the past. No locking, catching. ?buckling  Pain severity: 1/10  Frequency of symptoms: frequently  Exacerbating Factors: weight bearing, stairs  Relieving Factors: rest  Night Pain: No  Pain while at rest: No   Numbness or tingling: No   Patient has tried:     NSAIDS: yes, ibuprofen and now Aleve regularly     Physical Therapy: Yes      Activity modification: Yes      Bracing: No      Injections: No     Ice: Yes     Assistive device:  No     Other: none    Orthopaedic PMH: history of adhesive capsulitis in the past, had a " reaction to cortisone in the past (couldnt sleep, felt anxious).     Other PMH:  has a past medical history of Actinic keratosis, Allergic rhinitis, Cataract, Degenerative joint disease, Depression with anxiety, Herpes simplex, Hypoglycemia, and Impingement syndrome, shoulder. She also has no past medical history of Acne vulgaris, Allergies, Basal cell carcinoma, Eczema, Heart valve disorder, Malignant melanoma nos, Pacemaker, Photosensitive contact dermatitis, Psoriasis, Skin cancer, Squamous cell carcinoma, Unspecified asthma(493.90), or Urticaria.  Patient Active Problem List    Diagnosis Date Noted     Meibomian gland dysfunction 12/03/2018     Priority: Medium     Acute pain of left knee 11/14/2018     Priority: Medium     Somatic dysfunction of pelvis region 06/04/2018     Priority: Medium     Pelvic pain in female 06/04/2018     Priority: Medium     Urinary urgency 06/04/2018     Priority: Medium     Urinary frequency 06/04/2018     Priority: Medium     Urgency incontinence 06/04/2018     Priority: Medium     H/O hypoglycemia 03/08/2017     Priority: Medium     Chronic otitis externa of left ear, unspecified type 03/08/2017     Priority: Medium     Abdominal bloating 03/08/2017     Priority: Medium     Chronic rhinitis 03/08/2017     Priority: Medium     Dysthymic disorder 03/08/2017     Priority: Medium     Plugged tear duct, od > os 10/12/2014     Priority: Medium     Epiphora 10/12/2014     Priority: Medium     Hypermetropia 10/10/2013     Priority: Medium     Astigmatism with presbyopia 10/10/2013     Priority: Medium     Blepharitis of both eyes 10/10/2013     Priority: Medium     Cataracts, bilateral 10/10/2013     Priority: Medium     Seasonal allergic rhinitis 08/16/2013     Priority: Medium     Gross hematuria 01/02/2013     Priority: Medium     Right kidney stone 01/02/2013     Priority: Medium     Posterior vitreous detachment of both eyes 10/09/2012     Priority: Medium     Advance care planning  "05/20/2011     Priority: Medium     Advance Care Planning 01/22/2015: ACP Review and Resources Provided:  Reviewed chart for advance care plan.  Cely Ontiveros has no plan or code status on file. Mailed available resources and provided with information. Confirmed code status reflects current choices pending further ACP discussions.  Confirmed/documented designated decision maker(s). See permanent comments section of demographics in clinical tab. Added by Madonna Marques on 1/22/2015  Advance Care Planning 05/20/2011: Patient does not have an Advance/Health Care Directive (HCD), given \"What is Advance Care Planning?\" flyer. Mailed to patient.Jacqui Gilmer.May 20, 2011       Degenerative joint disease      Priority: Medium     CARDIOVASCULAR SCREENING; LDL GOAL LESS THAN 160 10/31/2010     Priority: Medium     Herpes simplex      Priority: Medium     Recurs left buttock  Zostavax 2/10  IMO update changed this record. Please review for accuracy       Allergic rhinitis      Priority: Medium       Surgical Hx:  has a past surgical history that includes ENLARGE BREAST WITH IMPLANT; REMOVAL OF BREAST IMPLANT; LAP,FULGURATE/EXCISE LESIONS; hysterectomy, pap no longer indicated (1998); sinus surgery; and Combined Cystoscopy, Ureteroscopy, Laser Holmium Lithotripsy Ureter(S) (Right, 8/23/2018).    Medications:   Current Outpatient Medications:      citalopram (CELEXA) 20 MG tablet, Take 1 tablet (20 mg) by mouth daily, Disp: 90 tablet, Rfl: 3     clotrimazole-betamethasone (LOTRISONE) cream, Apply topically 2 times daily, Disp: 15 g, Rfl: 1     loratadine-pseudoePHEDrine (EQL ALLERGY/CONGESTION RELIEF)  MG per 24 hr tablet, Take 1 tablet by mouth daily, Disp: 90 tablet, Rfl: 3     order for DME, Equipment being ordered: left knee sleeve, Disp: 1 each, Rfl: 0     valACYclovir (VALTREX) 500 MG tablet, Take 1 tablet (500 mg) by mouth daily, Disp: 90 tablet, Rfl: 3    Allergies:   Allergies   Allergen Reactions     Sulfa " Drugs Swelling     Cats Swelling     Lips swollen     Wool Fiber        Social Hx: retired .   reports that  has never smoked. she has never used smokeless tobacco. She reports that she does not drink alcohol or use drugs.    Family Hx: family history includes Arthritis in her mother; Cancer in her father and mother; Cancer - colorectal in her father; Cardiovascular in her mother; Circulatory in her mother; Glaucoma in her mother; Heart Disease in her mother; Hypertension in her mother; Lipids in her mother; Macular Degeneration in her father; Obesity in her mother; Osteoporosis in her mother.    REVIEW OF SYSTEMS:   CONSTITUTIONAL:NEGATIVE for fever, chills, change in weight  INTEGUMENTARY/SKIN: NEGATIVE for worrisome rashes, moles or lesions  MUSCULOSKELETAL:See HPI above  NEURO: NEGATIVE for weakness, dizziness or paresthesias      PHYSICAL EXAM:  /78   Pulse 85   Resp 16   Wt 66.2 kg (146 lb)   SpO2 97%   BMI 25.06 kg/m     GENERAL APPEARANCE: healthy, alert, no distress  SKIN: no suspicious lesions or rashes  NEURO: Normal strength and tone, mentation intact and speech normal  PSYCH:  mentation appears normal and affect normal, not anxious  RESPIRATORY: No increased work of breathing.    BILATERAL LOWER EXTREMITIES:  Gait: normal  Alignment: varus  No gross deformities or masses.  No Quad atrophy, strength normal.  Intact sensation deep peroneal nerve, superficial peroneal nerve, med/lat tibial nerve, sural nerve, saphenous nerve  Intact EHL, EDL, TA, FHL, GS, quadriceps hamstrings and hip flexors  Toes warm and well perfused, brisk capillary refill. Palpable 2+ dp pulses.  Bilateral calf soft and nttp or squeeze.  Edema: none    LEFT KNEE EXAM:    Skin: intact, no ecchymosis or erythema  Squat: 100 %, anterior knee pain.  ROM: 0 extension to 135+ flexion, anterior discomfort with knee flexion  Tight hamstrings on straight leg raise.  Effusion: none  Tender: pes anserine bursa,  lateral joint line, medial joint line, medial collateral ligament    NTTP anterior or posterior knee  McMurrays: positive - medially    MCL: stable at both 0 and 30 degrees knee flexion, painful  Varus stress: stable, and non-painful at both 0 and 30 degrees knee flexion  Lachmans: neg, firm endpoint  Posterior Drawer stable  Patellofemoral joint:                Apprehension: negative              Crepitations: mild    Grind: positive     RIGHT KNEE EXAM:    Skin: intact, no ecchymosis or erythema  Squat: 100 %, anterior knee pain.     ROM: 0 extension to 135+ flexion  Tight hamstrings on straight leg raise.  Effusion: none  Tender: pes anserine bursa, lateral joint line  NTTP med joint line, anterior or posterior knee  McMurrays: negative    MCL: stable, and non-painful at both 0 and 30 degrees knee flexion  Varus stress: stable, and non-painful at both 0 and 30 degrees knee flexion  Lachmans: neg, firm endpoint  Posterior Drawer stable  Patellofemoral joint:                Apprehension: negative              Crepitations: mild   Grind: positive     X-RAY:  AP/lateral views bilateral knee from 10/25/2018 were reviewed in clinic today. On my review, no obvious fractures or dislocations. Fairly preserved joint spaces. No sunrise views available.      MRI left knee 12/31/2018:  1. Horizontal oblique tear involving the body and posterior horn of  the the left knee medial meniscus, extending to the inferior articular  surface.  2. Diffuse thinning of the articular cartilage in all 3 joint  compartments of the left knee,most notable patello-femoral joint with a focal area of  high-grade cartilage loss centered at the median ridge with adjacent subchondral edema.   3. The anterior and posterior cruciate ligaments, medial and lateral  supporting structures, and lateral meniscus are intact.  4. Small popliteal cyst.  5. Multiloculated cystic structure adjacent to the cruciate ligaments  and posterior horn of the medial  meniscus, possibly a parameniscal or  pericruciate cyst.        Impression:   73 year old female with acute on chronic left knee pain, patello-femoral chondromalacia and pes anserine bursitis, complex medial meniscus tear.     Plan:   * Reviewed imaging with patient. Also, clinical exam findings. Likely patello-femoral pain and chondromalacia as well as pes bursitis/tendonitis. This appears bilateral.  * MRI shows complex medial meniscus tear.    * discussed imaging with patient, what appears to be a complex meniscal tear on MRI, as well as some underlying arthritic changes, which is consistent with symptoms and physical examination findings. Most notable arthritis is anterior under the knee cap.    * Discussed treatment options including nonoperative treatment with continued rest, ice, elevation, activity modification, NSAIDS and Physical Therapy, bracing and potential injections versus surgical treatment with arthroscopy and meniscal repair versus debridement, possible chondral debridement. Risks and benefits of each discussed in detail.  * in the setting of underlying chondrosis, predictability of arthroscopy is uncertain, unless mechanical symptoms present due to the meniscus tear.    * surgical risks discussed: bleeding, infection, pain, scar, damage to adjacent structures (nerve, vessels, cartilage), stiffness, post-traumatic arthritis, failure to relieve symptoms, recurrence of symptoms, blood clots (DVT), pulmonary emolism, risks of anesthesia and death. This surgery is not intended nor expected to alleviate arthritic pain symptoms, nor will it treat or correct underlying arthritic changes. Arthritis and symptoms related to arthritis could worsen with arthroscopy and meniscal and/or chondral debridement. Patient understands.    * understanding the risks of surgery, patient elected to proceed with injections today, both intra-articular and pes bursa.  * all questions addressed and answered prior to discharge  from clinic today.  * patient to call if any questions or concerns in the meantime.    * Rest  * Activity modification - avoid activities that aggravate symptoms.  * NSAIDS - regular use for inflammation, with food, as long as no contra-indications. Please discuss with pcp if needed.  * Ice twice daily to three times daily.  * Compression wrap  * Elevation of extremity to reduce swelling  * Tylenol as needed for pain      Nic Singh M.D., M.S.  Dept. of Orthopaedic Surgery  [facility]      Large Joint Injection/Arthocentesis: R knee joint  Date/Time: 1/16/2019 2:25 PM  Performed by: Jake Johnson PA  Authorized by: Nic Singh MD     Indications:  Pain and osteoarthritis  Needle Size:  22 G  Approach:  Anteromedial  Location:  Knee  Site:  R knee joint  Medications:  3 mL bupivacaine 0.25 %; 4 mL lidocaine 1 %; 80 mg methylPREDNISolone 80 MG/ML  Outcome:  Tolerated well, no immediate complications  Procedure discussed: discussed risks, benefits, and alternatives    Consent Given by:  Patient  Prep: patient was prepped and draped in usual sterile fashion    Large Joint Injection/Arthocentesis: L anserine bursa  Date/Time: 1/16/2019 2:26 PM  Performed by: Jake Johnson PA  Authorized by: Nic Singh MD     Indications:  Pain  Indications comment:  Pes anserine bursa   Needle Size:  25 G  Guidance: landmark guided    Approach:  Medial  Location:  Knee  Site:  L anserine bursa  Medications:  3 mL lidocaine 1 %; 80 mg methylPREDNISolone 80 MG/ML  Outcome:  Tolerated well, no immediate complications  Procedure discussed: discussed risks, benefits, and alternatives    Consent Given by:  Patient  Prep: patient was prepped and draped in usual sterile fashion

## 2019-01-16 NOTE — TELEPHONE ENCOUNTER
IOL calcs and orders emailed to [facility]. Medication orders sent to [facility] [place]. Tish Goldstein RN

## 2019-01-21 NOTE — TELEPHONE ENCOUNTER
Phone call to pt, left message reminding pt to start eye drops in the left eye tomorrow, in preparation for cataract surgery on Thursday.  Asked pt to call back with questions or concerns.  Tish Goldstein RN

## 2019-01-22 ENCOUNTER — OFFICE VISIT (OUTPATIENT)
Dept: FAMILY MEDICINE | Facility: CLINIC | Age: 74
End: 2019-01-22
Payer: COMMERCIAL

## 2019-01-22 ENCOUNTER — TELEPHONE (OUTPATIENT)
Dept: FAMILY MEDICINE | Facility: CLINIC | Age: 74
End: 2019-01-22

## 2019-01-22 VITALS
RESPIRATION RATE: 14 BRPM | OXYGEN SATURATION: 94 % | HEART RATE: 79 BPM | TEMPERATURE: 97.9 F | SYSTOLIC BLOOD PRESSURE: 127 MMHG | BODY MASS INDEX: 24.59 KG/M2 | WEIGHT: 144 LBS | HEIGHT: 64 IN | DIASTOLIC BLOOD PRESSURE: 70 MMHG

## 2019-01-22 DIAGNOSIS — H25.13 AGE-RELATED NUCLEAR CATARACT OF BOTH EYES: ICD-10-CM

## 2019-01-22 DIAGNOSIS — Z01.818 PREOP GENERAL PHYSICAL EXAM: Primary | ICD-10-CM

## 2019-01-22 DIAGNOSIS — Z12.11 SPECIAL SCREENING FOR MALIGNANT NEOPLASMS, COLON: ICD-10-CM

## 2019-01-22 DIAGNOSIS — Z78.0 ASYMPTOMATIC MENOPAUSAL STATE: ICD-10-CM

## 2019-01-22 PROBLEM — S83.232D COMPLEX TEAR OF MEDIAL MENISCUS OF LEFT KNEE AS CURRENT INJURY, SUBSEQUENT ENCOUNTER: Status: ACTIVE | Noted: 2019-01-22

## 2019-01-22 PROBLEM — M22.42 CHONDROMALACIA OF PATELLA, LEFT: Status: ACTIVE | Noted: 2019-01-22

## 2019-01-22 PROCEDURE — 99214 OFFICE O/P EST MOD 30 MIN: CPT | Performed by: NURSE PRACTITIONER

## 2019-01-22 ASSESSMENT — MIFFLIN-ST. JEOR: SCORE: 1133.18

## 2019-01-22 NOTE — PATIENT INSTRUCTIONS
Patient Instructions     Please avoid fish oil, aspirin,  ibuprofen, motrin, advil, aleve, or naproxen 7 days prior to surgery.      Before Your Surgery      Call your surgeon if there is any change in your health. This includes signs of a cold or flu (such as a sore throat, runny nose, cough, rash or fever).    Do not smoke, drink alcohol or take over the counter medicine (unless your surgeon or primary care doctor tells you to) for the 24 hours before and after surgery.    If you take prescribed drugs: Follow your doctor s orders about which medicines to take and which to stop until after surgery.    Eating and drinking prior to surgery: follow the instructions from your surgeon    Take a shower or bath the night before surgery. Use the soap your surgeon gave you to gently clean your skin. If you do not have soap from your surgeon, use your regular soap. Do not shave or scrub the surgery site.  Wear clean pajamas and have clean sheets on your bed.

## 2019-01-22 NOTE — TELEPHONE ENCOUNTER
Faxed Pre-op notes, no labs and no Ekg to [facility], [phone], right fax confirmed at 12:17 pm today.   Leslie Louis MA/  For Teams Treva

## 2019-01-22 NOTE — TELEPHONE ENCOUNTER
preop physical complete.  No labs, imaging or EKG pending.  Patient cleared for surgery.  Please see visit note.

## 2019-01-22 NOTE — PROGRESS NOTES
[address] [address]  [phone]  Dept: [phone]    PRE-OP EVALUATION:  Today's date: 2019    Cely Ontiveros (: 1945) presents for pre-operative evaluation assessment as requested by Dr. Aguilera.  She requires evaluation and anesthesia risk assessment prior to undergoing surgery/procedure for treatment of cataract both eyes .    Proposed Surgery/ Procedure: cataract both eyes  Date of Surgery/ Procedure:  and 19  Time of Surgery/ Procedure: 8:10am and 7:00am  Hospital/Surgical Facility: [facility]   Fax number for surgical facility:   Primary Physician: Mara Varma  Type of Anesthesia Anticipated: General    Patient has a Health Care Directive or Living Will:  NO    1. NO - Do you have a history of heart attack, stroke, stent, bypass or surgery on an artery in the head, neck, heart or legs?  2. NO - Do you ever have any pain or discomfort in your chest?  3. NO - Do you have a history of  Heart Failure?  4. NO - Are you troubled by shortness of breath when: walking on the level, up a slight hill or at night?  5. NO - Do you currently have a cold, bronchitis or other respiratory infection?  6. NO - Do you have a cough, shortness of breath or wheezing?  7. NO - Do you sometimes get pains in the calves of your legs when you walk?  8. NO - Do you or anyone in your family have previous history of blood clots?  9. NO - Do you or does anyone in your family have a serious bleeding problem such as prolonged bleeding following surgeries or cuts?  10. NO - Have you ever had problems with anemia or been told to take iron pills?  11. NO - Have you had any abnormal blood loss such as black, tarry or bloody stools, or abnormal vaginal bleeding?  12. NO - Have you ever had a blood transfusion?  13. NO - Have you or any of your relatives ever had problems with anesthesia?  14. NO - Do you have sleep apnea, excessive snoring  or daytime drowsiness?  15. NO - Do you have any prosthetic heart valves?  16. NO - Do you have prosthetic joints?  17. NO - Is there any chance that you may be pregnant?      HPI:     HPI related to upcoming procedure: Cataracts bilaterally.  Patient wishes to proceed with surgery as mentioned above.       See problem list for active medical problems.  Problems all longstanding and stable, except as noted/documented.  See ROS for pertinent symptoms related to these conditions.                                                                                                                                                          .    MEDICAL HISTORY:     Patient Active Problem List    Diagnosis Date Noted     Chondromalacia of patella, left 01/22/2019     Priority: Medium     Complex tear of medial meniscus of left knee as current injury, subsequent encounter 01/22/2019     Priority: Medium     Meibomian gland dysfunction 12/03/2018     Priority: Medium     Acute pain of left knee 11/14/2018     Priority: Medium     Somatic dysfunction of pelvis region 06/04/2018     Priority: Medium     Pelvic pain in female 06/04/2018     Priority: Medium     Urinary urgency 06/04/2018     Priority: Medium     Urinary frequency 06/04/2018     Priority: Medium     Urgency incontinence 06/04/2018     Priority: Medium     H/O hypoglycemia 03/08/2017     Priority: Medium     Chronic otitis externa of left ear, unspecified type 03/08/2017     Priority: Medium     Abdominal bloating 03/08/2017     Priority: Medium     Chronic rhinitis 03/08/2017     Priority: Medium     Dysthymic disorder 03/08/2017     Priority: Medium     Plugged tear duct, od > os 10/12/2014     Priority: Medium     Epiphora 10/12/2014     Priority: Medium     Hypermetropia 10/10/2013     Priority: Medium     Astigmatism with presbyopia 10/10/2013     Priority: Medium     Blepharitis of both eyes 10/10/2013     Priority: Medium     Cataracts, bilateral 10/10/2013      "Priority: Medium     Seasonal allergic rhinitis 08/16/2013     Priority: Medium     Gross hematuria 01/02/2013     Priority: Medium     Right kidney stone 01/02/2013     Priority: Medium     Posterior vitreous detachment of both eyes 10/09/2012     Priority: Medium     Advance care planning 05/20/2011     Priority: Medium     Advance Care Planning 01/22/2015: ACP Review and Resources Provided:  Reviewed chart for advance care plan.  Cely Ontiveros has no plan or code status on file. Mailed available resources and provided with information. Confirmed code status reflects current choices pending further ACP discussions.  Confirmed/documented designated decision maker(s). See permanent comments section of demographics in clinical tab. Added by Madonna Marques on 1/22/2015  Advance Care Planning 05/20/2011: Patient does not have an Advance/Health Care Directive (HCD), given \"What is Advance Care Planning?\" flyer. Mailed to patient.Jacqui Figueredoers.May 20, 2011       Degenerative joint disease      Priority: Medium     CARDIOVASCULAR SCREENING; LDL GOAL LESS THAN 160 10/31/2010     Priority: Medium     Herpes simplex      Priority: Medium     Recurs left buttock  Zostavax 2/10  IMO update changed this record. Please review for accuracy       Allergic rhinitis      Priority: Medium      Past Medical History:   Diagnosis Date     Actinic keratosis      Allergic rhinitis      Cataract      Degenerative joint disease     cervical disease     Depression with anxiety      Herpes simplex      Hypoglycemia     eats small meals and is fine     Impingement syndrome, shoulder      Past Surgical History:   Procedure Laterality Date     COMBINED CYSTOSCOPY, URETEROSCOPY, LASER HOLMIUM LITHOTRIPSY URETER(S) Right 8/23/2018    Procedure: COMBINED CYSTOSCOPY, URETEROSCOPY, LASER HOLMIUM LITHOTRIPSY URETER(S);   Cystoscopy,Right ureteroscopy with laser lithotripsy and stent placement;  Surgeon: Pino Hawk MD;  Location:  OR      " ENLARGE BREAST WITH IMPLANT       HC LAP,FULGURATE/EXCISE LESIONS       HC REMOVAL OF BREAST IMPLANT       HYSTERECTOMY, PAP NO LONGER INDICATED  1998    ovaries and uterus out for benign reasons(fibroids and ovarian cyst)     SINUS SURGERY       Current Outpatient Medications   Medication Sig Dispense Refill     citalopram (CELEXA) 20 MG tablet Take 1 tablet (20 mg) by mouth daily 90 tablet 3     clotrimazole-betamethasone (LOTRISONE) cream Apply topically 2 times daily 15 g 1     ketorolac (ACULAR) 0.5 % ophthalmic solution Apply 1 drop to eye 4 times daily for 21 days Start 2 days prior to surgery in operative eye. 1 Bottle 1     loratadine-pseudoePHEDrine (EQL ALLERGY/CONGESTION RELIEF)  MG per 24 hr tablet Take 1 tablet by mouth daily 90 tablet 3     ofloxacin (OCUFLOX) 0.3 % ophthalmic solution Apply 1 drop to eye 4 times daily for 10 days Start 2 days prior to surgery in operative eye 1 Bottle 1     order for DME Equipment being ordered: left knee sleeve 1 each 0     prednisoLONE acetate (PRED FORTE) 1 % ophthalmic suspension Apply 1 drop to eye 4 times daily for 21 days Start 2 days prior to surgery in operative eye. 1 Bottle 1     valACYclovir (VALTREX) 500 MG tablet Take 1 tablet (500 mg) by mouth daily 90 tablet 3     OTC products: no use of herbal medications or other supplements; patient has taken NSAIDS in last 24 hours    Allergies   Allergen Reactions     Sulfa Drugs Swelling     Cats Swelling     Lips swollen     Wool Fiber       Latex Allergy: NO    Social History     Tobacco Use     Smoking status: Never Smoker     Smokeless tobacco: Never Used   Substance Use Topics     Alcohol use: No     History   Drug Use No       REVIEW OF SYSTEMS:   CONSTITUTIONAL: NEGATIVE for fever, chills, change in weight  INTEGUMENTARY/SKIN: NEGATIVE for worrisome rashes, moles or lesions  EYES: NEGATIVE for vision changes or irritation  ENT/MOUTH: NEGATIVE for ear, mouth and throat problems  RESP: NEGATIVE for  "significant cough or SOB  BREAST: NEGATIVE for masses, tenderness or discharge  CV: NEGATIVE for chest pain, palpitations or peripheral edema  GI: NEGATIVE for nausea, abdominal pain, heartburn, or change in bowel habits  : NEGATIVE for frequency, dysuria, or hematuria  MUSCULOSKELETAL: NEGATIVE for significant arthralgias or myalgia  NEURO: NEGATIVE for weakness, dizziness or paresthesias  ENDOCRINE: NEGATIVE for temperature intolerance, skin/hair changes  HEME: NEGATIVE for bleeding problems  PSYCHIATRIC: NEGATIVE for changes in mood or affect    EXAM:   /70   Pulse 79   Temp 97.9  F (36.6  C)   Resp 14   Ht 1.626 m (5' 4\")   Wt 65.3 kg (144 lb)   SpO2 94%   BMI 24.72 kg/m      GENERAL APPEARANCE: healthy, alert and no distress     EYES: EOMI, PERRL     HENT: ear canals and TM's normal and nose and mouth without ulcers or lesions     NECK: no adenopathy, no asymmetry, masses, or scars and thyroid normal to palpation     RESP: lungs clear to auscultation - no rales, rhonchi or wheezes     CV: regular rates and rhythm, normal S1 S2, no S3 or S4 and no murmur, click or rub     ABDOMEN:  soft, nontender, no HSM or masses and bowel sounds normal     MS: extremities normal- no gross deformities noted, no evidence of inflammation in joints, FROM in all extremities.     SKIN: no suspicious lesions or rashes     NEURO: Normal strength and tone, sensory exam grossly normal, mentation intact and speech normal     PSYCH: mentation appears normal. and affect normal/bright     LYMPHATICS: No cervical adenopathy    DIAGNOSTICS:   No labs or EKG required for low risk surgery (cataract, skin procedure, breast biopsy, etc)    Recent Labs   Lab Test 03/08/17  0856 06/04/13  0821 06/04/13  0820  12/27/12  1452   HGB  --   --  12.8  --  13.4   PLT  --   --   --   --  274    141  --   --   --    POTASSIUM 4.2 4.2  --   --   --    CR 0.72 0.64  --    < >  --     < > = values in this interval not displayed.    "     IMPRESSION:   Reason for surgery/procedure: cataracts bilaterally    The proposed surgical procedure is considered LOW risk.    REVISED CARDIAC RISK INDEX  The patient has the following serious cardiovascular risks for perioperative complications such as (MI, PE, VFib and 3  AV Block):  No serious cardiac risks  INTERPRETATION: 0 risks: Class I (very low risk - 0.4% complication rate)    The patient has the following additional risks for perioperative complications:  No identified additional risks      ICD-10-CM    1. Preop general physical exam Z01.818 EKG 12-lead complete w/read - Clinics   2. Age-related nuclear cataract of both eyes H25.13    3. Special screening for malignant neoplasms, colon Z12.11 Fecal colorectal cancer screen (FIT)   4. Asymptomatic menopausal state Z78.0 DX Hip/Pelvis/Spine     In addition to the preop physical, we addressed colon cancer screening and osteoporosis screening today.    RECOMMENDATIONS:     --Patient is on no chronic medications    APPROVAL GIVEN to proceed with proposed procedure, without further diagnostic evaluation       Signed Electronically by: AUDREY Mcneil CNP    Copy of this evaluation report is provided to requesting physician.    [facility] Preop Guidelines    Revised Cardiac Risk Index

## 2019-01-23 ENCOUNTER — ANESTHESIA EVENT (OUTPATIENT)
Dept: SURGERY | Facility: AMBULATORY SURGERY CENTER | Age: 74
End: 2019-01-23

## 2019-01-24 ENCOUNTER — ANESTHESIA (OUTPATIENT)
Dept: SURGERY | Facility: AMBULATORY SURGERY CENTER | Age: 74
End: 2019-01-24
Payer: COMMERCIAL

## 2019-01-24 ENCOUNTER — SURGERY (OUTPATIENT)
Age: 74
End: 2019-01-24
Payer: COMMERCIAL

## 2019-01-24 ENCOUNTER — HOSPITAL ENCOUNTER (OUTPATIENT)
Facility: AMBULATORY SURGERY CENTER | Age: 74
Discharge: HOME | End: 2019-01-24
Attending: OPHTHALMOLOGY | Admitting: OPHTHALMOLOGY
Payer: COMMERCIAL

## 2019-01-24 VITALS
RESPIRATION RATE: 16 BRPM | SYSTOLIC BLOOD PRESSURE: 140 MMHG | OXYGEN SATURATION: 98 % | TEMPERATURE: 97.3 F | DIASTOLIC BLOOD PRESSURE: 51 MMHG

## 2019-01-24 PROCEDURE — G8918 PT W/O PREOP ORDER IV AB PRO: HCPCS

## 2019-01-24 PROCEDURE — G8907 PT DOC NO EVENTS ON DISCHARG: HCPCS

## 2019-01-24 PROCEDURE — 66984 XCAPSL CTRC RMVL W/O ECP: CPT | Mod: LT

## 2019-01-24 PROCEDURE — 66984 XCAPSL CTRC RMVL W/O ECP: CPT | Mod: LT | Performed by: OPHTHALMOLOGY

## 2019-01-24 DEVICE — EYE IMP IOL AMO PCL TECNIS ZCB00 23.5: Type: IMPLANTABLE DEVICE | Site: EYE | Status: FUNCTIONAL

## 2019-01-24 RX ORDER — DEXAMETHASONE SODIUM PHOSPHATE 4 MG/ML
4 INJECTION, SOLUTION INTRA-ARTICULAR; INTRALESIONAL; INTRAMUSCULAR; INTRAVENOUS; SOFT TISSUE EVERY 10 MIN PRN
Status: DISCONTINUED | OUTPATIENT
Start: 2019-01-24 | End: 2019-01-25 | Stop reason: HOSPADM

## 2019-01-24 RX ORDER — FENTANYL CITRATE 50 UG/ML
25-50 INJECTION, SOLUTION INTRAMUSCULAR; INTRAVENOUS
Status: DISCONTINUED | OUTPATIENT
Start: 2019-01-24 | End: 2019-01-25 | Stop reason: HOSPADM

## 2019-01-24 RX ORDER — MOXIFLOXACIN 5 MG/ML
1 SOLUTION/ DROPS OPHTHALMIC
Status: COMPLETED | OUTPATIENT
Start: 2019-01-24 | End: 2019-01-24

## 2019-01-24 RX ORDER — MOXIFLOXACIN 5 MG/ML
SOLUTION/ DROPS OPHTHALMIC PRN
Status: DISCONTINUED | OUTPATIENT
Start: 2019-01-24 | End: 2019-01-24 | Stop reason: HOSPADM

## 2019-01-24 RX ORDER — SODIUM CHLORIDE, SODIUM LACTATE, POTASSIUM CHLORIDE, CALCIUM CHLORIDE 600; 310; 30; 20 MG/100ML; MG/100ML; MG/100ML; MG/100ML
500 INJECTION, SOLUTION INTRAVENOUS CONTINUOUS
Status: DISCONTINUED | OUTPATIENT
Start: 2019-01-24 | End: 2019-01-25 | Stop reason: HOSPADM

## 2019-01-24 RX ORDER — ALBUTEROL SULFATE 0.83 MG/ML
2.5 SOLUTION RESPIRATORY (INHALATION) EVERY 4 HOURS PRN
Status: DISCONTINUED | OUTPATIENT
Start: 2019-01-24 | End: 2019-01-25 | Stop reason: HOSPADM

## 2019-01-24 RX ORDER — ACETAMINOPHEN 325 MG/1
975 TABLET ORAL ONCE
Status: COMPLETED | OUTPATIENT
Start: 2019-01-24 | End: 2019-01-24

## 2019-01-24 RX ORDER — ONDANSETRON 2 MG/ML
4 INJECTION INTRAMUSCULAR; INTRAVENOUS EVERY 30 MIN PRN
Status: DISCONTINUED | OUTPATIENT
Start: 2019-01-24 | End: 2019-01-25 | Stop reason: HOSPADM

## 2019-01-24 RX ORDER — LIDOCAINE 40 MG/G
CREAM TOPICAL
Status: DISCONTINUED | OUTPATIENT
Start: 2019-01-24 | End: 2019-01-25 | Stop reason: HOSPADM

## 2019-01-24 RX ORDER — PHENYLEPHRINE HYDROCHLORIDE 25 MG/ML
1 SOLUTION/ DROPS OPHTHALMIC
Status: COMPLETED | OUTPATIENT
Start: 2019-01-24 | End: 2019-01-24

## 2019-01-24 RX ORDER — LIDOCAINE HYDROCHLORIDE 20 MG/ML
INJECTION, SOLUTION INFILTRATION; PERINEURAL PRN
Status: DISCONTINUED | OUTPATIENT
Start: 2019-01-24 | End: 2019-01-24

## 2019-01-24 RX ORDER — ONDANSETRON 4 MG/1
4 TABLET, ORALLY DISINTEGRATING ORAL EVERY 30 MIN PRN
Status: DISCONTINUED | OUTPATIENT
Start: 2019-01-24 | End: 2019-01-25 | Stop reason: HOSPADM

## 2019-01-24 RX ORDER — SODIUM CHLORIDE, SODIUM LACTATE, POTASSIUM CHLORIDE, CALCIUM CHLORIDE 600; 310; 30; 20 MG/100ML; MG/100ML; MG/100ML; MG/100ML
INJECTION, SOLUTION INTRAVENOUS CONTINUOUS
Status: DISCONTINUED | OUTPATIENT
Start: 2019-01-24 | End: 2019-01-25 | Stop reason: HOSPADM

## 2019-01-24 RX ORDER — PROPARACAINE HYDROCHLORIDE 5 MG/ML
1 SOLUTION/ DROPS OPHTHALMIC
Status: COMPLETED | OUTPATIENT
Start: 2019-01-24 | End: 2019-01-24

## 2019-01-24 RX ORDER — HYDROMORPHONE HYDROCHLORIDE 1 MG/ML
.3-.5 INJECTION, SOLUTION INTRAMUSCULAR; INTRAVENOUS; SUBCUTANEOUS EVERY 10 MIN PRN
Status: DISCONTINUED | OUTPATIENT
Start: 2019-01-24 | End: 2019-01-25 | Stop reason: HOSPADM

## 2019-01-24 RX ORDER — MEPERIDINE HYDROCHLORIDE 25 MG/ML
12.5 INJECTION INTRAMUSCULAR; INTRAVENOUS; SUBCUTANEOUS
Status: DISCONTINUED | OUTPATIENT
Start: 2019-01-24 | End: 2019-01-25 | Stop reason: HOSPADM

## 2019-01-24 RX ORDER — CYCLOPENTOLATE HYDROCHLORIDE 10 MG/ML
1 SOLUTION/ DROPS OPHTHALMIC
Status: COMPLETED | OUTPATIENT
Start: 2019-01-24 | End: 2019-01-24

## 2019-01-24 RX ORDER — OXYCODONE HYDROCHLORIDE 5 MG/1
5-10 TABLET ORAL EVERY 4 HOURS PRN
Status: DISCONTINUED | OUTPATIENT
Start: 2019-01-24 | End: 2019-01-25 | Stop reason: HOSPADM

## 2019-01-24 RX ORDER — KETOROLAC TROMETHAMINE 5 MG/ML
1 SOLUTION OPHTHALMIC
Status: COMPLETED | OUTPATIENT
Start: 2019-01-24 | End: 2019-01-24

## 2019-01-24 RX ORDER — TETRACAINE HYDROCHLORIDE 5 MG/ML
SOLUTION OPHTHALMIC PRN
Status: DISCONTINUED | OUTPATIENT
Start: 2019-01-24 | End: 2019-01-24 | Stop reason: HOSPADM

## 2019-01-24 RX ORDER — NALOXONE HYDROCHLORIDE 0.4 MG/ML
.1-.4 INJECTION, SOLUTION INTRAMUSCULAR; INTRAVENOUS; SUBCUTANEOUS
Status: DISCONTINUED | OUTPATIENT
Start: 2019-01-24 | End: 2019-01-25 | Stop reason: HOSPADM

## 2019-01-24 RX ADMIN — TETRACAINE HYDROCHLORIDE 1 DROP: 5 SOLUTION OPHTHALMIC at 09:14

## 2019-01-24 RX ADMIN — MOXIFLOXACIN 1 DROP: 5 SOLUTION/ DROPS OPHTHALMIC at 08:30

## 2019-01-24 RX ADMIN — CYCLOPENTOLATE HYDROCHLORIDE 1 DROP: 10 SOLUTION/ DROPS OPHTHALMIC at 08:35

## 2019-01-24 RX ADMIN — CYCLOPENTOLATE HYDROCHLORIDE 1 DROP: 10 SOLUTION/ DROPS OPHTHALMIC at 08:30

## 2019-01-24 RX ADMIN — ACETAMINOPHEN 975 MG: 325 TABLET ORAL at 08:25

## 2019-01-24 RX ADMIN — LIDOCAINE HYDROCHLORIDE 60 MG: 20 INJECTION, SOLUTION INFILTRATION; PERINEURAL at 09:13

## 2019-01-24 RX ADMIN — MOXIFLOXACIN 1 DROP: 5 SOLUTION/ DROPS OPHTHALMIC at 08:25

## 2019-01-24 RX ADMIN — MOXIFLOXACIN 1 DROP: 5 SOLUTION/ DROPS OPHTHALMIC at 08:35

## 2019-01-24 RX ADMIN — Medication 250 ML: at 09:25

## 2019-01-24 RX ADMIN — KETOROLAC TROMETHAMINE 1 DROP: 5 SOLUTION OPHTHALMIC at 08:35

## 2019-01-24 RX ADMIN — KETOROLAC TROMETHAMINE 1 DROP: 5 SOLUTION OPHTHALMIC at 08:25

## 2019-01-24 RX ADMIN — KETOROLAC TROMETHAMINE 1 DROP: 5 SOLUTION OPHTHALMIC at 08:30

## 2019-01-24 RX ADMIN — PROPARACAINE HYDROCHLORIDE 1 DROP: 5 SOLUTION/ DROPS OPHTHALMIC at 08:25

## 2019-01-24 RX ADMIN — PHENYLEPHRINE HYDROCHLORIDE 1 DROP: 25 SOLUTION/ DROPS OPHTHALMIC at 08:25

## 2019-01-24 RX ADMIN — MOXIFLOXACIN 1 DROP: 5 SOLUTION/ DROPS OPHTHALMIC at 09:26

## 2019-01-24 RX ADMIN — SODIUM CHLORIDE, SODIUM LACTATE, POTASSIUM CHLORIDE, CALCIUM CHLORIDE 500 ML: 600; 310; 30; 20 INJECTION, SOLUTION INTRAVENOUS at 08:35

## 2019-01-24 RX ADMIN — PHENYLEPHRINE HYDROCHLORIDE 1 DROP: 25 SOLUTION/ DROPS OPHTHALMIC at 08:30

## 2019-01-24 RX ADMIN — PHENYLEPHRINE HYDROCHLORIDE 1 DROP: 25 SOLUTION/ DROPS OPHTHALMIC at 08:35

## 2019-01-24 RX ADMIN — SODIUM CHLORIDE, SODIUM LACTATE, POTASSIUM CHLORIDE, CALCIUM CHLORIDE: 600; 310; 30; 20 INJECTION, SOLUTION INTRAVENOUS at 09:13

## 2019-01-24 RX ADMIN — Medication 1 DROP: at 09:22

## 2019-01-24 RX ADMIN — CYCLOPENTOLATE HYDROCHLORIDE 1 DROP: 10 SOLUTION/ DROPS OPHTHALMIC at 08:25

## 2019-01-24 NOTE — OP NOTE
PATIENT NAME:  Cely Ontiveros    :  1945    PATIENT NUMBER:  [id number]    DATE OF SURGERY:  2019    SURGEON:  Wagner Aguilera M.D.    PREOPERATIVE DIAGNOSIS: Cataract left eye.    POSTOPERATIVE DIAGNOSIS:  Same    PROCEDURE PERFORMED:    1. Phacoemulsification with posterior chamber intraocular lens left eye.    ANESTHESIA:  Topical/MAC    COMPLICATIONS:  None    PROCEDURE: Following adequate preoperative dilation the patient was given topical anesthesia consisting of Proparacaine.  The patient was brought to the operative suite where the eye was prepped and draped in the usual sterile fashion.  A lid speculum was applied. A super sharp blade was used to create a paracentesis, through which 1% preservative free Lidocaine was injected.  Visoelastic was then used to inflate the anterior chamber.  A biplanar incision at the clear cornea limbus was created with a keratome.  A continuous curvilinear capsulorrhexis was started with a cystitome and completed using Utrata forceps.  The lens was hydrodissected and hydro delineated using BSS on a cannula.  The lens nucleus was removed using phacoemulsification.  Remaining cortex was removed using irrigation and aspiration.  Viscoelastic was injected to inflate the capsular bag and a 23.5 D [id number] IOL was inserted into the capsular bag without difficulty.  Residual viscoelastic and provisc material was removed with irrigation and aspiration.  BSS was used to hydrate the corneal incision and paracentesis sites which were checked and noted to be watertight.  A drop of Vigamox was applied to the eye and a clear plastic shield was placed.  The patient tolerated the procedure well and left the operative suite in stable condition.    Wagner Aguilera M.D.

## 2019-01-24 NOTE — ANESTHESIA PREPROCEDURE EVALUATION
Anesthesia Pre-Procedure Evaluation    Patient: Cely Ontiveros   MRN:     [id number] Gender:   female   Age:    73 year old :      1945        Preoperative Diagnosis: LEFT CATARACT   Procedure(s):  PHACOEMULSIFICATION WITH STANDARD INTRAOCULAR LENS IMPLANT, LEFT     Past Medical History:   Diagnosis Date     Actinic keratosis      Allergic rhinitis      Cataract      Degenerative joint disease     cervical disease     Depression with anxiety      Herpes simplex      Hypoglycemia     eats small meals and is fine     Impingement syndrome, shoulder       Past Surgical History:   Procedure Laterality Date     COMBINED CYSTOSCOPY, URETEROSCOPY, LASER HOLMIUM LITHOTRIPSY URETER(S) Right 2018    Procedure: COMBINED CYSTOSCOPY, URETEROSCOPY, LASER HOLMIUM LITHOTRIPSY URETER(S);   Cystoscopy,Right ureteroscopy with laser lithotripsy and stent placement;  Surgeon: Pino Hawk MD;  Location: [facility] OR     HC ENLARGE BREAST WITH IMPLANT       HC LAP,FULGURATE/EXCISE LESIONS       HC REMOVAL OF BREAST IMPLANT       HYSTERECTOMY, PAP NO LONGER INDICATED  1998    ovaries and uterus out for benign reasons(fibroids and ovarian cyst)     SINUS SURGERY            Anesthesia Evaluation     .             ROS/MED HX    ENT/Pulmonary:  - neg pulmonary ROS   (+)allergic rhinitis, , . .    Neurologic:  - neg neurologic ROS     Cardiovascular:  - neg cardiovascular ROS       METS/Exercise Tolerance:     Hematologic:  - neg hematologic  ROS       Musculoskeletal:  - neg musculoskeletal ROS (+) arthritis, , , -       GI/Hepatic:  - neg GI/hepatic ROS       Renal/Genitourinary:  - ROS Renal section negative       Endo:  - neg endo ROS       Psychiatric:  - neg psychiatric ROS   (+) psychiatric history depression      Infectious Disease:  - neg infectious disease ROS       Malignancy:      - no malignancy   Other:    - neg other ROS                     PHYSICAL EXAM:   Mental Status/Neuro: A/A/O   Airway: Facies:  "Feasible  Mallampati: I  Mouth/Opening: Full  TM distance: > 6 cm  Neck ROM: Full   Respiratory: Auscultation: CTAB     Resp. Rate: Normal     Resp. Effort: Normal      CV: Rhythm: Regular  Rate: Age appropriate  Heart: Normal Sounds   Comments:      Dental: Normal                  Lab Results   Component Value Date    WBC 9.3 12/27/2012    HGB 12.8 06/04/2013    HCT 41.4 12/27/2012     12/27/2012     03/08/2017    POTASSIUM 4.2 03/08/2017    CHLORIDE 107 03/08/2017    CO2 30 03/08/2017    BUN 19 03/08/2017    CR 0.72 03/08/2017    GLC 87 03/08/2017    KANIKA 8.8 03/08/2017    ALBUMIN 4.1 06/04/2013    PROTTOTAL 7.3 06/04/2013    ALT 25 06/04/2013    AST 30 06/04/2013    ALKPHOS 87 06/04/2013    BILITOTAL 0.4 06/04/2013    TSH 0.81 03/08/2017       Preop Vitals  BP Readings from Last 3 Encounters:   01/24/19 152/64   01/22/19 127/70   01/16/19 128/78    Pulse Readings from Last 3 Encounters:   01/22/19 79   01/16/19 85   12/12/18 80      Resp Readings from Last 3 Encounters:   01/24/19 16   01/22/19 14   01/16/19 16    SpO2 Readings from Last 3 Encounters:   01/24/19 96%   01/22/19 94%   01/16/19 97%      Temp Readings from Last 1 Encounters:   01/24/19 36.4  C (97.5  F) (Temporal)    Ht Readings from Last 1 Encounters:   01/22/19 1.626 m (5' 4\")      Wt Readings from Last 1 Encounters:   01/22/19 65.3 kg (144 lb)    Estimated body mass index is 24.72 kg/m  as calculated from the following:    Height as of 1/22/19: 1.626 m (5' 4\").    Weight as of 1/22/19: 65.3 kg (144 lb).     LDA:  Urethral Catheter Non-latex 16 fr (Active)   Number of days: 154       Ureteral Drain/Stent Right ureter 6 fr (Active)   Number of days: 154            Assessment:   ASA SCORE: 1    NPO Status: > 6 hours since completed Solid Foods   Documentation: H&P complete; Preop Testing complete; Consents complete   Proceeding: Proceed without further delay  Tobacco Use:  NO Active use of Tobacco/UNKNOWN Tobacco use status     Plan: "   Anes. Type:  MAC   Pre-Induction: Midazolam IV   Induction:  Not applicable   Airway: Native Airway   Access/Monitoring: PIV   Maintenance: N/a   Emergence: N/a   Logistics: Same Day Surgery     Postop Pain/Sedation Strategy:  Standard-Options: Opioids PRN     PONV Management:  Adult Risk Factors: Female, Non-Smoker, Postop Opioids  Prevention: Ondansetron     CONSENT: Direct conversation   Plan and risks discussed with: Patient   Blood Products: Consent Deferred (Minimal Blood Loss)                         Alessandro Payne MD

## 2019-01-24 NOTE — DISCHARGE INSTRUCTIONS
Cely Ontiveros    Cataract Surgery Postoperative Instructions    Postoperative Medications: After surgery, you will use several different eye drop  medications. In most cases you will start these eye drops 2 days before surgery.    1. Ocuflox - is an antibiotic drop that is used to minimize the risk of infection. It should be used 4 times daily for 10 days total or until you are told to discontinue.    (Acceptable alternatives to Ocuflox include: Zymaxid, Besivance, Gatafloxacin, Vigamox)     2.  Ketorolac - is an anti-inflammatory drop. Use it 4 time daily for 21 days total or until you are told to discontinue.  (Acceptable alternatives to Ketorolac include: Acuvail, Nevenac, Xibrom)    3. Prednisilone - is a steroid eye drop, used to minimize inflammation and modulate  healing. It should be used 4 times daily for 21 days total or until you are told to discontinue.  (Acceptable alternatives for Prednisilone include: Pred Forte, Omnipred, Econopred)      IF YOU GET AN ALTERNATIVE EYE DROP PLEASE FOLLOW THE DIRECTIONS ON THE BOTTLE OF DROPS. THEY WILL BE DIFFERENT!      The drops might sting a little when they are instilled, and that is normal.    It doesn t matter what order you put the drops into your eyes, but you should wait at least one minute between drops.    Please continue any glaucoma, dry eye, or other medications you were using prior to the surgery.    Please allow 24 to 48 hours when requesting refills, and call BEFORE you run out of drops.      Artificial Tears - are lubricating drops used to moisturize the eye. You can use these as much as you want, particularly if your eyes feel watery, gritty, or uncomfortable. Chilling these drops in the refrigerator results in a more soothing feeling. There are several brands of artificial tears available including, but not limited to: Optive, Refresh, Systane, Blink, Genteal, Soothe, and others. You should not use drops that  get the red out . You do not  need a prescription for these medications.      Restriction on Activities - It is extremely important that you DO NOT RUB THE  TREATED EYE.  - You will be given a clear plastic shield to wear as protection over your eye the  night after surgery.  - Refrain from any activities that may put your eye at risk of injury, as well as areas  containing a high volume of chemicals, dust, and debris.  - Do Not wear any eye makeup or moisturizer around the eye for 1 week after  surgery.  - Do Not swim or go into a hot-tub, Jacuzzi, or sauna for 1 week after surgery. You  can take showers as normal, but avoid getting shampoo or soap in your eyes.  - Avoid strenuous activity, including lifting more than 30 lbs, for 1 week after  surgery.  - It is fine to bathe, read, watch TV, and use the computer.  Symptoms requiring medical attention:  - Sudden onset of increased discharge from the eye  - Persistent or increasing pain in the eye  - Sudden decrease in vision  - Persistent nausea or vomiting    If you have any questions or concerns before or after your surgery, please contact:    Dr. Aguilera s office at [phone]    To contact Dr Sanderson call:  [phone]  [facility]  Same-Day Surgery   Adult Discharge Orders & Instructions   For 24 hours after surgery  1. Get plenty of rest.  A responsible adult must stay with you for at least 24 hours after you leave the hospital.   2. Do not drive or use heavy equipment.  If you have weakness or tingling, don't drive or use heavy equipment until this feeling goes away.  3. Do not drink alcohol.  4. Avoid strenuous or risky activities.  Ask for help when climbing stairs.   5. You may feel lightheaded.  IF so, sit for a few minutes before standing.  Have someone help you get up.   6. If you have nausea (feel sick to your stomach): Drink only clear liquids such as apple juice, ginger ale, broth or 7-Up.  Rest may also help.  Be sure to drink enough fluids.   Move to a regular diet as you feel able.  7. You may have a slight fever. Call the doctor if your fever is over 100 F (37.7 C) (taken under the tongue) or lasts longer than 24 hours.  8. You may have a dry mouth, a sore throat, muscle aches or trouble sleeping.  These should go away after 24 hours.  9. Do not make important or legal decisions.   Call your doctor for any of the followin.  Signs of infection (fever, growing tenderness at the surgery site, a large amount of drainage or bleeding, severe pain, foul-smelling drainage, redness, swelling).    2. It has been over 8 to 10 hours since surgery and you are still not able to urinate (pass water).    3.  Headache for over 24 hours.    4.  Numbness, tingling or weakness the day after surgery (if you had spinal anesthesia).  To contact Dr Sanderson call:  [phone]

## 2019-01-24 NOTE — ANESTHESIA POSTPROCEDURE EVALUATION
Anesthesia POST Procedure Evaluation    Patient: Cely Ontiveros   MRN:     [id number] Gender:   female   Age:    73 year old :      1945        Preoperative Diagnosis: LEFT CATARACT   Procedure(s):  PHACOEMULSIFICATION WITH STANDARD INTRAOCULAR LENS IMPLANT, LEFT   Postop Comments: No value filed.       Anesthesia Type:  MAC    Reportable Event: NO     PAIN: Uncomplicated   Sign Out status: Comfortable, Well controlled pain     PONV: No PONV   Sign Out status:  No Nausea or Vomiting     Neuro/Psych: Uneventful perioperative course   Sign Out Status: Preoperative baseline; Age appropriate mentation     Airway/Resp.: Uneventful perioperative course   Sign Out Status: Non labored breathing, age appropriate RR; Resp. Status within EXPECTED Parameters     CV: Uneventful perioperative course   Sign Out status: Appropriate BP and perfusion indices; Appropriate HR/Rhythm     Disposition:   Sign Out in:  PACU  Disposition:  Phase II; Home  Recovery Course: Uneventful  Follow-Up: Not required           Last Anesthesia Record Vitals:  CRNA VITALS  2019 0903 - 2019 1003      2019             Pulse:  62    SpO2:  100 %          Last PACU/Preop Vitals:  Vitals:    19 0823 19 0940 19 0950   BP: 152/64 130/58 140/51   Resp: 16 16 16   Temp: 36.4  C (97.5  F) 36.3  C (97.3  F)    SpO2: 96% 100% 98%         Electronically Signed By: Alessandro Payne MD, 2019, 3:02 PM

## 2019-01-24 NOTE — ANESTHESIA CARE TRANSFER NOTE
Patient: Cely Ontiveros    Procedure(s):  PHACOEMULSIFICATION WITH STANDARD INTRAOCULAR LENS IMPLANT, LEFT    Diagnosis: LEFT CATARACT  Diagnosis Additional Information: No value filed.    Anesthesia Type:   No value filed.     Note:    Patient transferred to:Phase II  Comments: /65, HR 70, Sats 98%, RR 12, Temp 36.6Handoff Report: Identifed the Patient, Identified the Reponsible Provider, Reviewed the pertinent medical history, Discussed the surgical course, Reviewed Intra-OP anesthesia mangement and issues during anesthesia, Set expectations for post-procedure period and Allowed opportunity for questions and acknowledgement of understanding      Vitals: (Last set prior to Anesthesia Care Transfer)    CRNA VITALS  1/24/2019 0903 - 1/24/2019 0934      1/24/2019             Pulse:  62    SpO2:  100 %                Electronically Signed By: AUDREY Benjamin CRNA  January 24, 2019  9:34 AM

## 2019-01-25 ENCOUNTER — OFFICE VISIT (OUTPATIENT)
Dept: OPTOMETRY | Facility: CLINIC | Age: 74
End: 2019-01-25
Payer: COMMERCIAL

## 2019-01-25 DIAGNOSIS — Z96.1 PSEUDOPHAKIA OF LEFT EYE: Primary | ICD-10-CM

## 2019-01-25 PROCEDURE — 99024 POSTOP FOLLOW-UP VISIT: CPT | Performed by: OPTOMETRIST

## 2019-01-25 RX ORDER — DORZOLAMIDE HYDROCHLORIDE AND TIMOLOL MALEATE 20; 5 MG/ML; MG/ML
1 SOLUTION/ DROPS OPHTHALMIC 2 TIMES DAILY
Qty: 9 ML | Refills: 3 | Status: CANCELLED | OUTPATIENT
Start: 2019-01-25 | End: 2020-01-25

## 2019-01-25 ASSESSMENT — VISUAL ACUITY
OS_SC: 20/50
METHOD: SNELLEN - LINEAR
OS_PH_SC: 20/40
OS_SC+: +1

## 2019-01-25 ASSESSMENT — TONOMETRY
IOP_METHOD: ICARE
IOP_METHOD: ICARE
OS_IOP_MMHG: 26
OS_IOP_MMHG: 35
OD_IOP_MMHG: 20
OS_IOP_MMHG: 34
OD_IOP_MMHG: 20
IOP_METHOD: APPLANATION

## 2019-01-25 ASSESSMENT — EXTERNAL EXAM - LEFT EYE: OS_EXAM: NORMAL

## 2019-01-25 ASSESSMENT — REFRACTION_WEARINGRX
OS_CYLINDER: +0.75
OD_CYLINDER: +0.75
OS_SPHERE: +0.50
OS_ADD: +2.50
OD_ADD: +2.50
OS_AXIS: 010
OD_SPHERE: +0.75
OD_AXIS: 010

## 2019-01-25 ASSESSMENT — SLIT LAMP EXAM - LIDS: COMMENTS: NORMAL

## 2019-01-25 NOTE — PROGRESS NOTES
CHIEF COMPLAINT:   Chief Complaint   Patient presents with     Post Op (Ophthalmology) Left Eye     S/P PHACOEMULSIFICATION WITH STANDARD INTRAOCULAR LENS IMPLANT, LEFT 01/24/2019 Patient states feels like there is a film over left eye.       Type of surgery  Cataract/ left eye  Date of surgery  1/24/2019      Drops reviewed.    OBJECTIVE:     See ophthalmology exam    ASSESSMENT:         ICD-10-CM    1. Pseudophakia of left eye Z96.1 POST-OP FOLLOW-UP VISIT     PLAN:      Patient Instructions   Continue with drops and scheduled follow up appointments.  Follow directions on your bottles as far as how many drops to use daily.    Wear shield while sleeping.       Please continue any glaucoma, dry eye, or other medications you were using prior to the surgery.        Brandt Richardson OD  [facility]  [address] [place] [address]  Tel- [phone]  [phone]

## 2019-01-25 NOTE — PATIENT INSTRUCTIONS
Continue with drops and scheduled follow up appointments.  Follow directions on your bottles as far as how many drops to use daily.    Wear shield while sleeping.       Please continue any glaucoma, dry eye, or other medications you were using prior to the surgery.        Brandt Richardson, OD  [facility]  [address] [place] [address]  Tel- [phone]  Ues-[phone]

## 2019-01-27 DIAGNOSIS — Z12.11 SPECIAL SCREENING FOR MALIGNANT NEOPLASMS, COLON: ICD-10-CM

## 2019-01-27 PROCEDURE — 82274 ASSAY TEST FOR BLOOD FECAL: CPT | Performed by: NURSE PRACTITIONER

## 2019-01-28 LAB — HEMOCCULT STL QL IA: NEGATIVE

## 2019-01-30 ENCOUNTER — OFFICE VISIT (OUTPATIENT)
Dept: OPTOMETRY | Facility: CLINIC | Age: 74
End: 2019-01-30
Payer: COMMERCIAL

## 2019-01-30 DIAGNOSIS — Z96.1 PSEUDOPHAKIA OF LEFT EYE: Primary | ICD-10-CM

## 2019-01-30 PROCEDURE — 99024 POSTOP FOLLOW-UP VISIT: CPT | Performed by: OPTOMETRIST

## 2019-01-30 ASSESSMENT — REFRACTION_WEARINGRX
OD_SPHERE: +0.75
OD_AXIS: 010
OS_AXIS: 010
OS_SPHERE: +0.50
OS_ADD: +2.50
OD_CYLINDER: +0.75
OD_ADD: +2.50
OS_CYLINDER: +0.75

## 2019-01-30 ASSESSMENT — EXTERNAL EXAM - LEFT EYE: OS_EXAM: NORMAL

## 2019-01-30 ASSESSMENT — TONOMETRY
IOP_METHOD: TONOPEN
OS_IOP_MMHG: 21

## 2019-01-30 ASSESSMENT — VISUAL ACUITY
OS_SC+: -2
OS_SC: 20/30
METHOD: SNELLEN - LINEAR

## 2019-01-30 ASSESSMENT — SLIT LAMP EXAM - LIDS: COMMENTS: NORMAL

## 2019-01-30 NOTE — PROGRESS NOTES
CHIEF COMPLAINT:   Chief Complaint   Patient presents with     Post-op Cataract     1 week left eye         Type of surgery  Cataract left eye   Date of surgery  1/24//2019       Vision still seems blurry    Drops reviewed.    OBJECTIVE:     See ophthalmology exam    ASSESSMENT:         ICD-10-CM    1. Pseudophakia of left eye Z96.1 POST-OP FOLLOW-UP VISIT     PLAN:      Patient Instructions   Discontinue ofloxacin.  Continue prednisolone acetate and ketorolac for the full 21 days.    Ok to discontinue shield while sleeping.  All restrictions are lifted.    Keep follow up appointments as scheduled.  Ok to schedule vision check left eye before next surgery right eye.       Please continue any glaucoma, dry eye, or other medications you were using prior to the surgery.        Brandt Richardson, OD  [facility]  [address] [place] [address]  Tel- [phone]

## 2019-01-30 NOTE — PATIENT INSTRUCTIONS
Discontinue ofloxacin.  Continue prednisolone acetate and ketorolac for the full 21 days.    Ok to discontinue shield while sleeping.  All restrictions are lifted.    Keep follow up appointments as scheduled.  Ok to schedule vision check left eye before next surgery right eye.       Please continue any glaucoma, dry eye, or other medications you were using prior to the surgery.        Brandt Richardson, OD  [facility]  [address] [place] [address]  Tel- [phone]

## 2019-02-01 ENCOUNTER — OFFICE VISIT (OUTPATIENT)
Dept: OPTOMETRY | Facility: CLINIC | Age: 74
End: 2019-02-01
Payer: COMMERCIAL

## 2019-02-01 DIAGNOSIS — Z96.1 PSEUDOPHAKIA OF LEFT EYE: Primary | ICD-10-CM

## 2019-02-01 PROCEDURE — 99024 POSTOP FOLLOW-UP VISIT: CPT | Performed by: OPTOMETRIST

## 2019-02-01 ASSESSMENT — TONOMETRY
IOP_METHOD: TONOPEN
OS_IOP_MMHG: 25

## 2019-02-01 ASSESSMENT — VISUAL ACUITY
OD_SC: 20/30
METHOD: SNELLEN - LINEAR
OS_SC: 20/40
OS_SC+: -1

## 2019-02-01 ASSESSMENT — SLIT LAMP EXAM - LIDS: COMMENTS: MEIBOMIAN GLAND DYSFUNCTION

## 2019-02-01 NOTE — PROGRESS NOTES
CHIEF COMPLAINT:   Chief Complaint   Patient presents with     Cataract Follow-Up       Type of surgery cataract   Date of surgery 1- os eye    Patient was here 2 days ago and told to stop antibiotic drops,woke up next day and eye is swollen under and over the eye- better today- some mild itching    Shruthi Cantu, Optometric Assistant, A.B.O.C.     Drops reviewed.    OBJECTIVE:     See ophthalmology exam    ASSESSMENT:         ICD-10-CM    1. Pseudophakia of left eye Z96.1 POST-OP FOLLOW-UP VISIT     PLAN:      Patient Instructions   Reassured. Continue drops as directed.    Artificial tears- 1 drop left eye 2-4 x day.  Cold compresses.    Keep follow up appointments as scheduled.       Please continue any glaucoma, dry eye, or other medications you were using prior to the surgery.        Brandt Richardson, OD  [facility]  [address] [place] [address]  Tel- [phone]

## 2019-02-01 NOTE — PATIENT INSTRUCTIONS
Reassured. Continue drops as directed.    Artificial tears- 1 drop left eye 2-4 x day.  Cold compresses.    Keep follow up appointments as scheduled.       Please continue any glaucoma, dry eye, or other medications you were using prior to the surgery.        Brandt Richardson, OD  [facility]  [address] [place] [address]  Tel- [phone]

## 2019-02-11 ENCOUNTER — OFFICE VISIT (OUTPATIENT)
Dept: OPTOMETRY | Facility: CLINIC | Age: 74
End: 2019-02-11
Payer: COMMERCIAL

## 2019-02-11 DIAGNOSIS — Z96.1 PSEUDOPHAKIA OF LEFT EYE: Primary | ICD-10-CM

## 2019-02-11 PROCEDURE — 99024 POSTOP FOLLOW-UP VISIT: CPT | Performed by: OPTOMETRIST

## 2019-02-11 ASSESSMENT — REFRACTION_WEARINGRX
OD_ADD: +2.50
OS_SPHERE: +0.50
OD_AXIS: 010
OD_CYLINDER: +0.75
OS_ADD: +2.50
OS_CYLINDER: +0.75
OS_AXIS: 010
OD_SPHERE: +0.75

## 2019-02-11 ASSESSMENT — SLIT LAMP EXAM - LIDS: COMMENTS: MEIBOMIAN GLAND DYSFUNCTION

## 2019-02-11 ASSESSMENT — REFRACTION_MANIFEST
METHOD_AUTOREFRACTION: 1
OS_CYLINDER: +1.25
OS_AXIS: 177
OD_CYLINDER: +0.75
OD_SPHERE: +0.75
OD_AXIS: 026
OS_SPHERE: -0.50

## 2019-02-11 ASSESSMENT — TONOMETRY
IOP_METHOD: TONOPEN
OS_IOP_MMHG: 20

## 2019-02-11 ASSESSMENT — VISUAL ACUITY
OD_SC: 20/40
OS_SC: 20/40
METHOD: SNELLEN - LINEAR
OS_SC+: +2

## 2019-02-11 ASSESSMENT — EXTERNAL EXAM - LEFT EYE: OS_EXAM: NORMAL

## 2019-02-11 NOTE — PATIENT INSTRUCTIONS
Continue Acular and prednisolone acetate 4 x day for 1 more week.    Start all 3 drops on surgical eye tomorrow for surgery on Thursday.    Brandt Richardson, OD

## 2019-02-11 NOTE — LETTER
2/11/2019         RE: Cely Ontiveros  [address]  [facility] [address]        Dear Colleague,    Thank you for referring your patient, Cely Ontiveros, to the [facility]. Please see a copy of my visit note below.    CHIEF COMPLAINT:   Chief Complaint   Patient presents with     Cataract Follow-Up     fortino os eye       Type of surgery cataract   Date of surgery 1-    Wanted to check vision before scheduled surgery right eye on Feb. 14th.    Shruthi Cantu, Optometric Assistant, A.B.O.C.      Drops reviewed.    OBJECTIVE:     See ophthalmology exam    ASSESSMENT:         ICD-10-CM    1. Pseudophakia of left eye Z96.1 POST-OP FOLLOW-UP VISIT     PLAN:      Patient Instructions   Continue Acular and prednisolone acetate 4 x day for 1 more week.    Start all 3 drops on surgical eye tomorrow for surgery on Thursday.    Brandt Richardson, GENNY          Again, thank you for allowing me to participate in the care of your patient.        Sincerely,        Brandt Richardson, OD

## 2019-02-11 NOTE — PROGRESS NOTES
CHIEF COMPLAINT:   Chief Complaint   Patient presents with     Cataract Follow-Up     fortino os eye       Type of surgery cataract   Date of surgery 1-    Wanted to check vision before scheduled surgery right eye on Feb. 14th.    Shruthi Cantu, Optometric Assistant, A.B.O.C.      Drops reviewed.    OBJECTIVE:     See ophthalmology exam    ASSESSMENT:         ICD-10-CM    1. Pseudophakia of left eye Z96.1 POST-OP FOLLOW-UP VISIT     PLAN:      Patient Instructions   Continue Acular and prednisolone acetate 4 x day for 1 more week.    Start all 3 drops on surgical eye tomorrow for surgery on Thursday.    Brandt Richardson, OD

## 2019-02-13 ENCOUNTER — ANESTHESIA EVENT (OUTPATIENT)
Dept: SURGERY | Facility: AMBULATORY SURGERY CENTER | Age: 74
End: 2019-02-13

## 2019-02-13 RX ORDER — NALOXONE HYDROCHLORIDE 0.4 MG/ML
.1-.4 INJECTION, SOLUTION INTRAMUSCULAR; INTRAVENOUS; SUBCUTANEOUS
Status: CANCELLED | OUTPATIENT
Start: 2019-02-13 | End: 2019-02-14

## 2019-02-13 RX ORDER — FENTANYL CITRATE 50 UG/ML
25-50 INJECTION, SOLUTION INTRAMUSCULAR; INTRAVENOUS
Status: CANCELLED | OUTPATIENT
Start: 2019-02-13

## 2019-02-13 RX ORDER — ALBUTEROL SULFATE 0.83 MG/ML
2.5 SOLUTION RESPIRATORY (INHALATION) EVERY 4 HOURS PRN
Status: CANCELLED | OUTPATIENT
Start: 2019-02-13

## 2019-02-13 RX ORDER — DEXAMETHASONE SODIUM PHOSPHATE 4 MG/ML
4 INJECTION, SOLUTION INTRA-ARTICULAR; INTRALESIONAL; INTRAMUSCULAR; INTRAVENOUS; SOFT TISSUE EVERY 10 MIN PRN
Status: CANCELLED | OUTPATIENT
Start: 2019-02-13

## 2019-02-13 RX ORDER — SODIUM CHLORIDE, SODIUM LACTATE, POTASSIUM CHLORIDE, CALCIUM CHLORIDE 600; 310; 30; 20 MG/100ML; MG/100ML; MG/100ML; MG/100ML
INJECTION, SOLUTION INTRAVENOUS CONTINUOUS
Status: CANCELLED | OUTPATIENT
Start: 2019-02-13

## 2019-02-13 RX ORDER — ONDANSETRON 2 MG/ML
4 INJECTION INTRAMUSCULAR; INTRAVENOUS EVERY 30 MIN PRN
Status: CANCELLED | OUTPATIENT
Start: 2019-02-13

## 2019-02-13 RX ORDER — MEPERIDINE HYDROCHLORIDE 25 MG/ML
12.5 INJECTION INTRAMUSCULAR; INTRAVENOUS; SUBCUTANEOUS
Status: CANCELLED | OUTPATIENT
Start: 2019-02-13

## 2019-02-13 RX ORDER — ONDANSETRON 4 MG/1
4 TABLET, ORALLY DISINTEGRATING ORAL EVERY 30 MIN PRN
Status: CANCELLED | OUTPATIENT
Start: 2019-02-13

## 2019-02-13 RX ORDER — OXYCODONE HYDROCHLORIDE 5 MG/1
5-10 TABLET ORAL EVERY 4 HOURS PRN
Status: CANCELLED | OUTPATIENT
Start: 2019-02-13

## 2019-02-13 RX ORDER — HYDROMORPHONE HYDROCHLORIDE 1 MG/ML
.3-.5 INJECTION, SOLUTION INTRAMUSCULAR; INTRAVENOUS; SUBCUTANEOUS EVERY 10 MIN PRN
Status: CANCELLED | OUTPATIENT
Start: 2019-02-13

## 2019-02-14 ENCOUNTER — HOSPITAL ENCOUNTER (OUTPATIENT)
Facility: AMBULATORY SURGERY CENTER | Age: 74
Discharge: HOME | End: 2019-02-14
Attending: OPHTHALMOLOGY | Admitting: OPHTHALMOLOGY
Payer: COMMERCIAL

## 2019-02-14 ENCOUNTER — ANESTHESIA (OUTPATIENT)
Dept: SURGERY | Facility: AMBULATORY SURGERY CENTER | Age: 74
End: 2019-02-14
Payer: COMMERCIAL

## 2019-02-14 ENCOUNTER — SURGERY (OUTPATIENT)
Age: 74
End: 2019-02-14
Payer: COMMERCIAL

## 2019-02-14 VITALS
RESPIRATION RATE: 16 BRPM | SYSTOLIC BLOOD PRESSURE: 137 MMHG | DIASTOLIC BLOOD PRESSURE: 48 MMHG | OXYGEN SATURATION: 96 % | TEMPERATURE: 97.7 F

## 2019-02-14 PROCEDURE — G8918 PT W/O PREOP ORDER IV AB PRO: HCPCS

## 2019-02-14 PROCEDURE — 66984 XCAPSL CTRC RMVL W/O ECP: CPT | Mod: 79 | Performed by: OPHTHALMOLOGY

## 2019-02-14 PROCEDURE — 66984 XCAPSL CTRC RMVL W/O ECP: CPT | Mod: RT

## 2019-02-14 PROCEDURE — G8907 PT DOC NO EVENTS ON DISCHARG: HCPCS

## 2019-02-14 DEVICE — EYE IMP IOL AMO PCL TECNIS ZCB00 23.0: Type: IMPLANTABLE DEVICE | Site: EYE | Status: FUNCTIONAL

## 2019-02-14 RX ORDER — CYCLOPENTOLATE HYDROCHLORIDE 10 MG/ML
1 SOLUTION/ DROPS OPHTHALMIC
Status: COMPLETED | OUTPATIENT
Start: 2019-02-14 | End: 2019-02-14

## 2019-02-14 RX ORDER — PHENYLEPHRINE HYDROCHLORIDE 25 MG/ML
1 SOLUTION/ DROPS OPHTHALMIC
Status: COMPLETED | OUTPATIENT
Start: 2019-02-14 | End: 2019-02-14

## 2019-02-14 RX ORDER — TETRACAINE HYDROCHLORIDE 5 MG/ML
SOLUTION OPHTHALMIC PRN
Status: DISCONTINUED | OUTPATIENT
Start: 2019-02-14 | End: 2019-02-14 | Stop reason: HOSPADM

## 2019-02-14 RX ORDER — FENTANYL CITRATE 50 UG/ML
INJECTION, SOLUTION INTRAMUSCULAR; INTRAVENOUS PRN
Status: DISCONTINUED | OUTPATIENT
Start: 2019-02-14 | End: 2019-02-14

## 2019-02-14 RX ORDER — PROPARACAINE HYDROCHLORIDE 5 MG/ML
1 SOLUTION/ DROPS OPHTHALMIC
Status: COMPLETED | OUTPATIENT
Start: 2019-02-14 | End: 2019-02-14

## 2019-02-14 RX ORDER — SODIUM CHLORIDE, SODIUM LACTATE, POTASSIUM CHLORIDE, CALCIUM CHLORIDE 600; 310; 30; 20 MG/100ML; MG/100ML; MG/100ML; MG/100ML
INJECTION, SOLUTION INTRAVENOUS CONTINUOUS
Status: DISCONTINUED | OUTPATIENT
Start: 2019-02-14 | End: 2019-02-15 | Stop reason: HOSPADM

## 2019-02-14 RX ORDER — ACETAMINOPHEN 325 MG/1
975 TABLET ORAL ONCE
Status: COMPLETED | OUTPATIENT
Start: 2019-02-14 | End: 2019-02-14

## 2019-02-14 RX ORDER — KETOROLAC TROMETHAMINE 5 MG/ML
1 SOLUTION OPHTHALMIC
Status: COMPLETED | OUTPATIENT
Start: 2019-02-14 | End: 2019-02-14

## 2019-02-14 RX ORDER — MOXIFLOXACIN 5 MG/ML
1 SOLUTION/ DROPS OPHTHALMIC
Status: COMPLETED | OUTPATIENT
Start: 2019-02-14 | End: 2019-02-14

## 2019-02-14 RX ORDER — LIDOCAINE 40 MG/G
CREAM TOPICAL
Status: DISCONTINUED | OUTPATIENT
Start: 2019-02-14 | End: 2019-02-15 | Stop reason: HOSPADM

## 2019-02-14 RX ORDER — ONDANSETRON 2 MG/ML
INJECTION INTRAMUSCULAR; INTRAVENOUS PRN
Status: DISCONTINUED | OUTPATIENT
Start: 2019-02-14 | End: 2019-02-14

## 2019-02-14 RX ORDER — MOXIFLOXACIN 5 MG/ML
SOLUTION/ DROPS OPHTHALMIC PRN
Status: DISCONTINUED | OUTPATIENT
Start: 2019-02-14 | End: 2019-02-14 | Stop reason: HOSPADM

## 2019-02-14 RX ADMIN — PROPARACAINE HYDROCHLORIDE 1 DROP: 5 SOLUTION/ DROPS OPHTHALMIC at 07:15

## 2019-02-14 RX ADMIN — KETOROLAC TROMETHAMINE 1 DROP: 5 SOLUTION OPHTHALMIC at 07:28

## 2019-02-14 RX ADMIN — CYCLOPENTOLATE HYDROCHLORIDE 1 DROP: 10 SOLUTION/ DROPS OPHTHALMIC at 07:25

## 2019-02-14 RX ADMIN — PHENYLEPHRINE HYDROCHLORIDE 1 DROP: 25 SOLUTION/ DROPS OPHTHALMIC at 07:22

## 2019-02-14 RX ADMIN — Medication 1 DROP: at 08:27

## 2019-02-14 RX ADMIN — PHENYLEPHRINE HYDROCHLORIDE 1 DROP: 25 SOLUTION/ DROPS OPHTHALMIC at 07:16

## 2019-02-14 RX ADMIN — MOXIFLOXACIN 1 DROP: 5 SOLUTION/ DROPS OPHTHALMIC at 07:22

## 2019-02-14 RX ADMIN — SODIUM CHLORIDE, SODIUM LACTATE, POTASSIUM CHLORIDE, CALCIUM CHLORIDE: 600; 310; 30; 20 INJECTION, SOLUTION INTRAVENOUS at 07:10

## 2019-02-14 RX ADMIN — FENTANYL CITRATE 25 MCG: 50 INJECTION, SOLUTION INTRAMUSCULAR; INTRAVENOUS at 08:35

## 2019-02-14 RX ADMIN — ACETAMINOPHEN 975 MG: 325 TABLET ORAL at 07:14

## 2019-02-14 RX ADMIN — PHENYLEPHRINE HYDROCHLORIDE 1 DROP: 25 SOLUTION/ DROPS OPHTHALMIC at 07:28

## 2019-02-14 RX ADMIN — Medication 200 ML: at 08:32

## 2019-02-14 RX ADMIN — MOXIFLOXACIN 1 DROP: 5 SOLUTION/ DROPS OPHTHALMIC at 07:28

## 2019-02-14 RX ADMIN — MOXIFLOXACIN 1 DROP: 5 SOLUTION/ DROPS OPHTHALMIC at 08:39

## 2019-02-14 RX ADMIN — CYCLOPENTOLATE HYDROCHLORIDE 1 DROP: 10 SOLUTION/ DROPS OPHTHALMIC at 07:16

## 2019-02-14 RX ADMIN — CYCLOPENTOLATE HYDROCHLORIDE 1 DROP: 10 SOLUTION/ DROPS OPHTHALMIC at 07:21

## 2019-02-14 RX ADMIN — FENTANYL CITRATE 50 MCG: 50 INJECTION, SOLUTION INTRAMUSCULAR; INTRAVENOUS at 08:27

## 2019-02-14 RX ADMIN — FENTANYL CITRATE 25 MCG: 50 INJECTION, SOLUTION INTRAMUSCULAR; INTRAVENOUS at 08:30

## 2019-02-14 RX ADMIN — KETOROLAC TROMETHAMINE 1 DROP: 5 SOLUTION OPHTHALMIC at 07:22

## 2019-02-14 RX ADMIN — KETOROLAC TROMETHAMINE 1 DROP: 5 SOLUTION OPHTHALMIC at 07:16

## 2019-02-14 RX ADMIN — MOXIFLOXACIN 1 DROP: 5 SOLUTION/ DROPS OPHTHALMIC at 07:16

## 2019-02-14 RX ADMIN — TETRACAINE HYDROCHLORIDE 1 DROP: 5 SOLUTION OPHTHALMIC at 08:27

## 2019-02-14 RX ADMIN — ONDANSETRON 4 MG: 2 INJECTION INTRAMUSCULAR; INTRAVENOUS at 08:37

## 2019-02-14 NOTE — ANESTHESIA CARE TRANSFER NOTE
Patient: Cely Ontiveros    Procedure(s):  PHACOEMULSIFICATION WITH STANDARD INTRAOCULAR LENS IMPLANT, RIGHT    Diagnosis: RIGHT CATARACT  Diagnosis Additional Information: No value filed.    Anesthesia Type:   No value filed.     Note:  Airway :Room Air  Patient transferred to:Phase II  Handoff Report: Identifed the Patient, Identified the Reponsible Provider, Reviewed the pertinent medical history, Discussed the surgical course, Reviewed Intra-OP anesthesia mangement and issues during anesthesia, Set expectations for post-procedure period and Allowed opportunity for questions and acknowledgement of understanding      Vitals: (Last set prior to Anesthesia Care Transfer)    CRNA VITALS  2/14/2019 0810 - 2/14/2019 0845      2/14/2019             Pulse:  65    SpO2:  99 %                Electronically Signed By: AUDREY Duval CRNA  February 14, 2019  8:45 AM

## 2019-02-14 NOTE — DISCHARGE INSTRUCTIONS
[facility]  Same-Day Surgery   Adult Discharge Orders & Instructions   For 24 hours after surgery  1. Get plenty of rest.  A responsible adult must stay with you for at least 24 hours after you leave the hospital.   2. Do not drive or use heavy equipment.  If you have weakness or tingling, don't drive or use heavy equipment until this feeling goes away.  3. Do not drink alcohol.  4. Avoid strenuous or risky activities.  Ask for help when climbing stairs.   5. You may feel lightheaded.  IF so, sit for a few minutes before standing.  Have someone help you get up.   6. If you have nausea (feel sick to your stomach): Drink only clear liquids such as apple juice, ginger ale, broth or 7-Up.  Rest may also help.  Be sure to drink enough fluids.  Move to a regular diet as you feel able.  7. You may have a slight fever. Call the doctor if your fever is over 100 F (37.7 C) (taken under the tongue) or lasts longer than 24 hours.  8. You may have a dry mouth, a sore throat, muscle aches or trouble sleeping.  These should go away after 24 hours.  9. Do not make important or legal decisions.   Call your doctor for any of the followin.  Signs of infection (fever, growing tenderness at the surgery site, a large amount of drainage or bleeding, severe pain, foul-smelling drainage, redness, swelling).    2. It has been over 8 to 10 hours since surgery and you are still not able to urinate (pass water).    3.  Headache for over 24 hours.    4.  Numbness, tingling or weakness the day after surgery (if you had spinal anesthesia).  To contact Dr Sanderson call:  [phone]__________________________              Cely Ontiveros    Cataract Surgery Postoperative Instructions    Postoperative Medications: After surgery, you will use several different eye drop  medications. In most cases you will start these eye drops 2 days before surgery.    1. Ocuflox - is an antibiotic drop that is used to minimize the risk  of infection. It should be used 4 times daily for 10 days total or until you are told to discontinue.    (Acceptable alternatives to Ocuflox include: Zymaxid, Besivance, Gatafloxacin, Vigamox)     2.  Ketorolac - is an anti-inflammatory drop. Use it 4 time daily for 21 days total or until you are told to discontinue.  (Acceptable alternatives to Ketorolac include: Acuvail, Nevenac, Xibrom)    3. Prednisilone - is a steroid eye drop, used to minimize inflammation and modulate  healing. It should be used 4 times daily for 21 days total or until you are told to discontinue.  (Acceptable alternatives for Prednisilone include: Pred Forte, Omnipred, Econopred)      IF YOU GET AN ALTERNATIVE EYE DROP PLEASE FOLLOW THE DIRECTIONS ON THE BOTTLE OF DROPS. THEY WILL BE DIFFERENT!      The drops might sting a little when they are instilled, and that is normal.    It doesn t matter what order you put the drops into your eyes, but you should wait at least one minute between drops.    Please continue any glaucoma, dry eye, or other medications you were using prior to the surgery.    Please allow 24 to 48 hours when requesting refills, and call BEFORE you run out of drops.      Artificial Tears - are lubricating drops used to moisturize the eye. You can use these as much as you want, particularly if your eyes feel watery, gritty, or uncomfortable. Chilling these drops in the refrigerator results in a more soothing feeling. There are several brands of artificial tears available including, but not limited to: Optive, Refresh, Systane, Blink, Genteal, Soothe, and others. You should not use drops that  get the red out . You do not need a prescription for these medications.      Restriction on Activities - It is extremely important that you DO NOT RUB THE  TREATED EYE.  - You will be given a clear plastic shield to wear as protection over your eye the  night after surgery.  - Refrain from any activities that may put your eye at risk of  injury, as well as areas  containing a high volume of chemicals, dust, and debris.  - Do Not wear any eye makeup or moisturizer around the eye for 1 week after  surgery.  - Do Not swim or go into a hot-tub, Jacuzzi, or sauna for 1 week after surgery. You  can take showers as normal, but avoid getting shampoo or soap in your eyes.  - Avoid strenuous activity, including lifting more than 30 lbs, for 1 week after  surgery.  - It is fine to bathe, read, watch TV, and use the computer.  Symptoms requiring medical attention:  - Sudden onset of increased discharge from the eye  - Persistent or increasing pain in the eye  - Sudden decrease in vision  - Persistent nausea or vomiting    If you have any questions or concerns before or after your surgery, please contact:    Dr. Aguilera s office at [phone]

## 2019-02-14 NOTE — ANESTHESIA PREPROCEDURE EVALUATION
Anesthesia Pre-Procedure Evaluation    Patient: Cely Ontiveros   MRN:     [id number] Gender:   female   Age:    73 year old :      1945        Preoperative Diagnosis: LEFT CATARACT   Procedure(s):  PHACOEMULSIFICATION WITH STANDARD INTRAOCULAR LENS IMPLANT, LEFT     Past Medical History:   Diagnosis Date     Actinic keratosis      Allergic rhinitis      Cataract      Degenerative joint disease     cervical disease     Depression with anxiety      Herpes simplex      Hypoglycemia     eats small meals and is fine     Impingement syndrome, shoulder       Past Surgical History:   Procedure Laterality Date     CATARACT IOL, RT/LT Left 2019     COMBINED CYSTOSCOPY, URETEROSCOPY, LASER HOLMIUM LITHOTRIPSY URETER(S) Right 2018    Procedure: COMBINED CYSTOSCOPY, URETEROSCOPY, LASER HOLMIUM LITHOTRIPSY URETER(S);   Cystoscopy,Right ureteroscopy with laser lithotripsy and stent placement;  Surgeon: Pino Hawk MD;  Location: [facility] OR      ENLARGE BREAST WITH IMPLANT       HC LAP,FULGURATE/EXCISE LESIONS       HC REMOVAL OF BREAST IMPLANT       HYSTERECTOMY, PAP NO LONGER INDICATED  1998    ovaries and uterus out for benign reasons(fibroids and ovarian cyst)     PHACOEMULSIFICATION WITH STANDARD INTRAOCULAR LENS IMPLANT Left 2019    Procedure: PHACOEMULSIFICATION WITH STANDARD INTRAOCULAR LENS IMPLANT, LEFT;  Surgeon: Wagner Aguilera MD;  Location: [facility] OR     SINUS SURGERY            Anesthesia Evaluation     .             ROS/MED HX    ENT/Pulmonary:  - neg pulmonary ROS   (+)allergic rhinitis, , . .    Neurologic:  - neg neurologic ROS     Cardiovascular:  - neg cardiovascular ROS       METS/Exercise Tolerance:     Hematologic:  - neg hematologic  ROS       Musculoskeletal:  - neg musculoskeletal ROS (+) arthritis, , , -       GI/Hepatic:  - neg GI/hepatic ROS       Renal/Genitourinary:  - ROS Renal section negative       Endo:  - neg endo ROS       Psychiatric:  - neg psychiatric ROS   (+)  "psychiatric history depression      Infectious Disease:  - neg infectious disease ROS       Malignancy:      - no malignancy   Other:    - neg other ROS                     PHYSICAL EXAM:   Mental Status/Neuro: A/A/O   Airway: Facies: Feasible  Mallampati: I  Mouth/Opening: Full  TM distance: > 6 cm  Neck ROM: Full   Respiratory: Auscultation: CTAB     Resp. Rate: Normal     Resp. Effort: Normal      CV: Rhythm: Regular  Rate: Age appropriate  Heart: Normal Sounds   Comments:      Dental: Normal                  Lab Results   Component Value Date    WBC 9.3 12/27/2012    HGB 12.8 06/04/2013    HCT 41.4 12/27/2012     12/27/2012     03/08/2017    POTASSIUM 4.2 03/08/2017    CHLORIDE 107 03/08/2017    CO2 30 03/08/2017    BUN 19 03/08/2017    CR 0.72 03/08/2017    GLC 87 03/08/2017    KANIKA 8.8 03/08/2017    ALBUMIN 4.1 06/04/2013    PROTTOTAL 7.3 06/04/2013    ALT 25 06/04/2013    AST 30 06/04/2013    ALKPHOS 87 06/04/2013    BILITOTAL 0.4 06/04/2013    TSH 0.81 03/08/2017       Preop Vitals  BP Readings from Last 3 Encounters:   02/14/19 116/55   01/24/19 140/51   01/22/19 127/70    Pulse Readings from Last 3 Encounters:   01/22/19 79   01/16/19 85   12/12/18 80      Resp Readings from Last 3 Encounters:   02/14/19 14   01/24/19 16   01/22/19 14    SpO2 Readings from Last 3 Encounters:   02/14/19 99%   01/24/19 98%   01/22/19 94%      Temp Readings from Last 1 Encounters:   02/14/19 36.1  C (96.9  F) (Temporal)    Ht Readings from Last 1 Encounters:   01/22/19 1.626 m (5' 4\")      Wt Readings from Last 1 Encounters:   01/22/19 65.3 kg (144 lb)    Estimated body mass index is 24.72 kg/m  as calculated from the following:    Height as of 1/22/19: 1.626 m (5' 4\").    Weight as of 1/22/19: 65.3 kg (144 lb).     LDA:  Peripheral IV 02/14/19 Left Hand (Active)   Site Assessment WDL 2/14/2019  7:12 AM   Line Status Infusing 2/14/2019  7:12 AM   Phlebitis Scale 0-->no symptoms 2/14/2019  7:12 AM   Dressing " Intervention New dressing  2/14/2019  7:12 AM   Number of days: 0       Urethral Catheter Non-latex 16 fr (Active)   Number of days: 175       Ureteral Drain/Stent Right ureter 6 fr (Active)   Number of days: 175            Assessment:   ASA SCORE: 1    NPO Status: > 6 hours since completed Solid Foods   Documentation: H&P complete; Preop Testing complete; Consents complete   Proceeding: Proceed without further delay  Tobacco Use:  NO Active use of Tobacco/UNKNOWN Tobacco use status     Plan:   Anes. Type:  MAC   Pre-Induction: Midazolam IV   Induction:  Not applicable   Airway: Native Airway   Access/Monitoring: PIV   Maintenance: N/a   Emergence: N/a   Logistics: Same Day Surgery     Postop Pain/Sedation Strategy:  Standard-Options: Opioids PRN     PONV Management:  Adult Risk Factors: Female, Non-Smoker, Postop Opioids  Prevention: Ondansetron     CONSENT: Direct conversation   Plan and risks discussed with: Patient   Blood Products: Consent Deferred (Minimal Blood Loss)                             Alessandro Payne MD

## 2019-02-14 NOTE — OP NOTE
PATIENT NAME:  Cely Ontiveros    :  1945    PATIENT NUMBER:  [id number]    DATE OF SURGERY:  2019    SURGEON:  Wagner Aguilera M.D.    PREOPERATIVE DIAGNOSIS: Cataract right eye.    POSTOPERATIVE DIAGNOSIS:  Same    PROCEDURE PERFORMED:    1. Phacoemulsification with posterior chamber intraocular lens right eye.    ANESTHESIA:  Topical/MAC    COMPLICATIONS:  None    PROCEDURE: Following adequate preoperative dilation the patient was given topical anesthesia consisting of Proparacaine.  The patient was brought to the operative suite where the eye was prepped and draped in the usual sterile fashion.  A lid speculum was applied. A super sharp blade was used to create a paracentesis, through which 1% preservative free Lidocaine was injected.  Visoelastic was then used to inflate the anterior chamber.  A biplanar incision at the clear cornea limbus was created with a keratome.  A continuous curvilinear capsulorrhexis was started with a cystitome and completed using Utrata forceps.  The lens was hydrodissected and hydro delineated using BSS on a cannula.  The lens nucleus was removed using phacoemulsification.  Remaining cortex was removed using irrigation and aspiration.  Viscoelastic was injected to inflate the capsular bag and a 23.0 D [id number] IOL was inserted into the capsular bag without difficulty.  Residual viscoelastic and provisc material was removed with irrigation and aspiration.  BSS was used to hydrate the corneal incision and paracentesis sites which were checked and noted to be watertight.  A drop of Vigamox was applied to the eye and a clear plastic shield was placed.  The patient tolerated the procedure well and left the operative suite in stable condition.    Wagner Aguilera M.D.

## 2019-02-15 ENCOUNTER — OFFICE VISIT (OUTPATIENT)
Dept: OPTOMETRY | Facility: CLINIC | Age: 74
End: 2019-02-15
Payer: COMMERCIAL

## 2019-02-15 DIAGNOSIS — Z96.1 PSEUDOPHAKIA OF RIGHT EYE: Primary | ICD-10-CM

## 2019-02-15 PROCEDURE — 99024 POSTOP FOLLOW-UP VISIT: CPT | Performed by: OPTOMETRIST

## 2019-02-15 ASSESSMENT — EXTERNAL EXAM - RIGHT EYE: OD_EXAM: NORMAL

## 2019-02-15 ASSESSMENT — VISUAL ACUITY
OD_SC+: -2
OS_SC: 20/30
OD_SC: 20/50
OS_SC+: -2
METHOD: SNELLEN - LINEAR

## 2019-02-15 ASSESSMENT — TONOMETRY
OD_IOP_MMHG: 22
IOP_METHOD: TONOPEN

## 2019-02-15 ASSESSMENT — SLIT LAMP EXAM - LIDS: COMMENTS: NORMAL

## 2019-02-15 NOTE — PROGRESS NOTES
CHIEF COMPLAINT:   Chief Complaint   Patient presents with     Cataract Follow-Up     1 day post op od eye       Type of surgery cataract   Date of surgery 2- od eye                            1- os eye    Shruthi Cantu, Optometric Assistant, A.B.O.C.     Drops reviewed.    OBJECTIVE:     See ophthalmology exam    ASSESSMENT:         ICD-10-CM    1. Pseudophakia of right eye Z96.1 POST-OP FOLLOW-UP VISIT     PLAN:      Patient Instructions   Continue with drops and scheduled follow up appointments.  Follow directions on your bottles as far as how many drops to use daily.    Wear shield while sleeping.       Please continue any glaucoma, dry eye, or other medications you were using prior to the surgery.        Brandt Richardson, OD  [facility]  [address] [place] [address]  Tel- [phone]  [phone]

## 2019-02-15 NOTE — PATIENT INSTRUCTIONS
Continue with drops and scheduled follow up appointments.  Follow directions on your bottles as far as how many drops to use daily.    Wear shield while sleeping.       Please continue any glaucoma, dry eye, or other medications you were using prior to the surgery.        Brandt Richardson, OD  [facility]  [address] [place] [address]  Tel- [phone]  [phone]

## 2019-02-20 ENCOUNTER — OFFICE VISIT (OUTPATIENT)
Dept: OPTOMETRY | Facility: CLINIC | Age: 74
End: 2019-02-20
Payer: COMMERCIAL

## 2019-02-20 DIAGNOSIS — Z96.1 PSEUDOPHAKIA OF RIGHT EYE: Primary | ICD-10-CM

## 2019-02-20 PROCEDURE — 99024 POSTOP FOLLOW-UP VISIT: CPT | Performed by: OPTOMETRIST

## 2019-02-20 ASSESSMENT — REFRACTION_WEARINGRX
OD_SPHERE: +0.75
OD_AXIS: 010
OS_SPHERE: +0.50
OS_CYLINDER: +0.75
OD_ADD: +2.50
OD_CYLINDER: +0.75
OS_AXIS: 010
OS_ADD: +2.50

## 2019-02-20 ASSESSMENT — EXTERNAL EXAM - RIGHT EYE: OD_EXAM: NORMAL

## 2019-02-20 ASSESSMENT — VISUAL ACUITY
OS_SC+: -2
OS_SC: 20/40
OD_SC: 20/30
OD_SC+: -1
METHOD: SNELLEN - LINEAR

## 2019-02-20 ASSESSMENT — SLIT LAMP EXAM - LIDS: COMMENTS: NORMAL

## 2019-02-20 ASSESSMENT — TONOMETRY
IOP_METHOD: TONOPEN
OD_IOP_MMHG: 19

## 2019-02-20 NOTE — PATIENT INSTRUCTIONS
Discontinue ofloxacin.  Continue prednisolone acetate and ketorolac for the full 21 days.    Ok to discontinue shield while sleeping.  All restrictions are lifted.    Keep follow up appointments as scheduled.       Please continue any glaucoma, dry eye, or other medications you were using prior to the surgery.        Brandt Richardson, OD  [facility]  [address] [place] [address]  Tel- [phone]

## 2019-02-20 NOTE — PROGRESS NOTES
CHIEF COMPLAINT:   Chief Complaint   Patient presents with     Cataract Follow-Up     1 week post op od eye       Type of surgery cataract   Date of surgery 2- od eye                             1- os eye    Shruthi Cantu, Optometric Assistant, A.B.O.C.     Drops reviewed.    OBJECTIVE:     See ophthalmology exam    ASSESSMENT:         ICD-10-CM    1. Pseudophakia of right eye Z96.1 POST-OP FOLLOW-UP VISIT     PLAN:      Patient Instructions   Discontinue ofloxacin.  Continue prednisolone acetate and ketorolac for the full 21 days.    Ok to discontinue shield while sleeping.  All restrictions are lifted.    Keep follow up appointments as scheduled.       Please continue any glaucoma, dry eye, or other medications you were using prior to the surgery.        Brandt Richardson, OD  [facility]  [address] [place] [address]  Tel- [phone]

## 2019-02-25 DIAGNOSIS — H26.9 CATARACT, BILATERAL: ICD-10-CM

## 2019-02-25 RX ORDER — PREDNISOLONE ACETATE 10 MG/ML
1 SUSPENSION/ DROPS OPHTHALMIC 4 TIMES DAILY
Qty: 1 BOTTLE | Refills: 0 | Status: SHIPPED | OUTPATIENT
Start: 2019-02-25 | End: 2019-05-17

## 2019-02-25 RX ORDER — KETOROLAC TROMETHAMINE 5 MG/ML
1 SOLUTION OPHTHALMIC 4 TIMES DAILY
Qty: 1 BOTTLE | Refills: 0 | Status: SHIPPED | OUTPATIENT
Start: 2019-02-25 | End: 2019-05-17

## 2019-02-25 NOTE — TELEPHONE ENCOUNTER
Phone call received from pt stating she is out of prednisolone and ketorolac eye drops. Refill requested to be sent to [facility] pharmacy.  Sent as requested.  Tish Goldstein RN

## 2019-03-04 ENCOUNTER — OFFICE VISIT (OUTPATIENT)
Dept: FAMILY MEDICINE | Facility: CLINIC | Age: 74
End: 2019-03-04
Payer: COMMERCIAL

## 2019-03-04 VITALS
BODY MASS INDEX: 24.55 KG/M2 | RESPIRATION RATE: 14 BRPM | HEART RATE: 90 BPM | OXYGEN SATURATION: 95 % | SYSTOLIC BLOOD PRESSURE: 115 MMHG | DIASTOLIC BLOOD PRESSURE: 65 MMHG | TEMPERATURE: 98 F | WEIGHT: 143 LBS

## 2019-03-04 DIAGNOSIS — J01.00 ACUTE MAXILLARY SINUSITIS, RECURRENCE NOT SPECIFIED: Primary | ICD-10-CM

## 2019-03-04 PROCEDURE — 99213 OFFICE O/P EST LOW 20 MIN: CPT | Performed by: NURSE PRACTITIONER

## 2019-03-04 RX ORDER — BENZONATATE 200 MG/1
200 CAPSULE ORAL 3 TIMES DAILY PRN
Qty: 21 CAPSULE | Refills: 0 | Status: SHIPPED | OUTPATIENT
Start: 2019-03-04 | End: 2019-05-28

## 2019-03-04 RX ORDER — MECLIZINE HYDROCHLORIDE 25 MG/1
25 TABLET ORAL 3 TIMES DAILY PRN
Qty: 21 TABLET | Refills: 0 | Status: SHIPPED | OUTPATIENT
Start: 2019-03-04 | End: 2019-06-27

## 2019-03-04 ASSESSMENT — ANXIETY QUESTIONNAIRES
5. BEING SO RESTLESS THAT IT IS HARD TO SIT STILL: NOT AT ALL
GAD7 TOTAL SCORE: 0
3. WORRYING TOO MUCH ABOUT DIFFERENT THINGS: NOT AT ALL
1. FEELING NERVOUS, ANXIOUS, OR ON EDGE: NOT AT ALL
2. NOT BEING ABLE TO STOP OR CONTROL WORRYING: NOT AT ALL
IF YOU CHECKED OFF ANY PROBLEMS ON THIS QUESTIONNAIRE, HOW DIFFICULT HAVE THESE PROBLEMS MADE IT FOR YOU TO DO YOUR WORK, TAKE CARE OF THINGS AT HOME, OR GET ALONG WITH OTHER PEOPLE: NOT DIFFICULT AT ALL
6. BECOMING EASILY ANNOYED OR IRRITABLE: NOT AT ALL
7. FEELING AFRAID AS IF SOMETHING AWFUL MIGHT HAPPEN: NOT AT ALL

## 2019-03-04 ASSESSMENT — PAIN SCALES - GENERAL: PAINLEVEL: NO PAIN (0)

## 2019-03-04 ASSESSMENT — PATIENT HEALTH QUESTIONNAIRE - PHQ9
5. POOR APPETITE OR OVEREATING: NOT AT ALL
SUM OF ALL RESPONSES TO PHQ QUESTIONS 1-9: 0

## 2019-03-04 NOTE — PROGRESS NOTES
SUBJECTIVE:   Cely Ontiveros is a 73 year old female who presents to clinic today for the following health issues:    Acute Illness   Acute illness concerns: COLD/Vertigo  Onset: 4 days    Fever: no    Chills/Sweats: YES    Headache (location?): YES    Sinus Pressure:YES    Conjunctivitis:  no    Ear Pain: YES: bilateral    Rhinorrhea: YES    Congestion: YES    Sore Throat: YES     Cough: YES-non-productive    Wheeze: no     Decreased Appetite: YES    Nausea: YES    Vomiting: no     Diarrhea:  no    Dysuria/Freq.: no    Fatigue/Achiness: YES    Sick/Strep Exposure: Yes possibly      Dizziness: Yes (Vertigo)     Therapies Tried and outcome: Vicks Sinus decongestant       Problem list and histories reviewed & adjusted, as indicated.  Additional history: as documented    Patient Active Problem List   Diagnosis     Allergic rhinitis     Herpes simplex     CARDIOVASCULAR SCREENING; LDL GOAL LESS THAN 160     Degenerative joint disease     Advance care planning     Posterior vitreous detachment of both eyes     Gross hematuria     Right kidney stone     Seasonal allergic rhinitis     Hypermetropia     Astigmatism with presbyopia     Blepharitis of both eyes     Cataracts, bilateral     Plugged tear duct, od > os     Epiphora     H/O hypoglycemia     Chronic otitis externa of left ear, unspecified type     Abdominal bloating     Chronic rhinitis     Dysthymic disorder     Somatic dysfunction of pelvis region     Pelvic pain in female     Urinary urgency     Urinary frequency     Urgency incontinence     Acute pain of left knee     Meibomian gland dysfunction     Chondromalacia of patella, left     Complex tear of medial meniscus of left knee as current injury, subsequent encounter     Age-related nuclear cataract of both eyes     Pseudophakia of left eye     Pseudophakia of right eye     Past Surgical History:   Procedure Laterality Date     CATARACT IOL, RT/LT Left 01/24/2019     COMBINED CYSTOSCOPY, URETEROSCOPY, LASER  HOLMIUM LITHOTRIPSY URETER(S) Right 8/23/2018    Procedure: COMBINED CYSTOSCOPY, URETEROSCOPY, LASER HOLMIUM LITHOTRIPSY URETER(S);   Cystoscopy,Right ureteroscopy with laser lithotripsy and stent placement;  Surgeon: Pino Hawk MD;  Location: [facility]     HC ENLARGE BREAST WITH IMPLANT       HC LAP,FULGURATE/EXCISE LESIONS       HC REMOVAL OF BREAST IMPLANT       HYSTERECTOMY, PAP NO LONGER INDICATED  1998    ovaries and uterus out for benign reasons(fibroids and ovarian cyst)     PHACOEMULSIFICATION WITH STANDARD INTRAOCULAR LENS IMPLANT Left 1/24/2019    Procedure: PHACOEMULSIFICATION WITH STANDARD INTRAOCULAR LENS IMPLANT, LEFT;  Surgeon: Wagner Aguilera MD;  Location: [facility]     PHACOEMULSIFICATION WITH STANDARD INTRAOCULAR LENS IMPLANT Right 2/14/2019    Procedure: PHACOEMULSIFICATION WITH STANDARD INTRAOCULAR LENS IMPLANT, RIGHT;  Surgeon: Wagner Aguilrea MD;  Location: [facility]     SINUS SURGERY         Social History     Tobacco Use     Smoking status: Never Smoker     Smokeless tobacco: Never Used   Substance Use Topics     Alcohol use: No     Family History   Problem Relation Age of Onset     Hypertension Mother      Arthritis Mother      Cardiovascular Mother      Circulatory Mother      Heart Disease Mother      Lipids Mother      Obesity Mother      Osteoporosis Mother      Cancer Mother         skin     Glaucoma Mother      Cancer - colorectal Father      Cancer Father      Macular Degeneration Father      Diabetes No family hx of      Cerebrovascular Disease No family hx of      Thyroid Disease No family hx of          Current Outpatient Medications   Medication Sig Dispense Refill     amoxicillin-clavulanate (AUGMENTIN) 875-125 MG tablet Take 1 tablet by mouth 2 times daily for 10 days 20 tablet 0     benzonatate (TESSALON) 200 MG capsule Take 1 capsule (200 mg) by mouth 3 times daily as needed for cough 21 capsule 0     citalopram (CELEXA) 20 MG tablet Take 1 tablet (20 mg) by mouth  daily 90 tablet 3     clotrimazole-betamethasone (LOTRISONE) cream Apply topically 2 times daily 15 g 1     ketorolac (ACULAR) 0.5 % ophthalmic solution Apply 1 drop to eye 4 times daily for 21 days Start 2 days prior to surgery in operative eye. 1 Bottle 0     loratadine-pseudoePHEDrine (EQL ALLERGY/CONGESTION RELIEF)  MG per 24 hr tablet Take 1 tablet by mouth daily 90 tablet 3     meclizine (ANTIVERT) 25 MG tablet Take 1 tablet (25 mg) by mouth 3 times daily as needed for dizziness 21 tablet 0     order for DME Equipment being ordered: left knee sleeve 1 each 0     prednisoLONE acetate (PRED FORTE) 1 % ophthalmic suspension Apply 1 drop to eye 4 times daily for 21 days Start 2 days prior to surgery in operative eye. 1 Bottle 0     valACYclovir (VALTREX) 500 MG tablet Take 1 tablet (500 mg) by mouth daily 90 tablet 3     Allergies   Allergen Reactions     Sulfa Drugs Swelling     Cats Swelling     Lips swollen     Wool Fiber      BP Readings from Last 3 Encounters:   03/04/19 115/65   02/14/19 137/48   01/24/19 140/51    Wt Readings from Last 3 Encounters:   03/04/19 64.9 kg (143 lb)   01/22/19 65.3 kg (144 lb)   01/16/19 66.2 kg (146 lb)                    Reviewed and updated as needed this visit by clinical staff  Tobacco  Allergies  Meds       Reviewed and updated as needed this visit by Provider  Tobacco  Allergies  Meds  Problems  Med Hx  Surg Hx  Fam Hx         ROS:  Constitutional, HEENT, cardiovascular, pulmonary, gi and gu systems are negative, except as otherwise noted.    OBJECTIVE:     /65 (BP Location: Left arm, Patient Position: Chair, Cuff Size: Adult Regular)   Pulse 90   Temp 98  F (36.7  C) (Oral)   Resp 14   Wt 64.9 kg (143 lb)   SpO2 95%   BMI 24.55 kg/m    Body mass index is 24.55 kg/m .  GENERAL: healthy, alert and no distress  EYES: Eyes grossly normal to inspection, PERRL and conjunctivae and sclerae normal  HENT: normal cephalic/atraumatic, both ears: clear  effusion, nose and mouth without ulcers or lesions, nasal mucosa edematous , rhinorrhea yellow, oropharynx clear, oral mucous membranes moist, tonsillar erythema and sinuses: maxillary, frontal tenderness on both sides (R>L)  NECK: no adenopathy, no asymmetry, masses, or scars and thyroid normal to palpation  RESP: lungs clear to auscultation - no rales, rhonchi or wheezes  CV: regular rate and rhythm, normal S1 S2, no S3 or S4, no murmur, click or rub, no peripheral edema and peripheral pulses strong  ABDOMEN: soft, nontender, no hepatosplenomegaly, no masses and bowel sounds normal  MS: no gross musculoskeletal defects noted, no edema    Diagnostic Test Results:  none     ASSESSMENT/PLAN:     1. Acute maxillary sinusitis, recurrence not specified  Treatment as below.    - meclizine (ANTIVERT) 25 MG tablet; Take 1 tablet (25 mg) by mouth 3 times daily as needed for dizziness  Dispense: 21 tablet; Refill: 0  - amoxicillin-clavulanate (AUGMENTIN) 875-125 MG tablet; Take 1 tablet by mouth 2 times daily for 10 days  Dispense: 20 tablet; Refill: 0  - benzonatate (TESSALON) 200 MG capsule; Take 1 capsule (200 mg) by mouth 3 times daily as needed for cough  Dispense: 21 capsule; Refill: 0    Patient Instructions   In addition to prescriptions, can continue your over the counter cold medication that you showed me today.  I also recommend Mucinex (gauifenesin)  600 mg twice a day as needed to help your sinuses drain.    Take your antibiotic with food to avoid stomach upset.  I recommend you also take an over the counter probiotic to avoid antibiotic-associated diarrhea.        AUDREY [facility]

## 2019-03-04 NOTE — PATIENT INSTRUCTIONS
In addition to prescriptions, can continue your over the counter cold medication that you showed me today.  I also recommend Mucinex (gauifenesin)  600 mg twice a day as needed to help your sinuses drain.    Take your antibiotic with food to avoid stomach upset.  I recommend you also take an over the counter probiotic to avoid antibiotic-associated diarrhea.

## 2019-03-05 ENCOUNTER — TELEPHONE (OUTPATIENT)
Dept: FAMILY MEDICINE | Facility: CLINIC | Age: 74
End: 2019-03-05

## 2019-03-05 DIAGNOSIS — J01.00 ACUTE NON-RECURRENT MAXILLARY SINUSITIS: Primary | ICD-10-CM

## 2019-03-05 RX ORDER — AMOXICILLIN 500 MG/1
500 CAPSULE ORAL 3 TIMES DAILY
Qty: 30 CAPSULE | Refills: 0 | Status: SHIPPED | OUTPATIENT
Start: 2019-03-05 | End: 2019-05-17

## 2019-03-05 ASSESSMENT — ANXIETY QUESTIONNAIRES: GAD7 TOTAL SCORE: 0

## 2019-03-05 NOTE — TELEPHONE ENCOUNTER
Will change to Amoxicillin three times a day for 10 days.  Sent to pharmacy.  Please notify patient.

## 2019-03-05 NOTE — TELEPHONE ENCOUNTER
Reason for call:  Medication   If this is a refill request, has the caller requested the refill from the pharmacy already? No  Will the patient be using a [facility]? yes  Name of the pharmacy and phone number for the current request: [facility] pharmacy     Other request:   The medication given is making her feel jittery and couldn't sleep, makes stomach hurt. She stopped taking it and was told to let you know if this happens and you could try something else  Call to advise    Years ago amoxicillin was ok    Phone number to reach patient:  Home number on file [phone] (home)    Best Time:  Call after noon    Can we leave a detailed message on this number?  YES

## 2019-03-13 ENCOUNTER — OFFICE VISIT (OUTPATIENT)
Dept: OPTOMETRY | Facility: CLINIC | Age: 74
End: 2019-03-13
Payer: COMMERCIAL

## 2019-03-13 ENCOUNTER — TELEPHONE (OUTPATIENT)
Dept: OPTOMETRY | Facility: CLINIC | Age: 74
End: 2019-03-13

## 2019-03-13 DIAGNOSIS — Z96.1 PSEUDOPHAKIA OF BOTH EYES: Primary | ICD-10-CM

## 2019-03-13 PROCEDURE — 99024 POSTOP FOLLOW-UP VISIT: CPT | Performed by: OPTOMETRIST

## 2019-03-13 ASSESSMENT — REFRACTION_MANIFEST
OD_SPHERE: -0.75
OD_ADD: +2.50
OS_ADD: +2.50
OD_CYLINDER: +0.75
OS_AXIS: 010
OS_CYLINDER: +1.00
OD_AXIS: 035
OS_SPHERE: -0.50

## 2019-03-13 ASSESSMENT — EXTERNAL EXAM - LEFT EYE
OS_EXAM: NORMAL
OS_EXAM: NORMAL

## 2019-03-13 ASSESSMENT — EXTERNAL EXAM - RIGHT EYE
OD_EXAM: NORMAL
OD_EXAM: NORMAL

## 2019-03-13 ASSESSMENT — VISUAL ACUITY
METHOD: SNELLEN - LINEAR
METHOD: SNELLEN - LINEAR
OD_SC+: -2
OS_SC: 20/30
OD_SC: 20/40
OD_SC+: -1
OS_SC: 20/40
OD_SC: 20/40
OS_SC+: -1

## 2019-03-13 ASSESSMENT — REFRACTION_WEARINGRX
OD_CYLINDER: +0.75
OS_SPHERE: +0.50
OS_CYLINDER: +0.75
OS_AXIS: 010
OD_ADD: +2.50
OS_ADD: +2.50
OD_AXIS: 010
OD_SPHERE: +0.75

## 2019-03-13 ASSESSMENT — SLIT LAMP EXAM - LIDS
COMMENTS: MEIBOMIAN GLAND DYSFUNCTION, DERMATOCHALASIS
COMMENTS: NORMAL
COMMENTS: NORMAL
COMMENTS: MEIBOMIAN GLAND DYSFUNCTION, DERMATOCHALASIS

## 2019-03-13 ASSESSMENT — TONOMETRY
OS_IOP_MMHG: 21
IOP_METHOD: TONOPEN
OD_IOP_MMHG: 17

## 2019-03-13 ASSESSMENT — CUP TO DISC RATIO
OS_RATIO: 0.3
OD_RATIO: 0.2

## 2019-03-13 NOTE — PROGRESS NOTES
Chief Complaint   Patient presents with     Eye Problem Right Eye     Seeing oval glare after dilation this am      Laterality:  right eye     Comments: Seeing oval glare after dilation this am             Medical, surgical and family histories reviewed and updated 3/13/2019.       OBJECTIVE: See Ophthalmology exam    ASSESSMENT:    ICD-10-CM    1. Pseudophakia of both eyes Z96.1 POST-OP FOLLOW-UP VISIT      PLAN:     Patient Instructions   Your right eye pupil is taking a little longer to get back to normal.  There is nothing serious wrong with the eye.    Return in 1 year for a complete eye exam or sooner if needed.    Brandt Richardson, OD

## 2019-03-13 NOTE — PATIENT INSTRUCTIONS
Eyeglass prescription given.    Continue artificial tears 2-4 x day.    Return in 1 year for a complete eye exam and glaucoma testing or sooner if needed.    Brandt Richardson, OD

## 2019-03-13 NOTE — TELEPHONE ENCOUNTER
[facility] Call Center    Phone Message  Patient was seen this morning at 940 had her eyes dilated, she would like to come in, she states her right eye is still blurry -     May a detailed message be left on voicemail: yes    Reason for Call: Other: Patient was seen this morning at 940 had her eyes dilated, she would like to come in, she states her right eye is still blurry -      Action Taken: Message routed to:  Adult Clinics: Eye p [id number]

## 2019-03-13 NOTE — PATIENT INSTRUCTIONS
Your right eye pupil is taking a little longer to get back to normal.  There is nothing serious wrong with the eye.    Return in 1 year for a complete eye exam or sooner if needed.    Brandt Richardson, OD

## 2019-03-13 NOTE — PROGRESS NOTES
CHIEF COMPLAINT:   Chief Complaint   Patient presents with     Cataract Follow-Up     Final refraction       Type of surgery cataract   Date of surgery 2- od eye                             1- os eye    Shruthi Cantu, Optometric Assistant, A.B.O.C.     Drops reviewed.    OBJECTIVE:     See ophthalmology exam    ASSESSMENT:         ICD-10-CM    1. Pseudophakia of both eyes Z96.1      PLAN:      Patient Instructions   Eyeglass prescription given.    Continue artificial tears 2-4 x day.    Return in 1 year for a complete eye exam and glaucoma testing or sooner if needed.    Brandt Richardson, OD

## 2019-03-14 DIAGNOSIS — J01.00 ACUTE MAXILLARY SINUSITIS, RECURRENCE NOT SPECIFIED: ICD-10-CM

## 2019-03-14 DIAGNOSIS — J01.00 ACUTE NON-RECURRENT MAXILLARY SINUSITIS: ICD-10-CM

## 2019-03-14 NOTE — TELEPHONE ENCOUNTER
"Requested Prescriptions   Pending Prescriptions Disp Refills     amoxicillin (AMOXIL) 500 MG capsule  Last Written Prescription Date:  03/05/19  Last Fill Quantity: 30,  # refills: 0   [place] [facility] [place] [facility] or [facility] prescribing provider:  03/04/19- [facility]  Future Office Visit:    30 capsule 0     Sig: Take 1 capsule (500 mg) by mouth 3 times daily    Oral Acne/Rosacea Medications Protocol Failed - 3/14/2019  8:59 AM       Failed - Confirmation of diagnosis is required    Please confirm diagnosis is acne or rosacea.     If NOT acne or rosacea; refer request to provider for further evaluation.    If diagnosis IS acne or rosacea, OK to refill BASED ON PREVIOUS REFILL CLINICAL NOTE RECOMMENDATION.         Passed - Patient is 12 years of age or older       Passed - Recent (12 mo) or future (30 days) visit within the authorizing provider's specialty    Patient had office visit in the last 12 months or has a visit in the next 30 days with authorizing provider or within the authorizing provider's specialty.  See \"Patient Info\" tab in inbasket, or \"Choose Columns\" in Meds & Orders section of the refill encounter.             Passed - Medication is active on med list       Passed - No active pregnancy on record       Passed - No positive prenancy test is past 12 months        meclizine (ANTIVERT) 25 MG tablet  Last Written Prescription Date:  03/04/19  Last Fill Quantity: 21,  # refills: 0   [place] [facility] [place] [facility] or [facility] prescribing provider:  03/04/19-[facility]   Future Office Visit:    21 tablet 0     Sig: Take 1 tablet (25 mg) by mouth 3 times daily as needed for dizziness     Antivertigo/Antiemetic Agents Passed - 3/14/2019  8:59 AM       Passed - Recent (12 mo) or future (30 days) visit within the authorizing provider's specialty    Patient had office visit in the last 12 months or has a visit in the next 30 days with authorizing provider or within the authorizing provider's specialty.  See " "\"Patient Info\" tab in inbasket, or \"Choose Columns\" in Meds & Orders section of the refill encounter.             Passed - Medication is active on med list       Passed - Patient is 18 years of age or older          "

## 2019-03-14 NOTE — TELEPHONE ENCOUNTER
Reason for Call:  Other prescription    Detailed comments, patient was recently seen for sinus issue and vertigo and still has these symptoms and asking if she could get another refill for amoxicillin and meclizine?    Phone Number Patient can be reached at: Home number on file [phone] (home)    Best Time: any    Can we leave a detailed message on this number? YES    Call taken on 3/14/2019 at 8:43 AM by Rosy Junior

## 2019-03-18 RX ORDER — AMOXICILLIN 500 MG/1
500 CAPSULE ORAL 3 TIMES DAILY
Qty: 30 CAPSULE | Refills: 0 | OUTPATIENT
Start: 2019-03-18

## 2019-03-18 RX ORDER — MECLIZINE HYDROCHLORIDE 25 MG/1
25 TABLET ORAL 3 TIMES DAILY PRN
Qty: 21 TABLET | Refills: 0 | OUTPATIENT
Start: 2019-03-18

## 2019-03-18 NOTE — TELEPHONE ENCOUNTER
Routing refill request to provider for review/approval because: Amoxicillin (Amoxil) 500 mg   NOT acne or rosacea    Routing refill request to provider for review/approval because:  Associated diagnosis is recurring sinusitis          Qi Diaz RN

## 2019-03-18 NOTE — TELEPHONE ENCOUNTER
Called and spoke to the patient and gave her Mara's message. Patient states that it is somewhat better and will give it a few more days and if not cleared up will call to schedule an appointment.  Leslie Louis MA/  For Teams Treva

## 2019-05-06 ENCOUNTER — TELEPHONE (OUTPATIENT)
Dept: ORTHOPEDICS | Facility: CLINIC | Age: 74
End: 2019-05-06

## 2019-05-06 NOTE — TELEPHONE ENCOUNTER
Type of surgery: Left knee arthroscopy, medial and chondral debridement CPT code 53163  Tear of medial meniscus of left knee, current, unspecified tear type, subsequent encounter [S83.242D]  - Primary   Location of surgery: [facility]  Date and time of surgery: 5-30-19  Surgeon: Dr Singh  Pre-Op Appt Date: 5-13-19  Post-Op Appt Date: 6-12-19   Packet sent out: Yes  Pre-cert/Authorization completed: No prior auth required per [facility] list.   Date: 05/06/2019    Insurance valid.

## 2019-05-07 ENCOUNTER — HOSPITAL ENCOUNTER (OUTPATIENT)
Facility: AMBULATORY SURGERY CENTER | Age: 74
End: 2019-05-07
Attending: ORTHOPAEDIC SURGERY | Admitting: ORTHOPAEDIC SURGERY
Payer: COMMERCIAL

## 2019-05-08 RX ORDER — CEFAZOLIN SODIUM 2 G/100ML
2 INJECTION, SOLUTION INTRAVENOUS
Status: CANCELLED | OUTPATIENT
Start: 2019-05-08

## 2019-05-08 RX ORDER — CEFAZOLIN SODIUM 1 G/3ML
1 INJECTION, POWDER, FOR SOLUTION INTRAMUSCULAR; INTRAVENOUS SEE ADMIN INSTRUCTIONS
Status: CANCELLED | OUTPATIENT
Start: 2019-05-08

## 2019-05-08 NOTE — TELEPHONE ENCOUNTER
I spoke to Cely and she wanted us to know that she decided against having the knee arthroscopy on 5/30/19. We discussed treatment options going forward. She was appreciative of the call.    Jake Johnson PA-C, [facility] (Ortho)  Supervising Physician: Nic Singh M.D., M.S.  Dept. of Orthopaedic Surgery  [facility]

## 2019-05-08 NOTE — TELEPHONE ENCOUNTER
Reason for Call:  Other     Detailed comments: Patient decided to cancel surgery and wants to know when she can get next cortisone shots.    Phone Number Patient can be reached at: Home number on file [phone] (home)    Best Time: any    Can we leave a detailed message on this number? YES    Call taken on 5/8/2019 at 1:29 PM by Katherin Escalona

## 2019-05-17 ENCOUNTER — TELEPHONE (OUTPATIENT)
Dept: FAMILY MEDICINE | Facility: CLINIC | Age: 74
End: 2019-05-17

## 2019-05-17 ENCOUNTER — OFFICE VISIT (OUTPATIENT)
Dept: FAMILY MEDICINE | Facility: CLINIC | Age: 74
End: 2019-05-17
Payer: COMMERCIAL

## 2019-05-17 ENCOUNTER — ANCILLARY PROCEDURE (OUTPATIENT)
Dept: GENERAL RADIOLOGY | Facility: CLINIC | Age: 74
End: 2019-05-17
Attending: NURSE PRACTITIONER
Payer: COMMERCIAL

## 2019-05-17 VITALS
OXYGEN SATURATION: 96 % | HEIGHT: 64 IN | RESPIRATION RATE: 16 BRPM | DIASTOLIC BLOOD PRESSURE: 70 MMHG | WEIGHT: 144 LBS | TEMPERATURE: 97.8 F | SYSTOLIC BLOOD PRESSURE: 144 MMHG | HEART RATE: 81 BPM | BODY MASS INDEX: 24.59 KG/M2

## 2019-05-17 DIAGNOSIS — R93.89 ABNORMAL X-RAY: Primary | ICD-10-CM

## 2019-05-17 DIAGNOSIS — Z12.31 VISIT FOR SCREENING MAMMOGRAM: ICD-10-CM

## 2019-05-17 DIAGNOSIS — S69.92XA FINGER INJURY, LEFT, INITIAL ENCOUNTER: ICD-10-CM

## 2019-05-17 DIAGNOSIS — M53.3 SI (SACROILIAC) JOINT DYSFUNCTION: ICD-10-CM

## 2019-05-17 DIAGNOSIS — M77.11 LATERAL EPICONDYLITIS OF RIGHT ELBOW: ICD-10-CM

## 2019-05-17 DIAGNOSIS — N64.59 INVERSION OF NIPPLE: Primary | ICD-10-CM

## 2019-05-17 PROCEDURE — 99214 OFFICE O/P EST MOD 30 MIN: CPT | Performed by: NURSE PRACTITIONER

## 2019-05-17 PROCEDURE — 73140 X-RAY EXAM OF FINGER(S): CPT | Mod: LT

## 2019-05-17 ASSESSMENT — MIFFLIN-ST. JEOR: SCORE: 1133.18

## 2019-05-17 ASSESSMENT — PAIN SCALES - GENERAL: PAINLEVEL: NO PAIN (1)

## 2019-05-17 NOTE — PROGRESS NOTES
SUBJECTIVE:   Cely Ontiveros is a 73 year old female who presents to clinic today for the following   health issues:      Joint Pain    Onset: 04/12/2019    Description:   Location: left pinky finger  Character: Sharp and Dull ache    Intensity: mild    Progression of Symptoms: better    Accompanying Signs & Symptoms:  Other symptoms: swelling    History:   Previous similar pain: no       Precipitating factors:   Trauma or overuse: YES    Alleviating factors:  Improved by: nothing    Therapies Tried and outcome: Ice: no relieve  Dropped suit case on in a month ago.  Still swollen and tender over DIP.  ROM intact but flexion slightly painful    Concern - Breast problem  Onset: noticed 1 week ago    Description:   Inverted nipple: left breast     Intensity: mild    Progression of Symptoms:  worsening    Accompanying Signs & Symptoms:  None    Previous history of similar problem:   None    Precipitating factors:   Worsened by: None    Alleviating factors:  Improved by: None    Therapies Tried and outcome: None  History of surgery on both sides-breast implants then removal.  2013 had US of left breast with no abnormal finding.  Nipple inversion on left side is not new but it is worse over the last week. Last mammogram was 2017 and was normal.    Has SI joint dysfunction for which she sees chiropractor.  Helps but he has suggested strengthening with physical therapy.    Also complains of tennis elbow.  Chiropractor helps with this also.  Patient ices.  She would like to try brace.     Additional history: as documented    Reviewed  and updated as needed this visit by clinical staff  Tobacco  Allergies  Meds  Problems  Med Hx  Surg Hx  Fam Hx  Soc Hx          Reviewed and updated as needed this visit by Provider  Tobacco  Allergies  Meds  Problems  Med Hx  Surg Hx  Fam Hx         Patient Active Problem List   Diagnosis     Allergic rhinitis     Herpes simplex     CARDIOVASCULAR SCREENING; LDL GOAL LESS THAN  160     Degenerative joint disease     Advance care planning     Posterior vitreous detachment of both eyes     Gross hematuria     Right kidney stone     Seasonal allergic rhinitis     Hypermetropia     Astigmatism with presbyopia     Blepharitis of both eyes     Cataracts, bilateral     Plugged tear duct, od > os     Epiphora     H/O hypoglycemia     Chronic otitis externa of left ear, unspecified type     Abdominal bloating     Chronic rhinitis     Dysthymic disorder     Somatic dysfunction of pelvis region     Pelvic pain in female     Urinary urgency     Urinary frequency     Urgency incontinence     Acute pain of left knee     Meibomian gland dysfunction     Chondromalacia of patella, left     Complex tear of medial meniscus of left knee as current injury, subsequent encounter     Age-related nuclear cataract of both eyes     Pseudophakia of left eye     Pseudophakia of right eye     Pseudophakia of both eyes     Past Surgical History:   Procedure Laterality Date     CATARACT IOL, RT/LT Left 01/24/2019     COMBINED CYSTOSCOPY, URETEROSCOPY, LASER HOLMIUM LITHOTRIPSY URETER(S) Right 8/23/2018    Procedure: COMBINED CYSTOSCOPY, URETEROSCOPY, LASER HOLMIUM LITHOTRIPSY URETER(S);   Cystoscopy,Right ureteroscopy with laser lithotripsy and stent placement;  Surgeon: Pino Hawk MD;  Location: [facility]      ENLARGE BREAST WITH IMPLANT       HC LAP,FULGURATE/EXCISE LESIONS       HC REMOVAL OF BREAST IMPLANT       HYSTERECTOMY, PAP NO LONGER INDICATED  1998    ovaries and uterus out for benign reasons(fibroids and ovarian cyst)     PHACOEMULSIFICATION WITH STANDARD INTRAOCULAR LENS IMPLANT Left 1/24/2019    Procedure: PHACOEMULSIFICATION WITH STANDARD INTRAOCULAR LENS IMPLANT, LEFT;  Surgeon: Wagner Aguilera MD;  Location: [facility]     PHACOEMULSIFICATION WITH STANDARD INTRAOCULAR LENS IMPLANT Right 2/14/2019    Procedure: PHACOEMULSIFICATION WITH STANDARD INTRAOCULAR LENS IMPLANT, RIGHT;  Surgeon: Ale  Wagner Richardson MD;  Location: [facility]     SINUS SURGERY         Social History     Tobacco Use     Smoking status: Never Smoker     Smokeless tobacco: Never Used   Substance Use Topics     Alcohol use: No     Family History   Problem Relation Age of Onset     Hypertension Mother      Arthritis Mother      Cardiovascular Mother      Circulatory Mother      Heart Disease Mother      Lipids Mother      Obesity Mother      Osteoporosis Mother      Cancer Mother         skin     Glaucoma Mother      Cancer - colorectal Father      Cancer Father      Macular Degeneration Father      Diabetes No family hx of      Cerebrovascular Disease No family hx of      Thyroid Disease No family hx of          Current Outpatient Medications   Medication Sig Dispense Refill     citalopram (CELEXA) 20 MG tablet Take 1 tablet (20 mg) by mouth daily 90 tablet 3     clotrimazole-betamethasone (LOTRISONE) cream Apply topically 2 times daily 15 g 1     loratadine-pseudoePHEDrine (EQL ALLERGY/CONGESTION RELIEF)  MG per 24 hr tablet Take 1 tablet by mouth daily 90 tablet 3     order for DME Equipment being ordered: tennis elbow brace 1 each 0     valACYclovir (VALTREX) 500 MG tablet Take 1 tablet (500 mg) by mouth daily 90 tablet 3     benzonatate (TESSALON) 200 MG capsule Take 1 capsule (200 mg) by mouth 3 times daily as needed for cough (Patient not taking: Reported on 5/17/2019) 21 capsule 0     meclizine (ANTIVERT) 25 MG tablet Take 1 tablet (25 mg) by mouth 3 times daily as needed for dizziness (Patient not taking: Reported on 5/17/2019) 21 tablet 0     order for DME Equipment being ordered: left knee sleeve (Patient not taking: Reported on 5/17/2019) 1 each 0     Allergies   Allergen Reactions     Sulfa Drugs Swelling     Cats Swelling     Lips swollen     Wool Fiber      Recent Labs   Lab Test 03/08/17  0856 10/07/15  0753 08/29/14  0725 06/04/13  0821 06/04/13  0820  12/27/12  1452   LDL  --  109 91  --  92  --   --    HDL  --  81 85  " --  60  --   --    TRIG  --  66 58  --  58  --   --    ALT  --   --   --  25  --   --  33   CR 0.72  --   --  0.64  --    < >  --    GFRESTIMATED 79  --   --  >90  --    < >  --    GFRESTBLACK >90   GFR Calc    --   --  >90  --    < >  --    POTASSIUM 4.2  --   --  4.2  --   --   --    TSH 0.81  --   --   --  1.03  --   --     < > = values in this interval not displayed.        ROS:  Constitutional, HEENT, cardiovascular, pulmonary, gi and gu systems are negative, except as otherwise noted.    OBJECTIVE:     /70 (BP Location: Left arm, Patient Position: Chair, Cuff Size: Adult Regular)   Pulse 81   Temp 97.8  F (36.6  C) (Oral)   Resp 16   Ht 1.626 m (5' 4\")   Wt 65.3 kg (144 lb)   LMP  (LMP Unknown)   SpO2 96%   Breastfeeding? No   BMI 24.72 kg/m    Body mass index is 24.72 kg/m .  GENERAL: healthy, alert and no distress  NECK: no adenopathy, no asymmetry, masses, or scars and thyroid normal to palpation  BREAST:  Left nipple inversion noted.  I am able to correct this and nipple stays everted.  No nipple discharge, skin changes, or masses noted on either side.  RESP: lungs clear to auscultation - no rales, rhonchi or wheezes  CV: regular rate and rhythm, normal S1 S2, no S3 or S4, no murmur, click or rub, no peripheral edema and peripheral pulses strong  ABDOMEN: soft, nontender, no hepatosplenomegaly, no masses and bowel sounds normal  MS: left little finger TTP over DIP with swelling noted.  mildly decreased ROM in flexion  SKIN: no suspicious lesions or rashes    Diagnostic Test Results:  Xray - pending    ASSESSMENT/PLAN:     1. Inversion of nipple  I am able to correct this on exam today but will do imaging as below.    - MA Diagnostic Digital Bilateral; Future  - US Breast Left Complete 4 Quadrants; Future    2. SI (sacroiliac) joint dysfunction  Patient would like to try physical therapy.   - [facility]; Future    3. Lateral epicondylitis of right " elbow  Trial of brace.  Continue with icing.  Rest from activities that hurt.   - order for DME; Equipment being ordered: tennis elbow brace  Dispense: 1 each; Refill: 0    4. Finger injury, left, initial encounter  Xray to rule out fracture.  Follow up plan based on results.   - XR Finger Left G/E 2 Views; Future    5. Visit for screening mammogram  - MA Diagnostic Digital Bilateral; Future    ADUREY Mcneil CNP  [facility]

## 2019-05-17 NOTE — TELEPHONE ENCOUNTER
Please call patient.  Her finger xray shows possible fracture.  I would like her to see ortho to discuss further.  I will have them reach out to schedule her for next week. In meantime, ice, avoid activities that cause pain. Thanks!  Mara

## 2019-05-17 NOTE — TELEPHONE ENCOUNTER
Patient contacted and informed of the below per provider documentation. Patient verbalizes understanding.     Demi Dorado RN

## 2019-05-20 ENCOUNTER — TELEPHONE (OUTPATIENT)
Dept: ORTHOPEDICS | Facility: CLINIC | Age: 74
End: 2019-05-20

## 2019-05-20 NOTE — TELEPHONE ENCOUNTER
[facility] Call Center    Phone Message    May a detailed message be left on voicemail: yes    Reason for Call: Other: Pt would like a call back to discuss being worked into sports med clinic for a pinky finger fracture. Pt states to please call after 3pm today. Writer unable to schedule, due to no provider availability. Please advise.      Action Taken: Message routed to:  Adult Clinics: Sports Medicine p [phone]

## 2019-05-20 NOTE — TELEPHONE ENCOUNTER
Call to patient and left voicemail to return call regarding getting her scheduled for finger fracture. Awaiting call back. Audelia Dumont RN

## 2019-05-23 ENCOUNTER — OFFICE VISIT (OUTPATIENT)
Dept: ORTHOPEDICS | Facility: CLINIC | Age: 74
End: 2019-05-23
Payer: COMMERCIAL

## 2019-05-23 ENCOUNTER — ANCILLARY PROCEDURE (OUTPATIENT)
Dept: GENERAL RADIOLOGY | Facility: CLINIC | Age: 74
End: 2019-05-23
Attending: FAMILY MEDICINE
Payer: COMMERCIAL

## 2019-05-23 VITALS — WEIGHT: 144 LBS | BODY MASS INDEX: 24.59 KG/M2 | HEIGHT: 64 IN

## 2019-05-23 DIAGNOSIS — M79.645 FINGER PAIN, LEFT: Primary | ICD-10-CM

## 2019-05-23 DIAGNOSIS — M79.645 FINGER PAIN, LEFT: ICD-10-CM

## 2019-05-23 DIAGNOSIS — S62.667A CLOSED NONDISPLACED FRACTURE OF DISTAL PHALANX OF LEFT LITTLE FINGER, INITIAL ENCOUNTER: Primary | ICD-10-CM

## 2019-05-23 PROCEDURE — 73140 X-RAY EXAM OF FINGER(S): CPT | Mod: LT | Performed by: RADIOLOGY

## 2019-05-23 PROCEDURE — 99214 OFFICE O/P EST MOD 30 MIN: CPT | Performed by: FAMILY MEDICINE

## 2019-05-23 ASSESSMENT — MIFFLIN-ST. JEOR: SCORE: 1133.18

## 2019-05-23 NOTE — PROGRESS NOTES
"      [facility]  5/23/2019    Cely Ontiveros's chief complaint for this visit includes:  Chief Complaint   Patient presents with     Consult     left 5th digit possible fracture, DOI 4/12/19 injury occured.      PCP: Mara Varma    Referring Provider:  No referring provider defined for this encounter.    Ht 1.626 m (5' 4\")   Wt 65.3 kg (144 lb)   LMP  (LMP Unknown)   BMI 24.72 kg/m    Data Unavailable       Reason for visit:     What part of your body is injured / painful?  left small finger    What caused the injury /pain? The patient was pulling her suitcase when it tipped and her finger got caught on the handle.     How long ago did your injury occur or pain begin? about a month ago    What are your most bothersome symptoms? Pain    How would you characterize your symptom?  aching    What makes your symptoms better? Rest    What makes your symptoms worse? Movement    Have you been previously seen for this problem? No    Medical History:    Any recent changes to your medical history? No    Any new medication prescribed since last visit? No    Have you had surgery on this body part before? No    Social History:    Occupation: Retired     Handedness: Right    Review of Systems:    Do you have fever, chills, weight loss? No    Do you have any vision problems? No    Do you have any chest pain or edema? No    Do you have any shortness of breath or wheezing?  No    Do you have stomach problems? No    Do you have any numbness or focal weakness? No    Do you have diabetes? No    Do you have problems with bleeding or clotting? No    Do you have an rashes or other skin lesions? No       CHIEF COMPLAINT:  Consult (left 5th digit possible fracture, DOI 4/12/19 injury occured. )       HISTORY OF PRESENT ILLNESS  Ms. Ontiveros is a pleasant 73 year old year old female who presents to clinic today with a finger issue.  She is seen at the request of Mara Varma.    Cely injured her finger on " April 12.  She was visiting her family in [place] when she her luggage strap became twisted in her hand.  This caught on her fifth finger, causing immediate pain in the distal aspect.  She noticed some pain afterwards and some swelling but was able to continue on with her trip without difficulty.  She was seen about a week ago by her primary care office and referred after a x-ray showed a possible fracture.  Her pain is improved, but persistent over the radial aspect of her finger.  She does have some swelling.      Additional history: as documented    MEDICAL HISTORY  Patient Active Problem List   Diagnosis     Allergic rhinitis     Herpes simplex     CARDIOVASCULAR SCREENING; LDL GOAL LESS THAN 160     Degenerative joint disease     Advance care planning     Posterior vitreous detachment of both eyes     Gross hematuria     Right kidney stone     Seasonal allergic rhinitis     Hypermetropia     Astigmatism with presbyopia     Blepharitis of both eyes     Cataracts, bilateral     Plugged tear duct, od > os     Epiphora     H/O hypoglycemia     Chronic otitis externa of left ear, unspecified type     Abdominal bloating     Chronic rhinitis     Dysthymic disorder     Somatic dysfunction of pelvis region     Pelvic pain in female     Urinary urgency     Urinary frequency     Urgency incontinence     Acute pain of left knee     Meibomian gland dysfunction     Chondromalacia of patella, left     Complex tear of medial meniscus of left knee as current injury, subsequent encounter     Age-related nuclear cataract of both eyes     Pseudophakia of left eye     Pseudophakia of right eye     Pseudophakia of both eyes       Current Outpatient Medications   Medication Sig Dispense Refill     benzonatate (TESSALON) 200 MG capsule Take 1 capsule (200 mg) by mouth 3 times daily as needed for cough (Patient not taking: Reported on 5/17/2019) 21 capsule 0     citalopram (CELEXA) 20 MG tablet Take 1 tablet (20 mg) by mouth daily  "90 tablet 3     clotrimazole-betamethasone (LOTRISONE) cream Apply topically 2 times daily 15 g 1     loratadine-pseudoePHEDrine (EQL ALLERGY/CONGESTION RELIEF)  MG per 24 hr tablet Take 1 tablet by mouth daily 90 tablet 3     meclizine (ANTIVERT) 25 MG tablet Take 1 tablet (25 mg) by mouth 3 times daily as needed for dizziness (Patient not taking: Reported on 5/17/2019) 21 tablet 0     order for DME Equipment being ordered: tennis elbow brace 1 each 0     order for DME Equipment being ordered: left knee sleeve (Patient not taking: Reported on 5/17/2019) 1 each 0     valACYclovir (VALTREX) 500 MG tablet Take 1 tablet (500 mg) by mouth daily 90 tablet 3       Allergies   Allergen Reactions     Sulfa Drugs Swelling     Cats Swelling     Lips swollen     Wool Fiber        Family History   Problem Relation Age of Onset     Hypertension Mother      Arthritis Mother      Cardiovascular Mother      Circulatory Mother      Heart Disease Mother      Lipids Mother      Obesity Mother      Osteoporosis Mother      Cancer Mother         skin     Glaucoma Mother      Cancer - colorectal Father      Cancer Father      Macular Degeneration Father      Diabetes No family hx of      Cerebrovascular Disease No family hx of      Thyroid Disease No family hx of        Additional medical/Social/Surgical histories reviewed in [facility] and updated as appropriate.          PHYSICAL EXAM  Vitals:    05/23/19 1124   Weight: 65.3 kg (144 lb)   Height: 1.626 m (5' 4\")     General  - normal appearance, in no obvious distress  CV  - normal radial pulse  Pulm  - normal respiratory pattern, non-labored  Musculoskeletal - left 5th finger  - inspection: DIP swelling  - palpation: mildly tender DIP medially  - ROM:  MCP 90 deg flexion   0 deg extension    deg flexion   0 deg extension   DIP 80 deg flexion   0 deg extension  - strength: 5/5  strength, 5/5 wrist abduction, 5/5 flexion, extension, pronation, supination, adduction  Neuro  - " no numbness, no motor deficit, grossly normal coordination, normal muscle tone  Skin  - no ecchymosis, erythema, warmth, or induration, no obvious rash  Psych  - interactive, appropriate, normal mood and affect             ASSESSMENT & PLAN  Ms. Ontiveros is a 73 year old year old female who presents to clinic today with pain in her left 5th finger.    I ordered and reviewed a repeat xray of her 5th finger, this does show joint space narrowing and subchondral sclerosis of the DIP joint of her fifth finger.  Also present is a possible, subtle radio lucent line at the radial aspect of the distal phalanx that could represent a healed fracture.    We luana taped to Cely's finger for comfort.  She should also wear her splint at night, if effective.    I am referring her to hand therapy for range of motion and strengthening exercises, and to build a custom splint, if needed.    Thank you for allowing me to participate in Cely's care.    Luis Le DO, CAQSM  Primary Care Sports Medicine

## 2019-05-23 NOTE — LETTER
"    5/23/2019         RE: Cely Ontiveros  [address] [place] [address]        Dear Colleague,    Thank you for referring your patient, Cely Ontiveros, to the [facility]. Please see a copy of my visit note below.          [facility]  5/23/2019    Cely Ontiveros's chief complaint for this visit includes:  Chief Complaint   Patient presents with     Consult     left 5th digit possible fracture, DOI 4/12/19 injury occured.      PCP: Mara Varma    Referring Provider:  No referring provider defined for this encounter.    Ht 1.626 m (5' 4\")   Wt 65.3 kg (144 lb)   LMP  (LMP Unknown)   BMI 24.72 kg/m     Data Unavailable       Reason for visit:     What part of your body is injured / painful?  left small finger    What caused the injury /pain? The patient was pulling her suitcase when it tipped and her finger got caught on the handle.     How long ago did your injury occur or pain begin? about a month ago    What are your most bothersome symptoms? Pain    How would you characterize your symptom?  aching    What makes your symptoms better? Rest    What makes your symptoms worse? Movement    Have you been previously seen for this problem? No    Medical History:    Any recent changes to your medical history? No    Any new medication prescribed since last visit? No    Have you had surgery on this body part before? No    Social History:    Occupation: Retired     Handedness: Right    Review of Systems:    Do you have fever, chills, weight loss? No    Do you have any vision problems? No    Do you have any chest pain or edema? No    Do you have any shortness of breath or wheezing?  No    Do you have stomach problems? No    Do you have any numbness or focal weakness? No    Do you have diabetes? No    Do you have problems with bleeding or clotting? No    Do you have an rashes or other skin lesions? No       CHIEF COMPLAINT:  Consult (left 5th digit possible " fracture, DOI 4/12/19 injury occured. )       HISTORY OF PRESENT ILLNESS  Ms. Ontiveros is a pleasant 73 year old year old female who presents to clinic today with a finger issue.  She is seen at the request of Mara Varma.    Cely injured her finger on April 12.  She was visiting her family in [place] when she her luggage strap became twisted in her hand.  This caught on her fifth finger, causing immediate pain in the distal aspect.  She noticed some pain afterwards and some swelling but was able to continue on with her trip without difficulty.  She was seen about a week ago by her primary care office and referred after a x-ray showed a possible fracture.  Her pain is improved, but persistent over the radial aspect of her finger.  She does have some swelling.      Additional history: as documented    MEDICAL HISTORY  Patient Active Problem List   Diagnosis     Allergic rhinitis     Herpes simplex     CARDIOVASCULAR SCREENING; LDL GOAL LESS THAN 160     Degenerative joint disease     Advance care planning     Posterior vitreous detachment of both eyes     Gross hematuria     Right kidney stone     Seasonal allergic rhinitis     Hypermetropia     Astigmatism with presbyopia     Blepharitis of both eyes     Cataracts, bilateral     Plugged tear duct, od > os     Epiphora     H/O hypoglycemia     Chronic otitis externa of left ear, unspecified type     Abdominal bloating     Chronic rhinitis     Dysthymic disorder     Somatic dysfunction of pelvis region     Pelvic pain in female     Urinary urgency     Urinary frequency     Urgency incontinence     Acute pain of left knee     Meibomian gland dysfunction     Chondromalacia of patella, left     Complex tear of medial meniscus of left knee as current injury, subsequent encounter     Age-related nuclear cataract of both eyes     Pseudophakia of left eye     Pseudophakia of right eye     Pseudophakia of both eyes       Current Outpatient Medications   Medication Sig  "Dispense Refill     benzonatate (TESSALON) 200 MG capsule Take 1 capsule (200 mg) by mouth 3 times daily as needed for cough (Patient not taking: Reported on 5/17/2019) 21 capsule 0     citalopram (CELEXA) 20 MG tablet Take 1 tablet (20 mg) by mouth daily 90 tablet 3     clotrimazole-betamethasone (LOTRISONE) cream Apply topically 2 times daily 15 g 1     loratadine-pseudoePHEDrine (EQL ALLERGY/CONGESTION RELIEF)  MG per 24 hr tablet Take 1 tablet by mouth daily 90 tablet 3     meclizine (ANTIVERT) 25 MG tablet Take 1 tablet (25 mg) by mouth 3 times daily as needed for dizziness (Patient not taking: Reported on 5/17/2019) 21 tablet 0     order for DME Equipment being ordered: tennis elbow brace 1 each 0     order for DME Equipment being ordered: left knee sleeve (Patient not taking: Reported on 5/17/2019) 1 each 0     valACYclovir (VALTREX) 500 MG tablet Take 1 tablet (500 mg) by mouth daily 90 tablet 3       Allergies   Allergen Reactions     Sulfa Drugs Swelling     Cats Swelling     Lips swollen     Wool Fiber        Family History   Problem Relation Age of Onset     Hypertension Mother      Arthritis Mother      Cardiovascular Mother      Circulatory Mother      Heart Disease Mother      Lipids Mother      Obesity Mother      Osteoporosis Mother      Cancer Mother         skin     Glaucoma Mother      Cancer - colorectal Father      Cancer Father      Macular Degeneration Father      Diabetes No family hx of      Cerebrovascular Disease No family hx of      Thyroid Disease No family hx of        Additional medical/Social/Surgical histories reviewed in [facility] and updated as appropriate.          PHYSICAL EXAM  Vitals:    05/23/19 1124   Weight: 65.3 kg (144 lb)   Height: 1.626 m (5' 4\")     General  - normal appearance, in no obvious distress  CV  - normal radial pulse  Pulm  - normal respiratory pattern, non-labored  Musculoskeletal - left 5th finger  - inspection: DIP swelling  - palpation: mildly tender " DIP medially  - ROM:  MCP 90 deg flexion   0 deg extension    deg flexion   0 deg extension   DIP 80 deg flexion   0 deg extension  - strength: 5/5  strength, 5/5 wrist abduction, 5/5 flexion, extension, pronation, supination, adduction  Neuro  - no numbness, no motor deficit, grossly normal coordination, normal muscle tone  Skin  - no ecchymosis, erythema, warmth, or induration, no obvious rash  Psych  - interactive, appropriate, normal mood and affect             ASSESSMENT & PLAN  Ms. Ontiveros is a 73 year old year old female who presents to clinic today with pain in her left 5th finger.    I ordered and reviewed a repeat xray of her 5th finger, this does show joint space narrowing and subchondral sclerosis of the DIP joint of her fifth finger.  Also present is a possible, subtle radio lucent line at the radial aspect of the distal phalanx that could represent a healed fracture.    We luana taped to Cely's finger for comfort.  She should also wear her splint at night, if effective.    I am referring her to hand therapy for range of motion and strengthening exercises, and to build a custom splint, if needed.    Thank you for allowing me to participate in Cely's care.    Luis Le DO, [facility]  Primary Care Sports Medicine           Again, thank you for allowing me to participate in the care of your patient.        Sincerely,        Luis Le DO

## 2019-05-28 ENCOUNTER — OFFICE VISIT (OUTPATIENT)
Dept: PEDIATRICS | Facility: CLINIC | Age: 74
End: 2019-05-28
Payer: COMMERCIAL

## 2019-05-28 ENCOUNTER — ANCILLARY PROCEDURE (OUTPATIENT)
Dept: ULTRASOUND IMAGING | Facility: CLINIC | Age: 74
End: 2019-05-28
Attending: NURSE PRACTITIONER
Payer: COMMERCIAL

## 2019-05-28 ENCOUNTER — ANCILLARY PROCEDURE (OUTPATIENT)
Dept: MAMMOGRAPHY | Facility: CLINIC | Age: 74
End: 2019-05-28
Attending: NURSE PRACTITIONER
Payer: COMMERCIAL

## 2019-05-28 ENCOUNTER — ANCILLARY PROCEDURE (OUTPATIENT)
Dept: CT IMAGING | Facility: CLINIC | Age: 74
End: 2019-05-28
Attending: FAMILY MEDICINE
Payer: COMMERCIAL

## 2019-05-28 VITALS
OXYGEN SATURATION: 96 % | BODY MASS INDEX: 24.55 KG/M2 | SYSTOLIC BLOOD PRESSURE: 113 MMHG | DIASTOLIC BLOOD PRESSURE: 70 MMHG | HEART RATE: 78 BPM | HEIGHT: 64 IN | TEMPERATURE: 97.9 F | WEIGHT: 143.8 LBS

## 2019-05-28 DIAGNOSIS — N64.59 INVERSION OF NIPPLE: ICD-10-CM

## 2019-05-28 DIAGNOSIS — B35.4 TINEA CORPORIS: ICD-10-CM

## 2019-05-28 DIAGNOSIS — R10.9 FLANK PAIN: ICD-10-CM

## 2019-05-28 DIAGNOSIS — N30.01 ACUTE CYSTITIS WITH HEMATURIA: Primary | ICD-10-CM

## 2019-05-28 DIAGNOSIS — Z12.31 VISIT FOR SCREENING MAMMOGRAM: ICD-10-CM

## 2019-05-28 DIAGNOSIS — N20.0 NEPHROLITHIASIS: ICD-10-CM

## 2019-05-28 LAB
ALBUMIN UR-MCNC: 10 MG/DL
APPEARANCE UR: ABNORMAL
BILIRUB UR QL STRIP: NEGATIVE
COLOR UR AUTO: YELLOW
GLUCOSE UR STRIP-MCNC: NEGATIVE MG/DL
HGB UR QL STRIP: ABNORMAL
KETONES UR STRIP-MCNC: NEGATIVE MG/DL
LEUKOCYTE ESTERASE UR QL STRIP: ABNORMAL
MUCOUS THREADS #/AREA URNS LPF: PRESENT /LPF
NITRATE UR QL: NEGATIVE
NON-SQ EPI CELLS #/AREA URNS LPF: ABNORMAL /LPF
PH UR STRIP: 6 PH (ref 5–7)
RBC #/AREA URNS AUTO: ABNORMAL /HPF
SOURCE: ABNORMAL
SP GR UR STRIP: 1.02 (ref 1–1.03)
UROBILINOGEN UR STRIP-MCNC: NORMAL MG/DL (ref 0–2)
WBC #/AREA URNS AUTO: ABNORMAL /HPF
YEAST #/AREA URNS HPF: ABNORMAL /HPF

## 2019-05-28 PROCEDURE — 76642 ULTRASOUND BREAST LIMITED: CPT | Mod: LT | Performed by: RADIOLOGY

## 2019-05-28 PROCEDURE — 99214 OFFICE O/P EST MOD 30 MIN: CPT | Performed by: FAMILY MEDICINE

## 2019-05-28 PROCEDURE — 74176 CT ABD & PELVIS W/O CONTRAST: CPT | Performed by: RADIOLOGY

## 2019-05-28 PROCEDURE — 81001 URINALYSIS AUTO W/SCOPE: CPT | Performed by: FAMILY MEDICINE

## 2019-05-28 PROCEDURE — 77063 BREAST TOMOSYNTHESIS BI: CPT | Performed by: RADIOLOGY

## 2019-05-28 PROCEDURE — 77067 SCR MAMMO BI INCL CAD: CPT | Performed by: RADIOLOGY

## 2019-05-28 RX ORDER — FLUCONAZOLE 200 MG/1
200 TABLET ORAL DAILY
Qty: 14 TABLET | Refills: 0 | Status: SHIPPED | OUTPATIENT
Start: 2019-05-28 | End: 2019-05-28

## 2019-05-28 RX ORDER — FLUCONAZOLE 200 MG/1
200 TABLET ORAL DAILY
Qty: 14 TABLET | Refills: 0 | Status: SHIPPED | OUTPATIENT
Start: 2019-05-28 | End: 2019-06-27

## 2019-05-28 RX ORDER — CEFDINIR 300 MG/1
300 CAPSULE ORAL 2 TIMES DAILY
Qty: 14 CAPSULE | Refills: 0 | Status: SHIPPED | OUTPATIENT
Start: 2019-05-28 | End: 2019-06-10

## 2019-05-28 RX ORDER — PRENATAL VIT 91/IRON/FOLIC/DHA 28-975-200
COMBINATION PACKAGE (EA) ORAL 2 TIMES DAILY
Qty: 42 G | Refills: 0 | Status: SHIPPED | OUTPATIENT
Start: 2019-05-28 | End: 2019-09-09

## 2019-05-28 ASSESSMENT — MIFFLIN-ST. JEOR: SCORE: 1136.24

## 2019-05-28 ASSESSMENT — PAIN SCALES - GENERAL: PAINLEVEL: NO PAIN (0)

## 2019-05-28 NOTE — PATIENT INSTRUCTIONS
Patient Education     Ringworm of the Skin    Ringworm is a fungal infection of the skin. Despite the name, a worm doesn't cause it. The cause of ringworm is a fungus that infects the outer layers of the skin. It is also not caused by bed bugs, scabies, or lice. These are totally different.  The medical term for ringworm is tinea. It can affect most parts of your body, although it seems to do better in moist areas of the body and around hair. It can be on almost any part of your body, including:    Arms, hands, legs, chest, feet, and back    Scalp    Beard    Groin    Between the toes  Depending on where it is located, sometimes the name changes:    Tinea capitis (scalp)    Tinea cruris (groin)    Tinea corporis (body)    Tinea pedis (feet)  Causes  Ringworm is very common all over the world, including the [place] It can take less than 1 week up to 2 weeks before you develop the infection after being exposed. So, you may not figure out the exact cause.  It is spread through direct contact with:    An infected person or animal    Infected soil, or objects such as towels, clothing, and kirby  Symptoms  At first you might not notice ringworm. Or you may just see a small, red, often raised itchy spot or pimple. Sometimes there may only be one spot. At other times there may be several. Ringworm can look slightly different on different parts of the body, but there are some things are always present:    Irregular, round, oval or ring-shaped, which is why it's called ringworm    Clearer or lighter color at the center, since it spreads from the center of the spot outward    Red or inflamed look    Raised    Itchy    Scaly, dry, or flaky  Home care  Follow these tips to help care for yourself at home:    Leave it alone. Don't scratch at the rash or pick it. This can increase the chance of infection and scarring.    Take medicine as prescribed. If you were prescribed a cream, apply it exactly as directed. Make sure to put the  cream not just on the rash, but also on the skin 1 or 2 inches around it. Medicine by mouth is sometimes needed, particularly for ringworm on the scalp. Take it as directed and until your healthcare provider says to stop.    Keep it from spreading to others. Untreated ringworm of the skin is contagious by skin-to-skin contact. Your child may return to school 2 days after treatment has started.  Prevention  To some degree, prevention depends on what part of your body was affected. In general, the following good hygiene can help.    Clean up after you get dirty or sweaty, or after using a locker room.    When possible, don t share kirby and brushes.    Avoid having your skin and feet wet or damp for long periods.    Wear clean, loose-fitting underwear.  Follow-up care  Follow up with your healthcare provider as advised by our staff if the rash does not improve after 10 days of treatment or if the rash spreads to other areas of the body.  When to seek medical advice  Call your healthcare provider right away if any of these occur:    Redness around the rash gets worse    Fluid drains from the rash    Fever of 100.4 F (38 C) or higher, or as directed by your healthcare provider  Date Last Reviewed: 8/1/2016 2000-2018 [employer]. [address] [place] [address], [place] [address]. All rights reserved. This information is not intended as a substitute for professional medical care. Always follow your healthcare professional's instructions.

## 2019-05-28 NOTE — PROGRESS NOTES
Subjective     Cely Ontiveros is a 73 year old female who presents to clinic today for the following health issues:    HPI   Flank pain      Duration: 1 week    Description (location/character/radiation): Right flank pain    Intensity:  moderate    Accompanying signs and symptoms: Worsening right flank pain in last 24 hours. Patient denies urinary symptoms.    History (similar episodes/previous evaluation): Kidney stone in 8/2018    Precipitating or alleviating factors: Notes history of right SI joint issues    Therapies tried and outcome: None     -Patient also complains of a small, coin sized rash to her right shoulder, usually comes and goes, present for about a week now, denies new soaps, detergents, sick contacts, fever or chills.    Reviewed and updated as needed this visit by Provider         Review of Systems   ROS COMP: Constitutional, HEENT, cardiovascular, pulmonary, gi and gu systems are negative, except as otherwise noted.      Objective    LMP  (LMP Unknown)   There is no height or weight on file to calculate BMI.  Physical Exam   GENERAL: healthy, alert and no distress  NECK: no adenopathy, no asymmetry, masses, or scars and thyroid normal to palpation  RESP: lungs clear to auscultation - no rales, rhonchi or wheezes  CV: regular rate and rhythm, normal S1 S2, no S3 or S4, no murmur, click or rub, no peripheral edema and peripheral pulses strong  ABDOMEN: soft, nontender, no hepatosplenomegaly, no masses and bowel sounds normal  SKIN: Roscoe-sized erythematous, pruritic rash to medial aspect of right shoulder.  Comprehensive back pain exam:  Right flank pain/?paraspinal muscle tenderness, Range of motion not limited by pain, Lower extremity strength functional and equal on both sides, Lower extremity reflexes within normal limits bilaterally, Lower extremity sensation normal and equal on both sides and Straight leg raise negative bilaterally  Results for orders placed or performed in visit on 05/28/19    UA with Microscopic reflex to Culture   Result Value Ref Range    Color Urine Yellow     Appearance Urine Slightly Cloudy     Glucose Urine Negative NEG^Negative mg/dL    Bilirubin Urine Negative NEG^Negative    Ketones Urine Negative NEG^Negative mg/dL    Specific Gravity Urine 1.021 1.003 - 1.035    Blood Urine Trace (A) NEG^Negative    pH Urine 6.0 5.0 - 7.0 pH    Protein Albumin Urine 10 (A) NEG^Negative mg/dL    Urobilinogen mg/dL Normal 0.0 - 2.0 mg/dL    Nitrite Urine Negative NEG^Negative    Leukocyte Esterase Urine Small (A) NEG^Negative    Source Clean catch urine     WBC Urine 0 - 5 OTO5^0 - 5 /HPF    RBC Urine O - 2 OTO2^O - 2 /HPF    Yeast Urine Few (A) NEG^Negative /HPF    Squamous Epithelial /LPF Urine Moderate (A) FEW^Few /LPF    Mucous Urine Present (A) NEG^Negative /LPF             Assessment & Plan     1. Acute cystitis with hematuria  Urine with yeast, treatment ordered below.  - fluconazole (DIFLUCAN) 200 MG tablet; Take 1 tablet (200 mg) by mouth daily (Patient not taking: Reported on 6/19/2019)  Dispense: 14 tablet; Refill: 0    2. Flank pain; Patient was quite concerned about a kidney stone, ctabd/pelvis reviewed with patient and unremarkable for a stone.  Use heat pads, over the counter ibuprofen/tylenol at appropriate doses.  - UA with Microscopic reflex to Culture  - CT Abdomen Pelvis w/o Contrast; Future    3. Tinea corporis  - terbinafine (LAMISIL) 1 % external cream; Apply topically 2 times daily  Dispense: 42 g; Refill: 0         Return if symptoms worsen or fail to improve.    Francheska Cody MD  [facility]

## 2019-06-03 ENCOUNTER — THERAPY VISIT (OUTPATIENT)
Dept: OCCUPATIONAL THERAPY | Facility: CLINIC | Age: 74
End: 2019-06-03
Attending: FAMILY MEDICINE
Payer: COMMERCIAL

## 2019-06-03 DIAGNOSIS — S62.667A CLOSED NONDISPLACED FRACTURE OF DISTAL PHALANX OF LEFT LITTLE FINGER, INITIAL ENCOUNTER: ICD-10-CM

## 2019-06-03 DIAGNOSIS — M79.645 PAIN OF FINGER OF LEFT HAND: ICD-10-CM

## 2019-06-03 PROCEDURE — 97110 THERAPEUTIC EXERCISES: CPT | Mod: GO | Performed by: OCCUPATIONAL THERAPIST

## 2019-06-03 PROCEDURE — 97165 OT EVAL LOW COMPLEX 30 MIN: CPT | Mod: GO | Performed by: OCCUPATIONAL THERAPIST

## 2019-06-03 NOTE — PROGRESS NOTES
Hand Therapy Initial Evaluation    Current Date:  6/3/2019  Referring Physician: Dr. Le    Diagnosis: Left 5th distal phalanx fracture  DOI: 19  Post: 7w 3 days    Subjective:  Cely Ontiveros is a 73 year old right hand dominant female.    Patient reports symptoms of pain, stiffness/loss of motion, weakness/loss of strength and edema of the left small finger which occurred due to an injury sustained after the handle of her suitcase slammed on the distal finger.. Since onset symptoms are Gradually getting better.  Special tests:  x-ray.  Previous treatment: splint for 1 week, luana taping.    General health as reported by patient is good.  Pertinent medical history includes:Depression, Osteoarthritis  Medical allergies:sulfa.  Surgical history: none.  Medication history: Anti-depressants, allergy.    Occupational Profile Information:  Current occupation is retired  Prior functional level:  no limitations  Barriers include:none  Mobility: No difficulty  Transportation: drives  Leisure activities/hobbies: gym, yoga,reading    Upper Extremity Functional Index Score:  SCORE:   Column Totals: /80: 73   (A lower score indicates greater disability.)    Objective:  Pain Level (Scale 0-10):   6/3/2019   At Rest 0/10   With Use 3-4/10     Pain Description:  Date 6/3/2019   Location small finger DIP joint   Pain Quality Aching   Frequency intermittent     Pain is worst  daytime   Exacerbated by  gripping, ROM   Relieved by rest   Progression Gradually improving      ROM:    Small Finger 6/3/19 6/3/19   AROM(PROM) Right Left   MCP /95 0/90   PIP /95 0/85   DIP +15/70 0/50   ORDOÑEZ       Strength:  Pain free (Measured in pounds)   6/3/19   Trials Right Left   1  2  3  Av  31  28  30 23  23  22  22       Edema:  []         None   [x]         Mild    []         Moderate      []         Severe                  Location:  Edema - Measured circumferentially in cm  Date 6/3/19         Right Left Right Left Right Left  Right Left Right Left Right Left                   P1               PIP 4.4 4.5              P2 4.3 4.3             DIP 3.8 4.1                 Color/Temperature:     []        Normal  [x]        Abnormal   Mild redness on the radial aspect of the (L) SF DIP jt.         Palpation:  []         Normal        [x]       Tender       [x]      Mild         []       Moderate []       Severe     Location: (L) SF radial side of the DIP jt.    Sensation: [x]                   WNL throughout all nerve distributions; per patient report    []                   Decreased  []        Median    []        Ulnar    []        Radial nerve distribution    Assessment:  Patient presents with symptoms consistent with diagnosis of finger fracture,  with conservative intervention.     Patient's limitations or Problem List includes:  Pain, Decreased ROM/motion, Increased edema, Weakness and Decreased  of the left small finger which interferes with the patient's ability to perform Recreational Activities and Household Chores as compared to previous level of function.    Rehab Potential:  Excellent - Return to full activity, no limitations    Patient will benefit from skilled Occupational Therapy to increase ROM, flexibility, overall strength and  strength and decrease pain and edema to return to previous activity level and resume normal daily tasks and to reach their rehab potential.    Barriers to Learning:  No barrier    Communication Issues:  Patient appears to be able to clearly communicate and understand verbal and written communication and follow directions correctly.    Chart Review: Brief history including review of medical and/or therapy records relating to the presenting problem and Simple history review with patient    Identified Performance Deficits: home establishment and management and leisure activities    Assessment of Occupational Performance:  1-3 Performance Deficits    Clinical Decision Making (Complexity): Low  complexity    Treatment Explanation:  The following has been discussed with the patient:  RX ordered/plan of care  Anticipated outcomes  Possible risks and side effects    Frequency:  1 X week, once daily  Duration:  for 6 weeks  Treatment Plan:  Modalities:  Fluidotherapy and Paraffin  Therapeutic Exercise:  AROM, AAROM, PROM, Tendon Gliding, Blocking, Reverse Blocking, Extensor Tracking and Isotonics  Manual Techniques:  Joint mobilization  Discharge Plan:  Achieve all LTG.  Independent in home treatment program.  Reach maximal therapeutic benefit.      Home Exercise Program:  Tendon glides  Blocking of PIP and DIP  Finger abd/add  PROM of PIP and DIP  Coban for swelling    Next Visit:  Review exercises and check ROM  Advance to strengthening if indicated

## 2019-06-07 PROBLEM — R39.15 URINARY URGENCY: Status: RESOLVED | Noted: 2018-06-04 | Resolved: 2019-06-07

## 2019-06-07 PROBLEM — N39.41 URGENCY INCONTINENCE: Status: RESOLVED | Noted: 2018-06-04 | Resolved: 2019-06-07

## 2019-06-07 PROBLEM — R10.2 PELVIC PAIN IN FEMALE: Status: RESOLVED | Noted: 2018-06-04 | Resolved: 2019-06-07

## 2019-06-07 PROBLEM — R35.0 URINARY FREQUENCY: Status: RESOLVED | Noted: 2018-06-04 | Resolved: 2019-06-07

## 2019-06-07 PROBLEM — M99.05 SOMATIC DYSFUNCTION OF PELVIS REGION: Status: RESOLVED | Noted: 2018-06-04 | Resolved: 2019-06-07

## 2019-06-10 ENCOUNTER — OFFICE VISIT (OUTPATIENT)
Dept: PEDIATRICS | Facility: CLINIC | Age: 74
End: 2019-06-10
Payer: COMMERCIAL

## 2019-06-10 VITALS
OXYGEN SATURATION: 97 % | SYSTOLIC BLOOD PRESSURE: 111 MMHG | BODY MASS INDEX: 24.53 KG/M2 | HEART RATE: 88 BPM | DIASTOLIC BLOOD PRESSURE: 66 MMHG | TEMPERATURE: 97.3 F | WEIGHT: 144 LBS

## 2019-06-10 DIAGNOSIS — N30.00 ACUTE CYSTITIS WITHOUT HEMATURIA: Primary | ICD-10-CM

## 2019-06-10 DIAGNOSIS — M54.41 ACUTE BILATERAL LOW BACK PAIN WITH RIGHT-SIDED SCIATICA: ICD-10-CM

## 2019-06-10 LAB
ALBUMIN UR-MCNC: NEGATIVE MG/DL
APPEARANCE UR: CLEAR
BILIRUB UR QL STRIP: NEGATIVE
COLOR UR AUTO: YELLOW
GLUCOSE UR STRIP-MCNC: NEGATIVE MG/DL
HBA1C MFR BLD: 5.2 % (ref 0–5.6)
HGB UR QL STRIP: NEGATIVE
HYALINE CASTS #/AREA URNS LPF: ABNORMAL /LPF
KETONES UR STRIP-MCNC: NEGATIVE MG/DL
LEUKOCYTE ESTERASE UR QL STRIP: ABNORMAL
MUCOUS THREADS #/AREA URNS LPF: PRESENT /LPF
NITRATE UR QL: NEGATIVE
NON-SQ EPI CELLS #/AREA URNS LPF: ABNORMAL /LPF
PH UR STRIP: 5.5 PH (ref 5–7)
RBC #/AREA URNS AUTO: ABNORMAL /HPF
SOURCE: ABNORMAL
SP GR UR STRIP: 1.02 (ref 1–1.03)
UROBILINOGEN UR STRIP-MCNC: NORMAL MG/DL (ref 0–2)
WBC #/AREA URNS AUTO: ABNORMAL /HPF

## 2019-06-10 PROCEDURE — 87086 URINE CULTURE/COLONY COUNT: CPT | Performed by: FAMILY MEDICINE

## 2019-06-10 PROCEDURE — 99213 OFFICE O/P EST LOW 20 MIN: CPT | Performed by: FAMILY MEDICINE

## 2019-06-10 PROCEDURE — 81001 URINALYSIS AUTO W/SCOPE: CPT | Performed by: FAMILY MEDICINE

## 2019-06-10 PROCEDURE — 83036 HEMOGLOBIN GLYCOSYLATED A1C: CPT | Performed by: FAMILY MEDICINE

## 2019-06-10 PROCEDURE — 36415 COLL VENOUS BLD VENIPUNCTURE: CPT | Performed by: FAMILY MEDICINE

## 2019-06-10 RX ORDER — NITROFURANTOIN 25; 75 MG/1; MG/1
100 CAPSULE ORAL 2 TIMES DAILY
Qty: 14 CAPSULE | Refills: 0 | Status: SHIPPED | OUTPATIENT
Start: 2019-06-10 | End: 2019-06-27

## 2019-06-10 RX ORDER — INFLUENZA A VIRUS A/VICTORIA/4897/2022 IVR-238 (H1N1) ANTIGEN (FORMALDEHYDE INACTIVATED), INFLUENZA A VIRUS A/CALIFORNIA/122/2022 SAN-022 (H3N2) ANTIGEN (FORMALDEHYDE INACTIVATED), AND INFLUENZA B VIRUS B/MICHIGAN/01/2021 ANTIGEN (FORMALDEHYDE INACTIVATED) 60; 60; 60 UG/.5ML; UG/.5ML; UG/.5ML
INJECTION, SUSPENSION INTRAMUSCULAR
Refills: 0 | COMMUNITY
Start: 2018-09-30 | End: 2019-06-10

## 2019-06-10 NOTE — PROGRESS NOTES
Subjective     Cely Ontiveros is a 73 year old female who presents to clinic today for the following health issues:    HPI   URINARY TRACT SYMPTOMS      Duration: recent UTI Flank pain last week, now improved    Description  back pain    Intensity:  mild    Accompanying signs and symptoms:  Fever/chills: no   Flank pain YES  Nausea and vomiting: no   Vaginal symptoms: none  Abdominal/Pelvic Pain: Slight tender    History  History of frequent UTI's: YES  History of kidney stones: YES  Sexually Active: no   Possibility of pregnancy: No    Precipitating or alleviating factors: None    Therapies tried and outcome: increase fluid intake   Outcome: some improvement    She was seen for concerns for cystitis 5/28, urine with small leukocyte, epithelial cells, trace blood and yeast. Treated with diflucan 200 mg for 7 days for yeast cystitis. Although her symptoms have improved she still complains of right flank pains which radiated to SI joint with a history of DJD lumbar, scheduled with physical therapy.    Reviewed and updated as needed this visit by Provider         Review of Systems   ROS COMP: Constitutional, HEENT, cardiovascular, pulmonary, GI, , musculoskeletal, neuro, skin, endocrine and psych systems are negative, except as otherwise noted.      Objective    /66   Pulse 88   Temp 97.3  F (36.3  C) (Temporal)   Wt 65.3 kg (144 lb)   LMP  (LMP Unknown)   SpO2 97%   BMI 24.53 kg/m    Body mass index is 24.53 kg/m .  Physical Exam   GENERAL: healthy, alert and no distress  EYES: Eyes grossly normal to inspection, PERRL and conjunctivae and sclerae normal  HENT: ear canals and TM's normal, nose and mouth without ulcers or lesions  NECK: no adenopathy, no asymmetry, masses, or scars and thyroid normal to palpation  RESP: lungs clear to auscultation - no rales, rhonchi or wheezes  CV: regular rate and rhythm, normal S1 S2, no S3 or S4, no murmur, click or rub, no peripheral edema and peripheral pulses  strong  ABDOMEN: soft, nontender, no hepatosplenomegaly, no masses and bowel sounds normal  MS: no gross musculoskeletal defects noted, no edema  Comprehensive back pain exam:  tenderness L-S1 and right paraspinal muscle spasm, Range of motion not limited by pain, Lower extremity strength functional and equal on both sides, Lower extremity reflexes within normal limits bilaterally, Lower extremity sensation normal and equal on both sides and Straight leg raise negative bilaterally          Assessment & Plan     1. Acute cystitis without hematuria  Push fluids, await urine culture. Leukocyte esterase still present although urinalysis has improved remarket.  - Urine Culture Aerobic Bacterial  - nitroFURantoin macrocrystal-monohydrate (MACROBID) 100 MG capsule; Take 1 capsule (100 mg) by mouth 2 times daily for 7 days  Dispense: 14 capsule; Refill: 0    2. Acute bilateral low back pain with right-sided sciatica  Alternate between ice/heat, over the counter NSAIDS at appropriate doses, continue with physical therapy as scheduled, she also sees a chiropractor intermittently.        Return if symptoms worsen or fail to improve.    Francheska Cody MD  [facility]

## 2019-06-10 NOTE — PATIENT INSTRUCTIONS
"Patient Education       * Sciatica    Sciatica (\"Lumbar Radiculopathy\") causes a pain that spreads from the lower back down into the buttock, hip and leg. Sometimes leg pain can occur without any back pain. Sciatica is due to irritation or pressure on a spinal nerve as it comes out of the spinal canal. This is most often due to pressure on the nerve from a nearby spinal disk (the cartilage cushion between each spinal bone). Other causes include spinal stenosis (narrowing of the spinal canal) and spasm of the piriformis muscle (a muscle in the buttocks that the sciatic nerve passes through).  Sciatica may begin after a sudden twisting/bending force (such as in a car accident), or sometimes after a simple awkward movement. In either case, muscle spasm is commonly present and can add to the pain.  The diagnosis of sciatica is made from the symptoms and physical exam. Unless you had a physical injury (such as a car accident or fall), X-rays are usually not ordered for the initial evaluation of sciatica because the nerves and disks cannot be seen on an X-ray. Most sciatica (80-90%) gets better with time.  What can I do about my low back pain?  There are three main things you can do to ease low back pain and help it go away.    Stay active! Use positions, movements and exercises. Too much rest can make your symptoms worse.    Use heat or cold packs.    Take medicine as directed.  Using positions, movements and exercises  Research tells us that moving your joints and muscles can help you recover from back pain. Such activity should be simple and gentle.  Use walking to help relieve your discomfort. Try taking a short walk every 3 to 4 hours during the day. Walk for a few minutes inside your home or take longer walks outside, on a treadmill or at a mall. Slowly increase the amount of time you walk. Expect discomfort when you begin, but it should lessen as your back starts to recover.  Finding a position that is " comfortable  When your back pain is new, you may find that certain positions will ease your pain. Gently try each of the following positions until you find one that eases your pain. Once you find a position of comfort, use it as often as you like while you recover. Return to your daily routine as soon as possible.     Lie on your back with your legs bent. You can do this by placing a pillow under your knees or lie on the floor and rest your lower legs on the seat of a chair.    Lie on your side with your knees bent and place a pillow between your knees.    Lie on your stomach over pillows.  Using heat or cold packs  Try cold packs or gentle heat to ease your pain. Use whichever gives you the most relief. Apply the cold pack or heat for 15 minutes at a time, as often as needed.  Taking medicine  If taking over-the-counter medicine:    Take ibuprofen (Advil, Motrin) 600 mg. three times a day as needed for pain.  OR    Take Aleve (naproxen sodium) 220 to 440 mg. two times a day as needed for pain.  If your doctor prescribed medicine, follow the instructions. Stop taking the medicine as soon as you can.  When should I call my doctor?  Your back pain should improve over the first couple of weeks. As it improves, you should be able to return to your normal activities. But call your doctor if:    You have a sudden change in your ability to control? your bladder or bowels.    You begin to feel tingling in your groin or legs.    The pain spreads down your leg and into your foot.    Your toes, feet or leg muscles begin to feel weak.    You feel generally unwell or sick.    Your pain gets worse.    2063-1307 [employer]. [address] [place] [address], [place] [address]. All rights reserved. This information is not intended as a substitute for professional medical care. Always follow your healthcare professional's instructions.  This information has been modified by your health care provider with permission from the  publisher.         icy hot  Aspercreme  Lidocaine patches  Jimbo walters  Bio freeze  capazaicin

## 2019-06-11 LAB
BACTERIA SPEC CULT: NORMAL
SPECIMEN SOURCE: NORMAL

## 2019-06-19 ENCOUNTER — OFFICE VISIT (OUTPATIENT)
Dept: ORTHOPEDICS | Facility: CLINIC | Age: 74
End: 2019-06-19
Payer: COMMERCIAL

## 2019-06-19 VITALS
WEIGHT: 144 LBS | BODY MASS INDEX: 24.53 KG/M2 | SYSTOLIC BLOOD PRESSURE: 143 MMHG | OXYGEN SATURATION: 95 % | HEART RATE: 84 BPM | DIASTOLIC BLOOD PRESSURE: 72 MMHG | TEMPERATURE: 97.7 F

## 2019-06-19 DIAGNOSIS — M70.52 PES ANSERINUS BURSITIS OF BOTH KNEES: ICD-10-CM

## 2019-06-19 DIAGNOSIS — M70.51 PES ANSERINUS BURSITIS OF BOTH KNEES: ICD-10-CM

## 2019-06-19 DIAGNOSIS — M70.61 TROCHANTERIC BURSITIS, RIGHT HIP: ICD-10-CM

## 2019-06-19 DIAGNOSIS — M17.12 PRIMARY OSTEOARTHRITIS OF ONE KNEE, LEFT: Primary | ICD-10-CM

## 2019-06-19 PROCEDURE — 20610 DRAIN/INJ JOINT/BURSA W/O US: CPT | Mod: 50 | Performed by: ORTHOPAEDIC SURGERY

## 2019-06-19 PROCEDURE — 99212 OFFICE O/P EST SF 10 MIN: CPT | Mod: 25 | Performed by: ORTHOPAEDIC SURGERY

## 2019-06-19 RX ORDER — BUPIVACAINE HYDROCHLORIDE 2.5 MG/ML
3 INJECTION, SOLUTION INFILTRATION; PERINEURAL
Status: DISCONTINUED | OUTPATIENT
Start: 2019-06-19 | End: 2019-06-27

## 2019-06-19 RX ORDER — METHYLPREDNISOLONE ACETATE 80 MG/ML
80 INJECTION, SUSPENSION INTRA-ARTICULAR; INTRALESIONAL; INTRAMUSCULAR; SOFT TISSUE
Status: DISCONTINUED | OUTPATIENT
Start: 2019-06-19 | End: 2019-06-27

## 2019-06-19 RX ORDER — LIDOCAINE HYDROCHLORIDE 10 MG/ML
4 INJECTION, SOLUTION INFILTRATION; PERINEURAL
Status: DISCONTINUED | OUTPATIENT
Start: 2019-06-19 | End: 2019-06-27

## 2019-06-19 RX ORDER — LIDOCAINE HYDROCHLORIDE 10 MG/ML
3 INJECTION, SOLUTION INFILTRATION; PERINEURAL
Status: DISCONTINUED | OUTPATIENT
Start: 2019-06-19 | End: 2019-06-27

## 2019-06-19 RX ADMIN — BUPIVACAINE HYDROCHLORIDE 3 ML: 2.5 INJECTION, SOLUTION INFILTRATION; PERINEURAL at 13:39

## 2019-06-19 RX ADMIN — METHYLPREDNISOLONE ACETATE 80 MG: 80 INJECTION, SUSPENSION INTRA-ARTICULAR; INTRALESIONAL; INTRAMUSCULAR; SOFT TISSUE at 13:43

## 2019-06-19 RX ADMIN — METHYLPREDNISOLONE ACETATE 80 MG: 80 INJECTION, SUSPENSION INTRA-ARTICULAR; INTRALESIONAL; INTRAMUSCULAR; SOFT TISSUE at 13:39

## 2019-06-19 RX ADMIN — LIDOCAINE HYDROCHLORIDE 3 ML: 10 INJECTION, SOLUTION INFILTRATION; PERINEURAL at 13:43

## 2019-06-19 RX ADMIN — LIDOCAINE HYDROCHLORIDE 4 ML: 10 INJECTION, SOLUTION INFILTRATION; PERINEURAL at 13:39

## 2019-06-19 ASSESSMENT — PAIN SCALES - GENERAL: PAINLEVEL: MILD PAIN (2)

## 2019-06-19 NOTE — PROGRESS NOTES
"CHIEF COMPLAINT:   Chief Complaint   Patient presents with     Left Knee - Pain     Pt states she has been icing both knees, does not like to take pain meds.  Also has been getting a massage and stretching which helps for awhile.  Pt would like injection in left knee unsure of right.  Hurts when walking and taking steps.  Possible surge     Knee Pain     Pt states she has been icing both knees, does not like to take pain meds.  Also has been getting a massage and stretching which helps for awhile.  Pt would like injection in left knee unsure of right.  Hurts when walking and taking steps.  Possible surgery conversation for down the road..  Last injection 01/16/19.     HISTORY OF PRESENT ILLNESS    Cely Ontiveros is a 73 year old female seen for evaluation of ongoing left knee pain with no known recent injury. Recent pain has been present since about 9/2018, but mentions possibly \"tearing something\" 5 years ago with an injury. Has had pain off/on but worse for some reason recently. She was seen by us 12/12/2018 and referred for an MRI to further evaluation. Noted to have medial meniscus tear, patello-femoral chondromalacia as we discussed with her over the phone. Was last seen 1/16/2019 and received left knee intra-articular as well as pes bursal injections. States the injections worked quite well for about 4 months.  Mild pain today, rated a 2/10. Starting to notice more right knee pain as well, medially. Also some pain up the lateral thigh and along the hip, worse at night laying on that side. No treatment for that pain. Pain is worsened with activities like flexion, and going up and down stairs. No pain with standing. Has had buckling episodes in the past. For treatment, she has tried ice, physical therapy and a knee brace. She'd like to try more injections today. Has been taking Aleve regularly so thinks that is making a difference now.    Of note: reports low back pain, does yoga x3 per week.    Present " symptoms: mild pain.  Anterior/medial. No swelling now but states has in the past. No locking, catching. ?buckling  Pain severity: 1/10  Frequency of symptoms: frequently  Exacerbating Factors: weight bearing, stairs  Relieving Factors: rest  Night Pain: No  Pain while at rest: No   Numbness or tingling: No   Patient has tried:     NSAIDS: yes, ibuprofen and now Aleve regularly     Physical Therapy: Yes      Activity modification: Yes      Bracing: No      Injections: Yes, 1/2019 left knee intra-articular and pes.     Ice: Yes     Assistive device:  No     Other: none    Orthopaedic PMH: history of adhesive capsulitis in the past, had a reaction to cortisone in the past (couldnt sleep, felt anxious).     Other PMH:  has a past medical history of Actinic keratosis, Allergic rhinitis, Cataract, Degenerative joint disease, Depression with anxiety, Herpes simplex, Hypoglycemia, and Impingement syndrome, shoulder. She also has no past medical history of Acne vulgaris, Allergies, Basal cell carcinoma, Complication of anesthesia, Eczema, Heart valve disorder, Malignant melanoma nos, Pacemaker, Photosensitive contact dermatitis, Psoriasis, Skin cancer, Squamous cell carcinoma, Unspecified asthma(493.90), or Urticaria.  Patient Active Problem List    Diagnosis Date Noted     Pain of finger of left hand 06/03/2019     Priority: Medium     Pseudophakia of both eyes 03/13/2019     Priority: Medium     Pseudophakia of right eye 02/15/2019     Priority: Medium     Pseudophakia of left eye 01/30/2019     Priority: Medium     Chondromalacia of patella, left 01/22/2019     Priority: Medium     Complex tear of medial meniscus of left knee as current injury, subsequent encounter 01/22/2019     Priority: Medium     Age-related nuclear cataract of both eyes 01/22/2019     Priority: Medium     Meibomian gland dysfunction 12/03/2018     Priority: Medium     Acute pain of left knee 11/14/2018     Priority: Medium     H/O hypoglycemia  "03/08/2017     Priority: Medium     Chronic otitis externa of left ear, unspecified type 03/08/2017     Priority: Medium     Abdominal bloating 03/08/2017     Priority: Medium     Chronic rhinitis 03/08/2017     Priority: Medium     Dysthymic disorder 03/08/2017     Priority: Medium     Plugged tear duct, od > os 10/12/2014     Priority: Medium     Epiphora 10/12/2014     Priority: Medium     Hypermetropia 10/10/2013     Priority: Medium     Astigmatism with presbyopia 10/10/2013     Priority: Medium     Blepharitis of both eyes 10/10/2013     Priority: Medium     Cataracts, bilateral 10/10/2013     Priority: Medium     Seasonal allergic rhinitis 08/16/2013     Priority: Medium     Gross hematuria 01/02/2013     Priority: Medium     Right kidney stone 01/02/2013     Priority: Medium     Posterior vitreous detachment of both eyes 10/09/2012     Priority: Medium     Advance care planning 05/20/2011     Priority: Medium     Advance Care Planning 01/22/2015: ACP Review and Resources Provided:  Reviewed chart for advance care plan.  Cely JESSICA Ontiveros has no plan or code status on file. Mailed available resources and provided with information. Confirmed code status reflects current choices pending further ACP discussions.  Confirmed/documented designated decision maker(s). See permanent comments section of demographics in clinical tab. Added by Madonna Marques on 1/22/2015  Advance Care Planning 05/20/2011: Patient does not have an Advance/Health Care Directive (HCD), given \"What is Advance Care Planning?\" flyer. Mailed to patient.Jacqui Scherer.May 20, 2011       Degenerative joint disease      Priority: Medium     CARDIOVASCULAR SCREENING; LDL GOAL LESS THAN 160 10/31/2010     Priority: Medium     Herpes simplex      Priority: Medium     Recurs left buttock  Zostavax 2/10  IMO update changed this record. Please review for accuracy       Allergic rhinitis      Priority: Medium       Surgical Hx:  has a past surgical history " that includes ENLARGE BREAST WITH IMPLANT; REMOVAL OF BREAST IMPLANT; LAP,FULGURATE/EXCISE LESIONS; hysterectomy, pap no longer indicated (1998); sinus surgery; Combined Cystoscopy, Ureteroscopy, Laser Holmium Lithotripsy Ureter(S) (Right, 8/23/2018); cataract iol, rt/lt (Left, 01/24/2019); Phacoemulsification with standard intraocular lens implant (Left, 1/24/2019); and Phacoemulsification with standard intraocular lens implant (Right, 2/14/2019).    Medications:   Current Outpatient Medications:      citalopram (CELEXA) 20 MG tablet, Take 1 tablet (20 mg) by mouth daily, Disp: 90 tablet, Rfl: 3     fluconazole (DIFLUCAN) 200 MG tablet, Take 1 tablet (200 mg) by mouth daily, Disp: 14 tablet, Rfl: 0     loratadine-pseudoePHEDrine (EQL ALLERGY/CONGESTION RELIEF)  MG per 24 hr tablet, Take 1 tablet by mouth daily, Disp: 90 tablet, Rfl: 3     meclizine (ANTIVERT) 25 MG tablet, Take 1 tablet (25 mg) by mouth 3 times daily as needed for dizziness (Patient not taking: Reported on 6/10/2019), Disp: 21 tablet, Rfl: 0     order for DME, Equipment being ordered: tennis elbow brace, Disp: 1 each, Rfl: 0     order for DME, Equipment being ordered: left knee sleeve, Disp: 1 each, Rfl: 0     terbinafine (LAMISIL) 1 % external cream, Apply topically 2 times daily, Disp: 42 g, Rfl: 0     valACYclovir (VALTREX) 500 MG tablet, Take 1 tablet (500 mg) by mouth daily, Disp: 90 tablet, Rfl: 3    Allergies:   Allergies   Allergen Reactions     Sulfa Drugs Swelling     Cats Swelling     Lips swollen     Wool Fiber        Social Hx: retired .   reports that she has never smoked. She has never used smokeless tobacco. She reports that she does not drink alcohol or use drugs.    Family Hx: family history includes Arthritis in her mother; Cancer in her father and mother; Cancer - colorectal in her father; Cardiovascular in her mother; Circulatory in her mother; Glaucoma in her mother; Heart Disease in her mother;  Hypertension in her mother; Lipids in her mother; Macular Degeneration in her father; Obesity in her mother; Osteoporosis in her mother.    REVIEW OF SYSTEMS:   CONSTITUTIONAL:NEGATIVE for fever, chills, change in weight  INTEGUMENTARY/SKIN: NEGATIVE for worrisome rashes, moles or lesions  MUSCULOSKELETAL:See HPI above  NEURO: NEGATIVE for weakness, dizziness or paresthesias      PHYSICAL EXAM:  /72 (BP Location: Left arm, Patient Position: Sitting, Cuff Size: Adult Regular)   Pulse 84   Temp 97.7  F (36.5  C) (Oral)   Wt 65.3 kg (144 lb)   LMP  (LMP Unknown)   SpO2 95%   BMI 24.53 kg/m     GENERAL APPEARANCE: healthy, alert, no distress  SKIN: no suspicious lesions or rashes  NEURO: Normal strength and tone, mentation intact and speech normal  PSYCH:  mentation appears normal and affect normal, not anxious  RESPIRATORY: No increased work of breathing.    BILATERAL LOWER EXTREMITIES:  Gait: normal  Alignment: varus  No gross deformities or masses.  No Quad atrophy, strength normal.  Intact sensation deep peroneal nerve, superficial peroneal nerve, med/lat tibial nerve, sural nerve, saphenous nerve  Intact EHL, EDL, TA, FHL, GS, quadriceps hamstrings and hip flexors  Toes warm and well perfused, brisk capillary refill. Palpable 2+ dp pulses.  Bilateral calf soft and nttp or squeeze.  Edema: none    There is tender to palpation along the right iliotibial band from the knee up to the greater trochanter. Tender to palpation over the greater trochanter.    LEFT KNEE EXAM:    Skin: intact, no ecchymosis or erythema  Squat: 100 %, anterior knee pain.  ROM: 0 extension to 135+ flexion, anterior discomfort with knee flexion  Tight hamstrings on straight leg raise.  Effusion: none  Tender: pes anserine bursa, lateral joint line, medial joint line, medial collateral ligament    NTTP anterior or posterior knee  McMurrays: positive - medially    MCL: stable at both 0 and 30 degrees knee flexion, painful  Varus  stress: stable, and non-painful at both 0 and 30 degrees knee flexion  Lachmans: neg, firm endpoint  Posterior Drawer stable  Patellofemoral joint:                Apprehension: negative              Crepitations: mild    Grind: positive     RIGHT KNEE EXAM:    Skin: intact, no ecchymosis or erythema  Squat: 100 %, anterior knee pain.     ROM: 0 extension to 135+ flexion  Tight hamstrings on straight leg raise.  Effusion: none  Tender: pes anserine bursa, lateral joint line  NTTP med joint line, anterior or posterior knee  McMurrays: negative    MCL: stable, and non-painful at both 0 and 30 degrees knee flexion  Varus stress: stable, and non-painful at both 0 and 30 degrees knee flexion  Lachmans: neg, firm endpoint  Posterior Drawer stable  Patellofemoral joint:                Apprehension: negative              Crepitations: mild   Grind: positive     X-RAY:  AP/lateral views bilateral knee from 10/25/2018 were reviewed in clinic today. On my review, no obvious fractures or dislocations. Fairly preserved joint spaces. No sunrise views available.      MRI left knee 12/31/2018:  1. Horizontal oblique tear involving the body and posterior horn of  the the left knee medial meniscus, extending to the inferior articular  surface.  2. Diffuse thinning of the articular cartilage in all 3 joint  compartments of the left knee,most notable patello-femoral joint with a focal area of  high-grade cartilage loss centered at the median ridge with adjacent subchondral edema.   3. The anterior and posterior cruciate ligaments, medial and lateral  supporting structures, and lateral meniscus are intact.  4. Small popliteal cyst.  5. Multiloculated cystic structure adjacent to the cruciate ligaments  and posterior horn of the medial meniscus, possibly a parameniscal or  pericruciate cyst.        Impression:   73 year old female with:  1.  acute on chronic left knee pain, patello-femoral chondromalacia and pes anserine bursitis, complex  medial meniscus tear.   2.  Acute on chronic right knee pain, pes bursitis.  3. Right lateral thigh pain, iliotibial band tendonitis and greater trochanter bursitis.    Plan:   * Reviewed imaging with patient. Also, clinical exam findings. Likely patello-femoral pain and chondromalacia as well as pes bursitis/tendonitis. This appears bilateral.  * MRI shows complex medial meniscus tear.    * discussed imaging with patient, what appears to be a complex meniscal tear on MRI, as well as some underlying arthritic changes, which is consistent with symptoms and physical examination findings. Most notable arthritis is anterior under the knee cap.    * Discussed treatment options including nonoperative treatment with continued rest, ice, elevation, activity modification, NSAIDS and Physical Therapy, bracing and potential injections versus surgical treatment with arthroscopy and meniscal repair versus debridement, possible chondral debridement. Risks and benefits of each discussed in detail.  * in the setting of underlying chondrosis, predictability of arthroscopy is uncertain, unless mechanical symptoms present due to the meniscus tear.    * surgical risks discussed: bleeding, infection, pain, scar, damage to adjacent structures (nerve, vessels, cartilage), stiffness, post-traumatic arthritis, failure to relieve symptoms, recurrence of symptoms, blood clots (DVT), pulmonary emolism, risks of anesthesia and death. This surgery is not intended nor expected to alleviate arthritic pain symptoms, nor will it treat or correct underlying arthritic changes. Arthritis and symptoms related to arthritis could worsen with arthroscopy and meniscal and/or chondral debridement. Patient understands.    * understanding the risks of surgery, patient elected to proceed with injections today, both intra-articular and pes bursa on the left and pes on the right.  * recommend iliotibial band stretching,massaging on the right hip.  * all questions  addressed and answered prior to discharge from clinic today.  * patient to call if any questions or concerns in the meantime.    * Rest  * Activity modification - avoid activities that aggravate symptoms.  * NSAIDS - regular use for inflammation, with food, as long as no contra-indications. Please discuss with pcp if needed.  * Ice twice daily to three times daily.  * Compression wrap  * Elevation of extremity to reduce swelling  * Tylenol as needed for pain      Nic Singh M.D., M.S.  Dept. of Orthopaedic Surgery  [facility]    Large Joint Injection/Arthocentesis: L knee joint  Date/Time: 6/19/2019 1:39 PM  Performed by: Nic Singh MD  Authorized by: Nic Singh MD     Indications:  Pain  Needle Size:  22 G  Guidance: landmark guided    Approach:  Anteromedial  Location:  Knee      Medications:  4 mL lidocaine 1 %; 80 mg methylPREDNISolone 80 MG/ML; 3 mL bupivacaine 0.25 %  Outcome:  Tolerated well, no immediate complications  Procedure discussed: discussed risks, benefits, and alternatives    Consent Given by:  Patient  Timeout: timeout called immediately prior to procedure    Prep: patient was prepped and draped in usual sterile fashion    Large Joint Injection/Arthocentesis: bilateral anserine bursa  Date/Time: 6/19/2019 1:43 PM  Performed by: Nic Singh MD  Authorized by: Nic Singh MD     Indications:  Pain  Needle Size:  22 G  Guidance: landmark guided    Approach:  Anterior  Location:  Knee  Laterality:  Bilateral      Medications (Right):  3 mL lidocaine 1 %; 80 mg methylPREDNISolone 80 MG/ML  Medications (Left):  80 mg methylPREDNISolone 80 MG/ML  Outcome:  Tolerated well, no immediate complications  Procedure discussed: discussed risks, benefits, and alternatives    Consent Given by:  Patient  Timeout: timeout called immediately prior to procedure    Prep: patient was prepped and draped in usual sterile fashion

## 2019-06-19 NOTE — LETTER
"    6/19/2019         RE: Cely Ontiveros  [address]  [facility] [id number]        Dear Colleague,    Thank you for referring your patient, Cely Ontiveros, to the [facility]. Please see a copy of my visit note below.    CHIEF COMPLAINT:   Chief Complaint   Patient presents with     Left Knee - Pain     Pt states she has been icing both knees, does not like to take pain meds.  Also has been getting a massage and stretching which helps for awhile.  Pt would like injection in left knee unsure of right.  Hurts when walking and taking steps.  Possible surge     Knee Pain     Pt states she has been icing both knees, does not like to take pain meds.  Also has been getting a massage and stretching which helps for awhile.  Pt would like injection in left knee unsure of right.  Hurts when walking and taking steps.  Possible surgery conversation for down the road..  Last injection 01/16/19.     HISTORY OF PRESENT ILLNESS    Cely Ontiveros is a 73 year old female seen for evaluation of ongoing left knee pain with no known recent injury. Recent pain has been present since about 9/2018, but mentions possibly \"tearing something\" 5 years ago with an injury. Has had pain off/on but worse for some reason recently. She was seen by us 12/12/2018 and referred for an MRI to further evaluation. Noted to have medial meniscus tear, patello-femoral chondromalacia as we discussed with her over the phone. Was last seen 1/16/2019 and received left knee intra-articular as well as pes bursal injections. States the injections worked quite well for about 4 months.  Mild pain today, rated a 2/10. Starting to notice more right knee pain as well, medially. Also some pain up the lateral thigh and along the hip, worse at night laying on that side. No treatment for that pain. Pain is worsened with activities like flexion, and going up and down stairs. No pain with standing. Has had buckling episodes in the past. For " treatment, she has tried ice, physical therapy and a knee brace. She'd like to try more injections today. Has been taking Aleve regularly so thinks that is making a difference now.    Of note: reports low back pain, does yoga x3 per week.    Present symptoms: mild pain.  Anterior/medial. No swelling now but states has in the past. No locking, catching. ?buckling  Pain severity: 1/10  Frequency of symptoms: frequently  Exacerbating Factors: weight bearing, stairs  Relieving Factors: rest  Night Pain: No  Pain while at rest: No   Numbness or tingling: No   Patient has tried:     NSAIDS: yes, ibuprofen and now Aleve regularly     Physical Therapy: Yes      Activity modification: Yes      Bracing: No      Injections: Yes, 1/2019 left knee intra-articular and pes.     Ice: Yes     Assistive device:  No     Other: none    Orthopaedic PMH: history of adhesive capsulitis in the past, had a reaction to cortisone in the past (couldnt sleep, felt anxious).     Other PMH:  has a past medical history of Actinic keratosis, Allergic rhinitis, Cataract, Degenerative joint disease, Depression with anxiety, Herpes simplex, Hypoglycemia, and Impingement syndrome, shoulder. She also has no past medical history of Acne vulgaris, Allergies, Basal cell carcinoma, Complication of anesthesia, Eczema, Heart valve disorder, Malignant melanoma nos, Pacemaker, Photosensitive contact dermatitis, Psoriasis, Skin cancer, Squamous cell carcinoma, Unspecified asthma(493.90), or Urticaria.  Patient Active Problem List    Diagnosis Date Noted     Pain of finger of left hand 06/03/2019     Priority: Medium     Pseudophakia of both eyes 03/13/2019     Priority: Medium     Pseudophakia of right eye 02/15/2019     Priority: Medium     Pseudophakia of left eye 01/30/2019     Priority: Medium     Chondromalacia of patella, left 01/22/2019     Priority: Medium     Complex tear of medial meniscus of left knee as current injury, subsequent encounter 01/22/2019  "    Priority: Medium     Age-related nuclear cataract of both eyes 01/22/2019     Priority: Medium     Meibomian gland dysfunction 12/03/2018     Priority: Medium     Acute pain of left knee 11/14/2018     Priority: Medium     H/O hypoglycemia 03/08/2017     Priority: Medium     Chronic otitis externa of left ear, unspecified type 03/08/2017     Priority: Medium     Abdominal bloating 03/08/2017     Priority: Medium     Chronic rhinitis 03/08/2017     Priority: Medium     Dysthymic disorder 03/08/2017     Priority: Medium     Plugged tear duct, od > os 10/12/2014     Priority: Medium     Epiphora 10/12/2014     Priority: Medium     Hypermetropia 10/10/2013     Priority: Medium     Astigmatism with presbyopia 10/10/2013     Priority: Medium     Blepharitis of both eyes 10/10/2013     Priority: Medium     Cataracts, bilateral 10/10/2013     Priority: Medium     Seasonal allergic rhinitis 08/16/2013     Priority: Medium     Gross hematuria 01/02/2013     Priority: Medium     Right kidney stone 01/02/2013     Priority: Medium     Posterior vitreous detachment of both eyes 10/09/2012     Priority: Medium     Advance care planning 05/20/2011     Priority: Medium     Advance Care Planning 01/22/2015: ACP Review and Resources Provided:  Reviewed chart for advance care plan.  Cely LOPEZ Weston has no plan or code status on file. Mailed available resources and provided with information. Confirmed code status reflects current choices pending further ACP discussions.  Confirmed/documented designated decision maker(s). See permanent comments section of demographics in clinical tab. Added by Madonna Marques on 1/22/2015  Advance Care Planning 05/20/2011: Patient does not have an Advance/Health Care Directive (HCD), given \"What is Advance Care Planning?\" flyer. Mailed to patient.Jacqui Scherer.May 20, 2011       Degenerative joint disease      Priority: Medium     CARDIOVASCULAR SCREENING; LDL GOAL LESS THAN 160 10/31/2010     " Priority: Medium     Herpes simplex      Priority: Medium     Recurs left buttock  Zostavax 2/10  IMO update changed this record. Please review for accuracy       Allergic rhinitis      Priority: Medium       Surgical Hx:  has a past surgical history that includes ENLARGE BREAST WITH IMPLANT; REMOVAL OF BREAST IMPLANT; LAP,FULGURATE/EXCISE LESIONS; hysterectomy, pap no longer indicated (1998); sinus surgery; Combined Cystoscopy, Ureteroscopy, Laser Holmium Lithotripsy Ureter(S) (Right, 8/23/2018); cataract iol, rt/lt (Left, 01/24/2019); Phacoemulsification with standard intraocular lens implant (Left, 1/24/2019); and Phacoemulsification with standard intraocular lens implant (Right, 2/14/2019).    Medications:   Current Outpatient Medications:      citalopram (CELEXA) 20 MG tablet, Take 1 tablet (20 mg) by mouth daily, Disp: 90 tablet, Rfl: 3     fluconazole (DIFLUCAN) 200 MG tablet, Take 1 tablet (200 mg) by mouth daily, Disp: 14 tablet, Rfl: 0     loratadine-pseudoePHEDrine (EQL ALLERGY/CONGESTION RELIEF)  MG per 24 hr tablet, Take 1 tablet by mouth daily, Disp: 90 tablet, Rfl: 3     meclizine (ANTIVERT) 25 MG tablet, Take 1 tablet (25 mg) by mouth 3 times daily as needed for dizziness (Patient not taking: Reported on 6/10/2019), Disp: 21 tablet, Rfl: 0     order for DME, Equipment being ordered: tennis elbow brace, Disp: 1 each, Rfl: 0     order for DME, Equipment being ordered: left knee sleeve, Disp: 1 each, Rfl: 0     terbinafine (LAMISIL) 1 % external cream, Apply topically 2 times daily, Disp: 42 g, Rfl: 0     valACYclovir (VALTREX) 500 MG tablet, Take 1 tablet (500 mg) by mouth daily, Disp: 90 tablet, Rfl: 3    Allergies:   Allergies   Allergen Reactions     Sulfa Drugs Swelling     Cats Swelling     Lips swollen     Wool Fiber        Social Hx: retired .   reports that she has never smoked. She has never used smokeless tobacco. She reports that she does not drink alcohol or use  drugs.    Family Hx: family history includes Arthritis in her mother; Cancer in her father and mother; Cancer - colorectal in her father; Cardiovascular in her mother; Circulatory in her mother; Glaucoma in her mother; Heart Disease in her mother; Hypertension in her mother; Lipids in her mother; Macular Degeneration in her father; Obesity in her mother; Osteoporosis in her mother.    REVIEW OF SYSTEMS:   CONSTITUTIONAL:NEGATIVE for fever, chills, change in weight  INTEGUMENTARY/SKIN: NEGATIVE for worrisome rashes, moles or lesions  MUSCULOSKELETAL:See HPI above  NEURO: NEGATIVE for weakness, dizziness or paresthesias      PHYSICAL EXAM:  /72 (BP Location: Left arm, Patient Position: Sitting, Cuff Size: Adult Regular)   Pulse 84   Temp 97.7  F (36.5  C) (Oral)   Wt 65.3 kg (144 lb)   LMP  (LMP Unknown)   SpO2 95%   BMI 24.53 kg/m      GENERAL APPEARANCE: healthy, alert, no distress  SKIN: no suspicious lesions or rashes  NEURO: Normal strength and tone, mentation intact and speech normal  PSYCH:  mentation appears normal and affect normal, not anxious  RESPIRATORY: No increased work of breathing.    BILATERAL LOWER EXTREMITIES:  Gait: normal  Alignment: varus  No gross deformities or masses.  No Quad atrophy, strength normal.  Intact sensation deep peroneal nerve, superficial peroneal nerve, med/lat tibial nerve, sural nerve, saphenous nerve  Intact EHL, EDL, TA, FHL, GS, quadriceps hamstrings and hip flexors  Toes warm and well perfused, brisk capillary refill. Palpable 2+ dp pulses.  Bilateral calf soft and nttp or squeeze.  Edema: none    There is tender to palpation along the right iliotibial band from the knee up to the greater trochanter. Tender to palpation over the greater trochanter.    LEFT KNEE EXAM:    Skin: intact, no ecchymosis or erythema  Squat: 100 %, anterior knee pain.  ROM: 0 extension to 135+ flexion, anterior discomfort with knee flexion  Tight hamstrings on straight leg  raise.  Effusion: none  Tender: pes anserine bursa, lateral joint line, medial joint line, medial collateral ligament    NTTP anterior or posterior knee  McMurrays: positive - medially    MCL: stable at both 0 and 30 degrees knee flexion, painful  Varus stress: stable, and non-painful at both 0 and 30 degrees knee flexion  Lachmans: neg, firm endpoint  Posterior Drawer stable  Patellofemoral joint:                Apprehension: negative              Crepitations: mild    Grind: positive     RIGHT KNEE EXAM:    Skin: intact, no ecchymosis or erythema  Squat: 100 %, anterior knee pain.     ROM: 0 extension to 135+ flexion  Tight hamstrings on straight leg raise.  Effusion: none  Tender: pes anserine bursa, lateral joint line  NTTP med joint line, anterior or posterior knee  McMurrays: negative    MCL: stable, and non-painful at both 0 and 30 degrees knee flexion  Varus stress: stable, and non-painful at both 0 and 30 degrees knee flexion  Lachmans: neg, firm endpoint  Posterior Drawer stable  Patellofemoral joint:                Apprehension: negative              Crepitations: mild   Grind: positive     X-RAY:  AP/lateral views bilateral knee from 10/25/2018 were reviewed in clinic today. On my review, no obvious fractures or dislocations. Fairly preserved joint spaces. No sunrise views available.      MRI left knee 12/31/2018:  1. Horizontal oblique tear involving the body and posterior horn of  the the left knee medial meniscus, extending to the inferior articular  surface.  2. Diffuse thinning of the articular cartilage in all 3 joint  compartments of the left knee,most notable patello-femoral joint with a focal area of  high-grade cartilage loss centered at the median ridge with adjacent subchondral edema.   3. The anterior and posterior cruciate ligaments, medial and lateral  supporting structures, and lateral meniscus are intact.  4. Small popliteal cyst.  5. Multiloculated cystic structure adjacent to the  cruciate ligaments  and posterior horn of the medial meniscus, possibly a parameniscal or  pericruciate cyst.        Impression:   73 year old female with:  1.  acute on chronic left knee pain, patello-femoral chondromalacia and pes anserine bursitis, complex medial meniscus tear.   2.  Acute on chronic right knee pain, pes bursitis.  3. Right lateral thigh pain, iliotibial band tendonitis and greater trochanter bursitis.    Plan:   * Reviewed imaging with patient. Also, clinical exam findings. Likely patello-femoral pain and chondromalacia as well as pes bursitis/tendonitis. This appears bilateral.  * MRI shows complex medial meniscus tear.    * discussed imaging with patient, what appears to be a complex meniscal tear on MRI, as well as some underlying arthritic changes, which is consistent with symptoms and physical examination findings. Most notable arthritis is anterior under the knee cap.    * Discussed treatment options including nonoperative treatment with continued rest, ice, elevation, activity modification, NSAIDS and Physical Therapy, bracing and potential injections versus surgical treatment with arthroscopy and meniscal repair versus debridement, possible chondral debridement. Risks and benefits of each discussed in detail.  * in the setting of underlying chondrosis, predictability of arthroscopy is uncertain, unless mechanical symptoms present due to the meniscus tear.    * surgical risks discussed: bleeding, infection, pain, scar, damage to adjacent structures (nerve, vessels, cartilage), stiffness, post-traumatic arthritis, failure to relieve symptoms, recurrence of symptoms, blood clots (DVT), pulmonary emolism, risks of anesthesia and death. This surgery is not intended nor expected to alleviate arthritic pain symptoms, nor will it treat or correct underlying arthritic changes. Arthritis and symptoms related to arthritis could worsen with arthroscopy and meniscal and/or chondral debridement.  Patient understands.    * understanding the risks of surgery, patient elected to proceed with injections today, both intra-articular and pes bursa on the left and pes on the right.  * recommend iliotibial band stretching,massaging on the right hip.  * all questions addressed and answered prior to discharge from clinic today.  * patient to call if any questions or concerns in the meantime.    * Rest  * Activity modification - avoid activities that aggravate symptoms.  * NSAIDS - regular use for inflammation, with food, as long as no contra-indications. Please discuss with pcp if needed.  * Ice twice daily to three times daily.  * Compression wrap  * Elevation of extremity to reduce swelling  * Tylenol as needed for pain      Nic Singh M.D., M.S.  Dept. of Orthopaedic Surgery  [facility]    Large Joint Injection/Arthocentesis: L knee joint  Date/Time: 6/19/2019 1:39 PM  Performed by: Nic Singh MD  Authorized by: Nic Singh MD     Indications:  Pain  Needle Size:  22 G  Guidance: landmark guided    Approach:  Anteromedial  Location:  Knee      Medications:  4 mL lidocaine 1 %; 80 mg methylPREDNISolone 80 MG/ML; 3 mL bupivacaine 0.25 %  Outcome:  Tolerated well, no immediate complications  Procedure discussed: discussed risks, benefits, and alternatives    Consent Given by:  Patient  Timeout: timeout called immediately prior to procedure    Prep: patient was prepped and draped in usual sterile fashion    Large Joint Injection/Arthocentesis: bilateral anserine bursa  Date/Time: 6/19/2019 1:43 PM  Performed by: Nic Singh MD  Authorized by: Nic Singh MD     Indications:  Pain  Needle Size:  22 G  Guidance: landmark guided    Approach:  Anterior  Location:  Knee  Laterality:  Bilateral      Medications (Right):  3 mL lidocaine 1 %; 80 mg methylPREDNISolone 80 MG/ML  Medications (Left):  80 mg methylPREDNISolone 80 MG/ML  Outcome:  Tolerated well, no immediate  complications  Procedure discussed: discussed risks, benefits, and alternatives    Consent Given by:  Patient  Timeout: timeout called immediately prior to procedure    Prep: patient was prepped and draped in usual sterile fashion              Again, thank you for allowing me to participate in the care of your patient.        Sincerely,        Nic Singh MD

## 2019-06-20 NOTE — PROGRESS NOTES
Subjective     Cely Ontiveros is a 73 year old female who presents to clinic today for the following health issues:    HPI   Flank pain      Duration: 1 week    Description (location/character/radiation): Right flank pain    Intensity:  moderate    Accompanying signs and symptoms: Worsening right flank pain in last 24 hours. Patient denies urinary symptoms.    History (similar episodes/previous evaluation): Kidney stone in 8/2018    Precipitating or alleviating factors: Notes history of right SI joint issues    Therapies tried and outcome: None     -Patient also complains of a small, coin sized rash to her right shoulder, usually comes and goes, present for about a week now, denies new soaps, detergents, sick contacts, fever or chills.    Reviewed and updated as needed this visit by Provider         Review of Systems   ROS COMP: Constitutional, HEENT, cardiovascular, pulmonary, gi and gu systems are negative, except as otherwise noted.      Objective    LMP  (LMP Unknown)   There is no height or weight on file to calculate BMI.  Physical Exam   GENERAL: healthy, alert and no distress  NECK: no adenopathy, no asymmetry, masses, or scars and thyroid normal to palpation  RESP: lungs clear to auscultation - no rales, rhonchi or wheezes  CV: regular rate and rhythm, normal S1 S2, no S3 or S4, no murmur, click or rub, no peripheral edema and peripheral pulses strong  ABDOMEN: soft, nontender, no hepatosplenomegaly, no masses and bowel sounds normal  SKIN: [place]-sized erythematous, pruritic rash to medial aspect of right shoulder.  Comprehensive back pain exam:  Right flank pain/?paraspinal muscle tenderness, Range of motion not limited by pain, Lower extremity strength functional and equal on both sides, Lower extremity reflexes within normal limits bilaterally, Lower extremity sensation normal and equal on both sides and Straight leg raise negative bilaterally  Results for orders placed or performed in visit on 05/28/19    UA with Microscopic reflex to Culture   Result Value Ref Range    Color Urine Yellow     Appearance Urine Slightly Cloudy     Glucose Urine Negative NEG^Negative mg/dL    Bilirubin Urine Negative NEG^Negative    Ketones Urine Negative NEG^Negative mg/dL    Specific Gravity Urine 1.021 1.003 - 1.035    Blood Urine Trace (A) NEG^Negative    pH Urine 6.0 5.0 - 7.0 pH    Protein Albumin Urine 10 (A) NEG^Negative mg/dL    Urobilinogen mg/dL Normal 0.0 - 2.0 mg/dL    Nitrite Urine Negative NEG^Negative    Leukocyte Esterase Urine Small (A) NEG^Negative    Source Clean catch urine     WBC Urine 0 - 5 OTO5^0 - 5 /HPF    RBC Urine O - 2 OTO2^O - 2 /HPF    Yeast Urine Few (A) NEG^Negative /HPF    Squamous Epithelial /LPF Urine Moderate (A) FEW^Few /LPF    Mucous Urine Present (A) NEG^Negative /LPF             Assessment & Plan     1. Acute cystitis with hematuria  Urine with yeast, treatment ordered below.  - fluconazole (DIFLUCAN) 200 MG tablet; Take 1 tablet (200 mg) by mouth daily (Patient not taking: Reported on 6/19/2019)  Dispense: 14 tablet; Refill: 0    2. Flank pain; Patient was quite concerned about a kidney stone, ctabd/pelvis reviewed with patient and unremarkable for a stone.  Use heat pads, over the counter ibuprofen/tylenol at appropriate doses.  - UA with Microscopic reflex to Culture  - CT Abdomen Pelvis w/o Contrast; Future    3. Tinea corporis  - terbinafine (LAMISIL) 1 % external cream; Apply topically 2 times daily  Dispense: 42 g; Refill: 0

## 2019-06-26 ENCOUNTER — THERAPY VISIT (OUTPATIENT)
Dept: PHYSICAL THERAPY | Facility: CLINIC | Age: 74
End: 2019-06-26
Attending: NURSE PRACTITIONER
Payer: COMMERCIAL

## 2019-06-26 DIAGNOSIS — G89.29 CHRONIC MIDLINE LOW BACK PAIN WITHOUT SCIATICA: ICD-10-CM

## 2019-06-26 DIAGNOSIS — M53.3 DISORDER OF SACRUM: ICD-10-CM

## 2019-06-26 DIAGNOSIS — M54.50 CHRONIC MIDLINE LOW BACK PAIN WITHOUT SCIATICA: ICD-10-CM

## 2019-06-26 DIAGNOSIS — M53.3 SI (SACROILIAC) JOINT DYSFUNCTION: ICD-10-CM

## 2019-06-26 PROCEDURE — 97112 NEUROMUSCULAR REEDUCATION: CPT | Mod: GP | Performed by: PHYSICAL THERAPIST

## 2019-06-26 PROCEDURE — 97161 PT EVAL LOW COMPLEX 20 MIN: CPT | Mod: GP | Performed by: PHYSICAL THERAPIST

## 2019-06-26 PROCEDURE — 97110 THERAPEUTIC EXERCISES: CPT | Mod: GP | Performed by: PHYSICAL THERAPIST

## 2019-06-26 NOTE — PROGRESS NOTES
[facility] Initial Evaluation  Subjective:  The history is provided by the patient.   Cely Ontiveros being seen for SI joint and tight IT band.   Problem began 5/17/2019 (MD consult; problem has been present for several yrs though). Where condition occurred: for unknown reasons.Problem occurred: not known  and reported as 1/10 on pain scale. General health as reported by patient is good. Pertinent medical history includes:  Osteoarthritis and depression.  Medical allergies: other (sulfa).  Surgeries include:  Other (hysterectomy, cataracts).  Current medications:  Anti-depressants.   Primary job tasks include:  Prolonged sitting and repetitive tasks (works out at the gym daily (yoga classes, stationary bike, free wts and weight machines)). Other job/home tasks details: lighter household chores.  Pain is described as aching and is intermittent. Pain is the same all the time. Since onset symptoms are unchanged.  Previous treatment includes physical therapy. There was moderate improvement following previous treatment.   Patient is retired.   Barriers include:  None as reported by patient.  Red flags:  None as reported by patient.  Type of problem:  Sacroiliac and lumbar   Condition occurred with:  Degenerative joint disease. This is a recurrent condition    Patient reports pain:  Lower lumbar spine, SI joint left and SI joint right. Radiates to:  Thigh right and knee right. Associated symptoms:  Loss of motion/stiffness. Symptoms are exacerbated by twisting and certain positions (sometimes very random) and relieved by rest and activity/movement.                      Objective:    Gait:    Gait Type:  Normal   Assistive Devices:  None                 Lumbar/SI Evaluation  ROM:    AROM Lumbar:   Flexion:          95% of NL  Ext:                    60%   Side Bend:        Left:  WNL    Right:  WNL  Rotation:           Left:     Right:   Side Glide:        Left:     Right:         Strength: weakness  noted in B gluteals, glut max > glut med          Neural Tension/Mobility:  Lumbar:  Normal        Lumbar Palpation:    Tenderness present at Left:    Piriformis; PSIS and Gluteus Medius  Tenderness not present at Left:    Quadratus Lumborum; Erector Spinae or Vertebral  Tenderness present at Right: Piriformis; PSIS and Gluteus Medius  Tenderness not present at Right:  Quadratus Lumborum; Erector Spinae or Vertebral      Spinal Segmental Conclusions: Minimal restrictions at all lumbar levels w/P-A mob's, mild to slight pain.          SI joint/Sacrum:    Tight adductors and piriformis mm bilat'ly but no increase in back pain.    Left positive at:    Squish  Left negative at:    Forward bend standing and Thigh thrust  Right positive at:    Squish  Right negative at:  Forward bend standing and Thigh thrust                                                   Alecia Lumbar Evaluation    Posture:  Sitting: fair  Standing: fair  Lordosis: Reduced  Lateral Shift: no  Correction of Posture: better                                                   ROS    Assessment/Plan:    Patient is a 73 year old female with lumbar and sacral complaints.    Patient has the following significant findings with corresponding treatment plan.                Diagnosis 1:  SIJ instability w/LBP and hip pain  Pain -  hot/cold therapy, self management, education, directional preference exercise and home program  Decreased ROM/flexibility - therapeutic exercise  Decreased joint mobility - therapeutic exercise  Decreased strength - therapeutic exercise and therapeutic activities  Impaired muscle performance - neuro re-education  Decreased function - therapeutic activities  Impaired posture - neuro re-education and therapeutic activities    Therapy Evaluation Codes:   1) History comprised of:   Personal factors that impact the plan of care:      Past/current experiences and Time since onset of symptoms.    Comorbidity factors that impact the plan of  care are:      Depression and Osteoarthritis.     Medications impacting care: Anti-depressant.  2) Examination of Body Systems comprised of:   Body structures and functions that impact the plan of care:      Lumbar spine and Sacral illiac joint.   Activity limitations that impact the plan of care are:      Lifting, Sitting, Walking and Sleeping.  3) Clinical presentation characteristics are:   Stable/Uncomplicated.  4) Decision-Making    Low complexity using standardized patient assessment instrument and/or measureable assessment of functional outcome.  Cumulative Therapy Evaluation is: Low complexity.    Previous and current functional limitations:  (See Goal Flow Sheet for this information)    Short term and Long term goals: (See Goal Flow Sheet for this information)     Communication ability:  Patient appears to be able to clearly communicate and understand verbal and written communication and follow directions correctly.  Treatment Explanation - The following has been discussed with the patient:   RX ordered/plan of care  Anticipated outcomes  Possible risks and side effects  This patient would benefit from PT intervention to resume normal activities.   Rehab potential is excellent.    Frequency:  2 X a month, once daily  Duration:  for 2 months  Discharge Plan:  Achieve all LTG.  Independent in home treatment program.  Reach maximal therapeutic benefit.    Please refer to the daily flowsheet for treatment today, total treatment time and time spent performing 1:1 timed codes.

## 2019-06-27 ENCOUNTER — OFFICE VISIT (OUTPATIENT)
Dept: FAMILY MEDICINE | Facility: CLINIC | Age: 74
End: 2019-06-27
Payer: COMMERCIAL

## 2019-06-27 VITALS
HEIGHT: 64 IN | DIASTOLIC BLOOD PRESSURE: 67 MMHG | OXYGEN SATURATION: 96 % | SYSTOLIC BLOOD PRESSURE: 123 MMHG | WEIGHT: 143 LBS | TEMPERATURE: 97.9 F | BODY MASS INDEX: 24.41 KG/M2 | HEART RATE: 82 BPM | RESPIRATION RATE: 16 BRPM

## 2019-06-27 DIAGNOSIS — N89.8 VAGINAL DRYNESS: ICD-10-CM

## 2019-06-27 DIAGNOSIS — R30.0 DYSURIA: Primary | ICD-10-CM

## 2019-06-27 LAB
ALBUMIN UR-MCNC: NEGATIVE MG/DL
APPEARANCE UR: CLEAR
BACTERIA #/AREA URNS HPF: ABNORMAL /HPF
BILIRUB UR QL STRIP: NEGATIVE
COLOR UR AUTO: YELLOW
GLUCOSE UR STRIP-MCNC: NEGATIVE MG/DL
HGB UR QL STRIP: NEGATIVE
KETONES UR STRIP-MCNC: NEGATIVE MG/DL
LEUKOCYTE ESTERASE UR QL STRIP: ABNORMAL
MUCOUS THREADS #/AREA URNS LPF: PRESENT /LPF
NITRATE UR QL: NEGATIVE
NON-SQ EPI CELLS #/AREA URNS LPF: ABNORMAL /LPF
PH UR STRIP: 5.5 PH (ref 5–7)
RBC #/AREA URNS AUTO: ABNORMAL /HPF
SOURCE: ABNORMAL
SP GR UR STRIP: 1.02 (ref 1–1.03)
SPECIMEN SOURCE: NORMAL
UROBILINOGEN UR STRIP-ACNC: 0.2 EU/DL (ref 0.2–1)
WBC #/AREA URNS AUTO: ABNORMAL /HPF
WET PREP SPEC: NORMAL

## 2019-06-27 PROCEDURE — 87210 SMEAR WET MOUNT SALINE/INK: CPT | Performed by: NURSE PRACTITIONER

## 2019-06-27 PROCEDURE — 99213 OFFICE O/P EST LOW 20 MIN: CPT | Performed by: NURSE PRACTITIONER

## 2019-06-27 PROCEDURE — 81001 URINALYSIS AUTO W/SCOPE: CPT | Performed by: NURSE PRACTITIONER

## 2019-06-27 ASSESSMENT — PAIN SCALES - GENERAL: PAINLEVEL: NO PAIN (0)

## 2019-06-27 ASSESSMENT — MIFFLIN-ST. JEOR: SCORE: 1132.61

## 2019-06-27 NOTE — PATIENT INSTRUCTIONS
Patient Education     Atrophic Vaginitis    Atrophic vaginitis means the walls of your vagina have become thin. This happens when your body makes too little of the hormone estrogen. Menopause or surgical removal of the ovaries are the most common causes for a drop in estrogen. Breastfeeding can also cause the hormone level to drop.  Symptoms of atrophic vaginitis include:    Dryness, soreness, burning, or itching in the vagina    Vaginal discharge  Sex can be uncomfortable, even painful. After sex, you may have bleeding from your vagina. You may also have burning or pain when you urinate.  Home care  Your healthcare provider may recommend 1 or more of these as treatment:    Vaginal creams, lotions, and lubricants. These products help relieve vaginal dryness. They don t need a prescription. They can be found in the personal care section of most pharmacies. Creams and lotions are used daily to help keep the vagina moist. Lubricants help reduce dryness and pain during sex. Choose water-based lubricants. Don t use petroleum jelly, mineral oil, or other oils. These increase the chance of infection.     Hormone therapy (HT). HT increases the amount of estrogen in your body. This can help manage or relieve symptoms. HT can be given in pill form. It may be given as a lotion, cream, ring put into the vagina, or a patch on the skin. The risks and benefits of HT vary for each woman. For instance, your risk may be higher if you have had breast cancer. Discuss this treatment with your healthcare provider. Not every woman can use HT.  You don t need to give up (abstain from) sex. In fact, regular sex can help keep vaginal tissues healthy. Take steps to make sex more comfortable by using water-based lubricants.  Preventing infections  Atrophic vaginitis makes an infection of the vagina or the urinary tract more likely. To help reduce your risk:    Keep your genitals clean. When you bathe, wash the outside of your vagina with  mild soap and water. Clean gently between the folds of your vagina.    Wipe from front to back after a bowel movement.    Don t douche unless your healthcare provider tells you to.    Avoid scented toilet paper, scented vaginal sprays, and scented tampons.    Avoid wearing clothes that are tight in the crotch. These include pantyhose, jeans, and leggings. Wear cotton underwear. Change it every day.  Follow-up care  Follow up with your healthcare provider, or as advised.  When to seek medical advice  Call your health care provider right away if any of these occur:    Fever of 100.4 F (38 C) or higher, or as directed by your healthcare provider    Symptoms don t go away or get worse even with treatment    Vaginal area swells or becomes painful    Vaginal area bleeds, but not because of your period    Bad-smelling discharge from the vagina    Pain or burning when you urinate or you have trouble passing urine    Open sores develop around vagina   Date Last Reviewed: 12/1/2016 2000-2018 [employer]. [address] [place] [address], [place] [address]. All rights reserved. This information is not intended as a substitute for professional medical care. Always follow your healthcare professional's instructions.

## 2019-06-27 NOTE — PROGRESS NOTES
Subjective     Cely Ontiveros is a 73 year old female who presents to clinic today for the following health issues:    HPI     URINARY TRACT SYMPTOMS  Onset: 6/24/19    Description:   Painful urination (Dysuria): no   Blood in urine (Hematuria): no   Delay in urine (Hesitency): no     Intensity: severe    Progression of Symptoms:  improving    Accompanying Signs & Symptoms:  Fever/chills: no   Flank pain no   Nausea and vomiting: no   Any vaginal symptoms: none  Abdominal/Pelvic Pain: YES- slight, LLQ    History:   History of frequent UTI's: YES - since kidney stone  History of kidney stones: YES  Sexually Active: no   Possibility of pregnancy: No    Precipitating factors:   NA    Therapies Tried and outcome: Increased fluids      Patient Active Problem List   Diagnosis     Allergic rhinitis     Herpes simplex     CARDIOVASCULAR SCREENING; LDL GOAL LESS THAN 160     Degenerative joint disease     Advance care planning     Posterior vitreous detachment of both eyes     Gross hematuria     Right kidney stone     Seasonal allergic rhinitis     Hypermetropia     Astigmatism with presbyopia     Blepharitis of both eyes     Cataracts, bilateral     Plugged tear duct, od > os     Epiphora     H/O hypoglycemia     Chronic otitis externa of left ear, unspecified type     Abdominal bloating     Chronic rhinitis     Dysthymic disorder     Acute pain of left knee     Meibomian gland dysfunction     Chondromalacia of patella, left     Complex tear of medial meniscus of left knee as current injury, subsequent encounter     Age-related nuclear cataract of both eyes     Pseudophakia of left eye     Pseudophakia of right eye     Pseudophakia of both eyes     Pain of finger of left hand     Disorder of sacrum     Chronic midline low back pain without sciatica     Past Surgical History:   Procedure Laterality Date     CATARACT IOL, RT/LT Left 01/24/2019     COMBINED CYSTOSCOPY, URETEROSCOPY, LASER HOLMIUM LITHOTRIPSY URETER(S) Right  8/23/2018    Procedure: COMBINED CYSTOSCOPY, URETEROSCOPY, LASER HOLMIUM LITHOTRIPSY URETER(S);   Cystoscopy,Right ureteroscopy with laser lithotripsy and stent placement;  Surgeon: Pino Hawk MD;  Location: [facility] OR     HC ENLARGE BREAST WITH IMPLANT       HC LAP,FULGURATE/EXCISE LESIONS       HC REMOVAL OF BREAST IMPLANT       HYSTERECTOMY, PAP NO LONGER INDICATED  1998    ovaries and uterus out for benign reasons(fibroids and ovarian cyst)     PHACOEMULSIFICATION WITH STANDARD INTRAOCULAR LENS IMPLANT Left 1/24/2019    Procedure: PHACOEMULSIFICATION WITH STANDARD INTRAOCULAR LENS IMPLANT, LEFT;  Surgeon: Wagner Aguilera MD;  Location: [facility] OR     PHACOEMULSIFICATION WITH STANDARD INTRAOCULAR LENS IMPLANT Right 2/14/2019    Procedure: PHACOEMULSIFICATION WITH STANDARD INTRAOCULAR LENS IMPLANT, RIGHT;  Surgeon: Wagner Aguilera MD;  Location: [facility] OR     SINUS SURGERY         Social History     Tobacco Use     Smoking status: Never Smoker     Smokeless tobacco: Never Used   Substance Use Topics     Alcohol use: No     Family History   Problem Relation Age of Onset     Hypertension Mother      Arthritis Mother      Cardiovascular Mother      Circulatory Mother      Heart Disease Mother      Lipids Mother      Obesity Mother      Osteoporosis Mother      Cancer Mother         skin     Glaucoma Mother      Cancer - colorectal Father      Cancer Father      Macular Degeneration Father      Diabetes No family hx of      Cerebrovascular Disease No family hx of      Thyroid Disease No family hx of          Current Outpatient Medications   Medication Sig Dispense Refill     citalopram (CELEXA) 20 MG tablet Take 1 tablet (20 mg) by mouth daily 90 tablet 3     loratadine-pseudoePHEDrine (EQL ALLERGY/CONGESTION RELIEF)  MG per 24 hr tablet Take 1 tablet by mouth daily 90 tablet 3     terbinafine (LAMISIL) 1 % external cream Apply topically 2 times daily 42 g 0     valACYclovir (VALTREX) 500 MG tablet Take  "1 tablet (500 mg) by mouth daily 90 tablet 3     BP Readings from Last 3 Encounters:   06/27/19 123/67   06/19/19 143/72   06/10/19 111/66    Wt Readings from Last 3 Encounters:   06/27/19 64.9 kg (143 lb)   06/19/19 65.3 kg (144 lb)   06/10/19 65.3 kg (144 lb)              Reviewed and updated as needed this visit by Provider         Review of Systems   ROS COMP: Constitutional, HEENT, cardiovascular, pulmonary, gi and gu systems are negative, except as otherwise noted.      Objective    /67 (BP Location: Left arm, Patient Position: Sitting, Cuff Size: Adult Regular)   Pulse 82   Temp 97.9  F (36.6  C) (Oral)   Resp 16   Ht 1.632 m (5' 4.25\")   Wt 64.9 kg (143 lb)   LMP  (LMP Unknown)   SpO2 96%   BMI 24.36 kg/m    Body mass index is 24.36 kg/m .  Physical Exam   GENERAL: healthy, alert and no distress  NECK: no adenopathy, no asymmetry, masses, or scars and thyroid normal to palpation  RESP: lungs clear to auscultation - no rales, rhonchi or wheezes  CV: regular rate and rhythm, normal S1 S2, no S3 or S4, no murmur, click or rub, no peripheral edema and peripheral pulses strong  ABDOMEN: soft, nontender, no hepatosplenomegaly, no masses and bowel sounds normal   (female): deferred  MS: no gross musculoskeletal defects noted, no edema  SKIN: no suspicious lesions or rashes  NEURO: Normal strength and tone, mentation intact and speech normal  BACK: no CVA tenderness, no paralumbar tenderness  PSYCH: mentation appears normal, affect normal/bright    Diagnostic Test Results:  Labs reviewed in Epic  Component      Latest Ref Rng & Units 6/27/2019 6/27/2019           9:50 AM 10:34 AM   Color Urine       Yellow    Appearance Urine       Clear    Glucose Urine      NEG:Negative mg/dL Negative    Bilirubin Urine      NEG:Negative Negative    Ketones Urine      NEG:Negative mg/dL Negative    Specific Gravity Urine      1.003 - 1.035 1.020    Blood Urine      NEG:Negative Negative    pH Urine      5.0 - 7.0 " pH 5.5    Protein Albumin Urine      NEG:Negative mg/dL Negative    Urobilinogen Urine      0.2 - 1.0 EU/dL 0.2    Nitrite Urine      NEG:Negative Negative    Leukocyte Esterase Urine      NEG:Negative Trace (A)    Source       Midstream Urine    Specimen Description        Vagina   Wet Prep        WBC'S seen . . .   WBC Urine      OTO5:0 - 5 /HPF 0 - 5    RBC Urine      OTO2:O - 2 /HPF O - 2    Squamous Epithelial /LPF Urine      FEW:Few /LPF Few    Bacteria Urine      NEG:Negative /HPF Few (A)    Mucous Urine      NEG:Negative /LPF Present (A)      Component      Latest Ref Rng & Units 6/27/2019 6/27/2019          10:34 AM 10:34 AM   Color Urine           Appearance Urine           Glucose Urine      NEG:Negative mg/dL     Bilirubin Urine      NEG:Negative     Ketones Urine      NEG:Negative mg/dL     Specific Gravity Urine      1.003 - 1.035     Blood Urine      NEG:Negative     pH Urine      5.0 - 7.0 pH     Protein Albumin Urine      NEG:Negative mg/dL     Urobilinogen Urine      0.2 - 1.0 EU/dL     Nitrite Urine      NEG:Negative     Leukocyte Esterase Urine      NEG:Negative     Source           Specimen Description           Wet Prep       No Trichomonas seen No clue cells seen   WBC Urine      OTO5:0 - 5 /HPF     RBC Urine      OTO2:O - 2 /HPF     Squamous Epithelial /LPF Urine      FEW:Few /LPF     Bacteria Urine      NEG:Negative /HPF     Mucous Urine      NEG:Negative /LPF       Component      Latest Ref Rng & Units 6/27/2019          10:34 AM   Color Urine          Appearance Urine          Glucose Urine      NEG:Negative mg/dL    Bilirubin Urine      NEG:Negative    Ketones Urine      NEG:Negative mg/dL    Specific Gravity Urine      1.003 - 1.035    Blood Urine      NEG:Negative    pH Urine      5.0 - 7.0 pH    Protein Albumin Urine      NEG:Negative mg/dL    Urobilinogen Urine      0.2 - 1.0 EU/dL    Nitrite Urine      NEG:Negative    Leukocyte Esterase Urine      NEG:Negative    Source           Specimen Description          Wet Prep       No yeast seen   WBC Urine      OTO5:0 - 5 /HPF    RBC Urine      OTO2:O - 2 /HPF    Squamous Epithelial /LPF Urine      FEW:Few /LPF    Bacteria Urine      NEG:Negative /HPF    Mucous Urine      NEG:Negative /LPF            Assessment & Plan     1. Dysuria  UA negative.  Reviewed genital hygiene.  - UA reflex to Microscopic and Culture  - Wet prep  - Urine Microscopic    2. Vaginal dryness  Will start Premarin Cream topicallyt for vaginal dryness, return to clinic if not improved, new, or worsening symptoms.   - conjugated estrogens (PREMARIN) 0.625 MG/GM vaginal cream; Place 0.5 g vaginally twice a week  Dispense: 30 g; Refill: 1       See Patient Instructions    Return in about 3 months (around 9/27/2019), or if symptoms worsen or fail to improve.    AUDREY Nixon [facility]

## 2019-07-01 ENCOUNTER — ANCILLARY PROCEDURE (OUTPATIENT)
Dept: BONE DENSITY | Facility: CLINIC | Age: 74
End: 2019-07-01
Attending: NURSE PRACTITIONER
Payer: COMMERCIAL

## 2019-07-01 DIAGNOSIS — Z78.0 ASYMPTOMATIC MENOPAUSAL STATE: ICD-10-CM

## 2019-07-01 PROCEDURE — 77080 DXA BONE DENSITY AXIAL: CPT | Performed by: RADIOLOGY

## 2019-07-02 NOTE — RESULT ENCOUNTER NOTE
Cely,     Bone density scan showed mild thinning of the bones called Osteopenia.   Please take 1200 mg of Calcium daily (from diet and supplements), Vitamin D 800 units daily and try and do weight bearing exercises.     Please do not hesitate to call us at [phone] if you have any questions or concerns.    Thank you,    Danyelle Espinosa MD MPH   Covering for Mara Varma

## 2019-07-10 ENCOUNTER — THERAPY VISIT (OUTPATIENT)
Dept: PHYSICAL THERAPY | Facility: CLINIC | Age: 74
End: 2019-07-10
Payer: COMMERCIAL

## 2019-07-10 DIAGNOSIS — M53.3 DISORDER OF SACRUM: ICD-10-CM

## 2019-07-10 DIAGNOSIS — G89.29 CHRONIC MIDLINE LOW BACK PAIN WITHOUT SCIATICA: ICD-10-CM

## 2019-07-10 DIAGNOSIS — M54.50 CHRONIC MIDLINE LOW BACK PAIN WITHOUT SCIATICA: ICD-10-CM

## 2019-07-10 PROCEDURE — 97110 THERAPEUTIC EXERCISES: CPT | Mod: GP | Performed by: PHYSICAL THERAPIST

## 2019-07-10 PROCEDURE — 97112 NEUROMUSCULAR REEDUCATION: CPT | Mod: GP | Performed by: PHYSICAL THERAPIST

## 2019-07-10 NOTE — PROGRESS NOTES
Subjective:  HPI                    Objective:  System    Physical Exam    General     ROS    Assessment/Plan:    DISCHARGE REPORT    Progress reporting period is from 6/26/19 to 7/10/19.       SUBJECTIVE  Subjective changes noted by patient:  e: Pt notes that her R ITB has tightened up again so she did go back to her chiropractor once, something was adjusted in her LB and it did make it better.    Current pain level is  1/10(barely).     Previous pain level was  1/10  .   Changes in function:  Yes (See Goal flowsheet attached for changes in current functional level)  Adverse reaction to treatment or activity: None    OBJECTIVE  Changes noted in objective findings:    Pt has good ITB flexibility when performing the stretch today. Knee pain increased after doing sidestep ex w/knees slightly bent. Good core stability observed with ball ex's.     ASSESSMENT/PLAN  Updated problem list and treatment plan: Diagnosis 1:  SIJ instability  Decreased strength - home program  Impaired muscle performance - home program  STG/LTGs have been met or progress has been made towards goals:  Yes (See Goal flow sheet completed today.)  Assessment of Progress: The patient's condition is improving.  The patient's condition has potential to improve.  Patient has decided to continue independently with her HEP.  Self Management Plans:  Patient has been instructed in a home treatment program.  Patient is independent in a home treatment program.  Patient  has been instructed in self management of symptoms.  Patient is independent in self management of symptoms.    Cely continues to require the following intervention to meet STG and LTG's:  PT intervention is no longer required to meet STG/LTG.    Recommendations:  This patient is ready to be discharged from therapy and continue their home treatment program.    Please refer to the daily flowsheet for treatment today, total treatment time and time spent performing 1:1 timed codes.

## 2019-07-11 PROBLEM — M79.645 PAIN OF FINGER OF LEFT HAND: Status: RESOLVED | Noted: 2019-06-03 | Resolved: 2019-07-11

## 2019-07-11 NOTE — PROGRESS NOTES
Discharge Summary - Hand Therapy    Patient did not return to therapy after the initial evaluation.  We will assume that patient's goals were met.    [facility]

## 2019-07-27 ENCOUNTER — OFFICE VISIT (OUTPATIENT)
Dept: URGENT CARE | Facility: URGENT CARE | Age: 74
End: 2019-07-27
Payer: COMMERCIAL

## 2019-07-27 VITALS
TEMPERATURE: 98.4 F | BODY MASS INDEX: 25.1 KG/M2 | HEART RATE: 85 BPM | DIASTOLIC BLOOD PRESSURE: 73 MMHG | OXYGEN SATURATION: 95 % | WEIGHT: 147.4 LBS | SYSTOLIC BLOOD PRESSURE: 118 MMHG

## 2019-07-27 DIAGNOSIS — J32.9 VIRAL SINUSITIS: ICD-10-CM

## 2019-07-27 DIAGNOSIS — H81.10 BENIGN PAROXYSMAL POSITIONAL VERTIGO, UNSPECIFIED LATERALITY: Primary | ICD-10-CM

## 2019-07-27 DIAGNOSIS — B97.89 VIRAL SINUSITIS: ICD-10-CM

## 2019-07-27 PROCEDURE — 99214 OFFICE O/P EST MOD 30 MIN: CPT | Performed by: FAMILY MEDICINE

## 2019-07-27 RX ORDER — MECLIZINE HYDROCHLORIDE 25 MG/1
25 TABLET ORAL 4 TIMES DAILY PRN
Qty: 20 TABLET | Refills: 1 | Status: SHIPPED | OUTPATIENT
Start: 2019-07-27 | End: 2019-10-29

## 2019-07-27 ASSESSMENT — ENCOUNTER SYMPTOMS
DYSURIA: 0
BRUISES/BLEEDS EASILY: 0
EYE PAIN: 0
NAUSEA: 0
SINUS PAIN: 1
EYE REDNESS: 0
ADENOPATHY: 0
SINUS PRESSURE: 1
BACK PAIN: 0
HEADACHES: 0
RHINORRHEA: 1
COUGH: 1
SORE THROAT: 0
CHILLS: 1
WHEEZING: 0
FEVER: 0
VOICE CHANGE: 1
DIZZINESS: 1
VOMITING: 0
SEIZURES: 0
AGITATION: 0
STRIDOR: 0
WOUND: 0
DIARRHEA: 0
ABDOMINAL PAIN: 0
NUMBNESS: 0
TREMORS: 0
SHORTNESS OF BREATH: 0
WEAKNESS: 0

## 2019-07-27 NOTE — PROGRESS NOTES
ecSUBJECTIVE:   Cely Ontiveros is a 73 year old female presenting with a chief complaint of   Chief Complaint   Patient presents with     Dizziness     Patient complains of vertigo and possible sinus infection        She is an established patient of [facility].      Vertigo 10 days worse with movement and seems to be consistent with the room, frontal and maxillary pressure 4 days. Frontal headache x 3 days and intermittently present.   Rhinorrhea, dry cough intermittent.   Deviated septum correction 20 years ago and sinus infection x 3 since that time     Review of Systems   Constitutional: Positive for chills (resolved Wednesday). Negative for fever.   HENT: Positive for congestion, ear pain (hx of left ear pain chronic from ear infection 15 years ago has beeen seen by ENT), rhinorrhea, sinus pressure, sinus pain and voice change (mild dysphonia or hoarse voice ). Negative for sore throat (resolved a few days ago ).    Eyes: Negative for pain and redness.   Respiratory: Positive for cough (dry occasional ). Negative for shortness of breath, wheezing and stridor.    Cardiovascular: Negative for chest pain.   Gastrointestinal: Negative for abdominal pain, diarrhea, nausea and vomiting.   Genitourinary: Negative for decreased urine volume, dysuria, menstrual problem, vaginal bleeding and vaginal discharge.   Musculoskeletal: Negative for back pain.   Skin: Negative for rash and wound.   Allergic/Immunologic: Negative for environmental allergies, food allergies and immunocompromised state.   Neurological: Positive for dizziness (vertigo). Negative for tremors, seizures, syncope, weakness, numbness and headaches.   Hematological: Negative for adenopathy. Does not bruise/bleed easily.   Psychiatric/Behavioral: Negative for agitation and self-injury.        Celexa for slight depression        Past Medical History:   Diagnosis Date     Actinic keratosis      Allergic rhinitis      Cataract      Degenerative joint disease      cervical disease     Depression with anxiety      Herpes simplex      Hypoglycemia     eats small meals and is fine     Impingement syndrome, shoulder      Family History   Problem Relation Age of Onset     Hypertension Mother      Arthritis Mother      Cardiovascular Mother      Circulatory Mother      Heart Disease Mother      Lipids Mother      Obesity Mother      Osteoporosis Mother      Cancer Mother         skin     Glaucoma Mother      Cancer - colorectal Father      Cancer Father      Macular Degeneration Father      Diabetes No family hx of      Cerebrovascular Disease No family hx of      Thyroid Disease No family hx of      Current Outpatient Medications   Medication Sig Dispense Refill     citalopram (CELEXA) 20 MG tablet Take 1 tablet (20 mg) by mouth daily 90 tablet 3     conjugated estrogens (PREMARIN) 0.625 MG/GM vaginal cream Place 0.5 g vaginally twice a week 30 g 1     loratadine-pseudoePHEDrine (EQL ALLERGY/CONGESTION RELIEF)  MG per 24 hr tablet Take 1 tablet by mouth daily 90 tablet 3     terbinafine (LAMISIL) 1 % external cream Apply topically 2 times daily 42 g 0     valACYclovir (VALTREX) 500 MG tablet Take 1 tablet (500 mg) by mouth daily 90 tablet 3     Social History     Tobacco Use     Smoking status: Never Smoker     Smokeless tobacco: Never Used   Substance Use Topics     Alcohol use: No       OBJECTIVE  /73 (BP Location: Left arm, Patient Position: Chair, Cuff Size: Adult Regular)   Pulse 85   Temp 98.4  F (36.9  C) (Oral)   Wt 66.9 kg (147 lb 6.4 oz)   LMP  (LMP Unknown)   SpO2 95%   BMI 25.10 kg/m      Physical Exam   Constitutional: She is oriented to person, place, and time.   HENT:   Head: Normocephalic and atraumatic.   Right Ear: External ear normal.   Left Ear: External ear normal.   Nose: Nose normal.   Mouth/Throat: Oropharynx is clear and moist. No oropharyngeal exudate.   Eyes: Pupils are equal, round, and reactive to light. Conjunctivae are normal. Right  eye exhibits no discharge. Left eye exhibits no discharge. No scleral icterus.   Neck: Neck supple. No tracheal deviation present. No thyromegaly present.   Cardiovascular: Normal rate, regular rhythm, normal heart sounds and intact distal pulses. Exam reveals no gallop and no friction rub.   No murmur heard.  Pulmonary/Chest: Effort normal and breath sounds normal. No stridor. No respiratory distress. She has no wheezes. She has no rales. She exhibits no tenderness.   Abdominal: Soft. Bowel sounds are normal. She exhibits no distension and no mass. There is no tenderness. There is no rebound and no guarding.   Musculoskeletal: She exhibits no edema, tenderness or deformity.   Lymphadenopathy:     She has no cervical adenopathy.   Neurological: She is alert and oriented to person, place, and time. No cranial nerve deficit or sensory deficit. She exhibits normal muscle tone.   cody-halpike testing does show mild subjective vertigo without nystagmus   Skin: Skin is warm and dry. No rash noted. No erythema.   Psychiatric: Judgment normal.         ASSESSMENT:    ICD-10-CM    1. Benign paroxysmal positional vertigo, unspecified laterality H81.10 meclizine (ANTIVERT) 25 MG tablet   2. Viral sinusitis J32.9     B97.89         PLAN:  The patient indicates understanding of these issues and agrees with the plan.   Patient educational/instructional material provided including reasons for follow-up   Jan Rios MD

## 2019-08-27 ENCOUNTER — TELEPHONE (OUTPATIENT)
Dept: ORTHOPEDICS | Facility: CLINIC | Age: 74
End: 2019-08-27

## 2019-08-27 DIAGNOSIS — S83.242D ACUTE MEDIAL MENISCUS TEAR OF LEFT KNEE, SUBSEQUENT ENCOUNTER: Primary | ICD-10-CM

## 2019-08-27 NOTE — TELEPHONE ENCOUNTER
Patient is wanting to move forward with knee surgery that was discussed in June. Please place orders. Thank you.

## 2019-08-28 NOTE — TELEPHONE ENCOUNTER
I put in orders for knee arthroscopy.    Jake Johnson PA-C, CAQ (Ortho)  Supervising Physician: Nic Singh M.D., M.S.  Dept. of Orthopaedic Surgery  [facility]

## 2019-08-29 ENCOUNTER — TELEPHONE (OUTPATIENT)
Dept: ORTHOPEDICS | Facility: CLINIC | Age: 74
End: 2019-08-29

## 2019-09-06 ENCOUNTER — DOCUMENTATION ONLY (OUTPATIENT)
Dept: OPHTHALMOLOGY | Facility: CLINIC | Age: 74
End: 2019-09-06

## 2019-09-09 ENCOUNTER — OFFICE VISIT (OUTPATIENT)
Dept: FAMILY MEDICINE | Facility: CLINIC | Age: 74
End: 2019-09-09
Payer: COMMERCIAL

## 2019-09-09 VITALS
HEIGHT: 64 IN | WEIGHT: 150.2 LBS | TEMPERATURE: 97.9 F | HEART RATE: 81 BPM | BODY MASS INDEX: 25.64 KG/M2 | SYSTOLIC BLOOD PRESSURE: 136 MMHG | OXYGEN SATURATION: 96 % | DIASTOLIC BLOOD PRESSURE: 73 MMHG

## 2019-09-09 DIAGNOSIS — Z01.818 PREOP GENERAL PHYSICAL EXAM: Primary | ICD-10-CM

## 2019-09-09 DIAGNOSIS — M17.12 PRIMARY OSTEOARTHRITIS OF LEFT KNEE: ICD-10-CM

## 2019-09-09 DIAGNOSIS — F34.1 DYSTHYMIC DISORDER: ICD-10-CM

## 2019-09-09 DIAGNOSIS — B00.9 HERPES SIMPLEX VIRUS INFECTION: ICD-10-CM

## 2019-09-09 DIAGNOSIS — J31.0 CHRONIC RHINITIS: ICD-10-CM

## 2019-09-09 DIAGNOSIS — S83.242D TEAR OF MEDIAL MENISCUS OF LEFT KNEE, CURRENT, UNSPECIFIED TEAR TYPE, SUBSEQUENT ENCOUNTER: ICD-10-CM

## 2019-09-09 DIAGNOSIS — B35.4 RINGWORM OF BODY: ICD-10-CM

## 2019-09-09 DIAGNOSIS — B35.4 TINEA CORPORIS: ICD-10-CM

## 2019-09-09 LAB
ALBUMIN SERPL-MCNC: 3.8 G/DL (ref 3.4–5)
ALP SERPL-CCNC: 58 U/L (ref 40–150)
ALT SERPL W P-5'-P-CCNC: 20 U/L (ref 0–50)
ANION GAP SERPL CALCULATED.3IONS-SCNC: 2 MMOL/L (ref 3–14)
AST SERPL W P-5'-P-CCNC: 24 U/L (ref 0–45)
BILIRUB SERPL-MCNC: 0.3 MG/DL (ref 0.2–1.3)
BUN SERPL-MCNC: 21 MG/DL (ref 7–30)
CALCIUM SERPL-MCNC: 9.4 MG/DL (ref 8.5–10.1)
CHLORIDE SERPL-SCNC: 106 MMOL/L (ref 94–109)
CO2 SERPL-SCNC: 31 MMOL/L (ref 20–32)
CREAT SERPL-MCNC: 0.82 MG/DL (ref 0.52–1.04)
GFR SERPL CREATININE-BSD FRML MDRD: 70 ML/MIN/{1.73_M2}
GLUCOSE SERPL-MCNC: 92 MG/DL (ref 70–99)
HGB BLD-MCNC: 12.9 G/DL (ref 11.7–15.7)
POTASSIUM SERPL-SCNC: 5.1 MMOL/L (ref 3.4–5.3)
PROT SERPL-MCNC: 7.2 G/DL (ref 6.8–8.8)
SODIUM SERPL-SCNC: 139 MMOL/L (ref 133–144)

## 2019-09-09 PROCEDURE — 93000 ELECTROCARDIOGRAM COMPLETE: CPT | Performed by: NURSE PRACTITIONER

## 2019-09-09 PROCEDURE — 99214 OFFICE O/P EST MOD 30 MIN: CPT | Performed by: NURSE PRACTITIONER

## 2019-09-09 PROCEDURE — 80053 COMPREHEN METABOLIC PANEL: CPT | Performed by: NURSE PRACTITIONER

## 2019-09-09 PROCEDURE — 36415 COLL VENOUS BLD VENIPUNCTURE: CPT | Performed by: NURSE PRACTITIONER

## 2019-09-09 PROCEDURE — 85018 HEMOGLOBIN: CPT | Performed by: NURSE PRACTITIONER

## 2019-09-09 RX ORDER — CITALOPRAM HYDROBROMIDE 20 MG/1
20 TABLET ORAL DAILY
Qty: 90 TABLET | Refills: 3 | Status: SHIPPED | OUTPATIENT
Start: 2019-09-09 | End: 2020-10-06

## 2019-09-09 RX ORDER — PRENATAL VIT 91/IRON/FOLIC/DHA 28-975-200
COMBINATION PACKAGE (EA) ORAL 2 TIMES DAILY
Qty: 42 G | Refills: 0 | Status: SHIPPED | OUTPATIENT
Start: 2019-09-09 | End: 2019-09-09

## 2019-09-09 RX ORDER — VALACYCLOVIR HYDROCHLORIDE 500 MG/1
500 TABLET, FILM COATED ORAL DAILY
Qty: 90 TABLET | Refills: 3 | Status: SHIPPED | OUTPATIENT
Start: 2019-09-09 | End: 2021-01-20

## 2019-09-09 RX ORDER — TERBINAFINE HYDROCHLORIDE 250 MG/1
250 TABLET ORAL DAILY
Qty: 90 TABLET | Refills: 0 | Status: SHIPPED | OUTPATIENT
Start: 2019-09-09 | End: 2019-09-09

## 2019-09-09 RX ORDER — TERBINAFINE HYDROCHLORIDE 250 MG/1
250 TABLET ORAL DAILY
Qty: 90 TABLET | Refills: 0 | Status: SHIPPED | OUTPATIENT
Start: 2019-09-09 | End: 2019-10-29

## 2019-09-09 RX ORDER — PRENATAL VIT 91/IRON/FOLIC/DHA 28-975-200
COMBINATION PACKAGE (EA) ORAL 2 TIMES DAILY
Qty: 42 G | Refills: 0 | Status: SHIPPED | OUTPATIENT
Start: 2019-09-09 | End: 2019-10-29

## 2019-09-09 ASSESSMENT — PAIN SCALES - GENERAL: PAINLEVEL: NO PAIN (0)

## 2019-09-09 ASSESSMENT — MIFFLIN-ST. JEOR: SCORE: 1165.27

## 2019-09-09 NOTE — PATIENT INSTRUCTIONS
Patient Instructions     Please avoid fish oil, aspirin,  ibuprofen, motrin, advil, aleve, or naproxen 7 days prior to surgery.    Ok to take all prescribed medications prior to surgery unless otherwise discussed at your visit.    If you develop fever >100.4 or cough come in so we can possibly treat it prior to your surgery.  If not, we may need to delay your surgery.    For candida: take terbinafine by mouth daily for 6 weeks.  Use the topical cream twice a day.      Before Your Surgery      Call your surgeon if there is any change in your health. This includes signs of a cold or flu (such as a sore throat, runny nose, cough, rash or fever).    Do not smoke, drink alcohol or take over the counter medicine (unless your surgeon or primary care doctor tells you to) for the 24 hours before and after surgery.    If you take prescribed drugs: Follow your doctor s orders about which medicines to take and which to stop until after surgery.    Eating and drinking prior to surgery: follow the instructions from your surgeon    Take a shower or bath the night before surgery. Use the soap your surgeon gave you to gently clean your skin. If you do not have soap from your surgeon, use your regular soap. Do not shave or scrub the surgery site.  Wear clean pajamas and have clean sheets on your bed.

## 2019-09-09 NOTE — H&P (VIEW-ONLY)
[address] [address]  [phone]  Dept: [phone]    PRE-OP EVALUATION:  Today's date: 2019    Cely Ontiveros (: 1945) presents for pre-operative evaluation assessment as requested by Dr. Singh.  She requires evaluation and anesthesia risk assessment prior to undergoing surgery/procedure for treatment of left knee pain .    Proposed Surgery/ Procedure: Left Knee Arthroscopy, meniscal and chondral debridement   Date of Surgery/ Procedure: 2019  Time of Surgery/ Procedure: 7a  Hospital/Surgical Facility: [facility]  Fax number for surgical facility: Unknown  Primary Physician: Mara Varma  Type of Anesthesia Anticipated: Choice    Patient has a Health Care Directive or Living Will:  NO    1. NO - Do you have a history of heart attack, stroke, stent, bypass or surgery on an artery in the head, neck, heart or legs?  2. NO - Do you ever have any pain or discomfort in your chest?  3. NO - Do you have a history of  Heart Failure?  4. NO - Are you troubled by shortness of breath when: walking on the level, up a slight hill or at night?  5. NO - Do you currently have a cold, bronchitis or other respiratory infection?  6. NO - Do you have a cough, shortness of breath or wheezing?  7. NO - Do you sometimes get pains in the calves of your legs when you walk?  8. NO - Do you or anyone in your family have previous history of blood clots?  9. NO - Do you or does anyone in your family have a serious bleeding problem such as prolonged bleeding following surgeries or cuts?  10. NO - Have you ever had problems with anemia or been told to take iron pills?  11. NO - Have you had any abnormal blood loss such as black, tarry or bloody stools, or abnormal vaginal bleeding?  12. NO - Have you ever had a blood transfusion?  13. NO - Have you or any of your relatives ever had problems with anesthesia?  14. NO - Do you have sleep apnea,  excessive snoring or daytime drowsiness?  15. NO - Do you have any prosthetic heart valves?  16. NO - Do you have prosthetic joints?  17. NO - Is there any chance that you may be pregnant?      HPI:     HPI related to upcoming procedure: left knee painful to bend and during ambulation.  MRI 12/2018 showed medial meniscus tear and chondromalacia.  Patient wishes to proceed with the above mentioned surgery       DEPRESSION - Patient has a long history of Depression of moderate severity requiring medication for control with recent symptoms being stable..Current symptoms of depression include none.       MEDICAL HISTORY:     Patient Active Problem List    Diagnosis Date Noted     Primary osteoarthritis of left knee 09/09/2019     Priority: Medium     Pseudophakia of both eyes 03/13/2019     Priority: Medium     Pseudophakia of right eye 02/15/2019     Priority: Medium     Pseudophakia of left eye 01/30/2019     Priority: Medium     Chondromalacia of patella, left 01/22/2019     Priority: Medium     Complex tear of medial meniscus of left knee as current injury, subsequent encounter 01/22/2019     Priority: Medium     Age-related nuclear cataract of both eyes 01/22/2019     Priority: Medium     Meibomian gland dysfunction 12/03/2018     Priority: Medium     Acute pain of left knee 11/14/2018     Priority: Medium     H/O hypoglycemia 03/08/2017     Priority: Medium     Chronic otitis externa of left ear, unspecified type 03/08/2017     Priority: Medium     Abdominal bloating 03/08/2017     Priority: Medium     Chronic rhinitis 03/08/2017     Priority: Medium     Dysthymic disorder 03/08/2017     Priority: Medium     Plugged tear duct, od > os 10/12/2014     Priority: Medium     Epiphora 10/12/2014     Priority: Medium     Hypermetropia 10/10/2013     Priority: Medium     Astigmatism with presbyopia 10/10/2013     Priority: Medium     Blepharitis of both eyes 10/10/2013     Priority: Medium     Cataracts, bilateral  "10/10/2013     Priority: Medium     Seasonal allergic rhinitis 08/16/2013     Priority: Medium     Gross hematuria 01/02/2013     Priority: Medium     Right kidney stone 01/02/2013     Priority: Medium     Posterior vitreous detachment of both eyes 10/09/2012     Priority: Medium     Advance care planning 05/20/2011     Priority: Medium     Advance Care Planning 01/22/2015: ACP Review and Resources Provided:  Reviewed chart for advance care plan.  Cely Ontiveros has no plan or code status on file. Mailed available resources and provided with information. Confirmed code status reflects current choices pending further ACP discussions.  Confirmed/documented designated decision maker(s). See permanent comments section of demographics in clinical tab. Added by Madonna Marques on 1/22/2015  Advance Care Planning 05/20/2011: Patient does not have an Advance/Health Care Directive (HCD), given \"What is Advance Care Planning?\" flyer. Mailed to patient.Jacqui Gilmer.May 20, 2011       Degenerative joint disease      Priority: Medium     CARDIOVASCULAR SCREENING; LDL GOAL LESS THAN 160 10/31/2010     Priority: Medium     Herpes simplex      Priority: Medium     Recurs left buttock  Zostavax 2/10  IMO update changed this record. Please review for accuracy       Allergic rhinitis      Priority: Medium      Past Medical History:   Diagnosis Date     Actinic keratosis      Allergic rhinitis      Cataract      Degenerative joint disease     cervical disease     Depression with anxiety      Herpes simplex      Hypoglycemia     eats small meals and is fine     Impingement syndrome, shoulder      Past Surgical History:   Procedure Laterality Date     CATARACT IOL, RT/LT Left 01/24/2019     COMBINED CYSTOSCOPY, URETEROSCOPY, LASER HOLMIUM LITHOTRIPSY URETER(S) Right 8/23/2018    Procedure: COMBINED CYSTOSCOPY, URETEROSCOPY, LASER HOLMIUM LITHOTRIPSY URETER(S);   Cystoscopy,Right ureteroscopy with laser lithotripsy and stent placement;  " Surgeon: Pino Hawk MD;  Location: [facility] OR     HC ENLARGE BREAST WITH IMPLANT       HC LAP,FULGURATE/EXCISE LESIONS       HC REMOVAL OF BREAST IMPLANT       HYSTERECTOMY, PAP NO LONGER INDICATED  1998    ovaries and uterus out for benign reasons(fibroids and ovarian cyst)     PHACOEMULSIFICATION WITH STANDARD INTRAOCULAR LENS IMPLANT Left 1/24/2019    Procedure: PHACOEMULSIFICATION WITH STANDARD INTRAOCULAR LENS IMPLANT, LEFT;  Surgeon: Wagner Aguilera MD;  Location: [facility] OR     PHACOEMULSIFICATION WITH STANDARD INTRAOCULAR LENS IMPLANT Right 2/14/2019    Procedure: PHACOEMULSIFICATION WITH STANDARD INTRAOCULAR LENS IMPLANT, RIGHT;  Surgeon: Wagner Aguilera MD;  Location: [facility] OR     SINUS SURGERY       Current Outpatient Medications   Medication Sig Dispense Refill     citalopram (CELEXA) 20 MG tablet Take 1 tablet (20 mg) by mouth daily 90 tablet 3     conjugated estrogens (PREMARIN) 0.625 MG/GM vaginal cream Place 0.5 g vaginally twice a week 30 g 1     loratadine-pseudoePHEDrine (EQL ALLERGY/CONGESTION RELIEF)  MG per 24 hr tablet Take 1 tablet by mouth daily 90 tablet 3     terbinafine (LAMISIL) 1 % external cream Apply topically 2 times daily 42 g 0     valACYclovir (VALTREX) 500 MG tablet Take 1 tablet (500 mg) by mouth daily 90 tablet 3     OTC products: no recent use of OTC ASA, NSAIDS or Steroids, no use of herbal medications or other supplements and herbals and vitamins - fish oil, vitamin D, calcium    Allergies   Allergen Reactions     Sulfa Drugs Swelling     Cats Swelling     Lips swollen     Wool Fiber       Latex Allergy: NO    Social History     Tobacco Use     Smoking status: Never Smoker     Smokeless tobacco: Never Used   Substance Use Topics     Alcohol use: No     History   Drug Use No       REVIEW OF SYSTEMS:   CONSTITUTIONAL: NEGATIVE for fever, chills, change in weight  INTEGUMENTARY/SKIN: NEGATIVE for worrisome rashes, moles or lesions  EYES: NEGATIVE for vision  "changes or irritation  ENT/MOUTH: NEGATIVE for ear, mouth and throat problems  RESP: NEGATIVE for significant cough or SOB  BREAST: NEGATIVE for masses, tenderness or discharge  CV: NEGATIVE for chest pain, palpitations or peripheral edema  GI: NEGATIVE for nausea, abdominal pain, heartburn, or change in bowel habits  : NEGATIVE for frequency, dysuria, or hematuria  MUSCULOSKELETAL: NEGATIVE for significant arthralgias or myalgia  NEURO: NEGATIVE for weakness, dizziness or paresthesias  ENDOCRINE: NEGATIVE for temperature intolerance, skin/hair changes  HEME: NEGATIVE for bleeding problems  PSYCHIATRIC: NEGATIVE for changes in mood or affect    EXAM:   /73 (BP Location: Left arm, Patient Position: Chair, Cuff Size: Adult Regular)   Pulse 81   Temp 97.9  F (36.6  C) (Oral)   Ht 1.632 m (5' 4.25\")   Wt 68.1 kg (150 lb 3.2 oz)   LMP  (Exact Date)   SpO2 96%   Breastfeeding? No   BMI 25.58 kg/m      GENERAL APPEARANCE: healthy, alert and no distress     EYES: EOMI, PERRL     HENT: ear canals and TM's normal and nose and mouth without ulcers or lesions     NECK: no adenopathy, no asymmetry, masses, or scars and thyroid normal to palpation     RESP: lungs clear to auscultation - no rales, rhonchi or wheezes     CV: regular rates and rhythm, normal S1 S2, no S3 or S4 and no murmur, click or rub     ABDOMEN:  soft, nontender, no HSM or masses and bowel sounds normal     MS: extremities normal- no gross deformities noted, no evidence of inflammation in joints, FROM in all extremities.     SKIN: lesions:  Well circumscribed, circular, raised, erythematous with central lightening.  Noted on torso and trunk.     NEURO: Normal strength and tone, sensory exam grossly normal, mentation intact and speech normal     PSYCH: mentation appears normal. and affect normal/bright     LYMPHATICS: No cervical adenopathy    DIAGNOSTICS:     EKG: appears normal, NSR, normal axis, normal intervals, no acute ST/T changes c/w " ischemia, no LVH by voltage criteria, unchanged from previous tracings  Labs Resulted Today:   Results for orders placed or performed in visit on 07/01/19   DX Hip/Pelvis/Spine    Narrative    HISTORY: Asymptomatic menopausal state    COMPARISON:   10/6/2014    [age]  years.  Height: 64 inches  Weight: 143 pounds  Sex: Female  Ethnicity: White    Image quality: Adequate    Lumbar spine T-score in region of L1-L4 (L2, L3) = -1.1   Lumbar percent change: Not significant%     HIPS:  Mean total hip T-score: -0.6  Mean total hip percent change: -5%     Left femoral neck T-score = -0.8  Right femoral neck T-score= -0.7     FRAX:  10 year probability of major osteoporotic fracture: 9.4%  10 year probability of hip fracture: 1.3%  The 10 year probability of fracture may be lower than reported if the  patient has received treatment. FRAX data should be disregarded in  patient's taking bisphosphonates.    World Health Organization definition of osteoporosis and osteopenia  for  women:   Normal: T-score at or above -1.0  Low Bone Mass (Osteopenia): T-score between -1.0 and -2.5.   Osteoporosis: T-score at or below -2.5   T-scores are reported for postmenopausal women and men over 50 years  of age.      Impression    IMPRESSION:  Osteopenia.    INGRID HAM MD     Labs Drawn and in Process:   Unresulted Labs Ordered in the Past 30 Days of this Admission     Date and Time Order Name Status Description    9/9/2019 1009 COMPREHENSIVE METABOLIC PANEL In process     9/9/2019 1009 HEMOGLOBIN In process           Recent Labs   Lab Test 06/10/19  1048 03/08/17  0856 06/04/13  0821 06/04/13  0820  12/27/12  1452   HGB  --   --   --  12.8  --  13.4   PLT  --   --   --   --   --  274   NA  --  144 141  --   --   --    POTASSIUM  --  4.2 4.2  --   --   --    CR  --  0.72 0.64  --    < >  --    A1C 5.2  --   --   --   --   --     < > = values in this interval not displayed.        IMPRESSION:   Reason for surgery/procedure:  left knee meniscal tear    The proposed surgical procedure is considered INTERMEDIATE risk.    REVISED CARDIAC RISK INDEX  The patient has the following serious cardiovascular risks for perioperative complications such as (MI, PE, VFib and 3  AV Block):  No serious cardiac risks  INTERPRETATION: 1 risks: Class II (low risk - 0.9% complication rate)    The patient has the following additional risks for perioperative complications:  No identified additional risks      ICD-10-CM    1. Preop general physical exam Z01.818 Hemoglobin     EKG 12-lead complete w/read - Clinics     Comprehensive metabolic panel (BMP + Alb, Alk Phos, ALT, AST, Total. Bili, TP)   2. Tear of medial meniscus of left knee, current, unspecified tear type, subsequent encounter S83.242D    3. Primary osteoarthritis of left knee M17.12    4. Dysthymic disorder F34.1 citalopram (CELEXA) 20 MG tablet   5. Herpes simplex B00.9 valACYclovir (VALTREX) 500 MG tablet   6. Chronic rhinitis J31.0 loratadine-pseudoePHEDrine (EQL ALLERGY/CONGESTION RELIEF)  MG 24 hr tablet   7. Ringworm of body B35.4 terbinafine (LAMISIL) 250 MG tablet     terbinafine (LAMISIL) 1 % external cream     DISCONTINUED: terbinafine (LAMISIL) 250 MG tablet     DISCONTINUED: terbinafine (LAMISIL) 1 % external cream   8. Tinea corporis B35.4 terbinafine (LAMISIL) 250 MG tablet     terbinafine (LAMISIL) 1 % external cream     DISCONTINUED: terbinafine (LAMISIL) 250 MG tablet     DISCONTINUED: terbinafine (LAMISIL) 1 % external cream   in addition to preop, I refilled her chronic medications (celexa, valtrex) and treated her tinea corporis with terbinafine topical and oral.  CMP today for liver function.      RECOMMENDATIONS:       --Patient is to take all scheduled medications on the day of surgery EXCEPT for Fish Oil supplement-was instructed to stop 1 week prior to surgery.        Pending review of diagnostic evaluation, APPROVAL GIVEN to proceed with proposed procedure, without  further diagnostic evaluation.       Signed Electronically by: AUDREY Mcneil CNP    Copy of this evaluation report is provided to requesting physician.    [facility] Preop Guidelines    Revised Cardiac Risk Index

## 2019-09-09 NOTE — PROGRESS NOTES
[address] [address]  [phone]  Dept: [phone]    PRE-OP EVALUATION:  Today's date: 2019    Cely Ontiveros (: 1945) presents for pre-operative evaluation assessment as requested by Dr. Singh.  She requires evaluation and anesthesia risk assessment prior to undergoing surgery/procedure for treatment of left knee pain .    Proposed Surgery/ Procedure: Left Knee Arthroscopy, meniscal and chondral debridement   Date of Surgery/ Procedure: 2019  Time of Surgery/ Procedure: 7a  Hospital/Surgical Facility: [facility]  Fax number for surgical facility: Unknown  Primary Physician: Mara Varma  Type of Anesthesia Anticipated: Choice    Patient has a Health Care Directive or Living Will:  NO    1. NO - Do you have a history of heart attack, stroke, stent, bypass or surgery on an artery in the head, neck, heart or legs?  2. NO - Do you ever have any pain or discomfort in your chest?  3. NO - Do you have a history of  Heart Failure?  4. NO - Are you troubled by shortness of breath when: walking on the level, up a slight hill or at night?  5. NO - Do you currently have a cold, bronchitis or other respiratory infection?  6. NO - Do you have a cough, shortness of breath or wheezing?  7. NO - Do you sometimes get pains in the calves of your legs when you walk?  8. NO - Do you or anyone in your family have previous history of blood clots?  9. NO - Do you or does anyone in your family have a serious bleeding problem such as prolonged bleeding following surgeries or cuts?  10. NO - Have you ever had problems with anemia or been told to take iron pills?  11. NO - Have you had any abnormal blood loss such as black, tarry or bloody stools, or abnormal vaginal bleeding?  12. NO - Have you ever had a blood transfusion?  13. NO - Have you or any of your relatives ever had problems with anesthesia?  14. NO - Do you have sleep apnea,  excessive snoring or daytime drowsiness?  15. NO - Do you have any prosthetic heart valves?  16. NO - Do you have prosthetic joints?  17. NO - Is there any chance that you may be pregnant?      HPI:     HPI related to upcoming procedure: left knee painful to bend and during ambulation.  MRI 12/2018 showed medial meniscus tear and chondromalacia.  Patient wishes to proceed with the above mentioned surgery       DEPRESSION - Patient has a long history of Depression of moderate severity requiring medication for control with recent symptoms being stable..Current symptoms of depression include none.       MEDICAL HISTORY:     Patient Active Problem List    Diagnosis Date Noted     Primary osteoarthritis of left knee 09/09/2019     Priority: Medium     Pseudophakia of both eyes 03/13/2019     Priority: Medium     Pseudophakia of right eye 02/15/2019     Priority: Medium     Pseudophakia of left eye 01/30/2019     Priority: Medium     Chondromalacia of patella, left 01/22/2019     Priority: Medium     Complex tear of medial meniscus of left knee as current injury, subsequent encounter 01/22/2019     Priority: Medium     Age-related nuclear cataract of both eyes 01/22/2019     Priority: Medium     Meibomian gland dysfunction 12/03/2018     Priority: Medium     Acute pain of left knee 11/14/2018     Priority: Medium     H/O hypoglycemia 03/08/2017     Priority: Medium     Chronic otitis externa of left ear, unspecified type 03/08/2017     Priority: Medium     Abdominal bloating 03/08/2017     Priority: Medium     Chronic rhinitis 03/08/2017     Priority: Medium     Dysthymic disorder 03/08/2017     Priority: Medium     Plugged tear duct, od > os 10/12/2014     Priority: Medium     Epiphora 10/12/2014     Priority: Medium     Hypermetropia 10/10/2013     Priority: Medium     Astigmatism with presbyopia 10/10/2013     Priority: Medium     Blepharitis of both eyes 10/10/2013     Priority: Medium     Cataracts, bilateral  "10/10/2013     Priority: Medium     Seasonal allergic rhinitis 08/16/2013     Priority: Medium     Gross hematuria 01/02/2013     Priority: Medium     Right kidney stone 01/02/2013     Priority: Medium     Posterior vitreous detachment of both eyes 10/09/2012     Priority: Medium     Advance care planning 05/20/2011     Priority: Medium     Advance Care Planning 01/22/2015: ACP Review and Resources Provided:  Reviewed chart for advance care plan.  Cely Ontiveros has no plan or code status on file. Mailed available resources and provided with information. Confirmed code status reflects current choices pending further ACP discussions.  Confirmed/documented designated decision maker(s). See permanent comments section of demographics in clinical tab. Added by Madonna Marques on 1/22/2015  Advance Care Planning 05/20/2011: Patient does not have an Advance/Health Care Directive (HCD), given \"What is Advance Care Planning?\" flyer. Mailed to patient.Jacqui Gilmer.May 20, 2011       Degenerative joint disease      Priority: Medium     CARDIOVASCULAR SCREENING; LDL GOAL LESS THAN 160 10/31/2010     Priority: Medium     Herpes simplex      Priority: Medium     Recurs left buttock  Zostavax 2/10  IMO update changed this record. Please review for accuracy       Allergic rhinitis      Priority: Medium      Past Medical History:   Diagnosis Date     Actinic keratosis      Allergic rhinitis      Cataract      Degenerative joint disease     cervical disease     Depression with anxiety      Herpes simplex      Hypoglycemia     eats small meals and is fine     Impingement syndrome, shoulder      Past Surgical History:   Procedure Laterality Date     CATARACT IOL, RT/LT Left 01/24/2019     COMBINED CYSTOSCOPY, URETEROSCOPY, LASER HOLMIUM LITHOTRIPSY URETER(S) Right 8/23/2018    Procedure: COMBINED CYSTOSCOPY, URETEROSCOPY, LASER HOLMIUM LITHOTRIPSY URETER(S);   Cystoscopy,Right ureteroscopy with laser lithotripsy and stent placement;  " Surgeon: Pino Hawk MD;  Location: [facility]     HC ENLARGE BREAST WITH IMPLANT       HC LAP,FULGURATE/EXCISE LESIONS       HC REMOVAL OF BREAST IMPLANT       HYSTERECTOMY, PAP NO LONGER INDICATED  1998    ovaries and uterus out for benign reasons(fibroids and ovarian cyst)     PHACOEMULSIFICATION WITH STANDARD INTRAOCULAR LENS IMPLANT Left 1/24/2019    Procedure: PHACOEMULSIFICATION WITH STANDARD INTRAOCULAR LENS IMPLANT, LEFT;  Surgeon: Wagner Aguilera MD;  Location: [facility]     PHACOEMULSIFICATION WITH STANDARD INTRAOCULAR LENS IMPLANT Right 2/14/2019    Procedure: PHACOEMULSIFICATION WITH STANDARD INTRAOCULAR LENS IMPLANT, RIGHT;  Surgeon: Wagner Aguilera MD;  Location: [facility]     SINUS SURGERY       Current Outpatient Medications   Medication Sig Dispense Refill     citalopram (CELEXA) 20 MG tablet Take 1 tablet (20 mg) by mouth daily 90 tablet 3     conjugated estrogens (PREMARIN) 0.625 MG/GM vaginal cream Place 0.5 g vaginally twice a week 30 g 1     loratadine-pseudoePHEDrine (EQL ALLERGY/CONGESTION RELIEF)  MG per 24 hr tablet Take 1 tablet by mouth daily 90 tablet 3     terbinafine (LAMISIL) 1 % external cream Apply topically 2 times daily 42 g 0     valACYclovir (VALTREX) 500 MG tablet Take 1 tablet (500 mg) by mouth daily 90 tablet 3     OTC products: no recent use of OTC ASA, NSAIDS or Steroids, no use of herbal medications or other supplements and herbals and vitamins - fish oil, vitamin D, calcium    Allergies   Allergen Reactions     Sulfa Drugs Swelling     Cats Swelling     Lips swollen     Wool Fiber       Latex Allergy: NO    Social History     Tobacco Use     Smoking status: Never Smoker     Smokeless tobacco: Never Used   Substance Use Topics     Alcohol use: No     History   Drug Use No       REVIEW OF SYSTEMS:   CONSTITUTIONAL: NEGATIVE for fever, chills, change in weight  INTEGUMENTARY/SKIN: NEGATIVE for worrisome rashes, moles or lesions  EYES: NEGATIVE for vision  "changes or irritation  ENT/MOUTH: NEGATIVE for ear, mouth and throat problems  RESP: NEGATIVE for significant cough or SOB  BREAST: NEGATIVE for masses, tenderness or discharge  CV: NEGATIVE for chest pain, palpitations or peripheral edema  GI: NEGATIVE for nausea, abdominal pain, heartburn, or change in bowel habits  : NEGATIVE for frequency, dysuria, or hematuria  MUSCULOSKELETAL: NEGATIVE for significant arthralgias or myalgia  NEURO: NEGATIVE for weakness, dizziness or paresthesias  ENDOCRINE: NEGATIVE for temperature intolerance, skin/hair changes  HEME: NEGATIVE for bleeding problems  PSYCHIATRIC: NEGATIVE for changes in mood or affect    EXAM:   /73 (BP Location: Left arm, Patient Position: Chair, Cuff Size: Adult Regular)   Pulse 81   Temp 97.9  F (36.6  C) (Oral)   Ht 1.632 m (5' 4.25\")   Wt 68.1 kg (150 lb 3.2 oz)   LMP  (Exact Date)   SpO2 96%   Breastfeeding? No   BMI 25.58 kg/m      GENERAL APPEARANCE: healthy, alert and no distress     EYES: EOMI, PERRL     HENT: ear canals and TM's normal and nose and mouth without ulcers or lesions     NECK: no adenopathy, no asymmetry, masses, or scars and thyroid normal to palpation     RESP: lungs clear to auscultation - no rales, rhonchi or wheezes     CV: regular rates and rhythm, normal S1 S2, no S3 or S4 and no murmur, click or rub     ABDOMEN:  soft, nontender, no HSM or masses and bowel sounds normal     MS: extremities normal- no gross deformities noted, no evidence of inflammation in joints, FROM in all extremities.     SKIN: lesions:  Well circumscribed, circular, raised, erythematous with central lightening.  Noted on torso and trunk.     NEURO: Normal strength and tone, sensory exam grossly normal, mentation intact and speech normal     PSYCH: mentation appears normal. and affect normal/bright     LYMPHATICS: No cervical adenopathy    DIAGNOSTICS:     EKG: appears normal, NSR, normal axis, normal intervals, no acute ST/T changes c/w " ischemia, no LVH by voltage criteria, unchanged from previous tracings  Labs Resulted Today:   Results for orders placed or performed in visit on 07/01/19   DX Hip/Pelvis/Spine    Narrative    HISTORY: Asymptomatic menopausal state    COMPARISON:   10/6/2014    [age]  years[age]  Height: 64 inches  Weight: 143 pounds  Sex: Female  Ethnicity: White    Image quality: Adequate    Lumbar spine T-score in region of L1-L4 (L2, L3) = -1.1   Lumbar percent change: Not significant%     HIPS:  Mean total hip T-score: -0.6  Mean total hip percent change: -5%     Left femoral neck T-score = -0.8  Right femoral neck T-score= -0.7     FRAX:  10 year probability of major osteoporotic fracture: 9.4%  10 year probability of hip fracture: 1.3%  The 10 year probability of fracture may be lower than reported if the  patient has received treatment. FRAX data should be disregarded in  patient's taking bisphosphonates.    World Health Organization definition of osteoporosis and osteopenia  for  women:   Normal: T-score at or above -1.0  Low Bone Mass (Osteopenia): T-score between -1.0 and -2.5.   Osteoporosis: T-score at or below -2.5   T-scores are reported for postmenopausal women and men over 50 years  of age.      Impression    IMPRESSION:  Osteopenia.    INGRID HAM MD     Labs Drawn and in Process:   Unresulted Labs Ordered in the Past 30 Days of this Admission     Date and Time Order Name Status Description    9/9/2019 1009 COMPREHENSIVE METABOLIC PANEL In process     9/9/2019 1009 HEMOGLOBIN In process           Recent Labs   Lab Test 06/10/19  1048 03/08/17  0856 06/04/13  0821 06/04/13  0820  12/27/12  1452   HGB  --   --   --  12.8  --  13.4   PLT  --   --   --   --   --  274   NA  --  144 141  --   --   --    POTASSIUM  --  4.2 4.2  --   --   --    CR  --  0.72 0.64  --    < >  --    A1C 5.2  --   --   --   --   --     < > = values in this interval not displayed.        IMPRESSION:   Reason for surgery/procedure:  left knee meniscal tear    The proposed surgical procedure is considered INTERMEDIATE risk.    REVISED CARDIAC RISK INDEX  The patient has the following serious cardiovascular risks for perioperative complications such as (MI, PE, VFib and 3  AV Block):  No serious cardiac risks  INTERPRETATION: 1 risks: Class II (low risk - 0.9% complication rate)    The patient has the following additional risks for perioperative complications:  No identified additional risks      ICD-10-CM    1. Preop general physical exam Z01.818 Hemoglobin     EKG 12-lead complete w/read - Clinics     Comprehensive metabolic panel (BMP + Alb, Alk Phos, ALT, AST, Total. Bili, TP)   2. Tear of medial meniscus of left knee, current, unspecified tear type, subsequent encounter S83.242D    3. Primary osteoarthritis of left knee M17.12    4. Dysthymic disorder F34.1 citalopram (CELEXA) 20 MG tablet   5. Herpes simplex B00.9 valACYclovir (VALTREX) 500 MG tablet   6. Chronic rhinitis J31.0 loratadine-pseudoePHEDrine (EQL ALLERGY/CONGESTION RELIEF)  MG 24 hr tablet   7. Ringworm of body B35.4 terbinafine (LAMISIL) 250 MG tablet     terbinafine (LAMISIL) 1 % external cream     DISCONTINUED: terbinafine (LAMISIL) 250 MG tablet     DISCONTINUED: terbinafine (LAMISIL) 1 % external cream   8. Tinea corporis B35.4 terbinafine (LAMISIL) 250 MG tablet     terbinafine (LAMISIL) 1 % external cream     DISCONTINUED: terbinafine (LAMISIL) 250 MG tablet     DISCONTINUED: terbinafine (LAMISIL) 1 % external cream   in addition to preop, I refilled her chronic medications (celexa, valtrex) and treated her tinea corporis with terbinafine topical and oral.  CMP today for liver function.      RECOMMENDATIONS:       --Patient is to take all scheduled medications on the day of surgery EXCEPT for Fish Oil supplement-was instructed to stop 1 week prior to surgery.        Pending review of diagnostic evaluation, APPROVAL GIVEN to proceed with proposed procedure, without  further diagnostic evaluation.       Signed Electronically by: AUDREY Mcneil CNP    Copy of this evaluation report is provided to requesting physician.    [facility] Preop Guidelines    Revised Cardiac Risk Index

## 2019-09-10 NOTE — PROGRESS NOTES
Faxed Pre-op notes Labs and Ekg to [facility], [phone], right fax confirmed at 10:10 am today, 9/10/19.  Leslie Louis MA/  For [facility] [place]

## 2019-09-19 ENCOUNTER — ANESTHESIA EVENT (OUTPATIENT)
Dept: SURGERY | Facility: AMBULATORY SURGERY CENTER | Age: 74
End: 2019-09-19

## 2019-09-19 NOTE — ANESTHESIA PREPROCEDURE EVALUATION
Anesthesia Pre-Procedure Evaluation    Patient: Cely Ontiveros   MRN:     [id number] Gender:   female   Age:    73 year old :      1945        Preoperative Diagnosis: Left knee medial meniscus tear   Procedure(s):  Left knee arthroscopy, meniscal and chondral debridement, choice anesthesia     Past Medical History:   Diagnosis Date     Actinic keratosis      Allergic rhinitis      Cataract      Degenerative joint disease     cervical disease     Depression with anxiety      Herpes simplex      Hypoglycemia     eats small meals and is fine     Impingement syndrome, shoulder       Past Surgical History:   Procedure Laterality Date     CATARACT IOL, RT/LT Left 2019     COMBINED CYSTOSCOPY, URETEROSCOPY, LASER HOLMIUM LITHOTRIPSY URETER(S) Right 2018    Procedure: COMBINED CYSTOSCOPY, URETEROSCOPY, LASER HOLMIUM LITHOTRIPSY URETER(S);   Cystoscopy,Right ureteroscopy with laser lithotripsy and stent placement;  Surgeon: Pino Hawk MD;  Location: [facility]     HC ENLARGE BREAST WITH IMPLANT       HC LAP,FULGURATE/EXCISE LESIONS       HC REMOVAL OF BREAST IMPLANT       HYSTERECTOMY, PAP NO LONGER INDICATED  1998    ovaries and uterus out for benign reasons(fibroids and ovarian cyst)     PHACOEMULSIFICATION WITH STANDARD INTRAOCULAR LENS IMPLANT Left 2019    Procedure: PHACOEMULSIFICATION WITH STANDARD INTRAOCULAR LENS IMPLANT, LEFT;  Surgeon: Wagner Aguilera MD;  Location: [facility]     PHACOEMULSIFICATION WITH STANDARD INTRAOCULAR LENS IMPLANT Right 2019    Procedure: PHACOEMULSIFICATION WITH STANDARD INTRAOCULAR LENS IMPLANT, RIGHT;  Surgeon: Wagner Aguilera MD;  Location: [facility]     SINUS SURGERY            Anesthesia Evaluation     .             ROS/MED HX    ENT/Pulmonary:  - neg pulmonary ROS   (+)allergic rhinitis, , . .    Neurologic:  - neg neurologic ROS     Cardiovascular:  - neg cardiovascular ROS       METS/Exercise Tolerance:  4 - Raking leaves, gardening    Hematologic:  - neg hematologic  ROS       Musculoskeletal:  - neg musculoskeletal ROS (+) arthritis,  -       GI/Hepatic:  - neg GI/hepatic ROS       Renal/Genitourinary:  - ROS Renal section negative       Endo:  - neg endo ROS       Psychiatric:  - neg psychiatric ROS   (+) psychiatric history depression      Infectious Disease:  - neg infectious disease ROS       Malignancy:      - no malignancy   Other:    - neg other ROS                     PHYSICAL EXAM:   Mental Status/Neuro: A/A/O   Airway: Facies: Feasible  Mallampati: I  Mouth/Opening: Full  TM distance: > 6 cm  Neck ROM: Full   Respiratory: Auscultation: CTAB     Resp. Rate: Normal     Resp. Effort: Normal      CV: Rhythm: Regular  Rate: Age appropriate  Heart: Normal Sounds  Edema: None   Comments:      Dental: Normal Dentition                LABS:  CBC:   Lab Results   Component Value Date    WBC 9.3 12/27/2012    WBC 6.7 08/27/2010    HGB 12.9 09/09/2019    HGB 12.8 06/04/2013    HCT 41.4 12/27/2012    HCT 40.1 08/27/2010     12/27/2012     08/27/2010     BMP:   Lab Results   Component Value Date     09/09/2019     03/08/2017    POTASSIUM 5.1 09/09/2019    POTASSIUM 4.2 03/08/2017    CHLORIDE 106 09/09/2019    CHLORIDE 107 03/08/2017    CO2 31 09/09/2019    CO2 30 03/08/2017    BUN 21 09/09/2019    BUN 19 03/08/2017    CR 0.82 09/09/2019    CR 0.72 03/08/2017    GLC 92 09/09/2019    GLC 87 03/08/2017     COAGS: No results found for: PTT, INR, FIBR  POC: No results found for: BGM, HCG, HCGS  OTHER:   Lab Results   Component Value Date    A1C 5.2 06/10/2019    KANIKA 9.4 09/09/2019    ALBUMIN 3.8 09/09/2019    PROTTOTAL 7.2 09/09/2019    ALT 20 09/09/2019    AST 24 09/09/2019    ALKPHOS 58 09/09/2019    BILITOTAL 0.3 09/09/2019    TSH 0.81 03/08/2017        Preop Vitals    BP Readings from Last 3 Encounters:   09/09/19 136/73   07/27/19 118/73   06/27/19 123/67    Pulse Readings from Last 3 Encounters:   09/09/19 81   07/27/19  "85   06/27/19 82      Resp Readings from Last 3 Encounters:   06/27/19 16   05/17/19 16   03/04/19 14    SpO2 Readings from Last 3 Encounters:   09/09/19 96%   07/27/19 95%   06/27/19 96%      Temp Readings from Last 1 Encounters:   09/09/19 36.6  C (97.9  F) (Oral)    Ht Readings from Last 1 Encounters:   09/09/19 1.632 m (5' 4.25\")      Wt Readings from Last 1 Encounters:   09/09/19 68.1 kg (150 lb 3.2 oz)    Estimated body mass index is 25.58 kg/m  as calculated from the following:    Height as of 9/9/19: 1.632 m (5' 4.25\").    Weight as of 9/9/19: 68.1 kg (150 lb 3.2 oz).     LDA:  Urethral Catheter Non-latex 16 fr (Active)   Number of days: 392       Ureteral Drain/Stent Right ureter 6 fr (Active)   Number of days: 392        Assessment:   ASA SCORE: 2    H&P: History and physical reviewed and following examination; no interval change.    NPO Status: NPO Appropriate     Plan:   Anes. Type:  General   Pre-Medication: None   Induction:  IV (Standard)   Airway: LMA   Access/Monitoring: PIV   Maintenance: Balanced     Postop Plan:   Postop Pain: Opioids  Postop Sedation/Airway: Not planned  Disposition: Outpatient     PONV Management:   Adult Risk Factors: Female, Postop Opioids   Prevention: Ondansetron, Dexamethasone     CONSENT: Direct conversation   Plan and risks discussed with: Patient   Blood Products: Consented (ALL Blood Products)                   Luis Washington MD  "

## 2019-09-20 ENCOUNTER — HOSPITAL ENCOUNTER (OUTPATIENT)
Facility: AMBULATORY SURGERY CENTER | Age: 74
Discharge: HOME | End: 2019-09-20
Attending: ORTHOPAEDIC SURGERY | Admitting: ORTHOPAEDIC SURGERY
Payer: COMMERCIAL

## 2019-09-20 ENCOUNTER — ANESTHESIA (OUTPATIENT)
Dept: SURGERY | Facility: AMBULATORY SURGERY CENTER | Age: 74
End: 2019-09-20
Payer: COMMERCIAL

## 2019-09-20 VITALS
DIASTOLIC BLOOD PRESSURE: 53 MMHG | SYSTOLIC BLOOD PRESSURE: 113 MMHG | TEMPERATURE: 96.6 F | RESPIRATION RATE: 15 BRPM | OXYGEN SATURATION: 95 % | HEART RATE: 65 BPM

## 2019-09-20 DIAGNOSIS — S83.242D TEAR OF MEDIAL MENISCUS OF LEFT KNEE, CURRENT, UNSPECIFIED TEAR TYPE, SUBSEQUENT ENCOUNTER: Primary | ICD-10-CM

## 2019-09-20 PROCEDURE — 29881 ARTHRS KNE SRG MNISECTMY M/L: CPT | Mod: LT

## 2019-09-20 PROCEDURE — G8916 PT W IV AB GIVEN ON TIME: HCPCS

## 2019-09-20 PROCEDURE — G8907 PT DOC NO EVENTS ON DISCHARG: HCPCS

## 2019-09-20 PROCEDURE — 29881 ARTHRS KNE SRG MNISECTMY M/L: CPT | Mod: LT | Performed by: ORTHOPAEDIC SURGERY

## 2019-09-20 RX ORDER — ONDANSETRON 2 MG/ML
4 INJECTION INTRAMUSCULAR; INTRAVENOUS EVERY 30 MIN PRN
Status: DISCONTINUED | OUTPATIENT
Start: 2019-09-20 | End: 2019-09-21 | Stop reason: HOSPADM

## 2019-09-20 RX ORDER — OXYCODONE HYDROCHLORIDE 5 MG/1
5 TABLET ORAL EVERY 4 HOURS PRN
Status: DISCONTINUED | OUTPATIENT
Start: 2019-09-20 | End: 2019-09-21 | Stop reason: HOSPADM

## 2019-09-20 RX ORDER — LIDOCAINE HYDROCHLORIDE 20 MG/ML
INJECTION, SOLUTION INFILTRATION; PERINEURAL PRN
Status: DISCONTINUED | OUTPATIENT
Start: 2019-09-20 | End: 2019-09-20

## 2019-09-20 RX ORDER — NALOXONE HYDROCHLORIDE 0.4 MG/ML
.1-.4 INJECTION, SOLUTION INTRAMUSCULAR; INTRAVENOUS; SUBCUTANEOUS
Status: DISCONTINUED | OUTPATIENT
Start: 2019-09-20 | End: 2019-09-21 | Stop reason: HOSPADM

## 2019-09-20 RX ORDER — SODIUM CHLORIDE, SODIUM LACTATE, POTASSIUM CHLORIDE, CALCIUM CHLORIDE 600; 310; 30; 20 MG/100ML; MG/100ML; MG/100ML; MG/100ML
INJECTION, SOLUTION INTRAVENOUS CONTINUOUS
Status: DISCONTINUED | OUTPATIENT
Start: 2019-09-20 | End: 2019-09-21 | Stop reason: HOSPADM

## 2019-09-20 RX ORDER — HYDROXYZINE HYDROCHLORIDE 10 MG/1
10 TABLET, FILM COATED ORAL
Status: DISCONTINUED | OUTPATIENT
Start: 2019-09-20 | End: 2019-09-21 | Stop reason: HOSPADM

## 2019-09-20 RX ORDER — HYDROCODONE BITARTRATE AND ACETAMINOPHEN 5; 325 MG/1; MG/1
1 TABLET ORAL EVERY 4 HOURS PRN
Qty: 10 TABLET | Refills: 0 | Status: SHIPPED | OUTPATIENT
Start: 2019-09-20 | End: 2019-10-29

## 2019-09-20 RX ORDER — DEXAMETHASONE SODIUM PHOSPHATE 4 MG/ML
INJECTION, SOLUTION INTRA-ARTICULAR; INTRALESIONAL; INTRAMUSCULAR; INTRAVENOUS; SOFT TISSUE PRN
Status: DISCONTINUED | OUTPATIENT
Start: 2019-09-20 | End: 2019-09-20

## 2019-09-20 RX ORDER — FENTANYL CITRATE 50 UG/ML
25 INJECTION, SOLUTION INTRAMUSCULAR; INTRAVENOUS
Status: DISCONTINUED | OUTPATIENT
Start: 2019-09-20 | End: 2019-09-21 | Stop reason: HOSPADM

## 2019-09-20 RX ORDER — ACETAMINOPHEN 325 MG/1
975 TABLET ORAL ONCE
Status: COMPLETED | OUTPATIENT
Start: 2019-09-20 | End: 2019-09-20

## 2019-09-20 RX ORDER — CEFAZOLIN SODIUM 2 G/100ML
2 INJECTION, SOLUTION INTRAVENOUS
Status: COMPLETED | OUTPATIENT
Start: 2019-09-20 | End: 2019-09-20

## 2019-09-20 RX ORDER — ASPIRIN 325 MG
325 TABLET ORAL DAILY
Qty: 14 TABLET | Refills: 0 | Status: SHIPPED | OUTPATIENT
Start: 2019-09-20 | End: 2019-10-29

## 2019-09-20 RX ORDER — ONDANSETRON 4 MG/1
4 TABLET, ORALLY DISINTEGRATING ORAL
Status: DISCONTINUED | OUTPATIENT
Start: 2019-09-20 | End: 2019-09-21 | Stop reason: HOSPADM

## 2019-09-20 RX ORDER — MEPERIDINE HYDROCHLORIDE 25 MG/ML
12.5 INJECTION INTRAMUSCULAR; INTRAVENOUS; SUBCUTANEOUS
Status: DISCONTINUED | OUTPATIENT
Start: 2019-09-20 | End: 2019-09-21 | Stop reason: HOSPADM

## 2019-09-20 RX ORDER — BUPIVACAINE HYDROCHLORIDE 2.5 MG/ML
INJECTION, SOLUTION INFILTRATION; PERINEURAL PRN
Status: DISCONTINUED | OUTPATIENT
Start: 2019-09-20 | End: 2019-09-20 | Stop reason: HOSPADM

## 2019-09-20 RX ORDER — ONDANSETRON 4 MG/1
4 TABLET, ORALLY DISINTEGRATING ORAL EVERY 30 MIN PRN
Status: DISCONTINUED | OUTPATIENT
Start: 2019-09-20 | End: 2019-09-21 | Stop reason: HOSPADM

## 2019-09-20 RX ORDER — FENTANYL CITRATE 50 UG/ML
25-50 INJECTION, SOLUTION INTRAMUSCULAR; INTRAVENOUS
Status: DISCONTINUED | OUTPATIENT
Start: 2019-09-20 | End: 2019-09-21 | Stop reason: HOSPADM

## 2019-09-20 RX ORDER — PROPOFOL 10 MG/ML
INJECTION, EMULSION INTRAVENOUS PRN
Status: DISCONTINUED | OUTPATIENT
Start: 2019-09-20 | End: 2019-09-20

## 2019-09-20 RX ORDER — FENTANYL CITRATE 50 UG/ML
INJECTION, SOLUTION INTRAMUSCULAR; INTRAVENOUS PRN
Status: DISCONTINUED | OUTPATIENT
Start: 2019-09-20 | End: 2019-09-20

## 2019-09-20 RX ORDER — LIDOCAINE 40 MG/G
CREAM TOPICAL
Status: DISCONTINUED | OUTPATIENT
Start: 2019-09-20 | End: 2019-09-21 | Stop reason: HOSPADM

## 2019-09-20 RX ORDER — AMOXICILLIN 250 MG
1-2 CAPSULE ORAL 2 TIMES DAILY
Qty: 30 TABLET | Refills: 0 | Status: SHIPPED | OUTPATIENT
Start: 2019-09-20 | End: 2019-10-29

## 2019-09-20 RX ORDER — HYDROCODONE BITARTRATE AND ACETAMINOPHEN 5; 325 MG/1; MG/1
1 TABLET ORAL
Status: DISCONTINUED | OUTPATIENT
Start: 2019-09-20 | End: 2019-09-21 | Stop reason: HOSPADM

## 2019-09-20 RX ORDER — CEFAZOLIN SODIUM 1 G/3ML
1 INJECTION, POWDER, FOR SOLUTION INTRAMUSCULAR; INTRAVENOUS SEE ADMIN INSTRUCTIONS
Status: DISCONTINUED | OUTPATIENT
Start: 2019-09-20 | End: 2019-09-21 | Stop reason: HOSPADM

## 2019-09-20 RX ADMIN — ONDANSETRON 4 MG: 2 INJECTION INTRAMUSCULAR; INTRAVENOUS at 08:05

## 2019-09-20 RX ADMIN — FENTANYL CITRATE 50 MCG: 50 INJECTION, SOLUTION INTRAMUSCULAR; INTRAVENOUS at 06:59

## 2019-09-20 RX ADMIN — Medication 100 MCG: at 07:06

## 2019-09-20 RX ADMIN — PROPOFOL 150 MG: 10 INJECTION, EMULSION INTRAVENOUS at 06:59

## 2019-09-20 RX ADMIN — Medication 1 MCG/KG/MIN: at 07:15

## 2019-09-20 RX ADMIN — DEXAMETHASONE SODIUM PHOSPHATE 4 MG: 4 INJECTION, SOLUTION INTRA-ARTICULAR; INTRALESIONAL; INTRAMUSCULAR; INTRAVENOUS; SOFT TISSUE at 07:02

## 2019-09-20 RX ADMIN — LIDOCAINE HYDROCHLORIDE 60 MG: 20 INJECTION, SOLUTION INFILTRATION; PERINEURAL at 06:59

## 2019-09-20 RX ADMIN — SODIUM CHLORIDE, SODIUM LACTATE, POTASSIUM CHLORIDE, CALCIUM CHLORIDE: 600; 310; 30; 20 INJECTION, SOLUTION INTRAVENOUS at 06:56

## 2019-09-20 RX ADMIN — ACETAMINOPHEN 975 MG: 325 TABLET ORAL at 06:08

## 2019-09-20 RX ADMIN — SODIUM CHLORIDE, SODIUM LACTATE, POTASSIUM CHLORIDE, CALCIUM CHLORIDE: 600; 310; 30; 20 INJECTION, SOLUTION INTRAVENOUS at 06:22

## 2019-09-20 RX ADMIN — CEFAZOLIN SODIUM 2 G: 2 INJECTION, SOLUTION INTRAVENOUS at 07:03

## 2019-09-20 RX ADMIN — Medication 100 MCG: at 07:11

## 2019-09-20 NOTE — ANESTHESIA PREPROCEDURE EVALUATION
Anesthesia Pre-Procedure Evaluation    Patient: Cely Ontiveros   MRN:     [id number] Gender:   female   Age:    73 year old :      1945        Preoperative Diagnosis: Left knee medial meniscus tear   Procedure(s):  Left knee arthroscopy, meniscal and chondral debridement, choice anesthesia     Past Medical History:   Diagnosis Date     Actinic keratosis      Allergic rhinitis      Cataract      Degenerative joint disease     cervical disease     Depression with anxiety      Herpes simplex      Hypoglycemia     eats small meals and is fine     Impingement syndrome, shoulder       Past Surgical History:   Procedure Laterality Date     CATARACT IOL, RT/LT Left 2019     COMBINED CYSTOSCOPY, URETEROSCOPY, LASER HOLMIUM LITHOTRIPSY URETER(S) Right 2018    Procedure: COMBINED CYSTOSCOPY, URETEROSCOPY, LASER HOLMIUM LITHOTRIPSY URETER(S);   Cystoscopy,Right ureteroscopy with laser lithotripsy and stent placement;  Surgeon: Pino Hawk MD;  Location: [facility] OR     HC ENLARGE BREAST WITH IMPLANT       HC LAP,FULGURATE/EXCISE LESIONS       HC REMOVAL OF BREAST IMPLANT       HYSTERECTOMY, PAP NO LONGER INDICATED  1998    ovaries and uterus out for benign reasons(fibroids and ovarian cyst)     PHACOEMULSIFICATION WITH STANDARD INTRAOCULAR LENS IMPLANT Left 2019    Procedure: PHACOEMULSIFICATION WITH STANDARD INTRAOCULAR LENS IMPLANT, LEFT;  Surgeon: Wagner Aguilera MD;  Location: [facility]     PHACOEMULSIFICATION WITH STANDARD INTRAOCULAR LENS IMPLANT Right 2019    Procedure: PHACOEMULSIFICATION WITH STANDARD INTRAOCULAR LENS IMPLANT, RIGHT;  Surgeon: Wagner Aguilera MD;  Location: [facility]     SINUS SURGERY            Anesthesia Evaluation     . Pt has had prior anesthetic. Type: General    No history of anesthetic complications          ROS/MED HX    ENT/Pulmonary: Comment: Chronic otitis externa left    (+)allergic rhinitis, , . .    Neurologic:  - neg neurologic ROS      Cardiovascular:     (+) Dyslipidemia, ----. : . . . :. .       METS/Exercise Tolerance:  >4 METS   Hematologic:         Musculoskeletal: Comment: Shoulder impingement sy  (+) arthritis,  -       GI/Hepatic:  - neg GI/hepatic ROS       Renal/Genitourinary: Comment: Kidney stone    (+) chronic renal disease,       Endo:  - neg endo ROS       Psychiatric:     (+) psychiatric history anxiety and depression      Infectious Disease:         Malignancy:         Other:                         PHYSICAL EXAM:   Mental Status/Neuro: A/A/O   Airway: Facies: Feasible  Mallampati: II  Mouth/Opening: Full  TM distance: > 6 cm  Neck ROM: Full   Respiratory: Auscultation: CTAB     Resp. Rate: Normal     Resp. Effort: Normal      CV: Rhythm: Regular  Rate: Age appropriate  Heart: Normal Sounds  Edema: None   Comments:      Dental: Normal Dentition                LABS:  CBC:   Lab Results   Component Value Date    WBC 9.3 12/27/2012    WBC 6.7 08/27/2010    HGB 12.9 09/09/2019    HGB 12.8 06/04/2013    HCT 41.4 12/27/2012    HCT 40.1 08/27/2010     12/27/2012     08/27/2010     BMP:   Lab Results   Component Value Date     09/09/2019     03/08/2017    POTASSIUM 5.1 09/09/2019    POTASSIUM 4.2 03/08/2017    CHLORIDE 106 09/09/2019    CHLORIDE 107 03/08/2017    CO2 31 09/09/2019    CO2 30 03/08/2017    BUN 21 09/09/2019    BUN 19 03/08/2017    CR 0.82 09/09/2019    CR 0.72 03/08/2017    GLC 92 09/09/2019    GLC 87 03/08/2017     COAGS: No results found for: PTT, INR, FIBR  POC: No results found for: BGM, HCG, HCGS  OTHER:   Lab Results   Component Value Date    A1C 5.2 06/10/2019    KANIKA 9.4 09/09/2019    ALBUMIN 3.8 09/09/2019    PROTTOTAL 7.2 09/09/2019    ALT 20 09/09/2019    AST 24 09/09/2019    ALKPHOS 58 09/09/2019    BILITOTAL 0.3 09/09/2019    TSH 0.81 03/08/2017        Preop Vitals    BP Readings from Last 3 Encounters:   09/20/19 120/54   09/09/19 136/73   07/27/19 118/73    Pulse Readings from Last  "3 Encounters:   09/20/19 82   09/09/19 81   07/27/19 85      Resp Readings from Last 3 Encounters:   09/20/19 16   06/27/19 16   05/17/19 16    SpO2 Readings from Last 3 Encounters:   09/20/19 95%   09/09/19 96%   07/27/19 95%      Temp Readings from Last 1 Encounters:   09/20/19 36.2  C (97.1  F) (Temporal)    Ht Readings from Last 1 Encounters:   09/09/19 1.632 m (5' 4.25\")      Wt Readings from Last 1 Encounters:   09/09/19 68.1 kg (150 lb 3.2 oz)    Estimated body mass index is 25.58 kg/m  as calculated from the following:    Height as of 9/9/19: 1.632 m (5' 4.25\").    Weight as of 9/9/19: 68.1 kg (150 lb 3.2 oz).     LDA:  Peripheral IV 09/20/19 Left Hand (Active)   Line Status Infusing 9/20/2019  6:14 AM   Phlebitis Scale 0-->no symptoms 9/20/2019  6:14 AM   Number of days: 0       Airway - Adult/Peds 4 laryngeal mask airway (Active)   Number of days: 0       Urethral Catheter Non-latex 16 fr (Active)   Number of days: 393       Ureteral Drain/Stent Right ureter 6 fr (Active)   Number of days: 393        Assessment:   ASA SCORE: 2    H&P: History and physical reviewed and following examination; no interval change.   Smoking Status:  Non-Smoker/Unknown   NPO Status: NPO Appropriate     Plan:   Anes. Type:  General   Pre-Medication: None   Induction:  IV (Standard)   Airway: LMA   Access/Monitoring: PIV   Maintenance: Balanced     Postop Plan:   Postop Pain: Opioids  Postop Sedation/Airway: Not planned     PONV Management:   Adult Risk Factors: Female, Non-Smoker, Postop Opioids   Prevention: Ondansetron, Dexamethasone     CONSENT: Direct conversation      Blood Products: Consent Deferred (Minimal Blood Loss)       Comments for Plan/Consent:  GA with LMA, Standard ASA monitoring  All available and pertinent medical records and test results reviewed.  Risks, including but not limited to airway injury, bronchospasm, hypoxemia, PONV  d/w patient    Patient evaluated and examined prior to procedure, questions, " concerns addressed and answered, note deferred due to priority of clinical commitment.                 Denny Russo MD

## 2019-09-20 NOTE — ANESTHESIA POSTPROCEDURE EVALUATION
Anesthesia POST Procedure Evaluation    Patient: Cely Ontiveros   MRN:     [id number] Gender:   female   Age:    73 year old :      1945        Preoperative Diagnosis: Left knee medial meniscus tear   Procedure(s):  Left knee arthroscopy, partial medial meniscectomy and chondral debridement   Postop Comments: No value filed.       Anesthesia Type:  Not documented  General    Reportable Event: NO     PAIN: Uncomplicated   Sign Out status: Comfortable, Well controlled pain     PONV: No PONV   Sign Out status:  No Nausea or Vomiting     Neuro/Psych: Uneventful perioperative course   Sign Out Status: Preoperative baseline; Age appropriate mentation     Airway/Resp.: Uneventful perioperative course   Sign Out Status: Non labored breathing, age appropriate RR; Resp. Status within EXPECTED Parameters     CV: Uneventful perioperative course   Sign Out status: Appropriate BP and perfusion indices; Appropriate HR/Rhythm     Disposition:   Sign Out in:  PACU  Disposition:  Phase II; Home  Recovery Course: Uneventful  Follow-Up: Not required           Last Anesthesia Record Vitals:  CRNA VITALS  2019 0717 - 2019 0817      2019             Pulse:  90    SpO2:  98 %    Resp Rate (observed):  10          Last PACU Vitals:  Vitals Value Taken Time   /56 2019  8:10 AM   Temp 35.9  C (96.6  F) 2019  7:49 AM   Pulse 62 2019  8:10 AM   Resp 8 2019  8:11 AM   SpO2 96 % 2019  8:11 AM   Temp src     NIBP     Pulse     SpO2     Resp     Temp     Ht Rate     Temp 2     Vitals shown include unvalidated device data.      Electronically Signed By: Denny Russo MD, 2019, 9:21 AM

## 2019-09-20 NOTE — INTERVAL H&P NOTE
"The History and Physical on patient's chart was personally reviewed today with the patient. there have been no interval changes in patient's history since H+P performed.    History:  Cely Ontiveros is a 73 year old female seen for evaluation of ongoing left knee pain with no known recent injury. Recent pain has been present since about 9/2018, but mentions possibly \"tearing something\" 5 years ago with an injury. Has had pain off/on but worse for some reason recently. She was seen by us 12/12/2018 and referred for an MRI to further evaluation. Noted to have medial meniscus tear, patello-femoral chondromalacia as we discussed with her over the phone. Was seen 1/16/2019 and received left knee intra-articular as well as pes bursal injections. States the injections worked quite well for about 4 months. Had repeat injections 6/2019 which didn't provide as much relief.   Starting to notice more right knee pain as well, medially. Also some pain up the lateral thigh and along the hip, worse at night laying on that side. No treatment for that pain. Pain is worsened with activities like flexion, and going up and down stairs. No pain with standing. Has had buckling episodes in the past. For treatment, she has tried ice, physical therapy and a knee brace. She'd like to try more injections today. Has been taking Aleve regularly so thinks that is making a difference now.    X-RAY:  AP/lateral views bilateral knee from 10/25/2018 : no obvious fractures or dislocations. Fairly preserved joint spaces. No sunrise views available.        MRI left knee 12/31/2018:  1. Horizontal oblique tear involving the body and posterior horn of  the the left knee medial meniscus, extending to the inferior articular  surface.  2. Diffuse thinning of the articular cartilage in all 3 joint  compartments of the left knee,most notable patello-femoral joint with a focal area of  high-grade cartilage loss centered at the median ridge with adjacent " subchondral edema.   3. The anterior and posterior cruciate ligaments, medial and lateral  supporting structures, and lateral meniscus are intact.  4. Small popliteal cyst.  5. Multiloculated cystic structure adjacent to the cruciate ligaments  and posterior horn of the medial meniscus, possibly a parameniscal or  pericruciate cyst.           Impression:   73 year old female with:  1.  acute on chronic left knee pain, patello-femoral chondromalacia and pes anserine bursitis, complex medial meniscus tear.        Plan:   * Reviewed imaging with patient. Also, clinical exam findings. Likely patello-femoral pain and chondromalacia as well as pes bursitis/tendonitis. This appears bilateral.  * MRI shows complex medial meniscus tear.     * discussed imaging with patient, what appears to be a complex meniscal tear on MRI, as well as some underlying arthritic changes, which is consistent with symptoms and physical examination findings. Most notable arthritis is anterior under the knee cap.     * Discussed treatment options including nonoperative treatment with continued rest, ice, elevation, activity modification, NSAIDS and Physical Therapy, bracing and potential injections versus surgical treatment with arthroscopy and meniscal repair versus debridement, possible chondral debridement. Risks and benefits of each discussed in detail.  * in the setting of underlying chondrosis, predictability of arthroscopy is uncertain, unless mechanical symptoms present due to the meniscus tear.     * surgical risks discussed: bleeding, infection, pain, scar, damage to adjacent structures (nerve, vessels, cartilage), stiffness, post-traumatic arthritis, failure to relieve symptoms, recurrence of symptoms, blood clots (DVT), pulmonary emolism, risks of anesthesia and death. This surgery is not intended nor expected to alleviate arthritic pain symptoms, nor will it treat or correct underlying arthritic changes. Arthritis and symptoms related  to arthritis could worsen with arthroscopy and meniscal and/or chondral debridement. Patient understands.    Patient elects to proceed with planned procedure. Left knee arthroscopy, meniscal and chondral debridement. Outpatient.    Risks and perceived benefits of surgery again discussed with patient. Patient's questions addressed and answered. Written informed consent obtained and reviewed. Surgical site marked with indelible marker with patient's participation after confirming site with patient.      Nic Singh M.D., M.S.  Dept. of Orthopaedic Surgery  [facility]

## 2019-09-20 NOTE — BRIEF OP NOTE
POST OPERATIVE NOTE-IMMEDIATE :    Date of surgery: 9/20/2019    Preoperative Diagnosis:  Left knee medial meniscus tear    Postoperative Diagnosis:  Left knee medial meniscus tear    Procedures:  Procedure(s):  Left knee arthroscopy, partial medial meniscectomy and chondral debridement    Prosthetic Devices: See Op Note    Surgeon(s) and Assistants (if any):  Attending Surgeon: Nic Singh MD, MS  Assistant: Jake Johnson PA-C    Anesthesia:  Other    Antibiotics: 2g Ancef    IV Fluids: 500cc LR    UOP: 0, no olsen    Drains: none    Specimens: none    Complications: None apparent.    Tourniquet Time: 14 minutes @ 250mmHg    Findings/Conclusions: radial tear with horizontal component medial meniscus tear. See Op Note for further detail.    Estimated Blood Loss: 1ml    Post Op Plan:  *Rest   *Ice   *Elevation   *Weight bearing as tolerated, crutches as needed   *oral pain medications  *Daily asa x2 weeks  *Home exercise program   *Return to clinic 2 weeks for wound check, suture removal, sooner if needed      Jake Johnson PA-C, CAQ (Ortho)  Supervising Physician: Nic Singh M.D., M.S.  Dept. of Orthopaedic Surgery  [facility]

## 2019-09-20 NOTE — ANESTHESIA CARE TRANSFER NOTE
Patient: Cely Ontiveros    Procedure(s):  Left knee arthroscopy, partial medial meniscectomy and chondral debridement    Diagnosis: Left knee medial meniscus tear  Diagnosis Additional Information: No value filed.    Anesthesia Type:   General     Note:  Airway :Nasal Cannula  Patient transferred to:PACU  Comments: Awake, comfortable, sats 100%, report to RN.Handoff Report: Identifed the Patient, Identified the Reponsible Provider, Reviewed the pertinent medical history, Discussed the surgical course, Reviewed Intra-OP anesthesia mangement and issues during anesthesia, Set expectations for post-procedure period and Allowed opportunity for questions and acknowledgement of understanding      Vitals: (Last set prior to Anesthesia Care Transfer)    CRNA VITALS  9/20/2019 0717 - 9/20/2019 0754      9/20/2019             Pulse:  90    SpO2:  98 %    Resp Rate (observed):  10                Electronically Signed By: AUDREY Cagle CRNA  September 20, 2019  7:54 AM

## 2019-09-20 NOTE — DISCHARGE INSTRUCTIONS
1. Name: Cely Ontiveros MRN #: [id number]  2. Date: 9/20/2019  Procedure: left knee arthroscopy, meniscal and chondral debridement.  3. Discharge to home when stable, tolerating clear liquids, and patient has urinated  4. Call for follow-up appointment, [phone], with Dr. Singh in:  2 weeks.   WOUND CARE    The bandage may be slightly bloody. This is normal.  5. Ice:  Keep an ice bag on your knee for 20 minutes at a time.  6. Keep incisions clean and dry following surgery for:  72 hours   7. Change all bandages in:  72 hours       8. If bandages are changed before follow-up, cover all incisions with fresh bandages or bandaids.  9. O.K. to shower (may get incision wet) in:  72 hours  10. No tub baths, swimming pools, hot tubs, etc. for a minimum of 2 weeks following surgery  ACTIVITY  11. Keep leg elevated on a pillow placed under ankle. Do not keep pillow under your knee.  12. Weight-bearing ([place]):  Weight-bear as tolerated       May discontinue crutches in 2-3 days if able to walk without a limp.  13. Bracing: no brace needed.  14. Range of motion limits: no limit. Work to regain full range of motion.  15. Exercises:  Perform exercises 3 times a day for a minimum of 25 reps each time (start today or tomorrow):             Quadriceps sets  Calf Pumps Straight leg raises  Heels Slides   16. Start Physical Therapy: will discuss upon return to clinic.  17. OK to drive:  Not for 24 hours    When going back to driving, be sure to test braking/acceleration maneuvers in an empty parking lot before entry into any traffic areas.      ABSOLUTELY NO DRIVING WHILE TAKING NARCOTICS!    DISCHARGE MEDICATIONS:   Aspirin 325 mg, 1 tablet, take once a day for 14 days then stop (to prevent blood clots) (over the counter)  Norco (5/325), 1 tablets, take every 4 to 6 hours as needed for pain  Other: stool softeners while on pain medications.  Ok to take over the counter pain medications such as ibuprofen or acetaminophen  instead.    Strong pain medication has been prescribed. Use as directed. Do not combine with alcohol. Be careful as you walk or climb stairs.   DIET:  If no nausea, clear liquids should be taken initially.  Then progress to solid foods when clear liquids are tolerated.   RESPONSE TO SURGERY: It is normal to have pain and swelling in your knee after surgery. It may take 4 weeks or longer for the swelling to go away. It is also common to notice some bruising around the knee, thigh, and calf as the swelling resolves.  EMERGENCY: Call or return for any fevers (temperature greater than 101.5   or sustained fevers greater than 100.5   that haven t resolved within 3 to 4 days following surgery) or chills, increasing pain, swelling, redness, calf pain, drainage (especially if yellow, green, or foul smelling), excessive bleeding), chest pain, shortness of breath:  Phone #: [phone]; If emergency, go to local ER or dial 911.    Nic Singh M.D., M.S.  Dept. of Orthopaedic Surgery  [facility]    9/20/2019        KNEE SURGERY - HOME EXERCISE PROGRAM    All exercises to be performed at least 3 times per day.     Quad Sets    Sit with leg extended    Tighten quad muscles in front of leg, trying to  push back of knee downward    Hold exercise for 10 seconds    Rest 10 seconds between reps    Perform 1 set of 20 reps, 3 times a day     Heel Slides     Lie on back with legs straight    Slide heel to buttocks     Return to start position    Repeat with other leg    Perform 1 rep every 4 seconds    Perform 3 sets of 20 reps, 3 times a day    Rest 1 minute between sets     Ankle Pumps     Lie on back with foot elevated on pillow    Move foot up and down, pumping ankle    Perform 3 sets of 20 reps, 3 times a day    Perform 1 rep every 4 seconds    Rest 1 minute between sets     Straight Leg Raise    Lie on back with uninvolved knee bent    Raise straight leg to thigh level of bend leg    Return to starting  position    Perform 3 sets of 20 reps, 3 times a day    Perform 1 rep every 4 seconds    Rest 1 minute between sets            [facility]  Same-Day Surgery   Adult Discharge Orders & Instructions   For 24 hours after surgery  1. Get plenty of rest.  A responsible adult must stay with you for at least 24 hours after you leave the hospital.   2. Do not drive or use heavy equipment.  If you have weakness or tingling, don't drive or use heavy equipment until this feeling goes away.  3. Do not drink alcohol.  4. Avoid strenuous or risky activities.  Ask for help when climbing stairs.   5. You may feel lightheaded.  IF so, sit for a few minutes before standing.  Have someone help you get up.   6. If you have nausea (feel sick to your stomach): Drink only clear liquids such as apple juice, ginger ale, broth or 7-Up.  Rest may also help.  Be sure to drink enough fluids.  Move to a regular diet as you feel able.  7. You may have a slight fever. Call the doctor if your fever is over 100 F (37.7 C) (taken under the tongue) or lasts longer than 24 hours.  8. You may have a dry mouth, a sore throat, muscle aches or trouble sleeping.  These should go away after 24 hours.  9. Do not make important or legal decisions.   Call your doctor for any of the followin.  Signs of infection (fever, growing tenderness at the surgery site, a large amount of drainage or bleeding, severe pain, foul-smelling drainage, redness, swelling).  2. It has been over 8 to 10 hours since surgery and you are still not able to urinate (pass water).  3.  Headache for over 24 hours.    Managing Your Pain   Pain management is an important part of your care. When you are in pain or uncomfortable, it can affect the way you feel both physically and emotionally.   The longer pain goes untreated, the harder it is to relieve. Effective pain management can break the pain cycle.   When you take care of your pain before it becomes a problem  you will:     Heal faster     Be more comfortable when walking and doing breathing exercises     Regain your strength faster     Other Ways to Manage Pain   There are many ways besides medication to treat your pain. Ask your nurse or doctor for more information about:     Relaxation techniques     Guided imagery     Breathing exercises     Hot or cold packs     Massage     Changing position (elevation or support)     Using pillows or splints to protect incisions when coughing, laughing, etc.     Music     The goal is for you to be able to complete activities such as turning in bed, walking and doing deep breathing exercises with only mild to moderate pain.   Possible Side Effects of Pain Medications     Constipation     Sleepiness     Dry mouth     Nausea and/or vomiting   It is important for you to let your nurse or doctor know if you have any of these side effects.     What You Can Do to Help with the Side Effects     Drink as much fluid as possible     Eat foods high in fiber (beans, lentils, fruits)     Ask for medication if you continue to have problems with constipation     Suck on sugarless hard candy, or ice     Take pain medications with food     Peppermint can be helpful to decrease nausea     Managing Your Pain at Home   Your doctor may give you a prescription for pain medicine to take at home. Most pain medications to be taken at home are in pill form.   Your nurse will review the instructions for taking your pain medications. When taken by mouth, medication can take up to 30 minutes to be effective. Remember to take pain medication when your pain first begins      Remember, same day surgery does not mean same day recovery.  Healing is a gradual process.  It is normal to be impatient and feel discouraged while waiting for swelling, bruising, discomfort and numbness to diminish.  Allow yourself to be a patient!  Extra rest, a nutritious diet, and avoidance of stress are important aids to  recovery.      ankle pump exercises: This particular exercise is important because it helps decrease the swelling in the knee and lower leg.  It s also very important in helping you avoid developing blood clots in your lower leg(s) after surgery.   To do an ankle pump you point and flex your foot back and forth.  You should do 10 repetitions several times during the day. You really can t over do these.      Deep breathing and coughing:  It's important to learn deep breathing and coughing exercises as these will help to lower your risk of lung complications after your surgery.  Breathing deeply:  Moves air down to the bottom areas of the lungs   Opens air passages and moves mucous out (coughing is also easier)   Helps the blood and oxygen supply to your lungs, boosting circulation   Lowers the risk of lung complications such as pneumonia and infections  Breathe in deeply and slowly through your nose, expanding your lower rib cage, and letting your abdomen move forward. Hold for a count of 3 to 5. Breathe out slowly and completely.  Don't force your breath out. On the third breath, cough deeply from the lungs, not the throat.  Rest and repeat every hour while you are awake.    Tylenol was given at 6:10 AM

## 2019-09-20 NOTE — OP NOTE
Procedure Date: 09/20/2019      PREOPERATIVE DIAGNOSES:  Chronic left knee pain, complex medial meniscus tear, mild osteoarthritis.      POSTOPERATIVE DIAGNOSES:  Chronic left knee pain, complex medial meniscus tear and mild osteoarthritis, in addition to medial plica.      PROCEDURES:   1.  Left knee arthroscopic partial medial meniscectomy.   2.  Left knee arthroscopic shaving chondroplasty, patella, medial tibia, lateral tibia.   3.  Left knee arthroscopic medial plica resection.      ATTENDING SURGEON:  Nic Singh MD      ASSISTANT:  Jake Johnson PA-C      ANESTHESIA:  General in the supine position.      INTRAVENOUS FLUID:  500 mL lactated Ringer's.      URINE OUTPUT:  Zero, no Cobian.      ESTIMATED BLOOD LOSS:  1 mL      ANTIBIOTICS:  Cefazolin 2 grams IV prior to incision and tourniquet inflation.      TOURNIQUET TIME:  14 minutes at 250 mmHg on the left upper thigh.      DRAINS:  None.      SPECIMENS:  None.      IMPLANTS:  None.      FINDINGS:  No effusion.  Full range of motion.  Mild synovitis.  Grade 3 and 4 chondrosis of the patella.  No loose bodies in the medial or lateral gutters.  Grade I chondrosis of the femoral trochlea.  Intact ACL within the notch.  Lateral compartment with intact lateral meniscus.  Grade 2 chondrosis of the lateral tibia with grade III fissure.  Medial compartment with grade III chondrosis of the tibia.  Grade 2 chondrosis of the medial femoral condyle.  Complex tear in the posterior horn into the body of the medial meniscus with small flaps and horizontal cleavage tears.      INDICATIONS FOR PROCEDURE:  Cely Ontiveros is a 73-year-old female with ongoing left knee pain for the past year since 09/2018, but does report having an injury and tearing something 5 years ago.  Off and on over the years, but worse recently.  She was seen by us in 12/2018 and referred for an MRI showing medial meniscus tear, patellofemoral chondromalacia.  We discussed treatment options over the  phone, nonsurgical versus surgical options.  She elected to proceed with injections, both intraarticular, as well as pes bursal injections.  Those injections worked well for about 4 months.  She had repeat injections in June, which did not help her as much.  The pain is worse with weightbearing activities up and down stairs, bending the knee.  Intermittent mechanical symptoms.  She has tried physical therapy, ice, knee brace and injections, as well as anti-inflammatories.  However, given continued symptoms, she now elects to proceed with surgical debridement.      DETAILS OF PROCEDURE:  The patient was identified in the preoperative holding area.  After confirming with the patient, the correct procedure and the procedure site, the left knee was marked with an indelible marker by myself, the attending surgeon.  After again reviewing the risks and perceived benefits of surgery, questions were addressed and she elected to proceed.  Written informed consent was obtained and reviewed.      The patient was then taken to the operating room, placed supine on the operating table.  All bony prominences were well padded.  She was adequately secured.  After adequate general anesthesia, a nonsterile tourniquet was placed to the right and left upper thigh.  The left lower extremity was then prepped and draped in the usual sterile fashion.      A timeout was then performed, confirming the correct patient, procedure, procedure site, availability of instruments and implants, review of the patient's allergies as well, as the administration of prophylactic antibiotics by operating staff.      At that time, the left lower extremity was elevated, exsanguinated with an Esmarch and tourniquet inflated.  A standard anterolateral arthroscopy portal was made.  Upon entering the knee, no effusion was noted.  Mild suprapatellar synovitis.  Patellofemoral chondrosis as noted.  No loose bodies in the medial and lateral gutters.  Prominent medial  plica.  Upon entering the notch, the ACL was grossly intact.  Anterior medial arthroscopy portal was made, guided with a spinal needle.  Probe was introduced.  The knee was then placed in a figure-of-4 position, evaluating the lateral compartment.  Probing superior and inferior surface of the lateral meniscus revealed no obvious tear.  There was chondrosis of the medial tibia.  This was debrided using the oscillating debrider.      Then to the medial compartment using gentle valgus stress.  Complex tearing with displaced flaps to the articular surface of the posterior horn and body of the medial meniscus.  Noted chondrosis of the medial tibia.  Using the oscillating debrider, as well as the meniscal biter, the meniscus tear was debrided back until this was stable, confirmed with probing.  I used the oscillating debrider and gently performed a chondroplasty of the medial tibia.      Then back to the suprapatellar pouch, the medial plica was resected.  I used the oscillating debrider 1 more time to perform a shaving chondroplasty of the patella.  Final lavage of the knee was performed.  The remaining fluid was then drained from the knee.  Instruments were removed.  Wounds were closed with 3-0 Prolene, injected with 0.25% Marcaine.  Steri-Strips, well-padded soft dressing, Ace wrap and tourniquet inflated.  She was awakened and taken to recovery in stable condition.      Postoperatively, rest, ice, elevate, weightbear as tolerated.  Home exercise program.  We will see her back in 2 weeks' time for a wound check or sooner if needed.  Oral pain medications will include hydrocodone versus over-the-counter.  Stool softeners.  Daily aspirin 2 weeks for blood thinning.      There were no apparent complications.         ROMAIN CLEVELAND MD             D: 2019   T: 2019   MT: VIANEY      Name:     CORBY BOYKIN   MRN:      [id number]        Account:        [id number]   :      1945           Procedure Date:  09/20/2019      Document: [id number]

## 2019-09-23 ENCOUNTER — TELEPHONE (OUTPATIENT)
Dept: FAMILY MEDICINE | Facility: CLINIC | Age: 74
End: 2019-09-23

## 2019-09-23 NOTE — TELEPHONE ENCOUNTER
Reason for Call:  Post surgery question    Detailed comments: Pt had surgery on 09/20/2019 and was directed to not use a stationary bicycle for 2 weeks. States that she is doing really well and would like to know if she can go on the bike for 5-10 mins.  Please call back with direction. Thank you.    Phone Number Patient can be reached at: Home number on file [phone] (home)    Best Time: Any    Can we leave a detailed message on this number? YES    Call taken on 9/23/2019 at 2:09 PM by Radha Weaver

## 2019-09-23 NOTE — TELEPHONE ENCOUNTER
Called pt and informed her she should come in for her first post op visit with Francisco and discuss it at that time which should be around two weeks after surgery.  Aby García CMA CMA 9/23/2019 3:37 PM

## 2019-10-01 PROBLEM — M25.562 ACUTE PAIN OF LEFT KNEE: Status: RESOLVED | Noted: 2018-11-14 | Resolved: 2019-10-01

## 2019-10-01 NOTE — PROGRESS NOTES
DISCHARGE SUMMARY    Cely Ontiveros was seen 3 times for evaluation and treatment.  Patient did not return for further treatment and current status is unknown.  Due to short treatment duration, no objective or functional changes were made.  Please see goal flow sheet from episode noted date below and initial evaluation for further information.  Patient is discharged from therapy and therapy episode is resolved as of 10/01/19.      No linked episodes

## 2019-10-02 ENCOUNTER — OFFICE VISIT (OUTPATIENT)
Dept: ORTHOPEDICS | Facility: CLINIC | Age: 74
End: 2019-10-02
Payer: COMMERCIAL

## 2019-10-02 VITALS
SYSTOLIC BLOOD PRESSURE: 132 MMHG | BODY MASS INDEX: 25.55 KG/M2 | DIASTOLIC BLOOD PRESSURE: 71 MMHG | WEIGHT: 150 LBS | HEART RATE: 94 BPM | OXYGEN SATURATION: 96 %

## 2019-10-02 DIAGNOSIS — Z98.890 S/P ARTHROSCOPY OF LEFT KNEE: Primary | ICD-10-CM

## 2019-10-02 PROCEDURE — 99024 POSTOP FOLLOW-UP VISIT: CPT | Performed by: ORTHOPAEDIC SURGERY

## 2019-10-02 ASSESSMENT — PAIN SCALES - GENERAL: PAINLEVEL: NO PAIN (1)

## 2019-10-02 NOTE — LETTER
10/2/2019         RE: Cely Ontiveros  [address] [facility] [address]        Dear Colleague,    Thank you for referring your patient, Cely Ontiveros, to the [facility]. Please see a copy of my visit note below.    Chief Complaint   Patient presents with     Left Knee - Surgical Followup     DOS 09/20/19.  Left Knee scope-mm; pr;chondroplasty of P; MT, LT.  Pt states today she is feeling better, but she has been experiencing swelling, and pain.  Pt had bruised leg after surgery.  Pt has been doing exercises, unsure if PT is going to happen.       SURGERY:  ([facility] [place] )  1.  Left knee arthroscopic partial medial meniscectomy.   2.  Left knee arthroscopic shaving chondroplasty, patella, medial tibia, lateral tibia.   3.  Left knee arthroscopic medial plica resection.   DATE OF SURGERY: 9/20/2019.      HISTORY OF PRESENT ILLNESS:  Cely Ontiveros is a 73 year old female seen for postoperative evaluation of a left knee arthroscopy and medial meniscus debridement for complex medial meniscus tear. Surgery occurred 2 weeks ago. Returns today stating she's doing well. Pain has been ipmroving. No problems with the surgical wounds. Denies fevers chills or night sweats. No associated numbness or tingling. Has been doing home exercise program since surgery as recommended. Taking aspirin daily. Pain 1/10    OR FINDINGS:  No effusion.  Full range of motion.  Mild synovitis.  Grade 3 and 4 chondrosis of the patella.  No loose bodies in the medial or lateral gutters.  Grade I chondrosis of the femoral trochlea.  Intact ACL within the notch.  Lateral compartment with intact lateral meniscus.  Grade 2 chondrosis of the lateral tibia with grade III fissure.  Medial compartment with grade III chondrosis of the tibia.  Grade 2 chondrosis of the medial femoral condyle.  Complex tear in the posterior horn into the body of the medial meniscus with small flaps and  horizontal cleavage tears.     Past Medical History:   Diagnosis Date     Actinic keratosis      Allergic rhinitis      Cataract      Degenerative joint disease     cervical disease     Depression with anxiety      Herpes simplex      Hypoglycemia     eats small meals and is fine     Impingement syndrome, shoulder        Past Surgical History:   Procedure Laterality Date     ARTHROSCOPY KNEE Left 9/20/2019    Procedure: Left knee arthroscopy, partial medial meniscectomy and chondral debridement;  Surgeon: Nic Singh MD;  Location: [facility] OR     CATARACT IOL, RT/LT Left 01/24/2019     COMBINED CYSTOSCOPY, URETEROSCOPY, LASER HOLMIUM LITHOTRIPSY URETER(S) Right 8/23/2018    Procedure: COMBINED CYSTOSCOPY, URETEROSCOPY, LASER HOLMIUM LITHOTRIPSY URETER(S);   Cystoscopy,Right ureteroscopy with laser lithotripsy and stent placement;  Surgeon: Pino Hawk MD;  Location: [facility] OR     HC ENLARGE BREAST WITH IMPLANT       HC LAP,FULGURATE/EXCISE LESIONS       HC REMOVAL OF BREAST IMPLANT       HYSTERECTOMY, PAP NO LONGER INDICATED  1998    ovaries and uterus out for benign reasons(fibroids and ovarian cyst)     PHACOEMULSIFICATION WITH STANDARD INTRAOCULAR LENS IMPLANT Left 1/24/2019    Procedure: PHACOEMULSIFICATION WITH STANDARD INTRAOCULAR LENS IMPLANT, LEFT;  Surgeon: Wagner Aguilera MD;  Location: [facility] OR     PHACOEMULSIFICATION WITH STANDARD INTRAOCULAR LENS IMPLANT Right 2/14/2019    Procedure: PHACOEMULSIFICATION WITH STANDARD INTRAOCULAR LENS IMPLANT, RIGHT;  Surgeon: Wagner Aguilera MD;  Location: [facility] OR     SINUS SURGERY         Medications:   Current Outpatient Medications:      aspirin (ASA) 325 MG tablet, Take 1 tablet (325 mg) by mouth daily for 14 days, Disp: 14 tablet, Rfl: 0     citalopram (CELEXA) 20 MG tablet, Take 1 tablet (20 mg) by mouth daily, Disp: 90 tablet, Rfl: 3     conjugated estrogens (PREMARIN) 0.625 MG/GM vaginal cream, Place 0.5 g vaginally twice a week (Patient not  taking: Reported on 9/20/2019), Disp: 30 g, Rfl: 1     HYDROcodone-acetaminophen (NORCO) 5-325 MG tablet, Take 1 tablet by mouth every 4 hours as needed for moderate to severe pain, Disp: 10 tablet, Rfl: 0     loratadine-pseudoePHEDrine (EQL ALLERGY/CONGESTION RELIEF)  MG 24 hr tablet, Take 1 tablet by mouth daily, Disp: 90 tablet, Rfl: 3     senna-docusate (SENOKOT-S/PERICOLACE) 8.6-50 MG tablet, Take 1-2 tablets by mouth 2 times daily, Disp: 30 tablet, Rfl: 0     terbinafine (LAMISIL) 1 % external cream, Apply topically 2 times daily, Disp: 42 g, Rfl: 0     terbinafine (LAMISIL) 250 MG tablet, Take 1 tablet (250 mg) by mouth daily, Disp: 90 tablet, Rfl: 0     valACYclovir (VALTREX) 500 MG tablet, Take 1 tablet (500 mg) by mouth daily, Disp: 90 tablet, Rfl: 3    Allergies:   Allergies   Allergen Reactions     Sulfa Drugs Swelling     Cats Swelling     Lips swollen     Wool Fiber        REVIEW OF SYSTEMS:   CONSTITUTIONAL:NEGATIVE for fever, chills, night sweats  INTEGUMENTARY/SKIN: NEGATIVE for worrisome wound problems or redness.  MUSCULOSKELETAL:See HPI above  NEURO: NEGATIVE for weakness, dizziness or paresthesias    PHYSICAL EXAM:  /71 (BP Location: Left arm, Patient Position: Sitting, Cuff Size: Adult Regular)   Pulse 94   Wt 68 kg (150 lb)   SpO2 96%   BMI 25.55 kg/m      GENERAL APPEARANCE: healthy, alert, no distress  SKIN: no suspicious lesions or rashes  NEURO: Normal strength and tone, mentation intact and speech normal  PSYCH:  mentation appears normal and affect normal, not anxious  RESPIRATORY: No increased work of breathing.    left  LOWER EXTREMITY:  Gait: slight quadriceps avoidance left.  No Quad atrophy, strength normal.  Intact sensation deep peroneal nerve, superficial peroneal nerve, med/lat tibial nerve, sural nerve, saphenous nerve  Intact EHL, EDL, TA, FHL, GS, quadriceps hamstrings and hip flexors  Toes warm and well perfused, brisk capillary refill. Palpable 2+ dp  pulses.  calf soft and nttp or squeeze.  Edema: trace    left  KNEE EXAM:    Skin: intact, no ecchymosis or erythema  Incisions: skin edges well approximated, sutures in place. Dry. No erythema.  ROM: full extension to 130 flexion  Effusion: small  Tender: medial joint line, incisions.         X-RAY: none indicated.      Impression: Cely Ontiveros is a 73 year old female 2 weeks status post left knee arthroscopy and medial meniscus debridement, doing well.       Plan: routine postoperative knee arthroscopy  * suture removal  * surgical images reviewed with patient.    * WB status: weight bearing as tolerated. Cautioned not to overdo it too quickly. Increased activities too soon will lead to more swelling of the knee and leg, more pain, slower recovery. Expect 6-8 weeks.    * Rest  * Activity modification - avoid activities that aggravate symptoms.  * NSAIDS - regular use for inflammation, with food, as long as no contra-indications. Please discuss with pcp if needed.  * Ice twice daily to three times daily as needed.  * Compression wrap as needed.  * Elevation of extremity to reduce swelling as needed.  * discussed Physical Therapy. Sounds like she does a lot yoga, light exercising, biking, etc on her own that she feels like she can recover on her own with Physical Therapy.  * Tylenol as needed for pain  * return to clinic  as needed.      * We did also discuss that based on the amount of arthritic changes seen at the time of surgery, may have continued knee pain due to the arthritis. Once recovered from the knee arthroscopy, and arthritic symptoms persist, consider full treatment of arthritis starting with Physical Therapy and injections, NSAIDS, activity modification, bracing.      Nic Singh M.D., M.S.  Dept. of Orthopaedic Surgery  [facility]    Again, thank you for allowing me to participate in the care of your patient.        Sincerely,        Nic Singh MD

## 2019-10-02 NOTE — PROGRESS NOTES
Chief Complaint   Patient presents with     Left Knee - Surgical Followup     DOS 09/20/19.  Left Knee scope-mm; pr;chondroplasty of P; MT, LT.  Pt states today she is feeling better, but she has been experiencing swelling, and pain.  Pt had bruised leg after surgery.  Pt has been doing exercises, unsure if PT is going to happen.       SURGERY:  ([facility] [place] )  1.  Left knee arthroscopic partial medial meniscectomy.   2.  Left knee arthroscopic shaving chondroplasty, patella, medial tibia, lateral tibia.   3.  Left knee arthroscopic medial plica resection.   DATE OF SURGERY: 9/20/2019.      HISTORY OF PRESENT ILLNESS:  Cely Ontiveros is a 73 year old female seen for postoperative evaluation of a left knee arthroscopy and medial meniscus debridement for complex medial meniscus tear. Surgery occurred 2 weeks ago. Returns today stating she's doing well. Pain has been ipmroving. No problems with the surgical wounds. Denies fevers chills or night sweats. No associated numbness or tingling. Has been doing home exercise program since surgery as recommended. Taking aspirin daily. Pain 1/10    OR FINDINGS:  No effusion.  Full range of motion.  Mild synovitis.  Grade 3 and 4 chondrosis of the patella.  No loose bodies in the medial or lateral gutters.  Grade I chondrosis of the femoral trochlea.  Intact ACL within the notch.  Lateral compartment with intact lateral meniscus.  Grade 2 chondrosis of the lateral tibia with grade III fissure.  Medial compartment with grade III chondrosis of the tibia.  Grade 2 chondrosis of the medial femoral condyle.  Complex tear in the posterior horn into the body of the medial meniscus with small flaps and horizontal cleavage tears.     Past Medical History:   Diagnosis Date     Actinic keratosis      Allergic rhinitis      Cataract      Degenerative joint disease     cervical disease     Depression with anxiety      Herpes simplex      Hypoglycemia     eats  small meals and is fine     Impingement syndrome, shoulder        Past Surgical History:   Procedure Laterality Date     ARTHROSCOPY KNEE Left 9/20/2019    Procedure: Left knee arthroscopy, partial medial meniscectomy and chondral debridement;  Surgeon: Nic Singh MD;  Location: [facility]     CATARACT IOL, RT/LT Left 01/24/2019     COMBINED CYSTOSCOPY, URETEROSCOPY, LASER HOLMIUM LITHOTRIPSY URETER(S) Right 8/23/2018    Procedure: COMBINED CYSTOSCOPY, URETEROSCOPY, LASER HOLMIUM LITHOTRIPSY URETER(S);   Cystoscopy,Right ureteroscopy with laser lithotripsy and stent placement;  Surgeon: Pino Hawk MD;  Location: [facility] OR     HC ENLARGE BREAST WITH IMPLANT       HC LAP,FULGURATE/EXCISE LESIONS       HC REMOVAL OF BREAST IMPLANT       HYSTERECTOMY, PAP NO LONGER INDICATED  1998    ovaries and uterus out for benign reasons(fibroids and ovarian cyst)     PHACOEMULSIFICATION WITH STANDARD INTRAOCULAR LENS IMPLANT Left 1/24/2019    Procedure: PHACOEMULSIFICATION WITH STANDARD INTRAOCULAR LENS IMPLANT, LEFT;  Surgeon: Wagner Aguilera MD;  Location: [facility]     PHACOEMULSIFICATION WITH STANDARD INTRAOCULAR LENS IMPLANT Right 2/14/2019    Procedure: PHACOEMULSIFICATION WITH STANDARD INTRAOCULAR LENS IMPLANT, RIGHT;  Surgeon: Wagner Aguilera MD;  Location: [facility]     SINUS SURGERY         Medications:   Current Outpatient Medications:      aspirin (ASA) 325 MG tablet, Take 1 tablet (325 mg) by mouth daily for 14 days, Disp: 14 tablet, Rfl: 0     citalopram (CELEXA) 20 MG tablet, Take 1 tablet (20 mg) by mouth daily, Disp: 90 tablet, Rfl: 3     conjugated estrogens (PREMARIN) 0.625 MG/GM vaginal cream, Place 0.5 g vaginally twice a week (Patient not taking: Reported on 9/20/2019), Disp: 30 g, Rfl: 1     HYDROcodone-acetaminophen (NORCO) 5-325 MG tablet, Take 1 tablet by mouth every 4 hours as needed for moderate to severe pain, Disp: 10 tablet, Rfl: 0     loratadine-pseudoePHEDrine (EQL ALLERGY/CONGESTION  RELIEF)  MG 24 hr tablet, Take 1 tablet by mouth daily, Disp: 90 tablet, Rfl: 3     senna-docusate (SENOKOT-S/PERICOLACE) 8.6-50 MG tablet, Take 1-2 tablets by mouth 2 times daily, Disp: 30 tablet, Rfl: 0     terbinafine (LAMISIL) 1 % external cream, Apply topically 2 times daily, Disp: 42 g, Rfl: 0     terbinafine (LAMISIL) 250 MG tablet, Take 1 tablet (250 mg) by mouth daily, Disp: 90 tablet, Rfl: 0     valACYclovir (VALTREX) 500 MG tablet, Take 1 tablet (500 mg) by mouth daily, Disp: 90 tablet, Rfl: 3    Allergies:   Allergies   Allergen Reactions     Sulfa Drugs Swelling     Cats Swelling     Lips swollen     Wool Fiber        REVIEW OF SYSTEMS:   CONSTITUTIONAL:NEGATIVE for fever, chills, night sweats  INTEGUMENTARY/SKIN: NEGATIVE for worrisome wound problems or redness.  MUSCULOSKELETAL:See HPI above  NEURO: NEGATIVE for weakness, dizziness or paresthesias    PHYSICAL EXAM:  /71 (BP Location: Left arm, Patient Position: Sitting, Cuff Size: Adult Regular)   Pulse 94   Wt 68 kg (150 lb)   SpO2 96%   BMI 25.55 kg/m     GENERAL APPEARANCE: healthy, alert, no distress  SKIN: no suspicious lesions or rashes  NEURO: Normal strength and tone, mentation intact and speech normal  PSYCH:  mentation appears normal and affect normal, not anxious  RESPIRATORY: No increased work of breathing.    left  LOWER EXTREMITY:  Gait: slight quadriceps avoidance left.  No Quad atrophy, strength normal.  Intact sensation deep peroneal nerve, superficial peroneal nerve, med/lat tibial nerve, sural nerve, saphenous nerve  Intact EHL, EDL, TA, FHL, GS, quadriceps hamstrings and hip flexors  Toes warm and well perfused, brisk capillary refill. Palpable 2+ dp pulses.  calf soft and nttp or squeeze.  Edema: trace    left  KNEE EXAM:    Skin: intact, no ecchymosis or erythema  Incisions: skin edges well approximated, sutures in place. Dry. No erythema.  ROM: full extension to 130 flexion  Effusion: small  Tender: medial joint  line, incisions.         X-RAY: none indicated.      Impression: Cely Ontiveros is a 73 year old female 2 weeks status post left knee arthroscopy and medial meniscus debridement, doing well.       Plan: routine postoperative knee arthroscopy  * suture removal  * surgical images reviewed with patient.    * WB status: weight bearing as tolerated. Cautioned not to overdo it too quickly. Increased activities too soon will lead to more swelling of the knee and leg, more pain, slower recovery. Expect 6-8 weeks.    * Rest  * Activity modification - avoid activities that aggravate symptoms.  * NSAIDS - regular use for inflammation, with food, as long as no contra-indications. Please discuss with pcp if needed.  * Ice twice daily to three times daily as needed.  * Compression wrap as needed.  * Elevation of extremity to reduce swelling as needed.  * discussed Physical Therapy. Sounds like she does a lot yoga, light exercising, biking, etc on her own that she feels like she can recover on her own with Physical Therapy.  * Tylenol as needed for pain  * return to clinic  as needed.      * We did also discuss that based on the amount of arthritic changes seen at the time of surgery, may have continued knee pain due to the arthritis. Once recovered from the knee arthroscopy, and arthritic symptoms persist, consider full treatment of arthritis starting with Physical Therapy and injections, NSAIDS, activity modification, bracing.      Nic Singh M.D., M.S.  Dept. of Orthopaedic Surgery  [facility]

## 2019-10-29 ENCOUNTER — OFFICE VISIT (OUTPATIENT)
Dept: FAMILY MEDICINE | Facility: CLINIC | Age: 74
End: 2019-10-29
Payer: COMMERCIAL

## 2019-10-29 VITALS
HEART RATE: 81 BPM | WEIGHT: 147.6 LBS | SYSTOLIC BLOOD PRESSURE: 128 MMHG | HEIGHT: 64 IN | BODY MASS INDEX: 25.2 KG/M2 | OXYGEN SATURATION: 96 % | RESPIRATION RATE: 16 BRPM | TEMPERATURE: 97.9 F | DIASTOLIC BLOOD PRESSURE: 69 MMHG

## 2019-10-29 DIAGNOSIS — B35.4 TINEA CORPORIS: ICD-10-CM

## 2019-10-29 DIAGNOSIS — S83.232D COMPLEX TEAR OF MEDIAL MENISCUS OF LEFT KNEE AS CURRENT INJURY, SUBSEQUENT ENCOUNTER: Primary | ICD-10-CM

## 2019-10-29 DIAGNOSIS — Z13.6 CARDIOVASCULAR SCREENING; LDL GOAL LESS THAN 130: ICD-10-CM

## 2019-10-29 DIAGNOSIS — M17.12 PRIMARY OSTEOARTHRITIS OF LEFT KNEE: ICD-10-CM

## 2019-10-29 DIAGNOSIS — Z98.890 S/P LEFT KNEE ARTHROSCOPY: ICD-10-CM

## 2019-10-29 PROBLEM — R14.0 ABDOMINAL BLOATING: Status: RESOLVED | Noted: 2017-03-08 | Resolved: 2019-10-29

## 2019-10-29 PROBLEM — J31.0 CHRONIC RHINITIS: Status: RESOLVED | Noted: 2017-03-08 | Resolved: 2019-10-29

## 2019-10-29 PROBLEM — Z86.39 H/O HYPOGLYCEMIA: Status: RESOLVED | Noted: 2017-03-08 | Resolved: 2019-10-29

## 2019-10-29 PROBLEM — Z96.1 PSEUDOPHAKIA OF RIGHT EYE: Status: RESOLVED | Noted: 2019-02-15 | Resolved: 2019-10-29

## 2019-10-29 PROBLEM — Z96.1 PSEUDOPHAKIA OF LEFT EYE: Status: RESOLVED | Noted: 2019-01-30 | Resolved: 2019-10-29

## 2019-10-29 PROBLEM — S83.242D TEAR OF MEDIAL MENISCUS OF LEFT KNEE, CURRENT, UNSPECIFIED TEAR TYPE, SUBSEQUENT ENCOUNTER: Status: RESOLVED | Noted: 2019-09-09 | Resolved: 2019-10-29

## 2019-10-29 LAB
ALBUMIN SERPL-MCNC: 4 G/DL (ref 3.4–5)
ALP SERPL-CCNC: 65 U/L (ref 40–150)
ALT SERPL W P-5'-P-CCNC: 20 U/L (ref 0–50)
AST SERPL W P-5'-P-CCNC: 19 U/L (ref 0–45)
BILIRUB DIRECT SERPL-MCNC: <0.1 MG/DL (ref 0–0.2)
BILIRUB SERPL-MCNC: 0.2 MG/DL (ref 0.2–1.3)
PROT SERPL-MCNC: 7.3 G/DL (ref 6.8–8.8)

## 2019-10-29 PROCEDURE — 36415 COLL VENOUS BLD VENIPUNCTURE: CPT | Performed by: NURSE PRACTITIONER

## 2019-10-29 PROCEDURE — 99214 OFFICE O/P EST MOD 30 MIN: CPT | Performed by: NURSE PRACTITIONER

## 2019-10-29 PROCEDURE — 80076 HEPATIC FUNCTION PANEL: CPT | Performed by: NURSE PRACTITIONER

## 2019-10-29 RX ORDER — FLUCONAZOLE 200 MG/1
400 TABLET ORAL WEEKLY
Qty: 10 TABLET | Refills: 0 | Status: SHIPPED | OUTPATIENT
Start: 2019-10-29 | End: 2020-01-27

## 2019-10-29 ASSESSMENT — MIFFLIN-ST. JEOR: SCORE: 1153.48

## 2019-10-29 ASSESSMENT — PAIN SCALES - GENERAL: PAINLEVEL: NO PAIN (0)

## 2019-10-29 NOTE — PATIENT INSTRUCTIONS
Start fluconazole once weekly for 5 weeks (for yeast infection)    At [facility], we strive to deliver an exceptional experience to you, every time we see you.  If you receive a survey in the mail, please send us back your thoughts. We really do value your feedback.    Based on your medical history, these are the current health maintenance/preventive care services that you are due for (some may have been done at this visit.)  Health Maintenance Due   Topic Date Due     MEDICARE ANNUAL WELLNESS VISIT  10/07/2016     PHQ-9  09/04/2019         Suggested websites for health information:  Www.[employer].org : Up to date and easily searchable information on multiple topics.  Www.[employer]pareil.gov : medication info, interactive tutorials, watch real surgeries online  Www.familydoctor.org : good info from the Academy of Family Physicians  Www.cdc.gov : public health info, travel advisories, epidemics (H1N1)  Www.aap.org : children's health info, normal development, vaccinations  Www.health.[facility].[place].us : [place] dept of health, public health issues in [place], N1N1    Your care team:                            Family Medicine Internal Medicine   MD Mao Duran MD Shantel Branch-Fleming, MD Katya Georgiev PA-C Nam Ho, MD Pediatrics   MICHELLE Lorenzo, MD Johanne Burch CNP, MD Deborah Mielke, MD Kim Thein, APRN CNP      Clinic hours: Monday - Thursday 7 am-7 pm; Fridays 7 am-5 pm.   Urgent care: Monday - Friday 11 am-9 pm; Saturday and Sunday 9 am-5 pm.  Pharmacy : Monday -Thursday 8 am-8 pm; Friday 8 am-6 pm; Saturday and Sunday 9 am-5 pm.     Clinic: [phone]   Pharmacy: [phone]

## 2019-10-29 NOTE — PROGRESS NOTES
Subjective     Cely Ontiveros is a 73 year old female who presents to clinic today for the following health issues:    HPI   Patient states that she is here today to get a referral to physical therapy. She states that she is still having pain from her knee surgery (laparascopic, medial meniscectomy, chondral debridement). Patient states that she is having swelling, stiffness and experiencing pain 2/10 pain level. Post op note from surgeon stated that due to arthritis in knee, patient may continue to have pain and first steps would be physical therapy and injections.      Patient is also looking to go back to an old medication that she was taking called Fluconazole as terbinafine is not working for her tinea corporis.        Patient Active Problem List   Diagnosis     Allergic rhinitis     Herpes simplex     CARDIOVASCULAR SCREENING; LDL GOAL LESS THAN 160     Degenerative joint disease     Posterior vitreous detachment of both eyes     Seasonal allergic rhinitis     Hypermetropia     Astigmatism with presbyopia     Blepharitis of both eyes     Epiphora     Chronic otitis externa of left ear, unspecified type     Dysthymic disorder     Meibomian gland dysfunction     Chondromalacia of patella, left     Complex tear of medial meniscus of left knee as current injury, subsequent encounter     Age-related nuclear cataract of both eyes     Pseudophakia of both eyes     Primary osteoarthritis of left knee     S/P left knee arthroscopy     Past Surgical History:   Procedure Laterality Date     ARTHROSCOPY KNEE Left 9/20/2019    Procedure: Left knee arthroscopy, partial medial meniscectomy and chondral debridement;  Surgeon: Nic Singh MD;  Location: [facility]     CATARACT IOL, RT/LT Left 01/24/2019     COMBINED CYSTOSCOPY, URETEROSCOPY, LASER HOLMIUM LITHOTRIPSY URETER(S) Right 8/23/2018    Procedure: COMBINED CYSTOSCOPY, URETEROSCOPY, LASER HOLMIUM LITHOTRIPSY URETER(S);   Cystoscopy,Right ureteroscopy with laser  lithotripsy and stent placement;  Surgeon: Pino Hawk MD;  Location: [facility]     HC ENLARGE BREAST WITH IMPLANT       HC LAP,FULGURATE/EXCISE LESIONS       HC REMOVAL OF BREAST IMPLANT       HYSTERECTOMY, PAP NO LONGER INDICATED  1998    ovaries and uterus out for benign reasons(fibroids and ovarian cyst)     PHACOEMULSIFICATION WITH STANDARD INTRAOCULAR LENS IMPLANT Left 1/24/2019    Procedure: PHACOEMULSIFICATION WITH STANDARD INTRAOCULAR LENS IMPLANT, LEFT;  Surgeon: Wagner Aguilera MD;  Location: [facility]     PHACOEMULSIFICATION WITH STANDARD INTRAOCULAR LENS IMPLANT Right 2/14/2019    Procedure: PHACOEMULSIFICATION WITH STANDARD INTRAOCULAR LENS IMPLANT, RIGHT;  Surgeon: Wagner Aguilera MD;  Location: [facility]     SINUS SURGERY         Social History     Tobacco Use     Smoking status: Never Smoker     Smokeless tobacco: Never Used   Substance Use Topics     Alcohol use: No     Family History   Problem Relation Age of Onset     Hypertension Mother      Arthritis Mother      Cardiovascular Mother      Circulatory Mother      Heart Disease Mother      Lipids Mother      Obesity Mother      Osteoporosis Mother      Cancer Mother         skin     Glaucoma Mother      Cancer - colorectal Father      Cancer Father      Macular Degeneration Father      Diabetes No family hx of      Cerebrovascular Disease No family hx of      Thyroid Disease No family hx of          Current Outpatient Medications   Medication Sig Dispense Refill     citalopram (CELEXA) 20 MG tablet Take 1 tablet (20 mg) by mouth daily 90 tablet 3     conjugated estrogens (PREMARIN) 0.625 MG/GM vaginal cream Place 0.5 g vaginally twice a week 30 g 1     fluconazole (DIFLUCAN) 200 MG tablet Take 2 tablets (400 mg) by mouth once a week for 5 doses 10 tablet 0     loratadine-pseudoePHEDrine (EQL ALLERGY/CONGESTION RELIEF)  MG 24 hr tablet Take 1 tablet by mouth daily 90 tablet 3     valACYclovir (VALTREX) 500 MG tablet Take 1 tablet  "(500 mg) by mouth daily 90 tablet 3     Allergies   Allergen Reactions     Sulfa Drugs Swelling     Cats Swelling     Lips swollen     Wool Fiber          Reviewed and updated as needed this visit by Provider         Review of Systems   ROS COMP: Constitutional, HEENT, cardiovascular, pulmonary, gi and gu systems are negative, except as otherwise noted.      Objective    /69 (BP Location: Left arm, Patient Position: Sitting, Cuff Size: Adult Large)   Pulse 81   Temp 97.9  F (36.6  C) (Oral)   Resp 16   Ht 1.632 m (5' 4.25\")   Wt 67 kg (147 lb 9.6 oz)   LMP  (LMP Unknown)   SpO2 96%   Breastfeeding? No   BMI 25.14 kg/m    Body mass index is 25.14 kg/m .  Physical Exam   GENERAL: healthy, alert and no distress  MS: right knee with mild posterior effusion, tenderness along medial joint line (mild).  No redness or warmth noted.  No lower extremity edema, redness, or warmth.   SKIN: circular, erythematous patches throughout upper back, under breasts, and in groin area  PSYCH: mentation appears normal, affect normal/bright    Diagnostic Test Results:  No results found for this or any previous visit (from the past 24 hour(s)).        Assessment & Plan     1. Complex tear of medial meniscus of left knee as current injury, subsequent encounter  Agree with ortho plan to do physical therapy for left knee arthritis.  Patient can follow up for joint injection if no improvement.  [facility]; Future    2. Primary osteoarthritis of left knee  [facility]; Future    3. S/P left knee arthroscopy  - [facility]; Future    4. Tinea corporis  Failed terbinafine.  Trial of fluconazole.  Liver function labs today.   - Hepatic panel (Albumin, ALT, AST, Bili, Alk Phos, TP)  - fluconazole (DIFLUCAN) 200 MG tablet; Take 2 tablets (400 mg) by mouth once a week for 5 doses  Dispense: 10 tablet; Refill: 0    5. CARDIOVASCULAR SCREENING; LDL " "GOAL LESS THAN 130  - Lipid panel reflex to direct LDL Fasting; Future     BMI:   Estimated body mass index is 25.14 kg/m  as calculated from the following:    Height as of this encounter: 1.632 m (5' 4.25\").    Weight as of this encounter: 67 kg (147 lb 9.6 oz).           Patient Instructions     Start fluconazole once weekly for 5 weeks (for yeast infection)    At [facility], we strive to deliver an exceptional experience to you, every time we see you.  If you receive a survey in the mail, please send us back your thoughts. We really do value your feedback.    Based on your medical history, these are the current health maintenance/preventive care services that you are due for (some may have been done at this visit.)  Health Maintenance Due   Topic Date Due     MEDICARE ANNUAL WELLNESS VISIT  10/07/2016     PHQ-9  09/04/2019         Suggested websites for health information:  Www.WishGenie.ITA Software : Up to date and easily searchable information on multiple topics.  Www.medlineplus.gov : medication info, interactive tutorials, watch real surgeries online  Www.familydoctor.org : good info from the Academy of Family Physicians  Www.cdc.gov : public health info, travel advisories, epidemics (H1N1)  Www.aap.org : children's health info, normal development, vaccinations  Www.health.state.[place].us : [place] dept of health, public health issues in [place], N1N1    Your care team:                            Family Medicine Internal Medicine   MD Mao Duran MD Shantel Branch-Fleming, MD Katya Georgiev PA-C Nam Ho, MD Pediatrics   MICHELLE Lorenzo, MARIE Moeller APRN MD Johanne Wheeler MD Deborah Mielke, MD Kim Thein, APRN CNP      Clinic hours: Monday - Thursday 7 am-7 pm; Fridays 7 am-5 pm.   Urgent care: Monday - Friday 11 am-9 pm; Saturday and Sunday 9 am-5 pm.  Pharmacy : Monday -Thursday 8 am-8 pm; Friday 8 am-6 pm; Saturday and Sunday 9 " am-5 pm.     Clinic: [phone]   Pharmacy: [phone]        No follow-ups on file.    AUDREY Mcneil [facility]

## 2019-12-04 DIAGNOSIS — R21 RASH AND NONSPECIFIC SKIN ERUPTION: Primary | ICD-10-CM

## 2019-12-04 DIAGNOSIS — B35.4 TINEA CORPORIS: ICD-10-CM

## 2019-12-04 RX ORDER — FLUCONAZOLE 200 MG/1
400 TABLET ORAL WEEKLY
Qty: 10 TABLET | Refills: 0 | OUTPATIENT
Start: 2019-12-04

## 2019-12-04 NOTE — TELEPHONE ENCOUNTER
Reason for Call:  Other prescription    Detailed comments: Pending Prescriptions:                       Disp   Refills    fluconazole (DIFLUCAN) 200 MG tablet      10 tab*0            Sig: Take 2 tablets (400 mg) by mouth once a week  Wants every day for 3 months.Please call back and advise.  [id number] - [place], [place] - [email or web address]     Phone Number Patient can be reached at: Home number on file [phone] (home)    Best Time: any    Can we leave a detailed message on this number? YES    Call taken on 12/4/2019 at 1:47 PM by Katherin Escalona

## 2019-12-04 NOTE — TELEPHONE ENCOUNTER
"Requested Prescriptions   Pending Prescriptions Disp Refills     fluconazole (DIFLUCAN) 200 MG tablet 10 tablet 0     Sig: Take 2 tablets (400 mg) by mouth once a week       Antifungal Agents Failed - 12/4/2019  1:56 PM        Failed - Not Fluconazole or Terconazole      If oral Fluconazole or Terconazole, may refill if indicated in progress notes.           Passed - Recent (12 mo) or future (30 days) visit within the authorizing provider's specialty     Patient has had an office visit with the authorizing provider or a provider within the authorizing providers department within the previous 12 mos or has a future within next 30 days. See \"Patient Info\" tab in inbasket, or \"Choose Columns\" in Meds & Orders section of the refill encounter.              Passed - Medication is active on med list        Routing refill request to provider for review/approval because:  Drug not on the [facility] refill protocol   Patient requesting to take one tablet daily for 3 months. See message below from call center.       Leonora Duval RN, BSN, PHN          "

## 2019-12-05 NOTE — TELEPHONE ENCOUNTER
Called and left a voicemail message regarding Mara's message and # to call to schedule this appt.  Leslie Louis MA  [facility]  2nd Floor  Primary Care

## 2019-12-19 ENCOUNTER — OFFICE VISIT (OUTPATIENT)
Dept: DERMATOLOGY | Facility: CLINIC | Age: 74
End: 2019-12-19
Payer: COMMERCIAL

## 2019-12-19 DIAGNOSIS — L72.0 EIC (EPIDERMAL INCLUSION CYST): ICD-10-CM

## 2019-12-19 DIAGNOSIS — L85.3 XEROSIS CUTIS: Primary | ICD-10-CM

## 2019-12-19 PROCEDURE — 99202 OFFICE O/P NEW SF 15 MIN: CPT | Performed by: DERMATOLOGY

## 2019-12-19 ASSESSMENT — PAIN SCALES - GENERAL: PAINLEVEL: NO PAIN (0)

## 2019-12-19 NOTE — NURSING NOTE
Cely Ontiveros's goals for this visit include:   Chief Complaint   Patient presents with     Derm Problem     Rash with certain food consumption     Spot Check     Trunk     She requests these members of her care team be copied on today's visit information:     PCP: Mara Varma    Referring Provider:  Mara Varma, AUDREY CNP  [address] [place] [address]    LMP  (LMP Unknown)     Do you need any medication refills at today's visit? No    Tania Varela LPN

## 2019-12-19 NOTE — PROGRESS NOTES
[facility] Dermatology Note    Dermatology Problem List:  1. AK, left lower leg, s/p biopsy 8/16/13  2. Xerosis cutis  -current t/x: gentle skin care    Encounter Date: Dec 19, 2019    CC:  Chief Complaint   Patient presents with     Derm Problem     Rash with certain food consumption     Spot Check     Trunk     History of Present Illness:  Ms. Cely Ontiveros is a 73 year old female who presents in self referral for a rash. She was last seen at our clinic in 2011 by Dr. Garcia when she was treated for perioral dermatitis. Today she reports a rash that arises after food consumption (usually sugar, bread, fruit). She is concerned this rash is systemic candida. She feels a rash on her lower back, underarms and near her bra line today. She also sometimes gets it in her groin area. She was previously diagnosed with tinea corporis and was prescribed oral terbinafine and oral fluconazole by her PCP. She is hypoglycemic, not diabetic. She is considering cutting out chocolate and other sugars all together as she feels she keeps getting candida infections. She uses a non-allergenic soap that is unscented, probably Dove. Uses Curel moisturizer.    Additionally, she is concerned about a spot on her trunk. It feels like there is something under the skin that is itchy. She would like it removed.     She states that her mom passed from a skin cancer and her brother is currently undergoing radiation for skin cancer treatment so this spot makes her nervous.     No other concerns addressed today.    Past Medical History:   Patient Active Problem List   Diagnosis     Allergic rhinitis     Herpes simplex     CARDIOVASCULAR SCREENING; LDL GOAL LESS THAN 160     Degenerative joint disease     Posterior vitreous detachment of both eyes     Seasonal allergic rhinitis     Hypermetropia     Astigmatism with presbyopia     Blepharitis of both eyes     Epiphora     Chronic otitis externa of left ear, unspecified type      Dysthymic disorder     Meibomian gland dysfunction     Chondromalacia of patella, left     Complex tear of medial meniscus of left knee as current injury, subsequent encounter     Age-related nuclear cataract of both eyes     Pseudophakia of both eyes     Primary osteoarthritis of left knee     S/P left knee arthroscopy     Past Medical History:   Diagnosis Date     Actinic keratosis      Allergic rhinitis      Cataract      Degenerative joint disease     cervical disease     Depression with anxiety      Herpes simplex      Hypoglycemia     eats small meals and is fine     Impingement syndrome, shoulder      Past Surgical History:   Procedure Laterality Date     ARTHROSCOPY KNEE Left 9/20/2019    Procedure: Left knee arthroscopy, partial medial meniscectomy and chondral debridement;  Surgeon: Nic Singh MD;  Location: [facility]     CATARACT IOL, RT/LT Left 01/24/2019     COMBINED CYSTOSCOPY, URETEROSCOPY, LASER HOLMIUM LITHOTRIPSY URETER(S) Right 8/23/2018    Procedure: COMBINED CYSTOSCOPY, URETEROSCOPY, LASER HOLMIUM LITHOTRIPSY URETER(S);   Cystoscopy,Right ureteroscopy with laser lithotripsy and stent placement;  Surgeon: Pino Hawk MD;  Location: [facility] OR     HC ENLARGE BREAST WITH IMPLANT       HC LAP,FULGURATE/EXCISE LESIONS       HC REMOVAL OF BREAST IMPLANT       HYSTERECTOMY, PAP NO LONGER INDICATED  1998    ovaries and uterus out for benign reasons(fibroids and ovarian cyst)     PHACOEMULSIFICATION WITH STANDARD INTRAOCULAR LENS IMPLANT Left 1/24/2019    Procedure: PHACOEMULSIFICATION WITH STANDARD INTRAOCULAR LENS IMPLANT, LEFT;  Surgeon: Wagner Aguilera MD;  Location: [facility]     PHACOEMULSIFICATION WITH STANDARD INTRAOCULAR LENS IMPLANT Right 2/14/2019    Procedure: PHACOEMULSIFICATION WITH STANDARD INTRAOCULAR LENS IMPLANT, RIGHT;  Surgeon: Wagner Aguilera MD;  Location: [facility]     SINUS SURGERY       Social History:  Patient is retired .     Family  History:  History of skin cancer in her mother (passed from this), brother is currently undergoing radiation for skin cancer. Unknown types.     Medications:  Current Outpatient Medications   Medication Sig Dispense Refill     citalopram (CELEXA) 20 MG tablet Take 1 tablet (20 mg) by mouth daily 90 tablet 3     loratadine-pseudoePHEDrine (EQL ALLERGY/CONGESTION RELIEF)  MG 24 hr tablet Take 1 tablet by mouth daily 90 tablet 3     valACYclovir (VALTREX) 500 MG tablet Take 1 tablet (500 mg) by mouth daily 90 tablet 3       Allergies   Allergen Reactions     Sulfa Drugs Swelling     Cats Swelling     Lips swollen     Wool Fiber      Review of Systems:  -Constitutional: Patient is otherwise feeling well, in usual state of health.   -Skin: As above in HPI. No additional skin concerns.    Physical exam:  Vitals: LMP  (LMP Unknown)   GEN: This is a well developed, well-nourished female in no acute distress, in a pleasant mood.    SKIN: Focused examination of the head/face, abdomen, chest, underarms, back, and hands was performed.  - Newton Type I-II  - Skin is overly xerotic, no active rash today  - About 6mm EIC with visible punctum on the right lateral abdomen  - No other lesions of concern on areas examined.     Impression/Plan:    1. EIC. Patient reassured of the benign nature of these lesions. Discussed that removal would require surgical excision to remove underlying tissue. Patient declines treatment at this time.   - No further intervention required. Patient to report changes.     2. Xerosis cutis, at higher risk of developing eczema. No active rash today but suspect she has eczema. Discussed that systemic candida is not a well described condition and unlikely to be the cause. Patients that are diabetic get more candida cutaneous infections, but patient is not diabetic. We will discuss suggestions to prevent her from developing eczema. Discussed the pathogenesis of this condition and empathized the  importance of gentle skin care and cream based moisturizer for long term treatment.   - Gentle skin care handout provided  - Advised patient to follow-up when the skin is flared for direction evaluation    CC AUDREY Lockett CNP on close of this encounter.  Follow-up PRN, earlier for new or changing lesions.     Staff Involved:  Scribe/Staff    Scribe Disclosure  I, Christi Crowder, am serving as a scribe to document services personally performed by Dr. Evelyn Ross MD, based on data collection and the provider's statements to me.     Provider Disclosure:   The documentation recorded by the scribe accurately reflects the services I personally performed and the decisions made by me.    Evelyn Ross MD    Department of Dermatology  [facility]: Phone: [phone], Fax:[phone]  [facility]: Phone: [phone], Fax: [phone]

## 2019-12-19 NOTE — LETTER
12/19/2019         RE: Cely Ontiveros  [address] [facility] [facility] [address]        Dear Colleague,    Thank you for referring your patient, Cely Ontiveros, to the [facility]. Please see a copy of my visit note below.    [facility] Dermatology Note    Dermatology Problem List:  1. AK, left lower leg, s/p biopsy 8/16/13  2. Xerosis cutis  -current t/x: gentle skin care    Encounter Date: Dec 19, 2019    CC:  Chief Complaint   Patient presents with     Derm Problem     Rash with certain food consumption     Spot Check     Trunk     History of Present Illness:  Ms. Cely Ontiveros is a 73 year old female who presents in self referral for a rash. She was last seen at our clinic in 2011 by Dr. Garcia when she was treated for perioral dermatitis. Today she reports a rash that arises after food consumption (usually sugar, bread, fruit). She is concerned this rash is systemic candida. She feels a rash on her lower back, underarms and near her bra line today. She also sometimes gets it in her groin area. She was previously diagnosed with tinea corporis and was prescribed oral terbinafine and oral fluconazole by her PCP. She is hypoglycemic, not diabetic. She is considering cutting out chocolate and other sugars all together as she feels she keeps getting candida infections. She uses a non-allergenic soap that is unscented, probably Dove. Uses Curel moisturizer.    Additionally, she is concerned about a spot on her trunk. It feels like there is something under the skin that is itchy. She would like it removed.     She states that her mom passed from a skin cancer and her brother is currently undergoing radiation for skin cancer treatment so this spot makes her nervous.     No other concerns addressed today.    Past Medical History:   Patient Active Problem List   Diagnosis     Allergic rhinitis     Herpes simplex     CARDIOVASCULAR SCREENING; LDL GOAL LESS THAN 160      Degenerative joint disease     Posterior vitreous detachment of both eyes     Seasonal allergic rhinitis     Hypermetropia     Astigmatism with presbyopia     Blepharitis of both eyes     Epiphora     Chronic otitis externa of left ear, unspecified type     Dysthymic disorder     Meibomian gland dysfunction     Chondromalacia of patella, left     Complex tear of medial meniscus of left knee as current injury, subsequent encounter     Age-related nuclear cataract of both eyes     Pseudophakia of both eyes     Primary osteoarthritis of left knee     S/P left knee arthroscopy     Past Medical History:   Diagnosis Date     Actinic keratosis      Allergic rhinitis      Cataract      Degenerative joint disease     cervical disease     Depression with anxiety      Herpes simplex      Hypoglycemia     eats small meals and is fine     Impingement syndrome, shoulder      Past Surgical History:   Procedure Laterality Date     ARTHROSCOPY KNEE Left 9/20/2019    Procedure: Left knee arthroscopy, partial medial meniscectomy and chondral debridement;  Surgeon: Nic Singh MD;  Location: [facility] OR     CATARACT IOL, RT/LT Left 01/24/2019     COMBINED CYSTOSCOPY, URETEROSCOPY, LASER HOLMIUM LITHOTRIPSY URETER(S) Right 8/23/2018    Procedure: COMBINED CYSTOSCOPY, URETEROSCOPY, LASER HOLMIUM LITHOTRIPSY URETER(S);   Cystoscopy,Right ureteroscopy with laser lithotripsy and stent placement;  Surgeon: Pino Hawk MD;  Location: [facility] OR     HC ENLARGE BREAST WITH IMPLANT       HC LAP,FULGURATE/EXCISE LESIONS       HC REMOVAL OF BREAST IMPLANT       HYSTERECTOMY, PAP NO LONGER INDICATED  1998    ovaries and uterus out for benign reasons(fibroids and ovarian cyst)     PHACOEMULSIFICATION WITH STANDARD INTRAOCULAR LENS IMPLANT Left 1/24/2019    Procedure: PHACOEMULSIFICATION WITH STANDARD INTRAOCULAR LENS IMPLANT, LEFT;  Surgeon: Wagner Aguilera MD;  Location: [facility] OR     PHACOEMULSIFICATION WITH STANDARD INTRAOCULAR LENS IMPLANT  Right 2/14/2019    Procedure: PHACOEMULSIFICATION WITH STANDARD INTRAOCULAR LENS IMPLANT, RIGHT;  Surgeon: Wagner Aguilera MD;  Location: [facility] OR     SINUS SURGERY       Social History:  Patient is retired .     Family History:  History of skin cancer in her mother (passed from this), brother is currently undergoing radiation for skin cancer. Unknown types.     Medications:  Current Outpatient Medications   Medication Sig Dispense Refill     citalopram (CELEXA) 20 MG tablet Take 1 tablet (20 mg) by mouth daily 90 tablet 3     loratadine-pseudoePHEDrine (EQL ALLERGY/CONGESTION RELIEF)  MG 24 hr tablet Take 1 tablet by mouth daily 90 tablet 3     valACYclovir (VALTREX) 500 MG tablet Take 1 tablet (500 mg) by mouth daily 90 tablet 3       Allergies   Allergen Reactions     Sulfa Drugs Swelling     Cats Swelling     Lips swollen     Wool Fiber      Review of Systems:  -Constitutional: Patient is otherwise feeling well, in usual state of health.   -Skin: As above in HPI. No additional skin concerns.    Physical exam:  Vitals: LMP  (LMP Unknown)   GEN: This is a well developed, well-nourished female in no acute distress, in a pleasant mood.    SKIN: Focused examination of the head/face, abdomen, chest, underarms, back, and hands was performed.  - Newton Type I-II  - Skin is overly xerotic, no active rash today  - About 6mm EIC with visible punctum on the right lateral abdomen  - No other lesions of concern on areas examined.     Impression/Plan:    1. EIC. Patient reassured of the benign nature of these lesions. Discussed that removal would require surgical excision to remove underlying tissue. Patient declines treatment at this time.   - No further intervention required. Patient to report changes.     2. Xerosis cutis, at higher risk of developing eczema. No active rash today but suspect she has eczema. Discussed that systemic candida is not a well described condition and unlikely to be  the cause. Patients that are diabetic get more candida cutaneous infections, but patient is not diabetic. We will discuss suggestions to prevent her from developing eczema. Discussed the pathogenesis of this condition and empathized the importance of gentle skin care and cream based moisturizer for long term treatment.   - Gentle skin care handout provided  - Advised patient to follow-up when the skin is flared for direction evaluation    CC Mara Varma, AUDREY CNP on close of this encounter.  Follow-up PRN, earlier for new or changing lesions.     Staff Involved:  Scribe/Staff    Scribe Disclosure  I, Christi Crowder, am serving as a scribe to document services personally performed by Dr. Evelyn Ross MD, based on data collection and the provider's statements to me.     Provider Disclosure:   The documentation recorded by the scribe accurately reflects the services I personally performed and the decisions made by me.    Evelyn Ross MD    Department of Dermatology  [facility]: Phone: [phone], Fax:[phone]  [facility]: Phone: [phone], Fax: [phone]                Again, thank you for allowing me to participate in the care of your patient.        Sincerely,        Evelyn Ross MD

## 2020-01-10 ENCOUNTER — THERAPY VISIT (OUTPATIENT)
Dept: PHYSICAL THERAPY | Facility: CLINIC | Age: 75
End: 2020-01-10
Payer: COMMERCIAL

## 2020-01-10 DIAGNOSIS — Z98.890 S/P LEFT KNEE ARTHROSCOPY: ICD-10-CM

## 2020-01-10 DIAGNOSIS — M25.562 CHRONIC PAIN OF LEFT KNEE: ICD-10-CM

## 2020-01-10 DIAGNOSIS — M17.12 PRIMARY OSTEOARTHRITIS OF LEFT KNEE: ICD-10-CM

## 2020-01-10 DIAGNOSIS — S83.232D COMPLEX TEAR OF MEDIAL MENISCUS OF LEFT KNEE AS CURRENT INJURY, SUBSEQUENT ENCOUNTER: ICD-10-CM

## 2020-01-10 DIAGNOSIS — G89.29 CHRONIC PAIN OF LEFT KNEE: ICD-10-CM

## 2020-01-10 PROCEDURE — 97161 PT EVAL LOW COMPLEX 20 MIN: CPT | Mod: GP | Performed by: PHYSICAL THERAPIST

## 2020-01-10 PROCEDURE — 97110 THERAPEUTIC EXERCISES: CPT | Mod: GP | Performed by: PHYSICAL THERAPIST

## 2020-01-10 ASSESSMENT — ACTIVITIES OF DAILY LIVING (ADL)
PAIN: THE SYMPTOM AFFECTS MY ACTIVITY SLIGHTLY
SIT WITH YOUR KNEE BENT: ACTIVITY IS NOT DIFFICULT
SQUAT: ACTIVITY IS SOMEWHAT DIFFICULT
KNEE_ACTIVITY_OF_DAILY_LIVING_SCORE: 78.57
GIVING WAY, BUCKLING OR SHIFTING OF KNEE: I DO NOT HAVE THE SYMPTOM
GO DOWN STAIRS: ACTIVITY IS SOMEWHAT DIFFICULT
RISE FROM A CHAIR: ACTIVITY IS NOT DIFFICULT
RAW_SCORE: 55
KNEEL ON THE FRONT OF YOUR KNEE: ACTIVITY IS MINIMALLY DIFFICULT
STAND: ACTIVITY IS NOT DIFFICULT
WALK: ACTIVITY IS MINIMALLY DIFFICULT
HOW_WOULD_YOU_RATE_THE_OVERALL_FUNCTION_OF_YOUR_KNEE_DURING_YOUR_USUAL_DAILY_ACTIVITIES?: NEARLY NORMAL
HOW_WOULD_YOU_RATE_THE_CURRENT_FUNCTION_OF_YOUR_KNEE_DURING_YOUR_USUAL_DAILY_ACTIVITIES_ON_A_SCALE_FROM_0_TO_100_WITH_100_BEING_YOUR_LEVEL_OF_KNEE_FUNCTION_PRIOR_TO_YOUR_INJURY_AND_0_BEING_THE_INABILITY_TO_PERFORM_ANY_OF_YOUR_USUAL_DAILY_ACTIVITIES?: 80
KNEE_ACTIVITY_OF_DAILY_LIVING_SUM: 55
GO UP STAIRS: ACTIVITY IS MINIMALLY DIFFICULT
WEAKNESS: THE SYMPTOM AFFECTS MY ACTIVITY SLIGHTLY
STIFFNESS: I HAVE THE SYMPTOM BUT IT DOES NOT AFFECT MY ACTIVITY
SWELLING: I HAVE THE SYMPTOM BUT IT DOES NOT AFFECT MY ACTIVITY
LIMPING: THE SYMPTOM AFFECTS MY ACTIVITY SLIGHTLY
AS_A_RESULT_OF_YOUR_KNEE_INJURY,_HOW_WOULD_YOU_RATE_YOUR_CURRENT_LEVEL_OF_DAILY_ACTIVITY?: NEARLY NORMAL

## 2020-01-10 NOTE — PROGRESS NOTES
[facility] Initial Evaluation  Subjective:  The history is provided by the patient.   Cely Ontiveros being seen for left knee.   Problem began 9/20/2019 (DOS). Where condition occurred: for unknown reasons.  and reported as 1/10 on pain scale. General health as reported by patient is good. Pertinent medical history includes:  Incontinence and osteoarthritis.   Other medical allergies details: listed in Epic.  Surgeries include:  Orthopedic surgery. Other surgery history details: L knee.  Current medications:  Other. Other medications details: listed in Epic.    Other job/home tasks details: household chores, works out on a seated elliptical, does yoga.  Pain is described as aching and is constant. Pain is worse during the night. Since onset symptoms are unchanged. Special tests:  X-ray. Previous treatment includes physical therapy.    Patient is retired.   Barriers include:  None as reported by patient.  Red flags:  None as reported by patient.  Type of problem:  Left knee   Condition occurred with:  Degenerative joint disease. This is a chronic condition   Problem details: Pt had had problems in her L knee for some time, then had arthroscopic surgery done on 9/20/19. She feels that her knee has not really improved, and is maybe even a little worse than before the surgery (but then says she is making some progress). She has not followed up with the surgeon, but did consult with her PCP on 12/4/19 and was referred to PT..   Patient reports pain:  Medial.  Associated symptoms:  Edema and loss of motion/stiffness. Symptoms are exacerbated by transfers, descending stairs and bending/squatting and relieved by ice and bracing/immobilizing.                      Objective:    Gait:    Gait Type:  Normal   Assistive Devices:  None                                                        Knee Evaluation:  ROM:    AROM      Extension: Left: 0 deg    Right:   Flexion: Left: 143 deg   Right:    Pain: mild end  range pain on L w/flex only    Strength:     Extension:  Left: 4/5    Pain:-      Right: 5-/5    Pain:-  Flexion:  Left: 4+/5    Pain:-      Right: 4+/5    Pain:-          Special Tests:   Left knee positive for the following special tests:  Patellar Compression  Left knee negative for the following special tests:  Meninscal    Palpation:    Left knee tenderness present at:  Medial Joint Line; Lateral Joint Line and Biceps Femoral  Left knee tenderness not present at:  Patellar Tendon; IT Band and Popliteal    Edema:  Edema of the knee: mild edema on L medial knee.    Mobility Testing:      Patellofemoral Medial:  Left: normal      Patellofemoral Lateral:  Left: normal      Patellofemoral Superior:  Left: normal      Patellofemoral Inferior:  Left: normal            General     ROS    Assessment/Plan:    Patient is a 74 year old female with left side knee complaints.    Patient has the following significant findings with corresponding treatment plan.                Diagnosis 1:  S/p L knee arthroscopy  Pain -  hot/cold therapy, self management, education and home program  Decreased strength - therapeutic exercise and therapeutic activities  Edema - self management/home program  Impaired muscle performance - neuro re-education  Decreased function - therapeutic activities    Therapy Evaluation Codes:   1) History comprised of:   Personal factors that impact the plan of care:      Age, Overall behavior pattern and Time since onset of symptoms.    Comorbidity factors that impact the plan of care are:      Osteoarthritis.     Medications impacting care: None.  2) Examination of Body Systems comprised of:   Body structures and functions that impact the plan of care:      Knee.   Activity limitations that impact the plan of care are:      Squatting/kneeling, Stairs and Walking.  3) Clinical presentation characteristics are:   Stable/Uncomplicated.  4) Decision-Making    Low complexity using standardized patient assessment  instrument and/or measureable assessment of functional outcome.  Cumulative Therapy Evaluation is: Low complexity.    Previous and current functional limitations:  (See Goal Flow Sheet for this information)    Short term and Long term goals: (See Goal Flow Sheet for this information)     Communication ability:  Patient appears to be able to clearly communicate and understand verbal and written communication and follow directions correctly.  Treatment Explanation - The following has been discussed with the patient:   RX ordered/plan of care  Anticipated outcomes  Possible risks and side effects  This patient would benefit from PT intervention to resume normal activities.   Rehab potential is excellent.    Frequency:  2 X a month, once daily  Duration:  for 2 months  Discharge Plan:  Achieve all LTG.  Independent in home treatment program.  Reach maximal therapeutic benefit.    Please refer to the daily flowsheet for treatment today, total treatment time and time spent performing 1:1 timed codes.

## 2020-01-22 ENCOUNTER — THERAPY VISIT (OUTPATIENT)
Dept: PHYSICAL THERAPY | Facility: CLINIC | Age: 75
End: 2020-01-22
Payer: COMMERCIAL

## 2020-01-22 DIAGNOSIS — G89.29 CHRONIC PAIN OF LEFT KNEE: ICD-10-CM

## 2020-01-22 DIAGNOSIS — M25.562 CHRONIC PAIN OF LEFT KNEE: ICD-10-CM

## 2020-01-22 PROCEDURE — 97112 NEUROMUSCULAR REEDUCATION: CPT | Mod: GP | Performed by: PHYSICAL THERAPIST

## 2020-01-22 PROCEDURE — 97530 THERAPEUTIC ACTIVITIES: CPT | Mod: GP | Performed by: PHYSICAL THERAPIST

## 2020-01-22 PROCEDURE — 97110 THERAPEUTIC EXERCISES: CPT | Mod: GP | Performed by: PHYSICAL THERAPIST

## 2020-01-27 ENCOUNTER — OFFICE VISIT (OUTPATIENT)
Dept: DERMATOLOGY | Facility: CLINIC | Age: 75
End: 2020-01-27
Payer: COMMERCIAL

## 2020-01-27 DIAGNOSIS — L82.1 SEBORRHEIC KERATOSIS: ICD-10-CM

## 2020-01-27 DIAGNOSIS — L57.0 ACTINIC KERATOSIS: Primary | ICD-10-CM

## 2020-01-27 DIAGNOSIS — D22.9 MULTIPLE BENIGN NEVI: ICD-10-CM

## 2020-01-27 DIAGNOSIS — L81.4 SOLAR LENTIGO: ICD-10-CM

## 2020-01-27 DIAGNOSIS — D18.01 CHERRY ANGIOMA: ICD-10-CM

## 2020-01-27 DIAGNOSIS — L72.0 EIC (EPIDERMAL INCLUSION CYST): ICD-10-CM

## 2020-01-27 DIAGNOSIS — L82.0 INFLAMED SEBORRHEIC KERATOSIS: ICD-10-CM

## 2020-01-27 PROCEDURE — 17000 DESTRUCT PREMALG LESION: CPT | Mod: 59 | Performed by: DERMATOLOGY

## 2020-01-27 PROCEDURE — 17110 DESTRUCTION B9 LES UP TO 14: CPT | Performed by: DERMATOLOGY

## 2020-01-27 PROCEDURE — 99214 OFFICE O/P EST MOD 30 MIN: CPT | Mod: 25 | Performed by: DERMATOLOGY

## 2020-01-27 RX ORDER — FLUTICASONE PROPIONATE 50 MCG
2 SPRAY, SUSPENSION (ML) NASAL
COMMUNITY
Start: 2020-01-25

## 2020-01-27 RX ORDER — DOXYCYCLINE HYCLATE 100 MG
100 TABLET ORAL
COMMUNITY
Start: 2020-01-25 | End: 2020-01-30

## 2020-01-27 NOTE — NURSING NOTE
@Cely Ontiveros's goals for this visit include:   Chief Complaint   Patient presents with     Skin Check     Hx of AK - Lesion of concern on right abdomen and nasal tip - Fm hx of NMSC in brother and mother; hx of tanning bed use 5 or less times; no immunosuppression       She requests these members of her care team be copied on today's visit information: NO    PCP: Mara Varma    Referring Provider:  No referring provider defined for this encounter.    LMP  (LMP Unknown)     Do you need any medication refills at today's visit? NO    Marium Mckeon, DON

## 2020-01-27 NOTE — LETTER
1/27/2020         RE: Cely Ontiveros  [address] [facility] [address]        Dear Colleague,    Thank you for referring your patient, Cely Ontiveros, to the [facility]. Please see a copy of my visit note below.    [facility] Dermatology Note    Dermatology Problem List:  1. AK s/p cryo  2. Inflamed SK s/p cryo  3. EIC  - excision scheduled for 4/2020  4. Xerosis cutis  -current t/x: gentle skin care    Encounter Date: Jan 27, 2020    CC:  Chief Complaint   Patient presents with     Skin Check     Hx of AK - Lesion of concern on right abdomen and nasal tip - Fm hx of NMSC in brother and mother; hx of tanning bed use 5 or less times; no immunosuppression     History of Present Illness:  Ms. Cely Ontiveros is a 74 year old female who presents for a skin check. Last seen by Dr. Ross 12/19/2019 for EIC and xerosis cutis. Today, the patient has a few lesions of concern on her R & L legs, feet, nose and stomach. The lesion on her legs are itchy and bothersome, especially when she shaves her legs.     The patient otherwise denies any new or concerning lesions. No bleeding, painful, pruritic, or changing lesions. They report no personal history of skin cancer. There is a family history of skin cancer (SCC in mother and brother). No history of immunosuppression. Prior history of indoor tanning (x10 in the past). They do use sunscreen and protective clothing when outdoors for sun protection. Health otherwise stable. No other skin concerns.     Past Medical History:   Patient Active Problem List   Diagnosis     Allergic rhinitis     Herpes simplex     CARDIOVASCULAR SCREENING; LDL GOAL LESS THAN 160     Degenerative joint disease     Posterior vitreous detachment of both eyes     Seasonal allergic rhinitis     Hypermetropia     Astigmatism with presbyopia     Blepharitis of both eyes     Epiphora     Chronic otitis externa of left ear, unspecified type     Dysthymic  disorder     Meibomian gland dysfunction     Chondromalacia of patella, left     Complex tear of medial meniscus of left knee as current injury, subsequent encounter     Age-related nuclear cataract of both eyes     Pseudophakia of both eyes     Primary osteoarthritis of left knee     S/P left knee arthroscopy     Chronic pain of left knee     Past Medical History:   Diagnosis Date     Actinic keratosis      Allergic rhinitis      Cataract      Degenerative joint disease     cervical disease     Depression with anxiety      Herpes simplex      Hypoglycemia     eats small meals and is fine     Impingement syndrome, shoulder      Past Surgical History:   Procedure Laterality Date     ARTHROSCOPY KNEE Left 9/20/2019    Procedure: Left knee arthroscopy, partial medial meniscectomy and chondral debridement;  Surgeon: Nic Singh MD;  Location: [facility] OR     CATARACT IOL, RT/LT Left 01/24/2019     COMBINED CYSTOSCOPY, URETEROSCOPY, LASER HOLMIUM LITHOTRIPSY URETER(S) Right 8/23/2018    Procedure: COMBINED CYSTOSCOPY, URETEROSCOPY, LASER HOLMIUM LITHOTRIPSY URETER(S);   Cystoscopy,Right ureteroscopy with laser lithotripsy and stent placement;  Surgeon: Pino Hawk MD;  Location: [facility] OR     HC ENLARGE BREAST WITH IMPLANT       HC LAP,FULGURATE/EXCISE LESIONS       HC REMOVAL OF BREAST IMPLANT       HYSTERECTOMY, PAP NO LONGER INDICATED  1998    ovaries and uterus out for benign reasons(fibroids and ovarian cyst)     PHACOEMULSIFICATION WITH STANDARD INTRAOCULAR LENS IMPLANT Left 1/24/2019    Procedure: PHACOEMULSIFICATION WITH STANDARD INTRAOCULAR LENS IMPLANT, LEFT;  Surgeon: Wagner Aguilera MD;  Location: [facility]     PHACOEMULSIFICATION WITH STANDARD INTRAOCULAR LENS IMPLANT Right 2/14/2019    Procedure: PHACOEMULSIFICATION WITH STANDARD INTRAOCULAR LENS IMPLANT, RIGHT;  Surgeon: Wagner Aguilera MD;  Location: [facility] OR     SINUS SURGERY         Social History:  Patient reports that she has never smoked.  She has never used smokeless tobacco. She reports that she does not drink alcohol or use drugs.    Family History:  Family History   Problem Relation Age of Onset     Hypertension Mother      Arthritis Mother      Cardiovascular Mother      Circulatory Mother      Heart Disease Mother      Lipids Mother      Obesity Mother      Osteoporosis Mother      Cancer Mother         skin     Glaucoma Mother      Cancer - colorectal Father      Cancer Father      Macular Degeneration Father      Diabetes No family hx of      Cerebrovascular Disease No family hx of      Thyroid Disease No family hx of        Medications:  Current Outpatient Medications   Medication Sig Dispense Refill     citalopram (CELEXA) 20 MG tablet Take 1 tablet (20 mg) by mouth daily 90 tablet 3     doxycycline hyclate (VIBRA-TABS) 100 MG tablet Take 100 mg by mouth       fluticasone (FLONASE) 50 MCG/ACT nasal spray Spray 2 sprays in nostril       loratadine-pseudoePHEDrine (EQL ALLERGY/CONGESTION RELIEF)  MG 24 hr tablet Take 1 tablet by mouth daily 90 tablet 3     valACYclovir (VALTREX) 500 MG tablet Take 1 tablet (500 mg) by mouth daily 90 tablet 3       Allergies   Allergen Reactions     Sulfa Drugs Swelling     Cats Swelling     Lips swollen     Wool Fiber        Review of Systems:  -Constitutional: Patient is otherwise feeling well, in usual state of health.   -Skin: As above in HPI. No additional skin concerns.    Physical exam:  Vitals: LMP  (LMP Unknown)   GEN: This is a well developed, well-nourished female in no acute distress, in a pleasant mood.    SKIN: Full skin, which includes the head/face, both arms, chest, back, abdomen,both legs, genitalia and/or groin buttocks, digits and/or nails, was examined.  - Newton Type I-II  - Scattered brown macules on sun exposed areas.  - Dome shaped bright red papules on the trunk and extremities.   - Multiple regular brown pigmented macules and papules are identified on the trunk and  extremities.   - Waxy stuck on tan to brown papules on the trunk and extremities  - There are x3 tan to brown waxy stuck on papules with surrounding erythema on the L inner thigh, L shin and R shin   - There is an erythematous macule with overyling adherent scale on the right nasal tip.  - 1 cm cystic nodule with central punctum on the right upper abdomen.   - No other lesions of concern on areas examined.     Impression/Plan:    1. Multiple clinically benign lesions: solar lentigines, nevi    Recommend sunscreens SPF #30 or greater, protective clothing and avoidance of tanning beds.    ABCDs of melanoma were discussed and self skin checks were advised.     2. Benign findings including: seborrheic keratoses, cherry angioma    No further intervention required. Patient to report changes.     Patient reassured of the benign nature of these lesions.    3. EIC, right upper abdomen.     We discussed removal of the lesion via elliptical excision, the patient agrees to the plan. The patient will schedule the appointment at a later date.     5. Seborrheic keratosis, inflamed     Cryotherapy procedure note: After verbal consent and discussion of risks and benefits including but no limited to dyspigmentation/scar, blister, and pain, 3 was(were) treated with 1-2mm freeze border for 2 cycles with liquid nitrogen. Post cryotherapy instructions were provided.    6. Actinic keratosis    Cryotherapy procedure note: After verbal consent and discussion of risks and benefits including but no limited to dyspigmentation/scar, blister, and pain, 1 was(were) treated with 1-2mm freeze border for 2 cycles with liquid nitrogen. Post cryotherapy instructions were provided.      Follow-up in April 2020 for an excision, and 1 year for a skin check, earlier for new or changing lesions.     Staff Involved:  Scribe/Staff    Scribe Disclosure  I, Caren Paz, am serving as a scribe to document services personally performed by Dr. Pb Lewis MD,  based on data collection and the provider's statements to me.     Provider Disclosure:   The documentation recorded by the scribe accurately reflects the services I personally performed and the decisions made by me.    Pb Lewis MD    Department of Dermatology  [facility]: Phone: [phone], Fax:[phone]  [facility]: Phone: [phone] Fax: [phone]              Again, thank you for allowing me to participate in the care of your patient.        Sincerely,        Pb Lewis MD

## 2020-01-27 NOTE — PROGRESS NOTES
[facility] Dermatology Note    Dermatology Problem List:  1. AK s/p cryo  2. Inflamed SK s/p cryo  3. EIC  - excision scheduled for 4/2020  4. Xerosis cutis  -current t/x: gentle skin care    Encounter Date: Jan 27, 2020    CC:  Chief Complaint   Patient presents with     Skin Check     Hx of AK - Lesion of concern on right abdomen and nasal tip - Fm hx of NMSC in brother and mother; hx of tanning bed use 5 or less times; no immunosuppression     History of Present Illness:  Ms. Cely Ontiveros is a 74 year old female who presents for a skin check. Last seen by Dr. Ross 12/19/2019 for EIC and xerosis cutis. Today, the patient has a few lesions of concern on her R & L legs, feet, nose and stomach. The lesion on her legs are itchy and bothersome, especially when she shaves her legs.     The patient otherwise denies any new or concerning lesions. No bleeding, painful, pruritic, or changing lesions. They report no personal history of skin cancer. There is a family history of skin cancer (SCC in mother and brother). No history of immunosuppression. Prior history of indoor tanning (x10 in the past). They do use sunscreen and protective clothing when outdoors for sun protection. Health otherwise stable. No other skin concerns.     Past Medical History:   Patient Active Problem List   Diagnosis     Allergic rhinitis     Herpes simplex     CARDIOVASCULAR SCREENING; LDL GOAL LESS THAN 160     Degenerative joint disease     Posterior vitreous detachment of both eyes     Seasonal allergic rhinitis     Hypermetropia     Astigmatism with presbyopia     Blepharitis of both eyes     Epiphora     Chronic otitis externa of left ear, unspecified type     Dysthymic disorder     Meibomian gland dysfunction     Chondromalacia of patella, left     Complex tear of medial meniscus of left knee as current injury, subsequent encounter     Age-related nuclear cataract of both eyes     Pseudophakia of both eyes      Primary osteoarthritis of left knee     S/P left knee arthroscopy     Chronic pain of left knee     Past Medical History:   Diagnosis Date     Actinic keratosis      Allergic rhinitis      Cataract      Degenerative joint disease     cervical disease     Depression with anxiety      Herpes simplex      Hypoglycemia     eats small meals and is fine     Impingement syndrome, shoulder      Past Surgical History:   Procedure Laterality Date     ARTHROSCOPY KNEE Left 9/20/2019    Procedure: Left knee arthroscopy, partial medial meniscectomy and chondral debridement;  Surgeon: Nic Singh MD;  Location: [facility] OR     CATARACT IOL, RT/LT Left 01/24/2019     COMBINED CYSTOSCOPY, URETEROSCOPY, LASER HOLMIUM LITHOTRIPSY URETER(S) Right 8/23/2018    Procedure: COMBINED CYSTOSCOPY, URETEROSCOPY, LASER HOLMIUM LITHOTRIPSY URETER(S);   Cystoscopy,Right ureteroscopy with laser lithotripsy and stent placement;  Surgeon: Pino Hawk MD;  Location: [facility] OR     HC ENLARGE BREAST WITH IMPLANT       HC LAP,FULGURATE/EXCISE LESIONS       HC REMOVAL OF BREAST IMPLANT       HYSTERECTOMY, PAP NO LONGER INDICATED  1998    ovaries and uterus out for benign reasons(fibroids and ovarian cyst)     PHACOEMULSIFICATION WITH STANDARD INTRAOCULAR LENS IMPLANT Left 1/24/2019    Procedure: PHACOEMULSIFICATION WITH STANDARD INTRAOCULAR LENS IMPLANT, LEFT;  Surgeon: Wagner Aguilera MD;  Location: [facility] OR     PHACOEMULSIFICATION WITH STANDARD INTRAOCULAR LENS IMPLANT Right 2/14/2019    Procedure: PHACOEMULSIFICATION WITH STANDARD INTRAOCULAR LENS IMPLANT, RIGHT;  Surgeon: Wagner Aguilera MD;  Location: [facility] OR     SINUS SURGERY         Social History:  Patient reports that she has never smoked. She has never used smokeless tobacco. She reports that she does not drink alcohol or use drugs.    Family History:  Family History   Problem Relation Age of Onset     Hypertension Mother      Arthritis Mother      Cardiovascular Mother       Circulatory Mother      Heart Disease Mother      Lipids Mother      Obesity Mother      Osteoporosis Mother      Cancer Mother         skin     Glaucoma Mother      Cancer - colorectal Father      Cancer Father      Macular Degeneration Father      Diabetes No family hx of      Cerebrovascular Disease No family hx of      Thyroid Disease No family hx of        Medications:  Current Outpatient Medications   Medication Sig Dispense Refill     citalopram (CELEXA) 20 MG tablet Take 1 tablet (20 mg) by mouth daily 90 tablet 3     doxycycline hyclate (VIBRA-TABS) 100 MG tablet Take 100 mg by mouth       fluticasone (FLONASE) 50 MCG/ACT nasal spray Spray 2 sprays in nostril       loratadine-pseudoePHEDrine (EQL ALLERGY/CONGESTION RELIEF)  MG 24 hr tablet Take 1 tablet by mouth daily 90 tablet 3     valACYclovir (VALTREX) 500 MG tablet Take 1 tablet (500 mg) by mouth daily 90 tablet 3       Allergies   Allergen Reactions     Sulfa Drugs Swelling     Cats Swelling     Lips swollen     Wool Fiber        Review of Systems:  -Constitutional: Patient is otherwise feeling well, in usual state of health.   -Skin: As above in HPI. No additional skin concerns.    Physical exam:  Vitals: LMP  (LMP Unknown)   GEN: This is a well developed, well-nourished female in no acute distress, in a pleasant mood.    SKIN: Full skin, which includes the head/face, both arms, chest, back, abdomen,both legs, genitalia and/or groin buttocks, digits and/or nails, was examined.  - Newton Type I-II  - Scattered brown macules on sun exposed areas.  - Dome shaped bright red papules on the trunk and extremities.   - Multiple regular brown pigmented macules and papules are identified on the trunk and extremities.   - Waxy stuck on tan to brown papules on the trunk and extremities  - There are x3 tan to brown waxy stuck on papules with surrounding erythema on the L inner thigh, L shin and R shin   - There is an erythematous macule with overyling  adherent scale on the right nasal tip.  - 1 cm cystic nodule with central punctum on the right upper abdomen.   - No other lesions of concern on areas examined.     Impression/Plan:    1. Multiple clinically benign lesions: solar lentigines, nevi    Recommend sunscreens SPF #30 or greater, protective clothing and avoidance of tanning beds.    ABCDs of melanoma were discussed and self skin checks were advised.     2. Benign findings including: seborrheic keratoses, cherry angioma    No further intervention required. Patient to report changes.     Patient reassured of the benign nature of these lesions.    3. EIC, right upper abdomen.     We discussed removal of the lesion via elliptical excision, the patient agrees to the plan. The patient will schedule the appointment at a later date.     5. Seborrheic keratosis, inflamed     Cryotherapy procedure note: After verbal consent and discussion of risks and benefits including but no limited to dyspigmentation/scar, blister, and pain, 3 was(were) treated with 1-2mm freeze border for 2 cycles with liquid nitrogen. Post cryotherapy instructions were provided.    6. Actinic keratosis    Cryotherapy procedure note: After verbal consent and discussion of risks and benefits including but no limited to dyspigmentation/scar, blister, and pain, 1 was(were) treated with 1-2mm freeze border for 2 cycles with liquid nitrogen. Post cryotherapy instructions were provided.      Follow-up in April 2020 for an excision, and 1 year for a skin check, earlier for new or changing lesions.     Staff Involved:  Scribe/Staff    Scribe Disclosure  I, Caren Paz, am serving as a scribe to document services personally performed by Dr. Pb Lewis MD, based on data collection and the provider's statements to me.     Provider Disclosure:   The documentation recorded by the scribe accurately reflects the services I personally performed and the decisions made by me.    Pb Lewis MD  Assistant  Professor  Department of Dermatology  [facility]: Phone: [phone], Fax:[phone]  [facility]: Phone: [phone] Fax: [phone]

## 2020-01-27 NOTE — PATIENT INSTRUCTIONS
Cryotherapy    What is it?    Use of a very cold liquid, such as liquid nitrogen, to freeze and destroy abnormal skin cells that need to be removed    What should I expect?    Tenderness and redness    A small blister that might grow and fill with dark purple blood. There may be crusting.    More than one treatment may be needed if the lesions do not go away.    How do I care for the treated area?    Gently wash the area with your hands when bathing.    Use a thin layer of Vaseline to help with healing. You may use a Band-Aid.     The area should heal within 7-10 days and may leave behind a pink or lighter color.     Do not use an antibiotic or Neosporin ointment.     You may take acetaminophen (Tylenol) for pain.     Call your Doctor if you have:    Severe pain    Signs of infection (warmth, redness, cloudy yellow drainage, and or a bad smell)    Questions or concerns    Who should I call with questions?       [facility]: [phone]       [facility]: [phone]       For urgent needs outside of business hours call the [facility] at [phone]        and ask for the dermatology resident on call

## 2020-01-29 ENCOUNTER — TELEPHONE (OUTPATIENT)
Dept: OPHTHALMOLOGY | Facility: CLINIC | Age: 75
End: 2020-01-29

## 2020-01-29 ENCOUNTER — OFFICE VISIT (OUTPATIENT)
Dept: OPHTHALMOLOGY | Facility: CLINIC | Age: 75
End: 2020-01-29
Payer: COMMERCIAL

## 2020-01-29 DIAGNOSIS — H26.492 POSTERIOR CAPSULAR OPACIFICATION VISUALLY SIGNIFICANT OF LEFT EYE: ICD-10-CM

## 2020-01-29 DIAGNOSIS — Z96.1 PSEUDOPHAKIA OF BOTH EYES: Primary | ICD-10-CM

## 2020-01-29 DIAGNOSIS — H26.491 POSTERIOR CAPSULAR OPACIFICATION VISUALLY SIGNIFICANT OF RIGHT EYE: ICD-10-CM

## 2020-01-29 DIAGNOSIS — H35.372 EPIRETINAL MEMBRANE (ERM) OF LEFT EYE: ICD-10-CM

## 2020-01-29 PROBLEM — H25.13 AGE-RELATED NUCLEAR CATARACT OF BOTH EYES: Status: RESOLVED | Noted: 2019-01-22 | Resolved: 2020-01-29

## 2020-01-29 PROCEDURE — 66821 AFTER CATARACT LASER SURGERY: CPT | Mod: LT | Performed by: OPHTHALMOLOGY

## 2020-01-29 PROCEDURE — 92134 CPTRZ OPH DX IMG PST SGM RTA: CPT | Performed by: OPHTHALMOLOGY

## 2020-01-29 PROCEDURE — 92002 INTRM OPH EXAM NEW PATIENT: CPT | Mod: 57 | Performed by: OPHTHALMOLOGY

## 2020-01-29 ASSESSMENT — REFRACTION_WEARINGRX
OS_AXIS: 015
SPECS_TYPE: PAL
OS_CYLINDER: +1.00
OD_ADD: +2.50
OD_AXIS: 030
OS_ADD: +2.50
OD_CYLINDER: +0.75
OD_SPHERE: -0.75
OS_SPHERE: -0.50

## 2020-01-29 ASSESSMENT — TONOMETRY
OD_IOP_MMHG: 18
OS_IOP_MMHG: 18
IOP_METHOD: APPLANATION

## 2020-01-29 ASSESSMENT — VISUAL ACUITY
OS_CC: 20/30-2
METHOD: SNELLEN - LINEAR
OD_CC: 20/25-2

## 2020-01-29 ASSESSMENT — CUP TO DISC RATIO
OS_RATIO: 0.3
OD_RATIO: 0.3

## 2020-01-29 ASSESSMENT — REFRACTION_MANIFEST
OS_SPHERE: -0.75
OD_CYLINDER: +0.75
OS_ADD: +2.50
OD_SPHERE: -0.75
OS_CYLINDER: +1.00
OS_AXIS: 002
OD_AXIS: 015
OD_ADD: +2.50

## 2020-01-29 ASSESSMENT — SLIT LAMP EXAM - LIDS
COMMENTS: 3+ BLEPHARITIS
COMMENTS: 3+ BLEPHARITIS

## 2020-01-29 ASSESSMENT — EXTERNAL EXAM - LEFT EYE: OS_EXAM: 2+ BROW PTOSIS

## 2020-01-29 ASSESSMENT — EXTERNAL EXAM - RIGHT EYE: OD_EXAM: 2+ BROW PTOSIS

## 2020-01-29 NOTE — PATIENT INSTRUCTIONS
Your eye may be slightly red, sore, and blurry for a few days.  You may notice some floaters for a few days.  Return Visit in about 1 week for S/P YAG Cap left eye - intraocular pressure check, refraction, and possible Yag Caps right eye.     Otoniel Peter M.D.  [phone]

## 2020-01-29 NOTE — PROGRESS NOTES
Current Eye Medications:  no     Subjective:  Possible cloudy capsules  Pt reports has a complete eye exam by an optometrist about a week ago where was give an rx for new glasses(has not filled yet) and pt was told she may be having some clouding behind her IOLs. Pt had surgery both eyes about a year ago by Dr. Aguilera in [place].     Objective:  See Ophthalmology Exam.       Assessment:  Visually significant posterior capsule opacity left eye.      ICD-10-CM    1. Pseudophakia of both eyes Z96.1    2. Epiretinal membrane, mild, of left eye H35.372    3. Posterior capsular opacification visually significant of left eye H26.492    4. Posterior capsular opacification visually significant of right eye H26.491         Plan:  Yag Capsulotomy performed without problems left eye under Proparacaine anesthetic.  Well tolerated.  Number of applications: 48  Power of applications: 1.3 mJ  Iopidine given.    Otoniel Peter M.D.      See Patient Instructions.

## 2020-01-29 NOTE — LETTER
1/29/2020         RE: Cely Ontiveros  [address] [place] [address]        Dear Colleague,    Thank you for referring your patient, Cely Ontiveros, to the [facility]. Please see a copy of my visit note below.     Current Eye Medications:  no     Subjective:  Possible cloudy capsules  Pt reports has a complete eye exam by an optometrist about a week ago where was give an rx for new glasses(has not filled yet) and pt was told she may be having some clouding behind her IOLs. Pt had surgery both eyes about a year ago by Dr. Aguilera in [place].     Objective:  See Ophthalmology Exam.       Assessment:  Visually significant posterior capsule opacity left eye.      ICD-10-CM    1. Pseudophakia of both eyes Z96.1    2. Epiretinal membrane, mild, of left eye H35.372    3. Posterior capsular opacification visually significant of left eye H26.492    4. Posterior capsular opacification visually significant of right eye H26.491         Plan:  Yag Capsulotomy performed without problems left eye under Proparacaine anesthetic.  Well tolerated.  Number of applications: 48  Power of applications: 1.3 mJ  Iopidine given.    Otoniel Peter M.D.      See Patient Instructions.         Again, thank you for allowing me to participate in the care of your patient.        Sincerely,        Otoniel Peter MD

## 2020-01-29 NOTE — TELEPHONE ENCOUNTER
Pt called stating that she saw AES this morning for a YAG - she is wondering if she can do her Yoga tomorrow or if she should not. Please call Pt to discuss.

## 2020-02-05 ENCOUNTER — THERAPY VISIT (OUTPATIENT)
Dept: PHYSICAL THERAPY | Facility: CLINIC | Age: 75
End: 2020-02-05
Payer: COMMERCIAL

## 2020-02-05 DIAGNOSIS — G89.29 CHRONIC PAIN OF LEFT KNEE: ICD-10-CM

## 2020-02-05 DIAGNOSIS — M25.562 CHRONIC PAIN OF LEFT KNEE: ICD-10-CM

## 2020-02-05 PROCEDURE — 97530 THERAPEUTIC ACTIVITIES: CPT | Mod: GP | Performed by: PHYSICAL THERAPIST

## 2020-02-05 PROCEDURE — 97112 NEUROMUSCULAR REEDUCATION: CPT | Mod: GP | Performed by: PHYSICAL THERAPIST

## 2020-02-05 PROCEDURE — 97110 THERAPEUTIC EXERCISES: CPT | Mod: GP | Performed by: PHYSICAL THERAPIST

## 2020-02-05 NOTE — Clinical Note
Please see the progress report completed in PT today.  Thank you for referring Cely to us!Cordially, TEO Huerta, MPT

## 2020-02-05 NOTE — PROGRESS NOTES
Subjective:  HPI                    Objective:  System    Physical Exam    General     ROS    Assessment/Plan:  DISCHARGE REPORT  Progress reporting period is from 1/10/20 to 2/5/20.       SUBJECTIVE  Subjective changes noted by patient:   Pt notes that she is still having a lot of stiffness in her knee. The ache in her knee is always there when up on her feet, or getting out of bed. When sitting, there is no pain.    Current pain level is  2/10.     Previous pain level was  1/10  .   Changes in function:  Yes (See Goal flowsheet attached for changes in current functional level)  Adverse reaction to treatment or activity: None    OBJECTIVE  Changes noted in objective findings:     ROM L knee: ext lacks 2 deg, but w/quad setting could get to 0 deg (with pain); flex 145 deg w/pain (R knee flex is 155 deg).  Her L quad is still somewhat weak, grossly 4/5 as observed w/ascending stairs, performing SLRs, and w/SAQ. No limp w/gait, but she lands flat footed bilat'ly, and lacks full knee ext on both knees during stance phase.     ASSESSMENT/PLAN  Updated problem list and treatment plan: Diagnosis 1:  S/p L knee arthroscopy  Pain -  self management, education and home program  Decreased ROM/flexibility - therapeutic exercise, home program  Decreased strength - therapeutic exercise, therapeutic activities and home program  Impaired muscle performance - neuro re-education and home program  Decreased function - therapeutic activities and home program  STG/LTGs have been met or progress has been made towards goals:  Yes (See Goal flow sheet completed today.) and only on STG.  Assessment of Progress: The patient's condition has potential to improve.  The patient's condition has exacerbated.  Patient is meeting short term goals and is progressing towards long term goals.  Patient's function is worse according to the ADLS questionnaire completed today.  Self Management Plans:  Patient has been instructed in a home treatment  program.  Patient  has been instructed in self management of symptoms.  I have re-evaluated this patient and find that the nature, scope, duration and intensity of the therapy is appropriate for the medical condition of the patient.  Cely continues to require the following intervention to meet STG and LTG's:  Hold off on further PT until she rechecks with the orthopedist.    Recommendations:  Will discharge due to patient not return.    Please refer to the daily flowsheet for treatment today, total treatment time and time spent performing 1:1 timed codes.

## 2020-02-06 ENCOUNTER — OFFICE VISIT (OUTPATIENT)
Dept: OPHTHALMOLOGY | Facility: CLINIC | Age: 75
End: 2020-02-06
Payer: COMMERCIAL

## 2020-02-06 DIAGNOSIS — Z96.1 PSEUDOPHAKIA OF BOTH EYES: ICD-10-CM

## 2020-02-06 DIAGNOSIS — H26.491 POSTERIOR CAPSULAR OPACIFICATION VISUALLY SIGNIFICANT OF RIGHT EYE: Primary | ICD-10-CM

## 2020-02-06 PROCEDURE — 99024 POSTOP FOLLOW-UP VISIT: CPT | Performed by: OPHTHALMOLOGY

## 2020-02-06 PROCEDURE — 66821 AFTER CATARACT LASER SURGERY: CPT | Mod: 78 | Performed by: OPHTHALMOLOGY

## 2020-02-06 ASSESSMENT — VISUAL ACUITY
OS_CC: 20/30
METHOD: SNELLEN - LINEAR
OD_CC: 20/25
OS_CC+: -1
CORRECTION_TYPE: GLASSES
OD_CC+: -1

## 2020-02-06 ASSESSMENT — REFRACTION_WEARINGRX
OS_SPHERE: -0.75
OS_ADD: +2.50
OD_SPHERE: -0.50
OS_CYLINDER: +1.50
OD_AXIS: 014
SPECS_TYPE: PAL
OD_ADD: +2.50
OD_CYLINDER: +0.50
OS_AXIS: 015

## 2020-02-06 ASSESSMENT — TONOMETRY
IOP_METHOD: APPLANATION
OS_IOP_MMHG: 14

## 2020-02-06 ASSESSMENT — REFRACTION_MANIFEST
OS_SPHERE: -0.50
OS_AXIS: 006
OS_CYLINDER: +0.50
OS_ADD: +2.75

## 2020-02-06 ASSESSMENT — SLIT LAMP EXAM - LIDS
COMMENTS: 3+ BLEPHARITIS
COMMENTS: 3+ BLEPHARITIS

## 2020-02-06 ASSESSMENT — EXTERNAL EXAM - LEFT EYE: OS_EXAM: 2+ BROW PTOSIS

## 2020-02-06 ASSESSMENT — EXTERNAL EXAM - RIGHT EYE: OD_EXAM: 2+ BROW PTOSIS

## 2020-02-06 ASSESSMENT — CUP TO DISC RATIO: OD_RATIO: 0.3

## 2020-02-06 NOTE — PROGRESS NOTES
Current Eye Medications:  Artificial tears right eye each morning.       Subjective:  Status/Post Yag Capsulotomy left eye:  1-29-20, and possible Yag Capsulotomy right eye.  Vision is clearer since the laser.  Vision in her right eye is cloudy compared to her left eye.  Right eye always feels dry.  She would like to proceed the a Yag Capsulotomy on her right eye.  Consent signed.     Objective:  See Ophthalmology Exam.       Assessment:  Doing well s/p Yag Caps left eye.  Visually significant posterior capsule opacity right eye       Plan: Yag Capsulotomy performed without problems right eye under Proparacaine anesthetic. Well tolerated.  Number of applications: 32  Power of applications: 1.2 mJ  Iopidine given.    Otoniel Peter M.D.

## 2020-02-06 NOTE — PATIENT INSTRUCTIONS
Your eye may be slightly red, sore, and blurry for a few days.  You may notice some floaters for a few days.  Return visit for a intraocular pressure check and refraction in about one week.    Otoniel Peter M.D.  [phone]

## 2020-02-06 NOTE — LETTER
2/6/2020         RE: Cely Ontiveros  [address] [place] [address]        Dear Colleague,    Thank you for referring your patient, Cely Ontiveros, to the [facility]. Please see a copy of my visit note below.     Current Eye Medications:  Artificial tears right eye each morning.       Subjective:  Status/Post Yag Capsulotomy left eye:  1-29-20, and possible Yag Capsulotomy right eye.  Vision is clearer since the laser.  Vision in her right eye is cloudy compared to her left eye.  Right eye always feels dry.  She would like to proceed the a Yag Capsulotomy on her right eye.  Consent signed.     Objective:  See Ophthalmology Exam.       Assessment:  Doing well s/p Yag Caps left eye.  Visually significant posterior capsule opacity right eye       Plan: Yag Capsulotomy performed without problems right eye under Proparacaine anesthetic. Well tolerated.  Number of applications: 32  Power of applications: 1.2 mJ  Iopidine given.    Otoniel Peter M.D.                Again, thank you for allowing me to participate in the care of your patient.        Sincerely,        Otoniel Peter MD

## 2020-02-14 ENCOUNTER — OFFICE VISIT (OUTPATIENT)
Dept: OPHTHALMOLOGY | Facility: CLINIC | Age: 75
End: 2020-02-14
Payer: COMMERCIAL

## 2020-02-14 DIAGNOSIS — H04.123 DRY EYES: ICD-10-CM

## 2020-02-14 DIAGNOSIS — Z96.1 PSEUDOPHAKIA OF BOTH EYES: Primary | ICD-10-CM

## 2020-02-14 DIAGNOSIS — H04.203 BILATERAL EPIPHORA: ICD-10-CM

## 2020-02-14 PROCEDURE — 99024 POSTOP FOLLOW-UP VISIT: CPT | Performed by: OPHTHALMOLOGY

## 2020-02-14 PROCEDURE — 68810 PROBE NASOLACRIMAL DUCT: CPT | Mod: 78 | Performed by: OPHTHALMOLOGY

## 2020-02-14 ASSESSMENT — REFRACTION_MANIFEST
OD_AXIS: 015
OD_CYLINDER: +1.00
OS_CYLINDER: +1.50
OS_ADD: +2.75
OS_AXIS: 010
OD_ADD: +2.75
OD_SPHERE: -0.75
OS_SPHERE: -0.75

## 2020-02-14 ASSESSMENT — VISUAL ACUITY
OS_CC: 20/30
OD_CC: 20/20
METHOD: SNELLEN - LINEAR
OD_CC+: -3
CORRECTION_TYPE: GLASSES
OS_CC+: +2

## 2020-02-14 ASSESSMENT — REFRACTION_WEARINGRX
OS_SPHERE: -0.50
OD_AXIS: 007
OS_AXIS: 015
OD_ADD: +2.50
OS_CYLINDER: +1.25
OS_ADD: +2.50
SPECS_TYPE: PAL
OD_SPHERE: -0.50
OD_CYLINDER: +0.75

## 2020-02-14 ASSESSMENT — EXTERNAL EXAM - LEFT EYE: OS_EXAM: 2+ BROW PTOSIS

## 2020-02-14 ASSESSMENT — SLIT LAMP EXAM - LIDS
COMMENTS: 3+ BLEPHARITIS
COMMENTS: 3+ BLEPHARITIS

## 2020-02-14 ASSESSMENT — EXTERNAL EXAM - RIGHT EYE: OD_EXAM: 2+ BROW PTOSIS

## 2020-02-14 ASSESSMENT — TONOMETRY
OS_IOP_MMHG: 18
OD_IOP_MMHG: 19
IOP_METHOD: APPLANATION

## 2020-02-14 NOTE — LETTER
2/14/2020         RE: Cely Ontiveros  [address] [place] [address]        Dear Colleague,    Thank you for referring your patient, Cely Ontiveros, to the [facility]. Please see a copy of my visit note below.     Current Eye Medications:  Artificial tears right eye each morning     Subjective:  Here for s/p YAG right eye, helped her a lot with the vision. Much clearer andthinks the prescription is close and hopes so as she has lots of glasses.      Also bothered by epiphora, right eye > left eye.  Interested in dilation/probe.     Objective:  See Ophthalmology Exam.       Assessment:  Doing well s/p Yag Caps right eye.      Plan:  Lower lid puncta dilated both eyes under Proparacaine anesthesia.  System cannulated and irrigated to PNP both eyes; mild force left eye.  Glasses prescription given - optional  Use artificial tears up to 4 times daily both eyes as needed.  (Refresh Tears, Systane Ultra/Balance, or Theratears)  Call in October 2020 for an appointment in February 2021 for Complete Exam.    Dr. Peter [phone]           Again, thank you for allowing me to participate in the care of your patient.        Sincerely,        Otoniel Peter MD

## 2020-02-14 NOTE — PROGRESS NOTES
Current Eye Medications:  Artificial tears right eye each morning     Subjective:  Here for s/p YAG right eye, helped her a lot with the vision. Much clearer andthinks the prescription is close and hopes so as she has lots of glasses.      Also bothered by epiphora, right eye > left eye.  Interested in dilation/probe.     Objective:  See Ophthalmology Exam.       Assessment:  Doing well s/p Yag Caps right eye.      Plan:  Lower lid puncta dilated both eyes under Proparacaine anesthesia.  System cannulated and irrigated to PNP both eyes; mild force left eye.  Glasses prescription given - optional  Use artificial tears up to 4 times daily both eyes as needed.  (Refresh Tears, Systane Ultra/Balance, or Theratears)  Call in October 2020 for an appointment in February 2021 for Complete Exam.    Dr. Peter [phone]

## 2020-02-14 NOTE — PATIENT INSTRUCTIONS
Glasses prescription given - optional  Use artificial tears up to 4 times daily both eyes as needed.  (Refresh Tears, Systane Ultra/Balance, or Theratears)  Call in October 2020 for an appointment in February 2021 for Complete Exam.    Dr. Peter [phone]

## 2020-02-19 ENCOUNTER — ANCILLARY PROCEDURE (OUTPATIENT)
Dept: GENERAL RADIOLOGY | Facility: CLINIC | Age: 75
End: 2020-02-19
Attending: PHYSICIAN ASSISTANT
Payer: COMMERCIAL

## 2020-02-19 ENCOUNTER — OFFICE VISIT (OUTPATIENT)
Dept: ORTHOPEDICS | Facility: CLINIC | Age: 75
End: 2020-02-19
Payer: COMMERCIAL

## 2020-02-19 VITALS
SYSTOLIC BLOOD PRESSURE: 150 MMHG | HEIGHT: 64 IN | WEIGHT: 145 LBS | DIASTOLIC BLOOD PRESSURE: 72 MMHG | BODY MASS INDEX: 24.75 KG/M2

## 2020-02-19 DIAGNOSIS — Z98.890 S/P ARTHROSCOPY OF LEFT KNEE: ICD-10-CM

## 2020-02-19 DIAGNOSIS — M17.12 PRIMARY OSTEOARTHRITIS OF LEFT KNEE: Primary | ICD-10-CM

## 2020-02-19 DIAGNOSIS — M70.52 PES ANSERINUS BURSITIS OF LEFT KNEE: ICD-10-CM

## 2020-02-19 PROCEDURE — 73562 X-RAY EXAM OF KNEE 3: CPT | Mod: LT

## 2020-02-19 PROCEDURE — 20610 DRAIN/INJ JOINT/BURSA W/O US: CPT | Mod: LT | Performed by: ORTHOPAEDIC SURGERY

## 2020-02-19 PROCEDURE — 99213 OFFICE O/P EST LOW 20 MIN: CPT | Mod: 25 | Performed by: ORTHOPAEDIC SURGERY

## 2020-02-19 RX ORDER — BUPIVACAINE HYDROCHLORIDE 2.5 MG/ML
3 INJECTION, SOLUTION INFILTRATION; PERINEURAL
Status: DISCONTINUED | OUTPATIENT
Start: 2020-02-19 | End: 2021-01-20

## 2020-02-19 RX ORDER — METHYLPREDNISOLONE ACETATE 80 MG/ML
80 INJECTION, SUSPENSION INTRA-ARTICULAR; INTRALESIONAL; INTRAMUSCULAR; SOFT TISSUE
Status: DISCONTINUED | OUTPATIENT
Start: 2020-02-19 | End: 2021-01-20

## 2020-02-19 RX ORDER — BUPIVACAINE HYDROCHLORIDE 2.5 MG/ML
4 INJECTION, SOLUTION INFILTRATION; PERINEURAL
Status: DISCONTINUED | OUTPATIENT
Start: 2020-02-19 | End: 2021-01-20

## 2020-02-19 RX ADMIN — METHYLPREDNISOLONE ACETATE 80 MG: 80 INJECTION, SUSPENSION INTRA-ARTICULAR; INTRALESIONAL; INTRAMUSCULAR; SOFT TISSUE at 15:18

## 2020-02-19 RX ADMIN — METHYLPREDNISOLONE ACETATE 80 MG: 80 INJECTION, SUSPENSION INTRA-ARTICULAR; INTRALESIONAL; INTRAMUSCULAR; SOFT TISSUE at 15:20

## 2020-02-19 RX ADMIN — BUPIVACAINE HYDROCHLORIDE 4 ML: 2.5 INJECTION, SOLUTION INFILTRATION; PERINEURAL at 15:18

## 2020-02-19 RX ADMIN — BUPIVACAINE HYDROCHLORIDE 3 ML: 2.5 INJECTION, SOLUTION INFILTRATION; PERINEURAL at 15:20

## 2020-02-19 ASSESSMENT — MIFFLIN-ST. JEOR: SCORE: 1136.69

## 2020-02-19 ASSESSMENT — PAIN SCALES - GENERAL: PAINLEVEL: MILD PAIN (2)

## 2020-02-19 NOTE — LETTER
2/19/2020         RE: Cely Ontiveros  [address]  [facility] [address]        Dear Colleague,    Thank you for referring your patient, Cely Ontiveros, to the [facility]. Please see a copy of my visit note below.    Chief Complaint   Patient presents with     Left Knee - Pain     S/p knee arthroscopy - MM, MPR, chondroplasty of P. DOS 9/20/19, 5 mon PO. Medial knee pain mainly with getting up from a chair, using stairs. Not to bothersome with walking.           HISTORY OF PRESENT ILLNESS:  Cely Ontiveros is a 74 year old female seen for evaluation of a left knee pain. She had prior arthroscopy and medial meniscus debridement for complex medial meniscus tear 9/20/2019.  Returns today stating knee pain continues. Locates pain along the inner aspect of the knee especially with getting up from a chair, using stairs. Not too bad with walking. Using ice and icy hot. Not taking any over the counter medications. Pain 2/10.    Going to [place] in a month for a wedding.        OR FINDINGS:  No effusion.  Full range of motion.  Mild synovitis.  Grade 3 and 4 chondrosis of the patella.  No loose bodies in the medial or lateral gutters.  Grade I chondrosis of the femoral trochlea.  Intact ACL within the notch.  Lateral compartment with intact lateral meniscus.  Grade 2 chondrosis of the lateral tibia with grade III fissure.  Medial compartment with grade III chondrosis of the tibia.  Grade 2 chondrosis of the medial femoral condyle.  Complex tear in the posterior horn into the body of the medial meniscus with small flaps and horizontal cleavage tears.     Past Medical History:   Diagnosis Date     Actinic keratosis      Allergic rhinitis      Cataract      Degenerative joint disease     cervical disease     Depression with anxiety      Herpes simplex      Hypoglycemia     eats small meals and is fine     Impingement syndrome, shoulder        Past Surgical History:   Procedure Laterality Date      ARTHROSCOPY KNEE Left 9/20/2019    Procedure: Left knee arthroscopy, partial medial meniscectomy and chondral debridement;  Surgeon: Nic Singh MD;  Location: [facility]     CATARACT IOL, RT/LT Left 01/24/2019     COMBINED CYSTOSCOPY, URETEROSCOPY, LASER HOLMIUM LITHOTRIPSY URETER(S) Right 8/23/2018    Procedure: COMBINED CYSTOSCOPY, URETEROSCOPY, LASER HOLMIUM LITHOTRIPSY URETER(S);   Cystoscopy,Right ureteroscopy with laser lithotripsy and stent placement;  Surgeon: Pino Hawk MD;  Location: [facility]     HC ENLARGE BREAST WITH IMPLANT       HC LAP,FULGURATE/EXCISE LESIONS       HC REMOVAL OF BREAST IMPLANT       HYSTERECTOMY, PAP NO LONGER INDICATED  1998    ovaries and uterus out for benign reasons(fibroids and ovarian cyst)     LASER YAG CAPSULOTOMY  01/2020; 2/2020    left eye; right eye     PHACOEMULSIFICATION WITH STANDARD INTRAOCULAR LENS IMPLANT Left 1/24/2019    Procedure: PHACOEMULSIFICATION WITH STANDARD INTRAOCULAR LENS IMPLANT, LEFT;  Surgeon: Wagner Aguilera MD;  Location: [facility]     PHACOEMULSIFICATION WITH STANDARD INTRAOCULAR LENS IMPLANT Right 2/14/2019    Procedure: PHACOEMULSIFICATION WITH STANDARD INTRAOCULAR LENS IMPLANT, RIGHT;  Surgeon: Wagner Aguilera MD;  Location: [facility]     SINUS SURGERY         Medications:   Current Outpatient Medications:      citalopram (CELEXA) 20 MG tablet, Take 1 tablet (20 mg) by mouth daily, Disp: 90 tablet, Rfl: 3     fluticasone (FLONASE) 50 MCG/ACT nasal spray, Spray 2 sprays in nostril, Disp: , Rfl:      loratadine-pseudoePHEDrine (EQL ALLERGY/CONGESTION RELIEF)  MG 24 hr tablet, Take 1 tablet by mouth daily, Disp: 90 tablet, Rfl: 3     valACYclovir (VALTREX) 500 MG tablet, Take 1 tablet (500 mg) by mouth daily, Disp: 90 tablet, Rfl: 3    Allergies:   Allergies   Allergen Reactions     Sulfa Drugs Swelling     Cats Swelling     Lips swollen     Wool Fiber        REVIEW OF SYSTEMS:   CONSTITUTIONAL:NEGATIVE for fever, chills,  "night sweats  INTEGUMENTARY/SKIN: NEGATIVE for worrisome wound problems or redness.  MUSCULOSKELETAL:See HPI above  NEURO: NEGATIVE for weakness, dizziness or paresthesias    PHYSICAL EXAM:  BP (!) 150/72   Ht 1.632 m (5' 4.25\")   Wt 65.8 kg (145 lb)   LMP  (LMP Unknown)   BMI 24.70 kg/m      GENERAL APPEARANCE: healthy, alert, no distress  SKIN: no suspicious lesions or rashes  NEURO: Normal strength and tone, mentation intact and speech normal  PSYCH:  mentation appears normal and affect normal, not anxious  RESPIRATORY: No increased work of breathing.    left  LOWER EXTREMITY:  Gait: slight quadriceps avoidance left.  No Quad atrophy, strength normal.  Intact sensation deep peroneal nerve, superficial peroneal nerve, med/lat tibial nerve, sural nerve, saphenous nerve  Intact EHL, EDL, TA, FHL, GS, quadriceps hamstrings and hip flexors  Toes warm and well perfused, brisk capillary refill. Palpable 2+ dp pulses.  calf soft and nttp or squeeze.  Edema: trace    left  KNEE EXAM:    Skin: intact, no ecchymosis or erythema  Mild swelling over the pes insertion.  Incisions: well healed. Dry. No erythema.  ROM: full extension to 130 flexion  Effusion: none  Tender: pes > medial joint line  Mild patello-femoral crepitus and grind.      X-RAY: 3 views left knee 2/19/2020 reviewed. No obvious fracture or dislocation. No obvious bony abnormality or lesion. Mild medial narrowing.      Impression: Cely Ontiveros is a 74 year old female with left knee pain, primary osteoarthritis, pes bursitis/tendonitis, s/p left knee arthroscopy.      Plan: routine postoperative knee arthroscopy  * appears to be mostly pes pain, likely some medial and patello-femoral osteoarthritis symptoms as well. These were noted at time of surgery.    * Rest  * Activity modification - avoid activities that aggravate symptoms.  * NSAIDS - regular use for inflammation, with food, as long as no contra-indications. Please discuss with pcp if needed.  * " Ice twice daily to three times daily as needed.  * Compression wrap as needed.  * Elevation of extremity to reduce swelling as needed.  * home exercise program.  * Tylenol as needed for pain  * discussed injections, intra-articular and/or pes injections. She elects to proceed with both today.   * return to clinic  as needed.    Cely to follow up with Primary Care provider regarding elevated blood pressure.      Nic Singh M.D., M.S.  Dept. of Orthopaedic Surgery  [facility]    Large Joint Injection/Arthocentesis: L knee joint  Date/Time: 2/19/2020 3:18 PM  Performed by: Jake Johnson PA  Authorized by: Nic Singh MD     Indications:  Pain and osteoarthritis  Needle Size:  22 G  Guidance: landmark guided    Approach:  Anteromedial  Location:  Knee      Medications:  80 mg methylPREDNISolone 80 MG/ML; 4 mL bupivacaine 0.25 %  Outcome:  Tolerated well, no immediate complications  Procedure discussed: discussed risks, benefits, and alternatives    Consent Given by:  Patient  Prep: patient was prepped and draped in usual sterile fashion      Large Joint Injection/Arthocentesis: L anserine bursa  Date/Time: 2/19/2020 3:20 PM  Performed by: Jake Johnson PA  Authorized by: Nic Singh MD     Indications:  Pain  Indications comment:  Pes anserine bursa   Needle Size:  25 G  Guidance: landmark guided    Approach:  Medial  Location:  Knee      Medications:  80 mg methylPREDNISolone 80 MG/ML; 3 mL bupivacaine 0.25 %  Outcome:  Tolerated well, no immediate complications  Procedure discussed: discussed risks, benefits, and alternatives    Consent Given by:  Patient  Prep: patient was prepped and draped in usual sterile fashion            Again, thank you for allowing me to participate in the care of your patient.        Sincerely,        Nic Singh MD

## 2020-02-19 NOTE — PROGRESS NOTES
Chief Complaint   Patient presents with     Left Knee - Pain     S/p knee arthroscopy - MM, MPR, chondroplasty of P. DOS 9/20/19, 5 mon PO. Medial knee pain mainly with getting up from a chair, using stairs. Not to bothersome with walking.           HISTORY OF PRESENT ILLNESS:  Cely Ontiveros is a 74 year old female seen for evaluation of a left knee pain. She had prior arthroscopy and medial meniscus debridement for complex medial meniscus tear 9/20/2019.  Returns today stating knee pain continues. Locates pain along the inner aspect of the knee especially with getting up from a chair, using stairs. Not too bad with walking. Using ice and icy hot. Not taking any over the counter medications. Pain 2/10.    Going to [place] in a month for a wedding.        OR FINDINGS:  No effusion.  Full range of motion.  Mild synovitis.  Grade 3 and 4 chondrosis of the patella.  No loose bodies in the medial or lateral gutters.  Grade I chondrosis of the femoral trochlea.  Intact ACL within the notch.  Lateral compartment with intact lateral meniscus.  Grade 2 chondrosis of the lateral tibia with grade III fissure.  Medial compartment with grade III chondrosis of the tibia.  Grade 2 chondrosis of the medial femoral condyle.  Complex tear in the posterior horn into the body of the medial meniscus with small flaps and horizontal cleavage tears.     Past Medical History:   Diagnosis Date     Actinic keratosis      Allergic rhinitis      Cataract      Degenerative joint disease     cervical disease     Depression with anxiety      Herpes simplex      Hypoglycemia     eats small meals and is fine     Impingement syndrome, shoulder        Past Surgical History:   Procedure Laterality Date     ARTHROSCOPY KNEE Left 9/20/2019    Procedure: Left knee arthroscopy, partial medial meniscectomy and chondral debridement;  Surgeon: Nic Singh MD;  Location: [facility]     CATARACT IOL, RT/LT Left 01/24/2019     COMBINED CYSTOSCOPY,  "URETEROSCOPY, LASER HOLMIUM LITHOTRIPSY URETER(S) Right 8/23/2018    Procedure: COMBINED CYSTOSCOPY, URETEROSCOPY, LASER HOLMIUM LITHOTRIPSY URETER(S);   Cystoscopy,Right ureteroscopy with laser lithotripsy and stent placement;  Surgeon: Pino Hawk MD;  Location: [facility]     HC ENLARGE BREAST WITH IMPLANT       HC LAP,FULGURATE/EXCISE LESIONS       HC REMOVAL OF BREAST IMPLANT       HYSTERECTOMY, PAP NO LONGER INDICATED  1998    ovaries and uterus out for benign reasons(fibroids and ovarian cyst)     LASER YAG CAPSULOTOMY  01/2020; 2/2020    left eye; right eye     PHACOEMULSIFICATION WITH STANDARD INTRAOCULAR LENS IMPLANT Left 1/24/2019    Procedure: PHACOEMULSIFICATION WITH STANDARD INTRAOCULAR LENS IMPLANT, LEFT;  Surgeon: Wagner Aguilera MD;  Location: [facility]     PHACOEMULSIFICATION WITH STANDARD INTRAOCULAR LENS IMPLANT Right 2/14/2019    Procedure: PHACOEMULSIFICATION WITH STANDARD INTRAOCULAR LENS IMPLANT, RIGHT;  Surgeon: Wagner Aguilera MD;  Location: [facility]     SINUS SURGERY         Medications:   Current Outpatient Medications:      citalopram (CELEXA) 20 MG tablet, Take 1 tablet (20 mg) by mouth daily, Disp: 90 tablet, Rfl: 3     fluticasone (FLONASE) 50 MCG/ACT nasal spray, Spray 2 sprays in nostril, Disp: , Rfl:      loratadine-pseudoePHEDrine (EQL ALLERGY/CONGESTION RELIEF)  MG 24 hr tablet, Take 1 tablet by mouth daily, Disp: 90 tablet, Rfl: 3     valACYclovir (VALTREX) 500 MG tablet, Take 1 tablet (500 mg) by mouth daily, Disp: 90 tablet, Rfl: 3    Allergies:   Allergies   Allergen Reactions     Sulfa Drugs Swelling     Cats Swelling     Lips swollen     Wool Fiber        REVIEW OF SYSTEMS:   CONSTITUTIONAL:NEGATIVE for fever, chills, night sweats  INTEGUMENTARY/SKIN: NEGATIVE for worrisome wound problems or redness.  MUSCULOSKELETAL:See HPI above  NEURO: NEGATIVE for weakness, dizziness or paresthesias    PHYSICAL EXAM:  BP (!) 150/72   Ht 1.632 m (5' 4.25\")   Wt 65.8 kg " (145 lb)   LMP  (LMP Unknown)   BMI 24.70 kg/m     GENERAL APPEARANCE: healthy, alert, no distress  SKIN: no suspicious lesions or rashes  NEURO: Normal strength and tone, mentation intact and speech normal  PSYCH:  mentation appears normal and affect normal, not anxious  RESPIRATORY: No increased work of breathing.    left  LOWER EXTREMITY:  Gait: slight quadriceps avoidance left.  No Quad atrophy, strength normal.  Intact sensation deep peroneal nerve, superficial peroneal nerve, med/lat tibial nerve, sural nerve, saphenous nerve  Intact EHL, EDL, TA, FHL, GS, quadriceps hamstrings and hip flexors  Toes warm and well perfused, brisk capillary refill. Palpable 2+ dp pulses.  calf soft and nttp or squeeze.  Edema: trace    left  KNEE EXAM:    Skin: intact, no ecchymosis or erythema  Mild swelling over the pes insertion.  Incisions: well healed. Dry. No erythema.  ROM: full extension to 130 flexion  Effusion: none  Tender: pes > medial joint line  Mild patello-femoral crepitus and grind.      X-RAY: 3 views left knee 2/19/2020 reviewed. No obvious fracture or dislocation. No obvious bony abnormality or lesion. Mild medial narrowing.      Impression: Cely Ontiveros is a 74 year old female with left knee pain, primary osteoarthritis, pes bursitis/tendonitis, s/p left knee arthroscopy.      Plan: routine postoperative knee arthroscopy  * appears to be mostly pes pain, likely some medial and patello-femoral osteoarthritis symptoms as well. These were noted at time of surgery.    * Rest  * Activity modification - avoid activities that aggravate symptoms.  * NSAIDS - regular use for inflammation, with food, as long as no contra-indications. Please discuss with pcp if needed.  * Ice twice daily to three times daily as needed.  * Compression wrap as needed.  * Elevation of extremity to reduce swelling as needed.  * home exercise program.  * Tylenol as needed for pain  * discussed injections, intra-articular and/or pes  injections. She elects to proceed with both today.   * return to clinic  as needed.    Cely to follow up with Primary Care provider regarding elevated blood pressure.      Nic Singh M.D., M.S.  Dept. of Orthopaedic Surgery  [facility]    Large Joint Injection/Arthocentesis: L knee joint  Date/Time: 2/19/2020 3:18 PM  Performed by: Jake Johnson PA  Authorized by: Nic Singh MD     Indications:  Pain and osteoarthritis  Needle Size:  22 G  Guidance: landmark guided    Approach:  Anteromedial  Location:  Knee      Medications:  80 mg methylPREDNISolone 80 MG/ML; 4 mL bupivacaine 0.25 %  Outcome:  Tolerated well, no immediate complications  Procedure discussed: discussed risks, benefits, and alternatives    Consent Given by:  Patient  Prep: patient was prepped and draped in usual sterile fashion      Large Joint Injection/Arthocentesis: L anserine bursa  Date/Time: 2/19/2020 3:20 PM  Performed by: Jake Johnson PA  Authorized by: Nic Singh MD     Indications:  Pain  Indications comment:  Pes anserine bursa   Needle Size:  25 G  Guidance: landmark guided    Approach:  Medial  Location:  Knee      Medications:  80 mg methylPREDNISolone 80 MG/ML; 3 mL bupivacaine 0.25 %  Outcome:  Tolerated well, no immediate complications  Procedure discussed: discussed risks, benefits, and alternatives    Consent Given by:  Patient  Prep: patient was prepped and draped in usual sterile fashion

## 2020-02-24 ENCOUNTER — HEALTH MAINTENANCE LETTER (OUTPATIENT)
Age: 75
End: 2020-02-24

## 2020-03-23 PROBLEM — M25.562 CHRONIC PAIN OF LEFT KNEE: Status: RESOLVED | Noted: 2020-01-10 | Resolved: 2020-03-23

## 2020-03-23 PROBLEM — G89.29 CHRONIC PAIN OF LEFT KNEE: Status: RESOLVED | Noted: 2020-01-10 | Resolved: 2020-03-23

## 2020-10-01 DIAGNOSIS — F34.1 DYSTHYMIC DISORDER: ICD-10-CM

## 2020-10-01 NOTE — LETTER
Cely Ontiveros  [address]  [facility] [address]          10/07/20      Dear Cely Ontiveros        At [facility] we care about your health and are committed to providing quality patient care. Regular appointments are a vital part of the care and management of your health and can help prevent many of the complications that can occur.      We have refilled your medication(s)  We will need you to schedule a Annual Wellness visit either Video or face to face before any additional refills can be given.  Please call [phone] to schedule this appointment.      Thank you,    [facility]

## 2020-10-05 NOTE — TELEPHONE ENCOUNTER
Routing refill request to provider for review/approval because:  Labs not current:  BP    Cuca Tracy, Kira Valles RN

## 2020-10-06 RX ORDER — CITALOPRAM HYDROBROMIDE 20 MG/1
20 TABLET ORAL DAILY
Qty: 90 TABLET | Refills: 3 | Status: SHIPPED | OUTPATIENT
Start: 2020-10-06 | End: 2022-03-23

## 2020-10-06 NOTE — TELEPHONE ENCOUNTER
Patient needs annual wellness visit.  Please assist her in scheduling either video or face to face visit.  AUDREY Mcneil CNP

## 2020-12-13 ENCOUNTER — HEALTH MAINTENANCE LETTER (OUTPATIENT)
Age: 75
End: 2020-12-13

## 2021-01-20 ENCOUNTER — OFFICE VISIT (OUTPATIENT)
Dept: FAMILY MEDICINE | Facility: CLINIC | Age: 76
End: 2021-01-20
Payer: COMMERCIAL

## 2021-01-20 VITALS
HEIGHT: 64 IN | WEIGHT: 148 LBS | BODY MASS INDEX: 25.27 KG/M2 | DIASTOLIC BLOOD PRESSURE: 72 MMHG | SYSTOLIC BLOOD PRESSURE: 138 MMHG | OXYGEN SATURATION: 96 % | HEART RATE: 76 BPM

## 2021-01-20 DIAGNOSIS — L72.3 SEBACEOUS CYST: ICD-10-CM

## 2021-01-20 DIAGNOSIS — J31.0 CHRONIC RHINITIS: ICD-10-CM

## 2021-01-20 DIAGNOSIS — Z13.220 SCREENING FOR HYPERLIPIDEMIA: ICD-10-CM

## 2021-01-20 DIAGNOSIS — Z13.21 ENCOUNTER FOR VITAMIN DEFICIENCY SCREENING: ICD-10-CM

## 2021-01-20 DIAGNOSIS — E78.5 HYPERLIPIDEMIA LDL GOAL <100: Primary | ICD-10-CM

## 2021-01-20 DIAGNOSIS — Z11.59 NEED FOR HEPATITIS C SCREENING TEST: ICD-10-CM

## 2021-01-20 DIAGNOSIS — Z00.00 ENCOUNTER FOR MEDICARE ANNUAL WELLNESS EXAM: Primary | ICD-10-CM

## 2021-01-20 DIAGNOSIS — Z12.11 SPECIAL SCREENING FOR MALIGNANT NEOPLASMS, COLON: ICD-10-CM

## 2021-01-20 DIAGNOSIS — B00.9 HERPES SIMPLEX VIRUS INFECTION: ICD-10-CM

## 2021-01-20 DIAGNOSIS — N95.9 UNSPECIFIED MENOPAUSAL AND PERIMENOPAUSAL DISORDER: ICD-10-CM

## 2021-01-20 DIAGNOSIS — M85.80 OSTEOPENIA, UNSPECIFIED LOCATION: ICD-10-CM

## 2021-01-20 LAB
ALBUMIN SERPL-MCNC: 3.9 G/DL (ref 3.4–5)
ALP SERPL-CCNC: 61 U/L (ref 40–150)
ALT SERPL W P-5'-P-CCNC: 18 U/L (ref 0–50)
ANION GAP SERPL CALCULATED.3IONS-SCNC: 4 MMOL/L (ref 3–14)
AST SERPL W P-5'-P-CCNC: 18 U/L (ref 0–45)
BILIRUB SERPL-MCNC: 0.4 MG/DL (ref 0.2–1.3)
BUN SERPL-MCNC: 24 MG/DL (ref 7–30)
CALCIUM SERPL-MCNC: 9 MG/DL (ref 8.5–10.1)
CHLORIDE SERPL-SCNC: 104 MMOL/L (ref 94–109)
CHOLEST SERPL-MCNC: 224 MG/DL
CO2 SERPL-SCNC: 29 MMOL/L (ref 20–32)
CREAT SERPL-MCNC: 0.83 MG/DL (ref 0.52–1.04)
DEPRECATED CALCIDIOL+CALCIFEROL SERPL-MC: 35 UG/L (ref 20–75)
GFR SERPL CREATININE-BSD FRML MDRD: 68 ML/MIN/{1.73_M2}
GLUCOSE SERPL-MCNC: 84 MG/DL (ref 70–99)
HCV AB SERPL QL IA: NONREACTIVE
HDLC SERPL-MCNC: 89 MG/DL
HGB BLD-MCNC: 13.1 G/DL (ref 11.7–15.7)
LDLC SERPL CALC-MCNC: 121 MG/DL
NONHDLC SERPL-MCNC: 135 MG/DL
POTASSIUM SERPL-SCNC: 4.3 MMOL/L (ref 3.4–5.3)
PROT SERPL-MCNC: 7.4 G/DL (ref 6.8–8.8)
SODIUM SERPL-SCNC: 137 MMOL/L (ref 133–144)
TRIGL SERPL-MCNC: 69 MG/DL

## 2021-01-20 PROCEDURE — 80053 COMPREHEN METABOLIC PANEL: CPT | Performed by: NURSE PRACTITIONER

## 2021-01-20 PROCEDURE — 82306 VITAMIN D 25 HYDROXY: CPT | Performed by: NURSE PRACTITIONER

## 2021-01-20 PROCEDURE — 80061 LIPID PANEL: CPT | Performed by: NURSE PRACTITIONER

## 2021-01-20 PROCEDURE — 36415 COLL VENOUS BLD VENIPUNCTURE: CPT | Performed by: NURSE PRACTITIONER

## 2021-01-20 PROCEDURE — 86803 HEPATITIS C AB TEST: CPT | Performed by: NURSE PRACTITIONER

## 2021-01-20 PROCEDURE — G0438 PPPS, INITIAL VISIT: HCPCS | Performed by: NURSE PRACTITIONER

## 2021-01-20 PROCEDURE — 99214 OFFICE O/P EST MOD 30 MIN: CPT | Mod: 25 | Performed by: NURSE PRACTITIONER

## 2021-01-20 PROCEDURE — 85018 HEMOGLOBIN: CPT | Performed by: NURSE PRACTITIONER

## 2021-01-20 RX ORDER — VALACYCLOVIR HYDROCHLORIDE 500 MG/1
500 TABLET, FILM COATED ORAL DAILY
Qty: 90 TABLET | Refills: 3 | Status: SHIPPED | OUTPATIENT
Start: 2021-01-20 | End: 2022-03-23

## 2021-01-20 RX ORDER — LORATADINE AND PSEUDOEPHEDRINE 10; 240 MG/1; MG/1
1 TABLET, FILM COATED, EXTENDED RELEASE ORAL DAILY
Qty: 90 TABLET | Refills: 0 | Status: CANCELLED | OUTPATIENT
Start: 2021-01-20

## 2021-01-20 RX ORDER — DOXYCYCLINE 100 MG/1
CAPSULE ORAL
COMMUNITY
Start: 2020-01-25 | End: 2022-03-23

## 2021-01-20 ASSESSMENT — MIFFLIN-ST. JEOR: SCORE: 1145.29

## 2021-01-20 ASSESSMENT — PAIN SCALES - GENERAL: PAINLEVEL: NO PAIN (0)

## 2021-01-20 NOTE — PATIENT INSTRUCTIONS
At [facility], we strive to deliver an exceptional experience to you, every time we see you. If you receive a survey, please complete it as we do value your feedback.  If you have MyChart, you can expect to receive results automatically within 24 hours of their completion.  Your provider will send a note interpreting your results as well.   If you do not have MyChart, you should receive your results in about a week by mail.    Your care team:                            Family Medicine Internal Medicine   MD Mao Duran, MD Brandi Caraballo, MD Venita Bess PA-C, APRN CNP    Lewis Whitten, MD Pediatrics   Jefferson Gallardo, PAAlidaC  Mara Varma, CNP Jasmin Kulkarni, MD Li Moeller APRN CNP   MD Johanne Hannon MD Deborah Mielke, MD Mary Sarabia, APRN CNP  Padmini Rodríguez, PAAlidaC  Teresa Hayes, CNP  MD Mary Medina MD Angela Wermerskirchen, MD      Clinic hours: Monday - Thursday 7 am-7 pm; Fridays 7 am-5 pm.   Urgent care: Monday - Friday 11 am-9 pm; Saturday and Sunday 9 am-5 pm.    Clinic: [phone]       [facility]: Monday - Thursday 8 am - 7 pm; Friday 8 am - 6 pm  [facility]: [phone]     Use [email or web address] for 24/7 diagnosis and treatment of dozens of conditions.    Patient Education   Personalized Prevention Plan  You are due for the preventive services outlined below.  Your care team is available to assist you in scheduling these services.  If you have already completed any of these items, please share that information with your care team to update in your medical record.  Health Maintenance Due   Topic Date Due     Hepatitis C Screening  12/30/1963     Depression Assessment  09/04/2019     FALL RISK ASSESSMENT  03/04/2020     Diptheria Tetanus Pertussis (DTAP/TDAP/TD) Vaccine (3 - Td) 03/09/2020     Cholesterol  Lab  10/07/2020     Eye Exam  02/06/2021

## 2021-01-20 NOTE — PROGRESS NOTES
"erm  SUBJECTIVE:   Cely Ontiveros is a 75 year old female who presents for Preventive Visit.      Patient has been advised of split billing requirements and indicates understanding: Yes  Are you in the first 12 months of your Medicare Part B coverage?  No    Physical Health:    In general, how would you rate your overall physical health? good    Outside of work, how many days during the week do you exercise? 6-7 days/week    Outside of work, approximately how many minutes a day do you exercise?30-45 minutes    If you drink alcohol do you typically have >3 drinks per day or >7 drinks per week? No    Do you usually eat at least 4 servings of fruit and vegetables a day, include whole grains & fiber and avoid regularly eating high fat or \"junk\" foods? Yes    Do you have any problems taking medications regularly?  No    Do you have any side effects from medications? none    Needs assistance for the following daily activities: no assistance needed    Which of the following safety concerns are present in your home?  none identified     Hearing impairment: No    In the past 6 months, have you been bothered by leaking of urine? no    Mental Health:    In general, how would you rate your overall mental or emotional health? excellent  PHQ-2 Score:      Do you feel safe in your environment? NO    Have you ever done Advance Care Planning? (For example, a Health Directive, POLST, or a discussion with a medical provider or your loved ones about your wishes): Yes, advance care planning is on file.    Additional concerns to address?  YES- Bump on Back; has had one previously.  Had one two months ago went away. No redness, somewhat tender. Quarter size.    Fall risk:  Fallen 2 or more times in the past year?: No  Any fall with injury in the past year?: No    Cognitive Screenin) Repeat 3 items (Leader, Season, Table)  YES  2) Clock draw: NORMAL  3) 3 item recall: Recalls 2 objects   Results: NORMAL clock, 2 items recalled: " COGNITIVE IMPAIRMENT LESS LIKELY    Mini-CogTM Copyright SOLANGE Worley. Licensed by the author for use in [facility]; reprinted with permission (neno@.[facility]). All rights reserved.      Do you have sleep apnea, excessive snoring or daytime drowsiness?: no        Reviewed and updated as needed this visit by clinical staff  Tobacco  Allergies  Meds  Problems  Med Hx  Surg Hx  Fam Hx  Soc Hx          Reviewed and updated as needed this visit by Provider                Social History     Tobacco Use     Smoking status: Never Smoker     Smokeless tobacco: Never Used   Substance Use Topics     Alcohol use: No                           Current providers sharing in care for this patient include:   Patient Care Team:  Mara Varma APRN CNP as PCP - General (Nurse Practitioner - Family)  Mara Varma APRN CNP as Assigned PCP  Nic Singh MD as Assigned Musculoskeletal Provider  Otoniel Peter MD as Assigned Surgical Provider    The following health maintenance items are reviewed in Epic and correct as of today:  Health Maintenance   Topic Date Due     HEPATITIS C SCREENING  12/30/1963     PHQ-9  09/04/2019     FALL RISK ASSESSMENT  03/04/2020     DTAP/TDAP/TD IMMUNIZATION (3 - Td) 03/09/2020     LIPID  10/07/2020     EYE EXAM  02/06/2021     MEDICARE ANNUAL WELLNESS VISIT  01/20/2022     DEXA  07/01/2024     ADVANCE CARE PLANNING  10/29/2024     DEPRESSION ACTION PLAN  Completed     INFLUENZA VACCINE  Completed     Pneumococcal Vaccine: 65+ Years  Completed     ZOSTER IMMUNIZATION  Completed     Pneumococcal Vaccine: Pediatrics (0 to 5 Years) and At-Risk Patients (6 to 64 Years)  Aged Out     IPV IMMUNIZATION  Aged Out     MENINGITIS IMMUNIZATION  Aged Out     HEPATITIS B IMMUNIZATION  Aged Out     BP Readings from Last 3 Encounters:   01/20/21 138/72   02/19/20 (!) 150/72   10/29/19 128/69    Wt Readings from Last 3 Encounters:   01/20/21 67.1 kg (148 lb)   02/19/20 65.8  kg (145 lb)   10/29/19 67 kg (147 lb 9.6 oz)                  Patient Active Problem List   Diagnosis     Allergic rhinitis     Herpes simplex     CARDIOVASCULAR SCREENING; LDL GOAL LESS THAN 160     Degenerative joint disease     Posterior vitreous detachment of both eyes     Seasonal allergic rhinitis     Hypermetropia     Astigmatism with presbyopia     Blepharitis of both eyes     Epiphora     Chronic otitis externa of left ear, unspecified type     Dysthymic disorder     Meibomian gland dysfunction     Chondromalacia of patella, left     Complex tear of medial meniscus of left knee as current injury, subsequent encounter     Pseudophakia, Yag Caps, of both eyes     Primary osteoarthritis of left knee     S/P left knee arthroscopy     Epiretinal membrane, mild, of left eye     Posterior capsular opacification visually significant of right eye     Past Surgical History:   Procedure Laterality Date     ARTHROSCOPY KNEE Left 9/20/2019    Procedure: Left knee arthroscopy, partial medial meniscectomy and chondral debridement;  Surgeon: Nic Singh MD;  Location: [facility] OR     CATARACT IOL, RT/LT Left 01/24/2019     COMBINED CYSTOSCOPY, URETEROSCOPY, LASER HOLMIUM LITHOTRIPSY URETER(S) Right 8/23/2018    Procedure: COMBINED CYSTOSCOPY, URETEROSCOPY, LASER HOLMIUM LITHOTRIPSY URETER(S);   Cystoscopy,Right ureteroscopy with laser lithotripsy and stent placement;  Surgeon: Pino Hawk MD;  Location: [facility] OR     HC ENLARGE BREAST WITH IMPLANT       HC LAP,FULGURATE/EXCISE LESIONS       HC REMOVAL OF BREAST IMPLANT       HYSTERECTOMY, PAP NO LONGER INDICATED  1998    ovaries and uterus out for benign reasons(fibroids and ovarian cyst)     LASER YAG CAPSULOTOMY  01/2020; 2/2020    left eye; right eye     PHACOEMULSIFICATION WITH STANDARD INTRAOCULAR LENS IMPLANT Left 1/24/2019    Procedure: PHACOEMULSIFICATION WITH STANDARD INTRAOCULAR LENS IMPLANT, LEFT;  Surgeon: Wagner Aguilera MD;  Location: [facility] OR      PHACOEMULSIFICATION WITH STANDARD INTRAOCULAR LENS IMPLANT Right 2/14/2019    Procedure: PHACOEMULSIFICATION WITH STANDARD INTRAOCULAR LENS IMPLANT, RIGHT;  Surgeon: Wagner Aguilera MD;  Location: [facility] OR     SINUS SURGERY         Social History     Tobacco Use     Smoking status: Never Smoker     Smokeless tobacco: Never Used   Substance Use Topics     Alcohol use: No     Family History   Problem Relation Age of Onset     Hypertension Mother      Arthritis Mother      Cardiovascular Mother      Circulatory Mother      Heart Disease Mother      Lipids Mother      Obesity Mother      Osteoporosis Mother      Cancer Mother         skin     Glaucoma Mother      Cancer - colorectal Father      Cancer Father      Macular Degeneration Father      Diabetes No family hx of      Cerebrovascular Disease No family hx of      Thyroid Disease No family hx of          Current Outpatient Medications   Medication Sig Dispense Refill     citalopram (CELEXA) 20 MG tablet Take 1 tablet (20 mg) by mouth daily 90 tablet 3     fluticasone (FLONASE) 50 MCG/ACT nasal spray Spray 2 sprays in nostril       loratadine-pseudoePHEDrine (EQL ALLERGY/CONGESTION RELIEF)  MG 24 hr tablet Take 1 tablet by mouth daily 90 tablet 0     valACYclovir (VALTREX) 500 MG tablet Take 1 tablet (500 mg) by mouth daily 90 tablet 3     doxycycline hyclate (VIBRAMYCIN) 100 MG capsule        Allergies   Allergen Reactions     Sulfa Drugs Swelling     Cats Swelling     Lips swollen     Wool Fiber      Recent Labs   Lab Test 10/29/19  1110 09/09/19  1015 06/10/19  1048 03/08/17  0856 10/07/15  0753 08/29/14  0725 06/04/13  0821 06/04/13  0820 06/04/13  0820   A1C  --   --  5.2  --   --   --   --   --   --    LDL  --   --   --   --  109 91  --   --  92   HDL  --   --   --   --  81 85  --   --  60   TRIG  --   --   --   --  66 58  --   --  58   ALT 20 20  --   --   --   --  25  --   --    CR  --  0.82  --  0.72  --   --  0.64  --   --    GFRESTIMATED  --   "70  --  79  --   --  >90  --   --    GFRESTBLACK  --  81  --  >90   GFR Calc    --   --  >90  --   --    POTASSIUM  --  5.1  --  4.2  --   --  4.2   < >  --    TSH  --   --   --  0.81  --   --   --   --  1.03    < > = values in this interval not displayed.      Mammogram Screening: Patient over age 75, has elected to stop mammography screening.  Last 3 Pap and HPV Results:      ROS:  Constitutional, HEENT, cardiovascular, pulmonary, GI, , musculoskeletal, neuro, skin, endocrine and psych systems are negative, except as otherwise noted.    OBJECTIVE:   BP (!) 149/74 (BP Location: Left arm, Patient Position: Chair, Cuff Size: Adult Large)   Pulse 76   Ht 1.632 m (5' 4.25\")   Wt 67.1 kg (148 lb)   LMP  (LMP Unknown)   SpO2 96%   BMI 25.21 kg/m   Estimated body mass index is 25.21 kg/m  as calculated from the following:    Height as of this encounter: 1.632 m (5' 4.25\").    Weight as of this encounter: 67.1 kg (148 lb).  EXAM:   GENERAL: healthy, alert and no distress  EYES: Eyes grossly normal to inspection, PERRL and conjunctivae and sclerae normal  HENT: ear canals and TM's normal, nose and mouth without ulcers or lesions  NECK: no adenopathy, no asymmetry, masses, or scars and thyroid normal to palpation  RESP: lungs clear to auscultation - no rales, rhonchi or wheezes  CV: regular rate and rhythm, normal S1 S2, no S3 or S4, no murmur, click or rub, no peripheral edema and peripheral pulses strong  ABDOMEN: soft, nontender, no hepatosplenomegaly, no masses and bowel sounds normal  MS: no gross musculoskeletal defects noted, no edema  SKIN: mid back long thoracic spine, closed comedone with some surrounding swelling and fluctuance.  No erythema and mildly tender.  multple other closed comedones on back.    NEURO: Normal strength and tone, mentation intact and speech normal  PSYCH: mentation appears normal, affect normal/bright    Diagnostic Test Results:  Labs reviewed in Epic  No results " found for this or any previous visit (from the past 24 hour(s)).    ASSESSMENT / PLAN:   1. Encounter for Medicare annual wellness exam  - Hepatitis C Screen Reflex to HCV RNA Quant and Genotype  - Lipid panel reflex to direct LDL Fasting  - Comprehensive metabolic panel (BMP + Alb, Alk Phos, ALT, AST, Total. Bili, TP)  - Vitamin D Deficiency  - Hemoglobin    2. Sebaceous cyst  Since recurrent in same spot, will have patient follow up with dermatology for cyst removal.  No sign of infection today.  Discussed s/s of infection and she should call for antibiotics if she notes these.   - DERMATOLOGY ADULT REFERRAL; Future    3. Herpes simplex  Refille.d    - valACYclovir (VALTREX) 500 MG tablet; Take 1 tablet (500 mg) by mouth daily  Dispense: 90 tablet; Refill: 3    4. Chronic rhinitis  Stable.  Uses flonase over the counter.      5. Osteopenia, unspecified location  - Vitamin D Deficiency    6. Need for hepatitis C screening test  - Hepatitis C Screen Reflex to HCV RNA Quant and Genotype    7. Screening for hyperlipidemia    8. Encounter for vitamin deficiency screening  - Vitamin D Deficiency    9. Special screening for malignant neoplasms, colon  - Fecal colorectal cancer screen (FIT); Future    Patient has been advised of split billing requirements and indicates understanding: Yes    COUNSELING:  Reviewed preventive health counseling, as reflected in patient instructions       Regular exercise       Healthy diet/nutrition       Vision screening       Dental care       Bladder control       Fall risk prevention       Immunizations    We elected to hold off on her TDAP as she is trying to get a spot for COVID vaccination and recommendation is to not have any other vaccinations around that time.              Folic Acid Counseling       Osteoporosis Prevention/Bone Health       Hepatitis C screening    Estimated body mass index is 25.21 kg/m  as calculated from the following:    Height as of this encounter: 1.632 m (5'  "4.25\").    Weight as of this encounter: 67.1 kg (148 lb).        She reports that she has never smoked. She has never used smokeless tobacco.    Appropriate preventive services were discussed with this patient, including applicable screening as appropriate for cardiovascular disease, diabetes, osteopenia/osteoporosis, and glaucoma.  As appropriate for age/gender, discussed screening for colorectal cancer, prostate cancer, breast cancer, and cervical cancer. Checklist reviewing preventive services available has been given to the patient.    Reviewed patients plan of care and provided an AVS. The Intermediate Care Plan ( asthma action plan, low back pain action plan, and migraine action plan) for Cely meets the Care Plan requirement. This Care Plan has been established and reviewed with the Patient.    Counseling Resources:  ATP IV Guidelines  Pooled Cohorts Equation Calculator  Breast Cancer Risk Calculator  BRCA-Related Cancer Risk Assessment: FHS-7 Tool  FRAX Risk Assessment  ICSI Preventive Guidelines  Dietary Guidelines for Americans, 2010  USDA's MyPlate  ASA Prophylaxis  Lung CA Screening    AUDREY Mcneil [facility]  "

## 2021-01-21 ASSESSMENT — PATIENT HEALTH QUESTIONNAIRE - PHQ9
SUM OF ALL RESPONSES TO PHQ QUESTIONS 1-9: 0
5. POOR APPETITE OR OVEREATING: NOT AT ALL

## 2021-01-21 ASSESSMENT — ANXIETY QUESTIONNAIRES
3. WORRYING TOO MUCH ABOUT DIFFERENT THINGS: NOT AT ALL
5. BEING SO RESTLESS THAT IT IS HARD TO SIT STILL: NOT AT ALL
GAD7 TOTAL SCORE: 0
7. FEELING AFRAID AS IF SOMETHING AWFUL MIGHT HAPPEN: NOT AT ALL
2. NOT BEING ABLE TO STOP OR CONTROL WORRYING: NOT AT ALL
6. BECOMING EASILY ANNOYED OR IRRITABLE: NOT AT ALL
IF YOU CHECKED OFF ANY PROBLEMS ON THIS QUESTIONNAIRE, HOW DIFFICULT HAVE THESE PROBLEMS MADE IT FOR YOU TO DO YOUR WORK, TAKE CARE OF THINGS AT HOME, OR GET ALONG WITH OTHER PEOPLE: NOT DIFFICULT AT ALL
1. FEELING NERVOUS, ANXIOUS, OR ON EDGE: NOT AT ALL

## 2021-01-22 ASSESSMENT — ANXIETY QUESTIONNAIRES: GAD7 TOTAL SCORE: 0

## 2021-01-25 DIAGNOSIS — Z12.11 SPECIAL SCREENING FOR MALIGNANT NEOPLASMS, COLON: ICD-10-CM

## 2021-01-25 LAB — HEMOCCULT STL QL IA: NEGATIVE

## 2021-01-25 PROCEDURE — 82274 ASSAY TEST FOR BLOOD FECAL: CPT | Performed by: NURSE PRACTITIONER

## 2021-01-29 ENCOUNTER — IMMUNIZATION (OUTPATIENT)
Dept: PEDIATRICS | Facility: CLINIC | Age: 76
End: 2021-01-29
Payer: COMMERCIAL

## 2021-01-29 PROCEDURE — 91300 PR COVID VAC PFIZER DIL RECON 30 MCG/0.3 ML IM: CPT

## 2021-01-29 PROCEDURE — 0001A PR COVID VAC PFIZER DIL RECON 30 MCG/0.3 ML IM: CPT

## 2021-02-01 ENCOUNTER — OFFICE VISIT (OUTPATIENT)
Dept: DERMATOLOGY | Facility: CLINIC | Age: 76
End: 2021-02-01
Payer: COMMERCIAL

## 2021-02-01 DIAGNOSIS — D18.01 CHERRY ANGIOMA: ICD-10-CM

## 2021-02-01 DIAGNOSIS — L72.3 SEBACEOUS CYST: ICD-10-CM

## 2021-02-01 DIAGNOSIS — D22.9 MULTIPLE BENIGN NEVI: ICD-10-CM

## 2021-02-01 DIAGNOSIS — L81.4 SOLAR LENTIGO: Primary | ICD-10-CM

## 2021-02-01 DIAGNOSIS — L82.1 SEBORRHEIC KERATOSIS: ICD-10-CM

## 2021-02-01 DIAGNOSIS — L72.0 EIC (EPIDERMAL INCLUSION CYST): ICD-10-CM

## 2021-02-01 PROCEDURE — 99213 OFFICE O/P EST LOW 20 MIN: CPT | Performed by: DERMATOLOGY

## 2021-02-01 NOTE — PROGRESS NOTES
[facility] Dermatology Note  Encounter Date: Feb 1, 2021  Office Visit     Dermatology Problem List:  1. AK s/p cryo  2. Inflamed SK s/p cryo  3. EIC, R paraspinal back  - patient to follow up for removal PRN  4. Xerosis cutis  -current t/x: gentle skin care  5. Family history of skin cancer  -Mother and brother    ____________________________________________    Assessment & Plan:    # Benign lesions: Multiple benign nevi, solar lentigos, seborrheic keratoses, cherry angiomas. Explained to patient benign nature of lesion. No treatment is necessary at this time unless the lesion changes or becomes symptomatic.   - ABCDs of melanoma were discussed and self skin checks were advised.  - Sun precaution was advised including the use of sun screens of SPF 30 or higher, sun protective clothing, and avoidance of tanning beds.    # EIC, left lower back   - We discussed removal of the lesion via elliptical excision. Patient will consider this, and will schedule an appointment if interested in removal.   - Photodocumentation completed today.    Procedures Performed:   None.    Follow-up: 1 year(s) in-person, or earlier for new or changing lesions    Staff and Scribe:     Scribe Disclosure:   I, Nic Huang, am serving as a scribe to document services personally performed by this physician, Dr. Pb Lewis, based on data collection and the provider's statements to me.     Provider Disclosure:   The documentation recorded by the scribe accurately reflects the services I personally performed and the decisions made by me.    Pb Lewis MD    Department of Dermatology  [facility]: Phone: [phone], Fax:[phone]  [facility]: Phone: [phone] Fax: [phone]    ____________________________________________    CC: Derm Problem (annual skin check, Sebaceous cyst on lower  back, no personal history of skin cancer, skin cancer history in mother and brother)    HPI:  Ms. Cely Ontiveros is a(n) 75 year old female who presents today as a return patient for a skin check.    Last seen 01/27/20 for a skin check when 3 ISKs and 1 AK was treated with cryo. An EIC was noted on the right upper abdomen and patient was instructed to follow up for removal.     Today, she reports a cyst on her lower back. She had this evaluated by her PCP recently as well. She is not currently taking any antibiotics for this. She had a similar cyst at her previous visit, and notes it has since resolved.    The patient otherwise denies any new or concerning lesions. No bleeding, painful, pruritic, or changing lesions. They report no personal history of skin cancer. There is  family history of skin cancer in her mother and brother. No history of immunosuppression. No history of indoor tanning. They do use sunscreen and protective clothing when outdoors for sun protection. No occupational exposure to ultraviolet light or other forms of radiation. Patient is a retired . Health otherwise stable. No other skin concerns.     Patient is otherwise feeling well, without additional concerns.    Labs:  NA    Physical Exam:  Vitals: LMP  (LMP Unknown)   SKIN: Total skin excluding the undergarment areas was performed. The exam included the head/face, neck, both arms, chest, back, abdomen, both legs, buttock, digits and/or nails.   - Patient is wearing a bra.  - Patient declines genital exam.  - There is a raised dome shaped nodule with a central punctum located on right paraspinal back  - There are dome shaped bright red papules on the trunk and extremities.   - Multiple regular brown pigmented macules and papules are identified on the trunk and extremities.   - Scattered brown macules on sun exposed areas.  - There are waxy stuck on tan to brown papules on the trunk and extremities  - No other lesions of concern  on areas examined.         Medications:  Current Outpatient Medications   Medication     citalopram (CELEXA) 20 MG tablet     doxycycline hyclate (VIBRAMYCIN) 100 MG capsule     fluticasone (FLONASE) 50 MCG/ACT nasal spray     loratadine-pseudoePHEDrine (EQL ALLERGY/CONGESTION RELIEF)  MG 24 hr tablet     valACYclovir (VALTREX) 500 MG tablet     No current facility-administered medications for this visit.       Past Medical History:   Patient Active Problem List   Diagnosis     Allergic rhinitis     Herpes simplex     CARDIOVASCULAR SCREENING; LDL GOAL LESS THAN 160     Degenerative joint disease     Posterior vitreous detachment of both eyes     Seasonal allergic rhinitis     Hypermetropia     Astigmatism with presbyopia     Blepharitis of both eyes     Epiphora     Chronic otitis externa of left ear, unspecified type     Dysthymic disorder     Meibomian gland dysfunction     Chondromalacia of patella, left     Complex tear of medial meniscus of left knee as current injury, subsequent encounter     Pseudophakia, Yag Caps, of both eyes     Primary osteoarthritis of left knee     S/P left knee arthroscopy     Epiretinal membrane, mild, of left eye     Posterior capsular opacification visually significant of right eye     Past Medical History:   Diagnosis Date     Actinic keratosis      Allergic rhinitis      Cataract      Degenerative joint disease     cervical disease     Depression with anxiety      Herpes simplex      Hypoglycemia     eats small meals and is fine     Impingement syndrome, shoulder         CC AUDREY Lockett CNP  [address] [place] [address] on close of this encounter.

## 2021-02-01 NOTE — NURSING NOTE
Cely Ontiveros's goals for this visit include:   Chief Complaint   Patient presents with     Derm Problem     annual skin check, Sebaceous cyst on lower back, no personal history of skin cancer, skin cancer history in mother and brother       She requests these members of her care team be copied on today's visit information:     PCP: Mara Varma    Referring Provider:  Mara Varma, AUDREY CNP  [address] [place] [address]    LMP  (LMP Unknown)     Do you need any medication refills at today's visit? No  Karla Almendarez, [facility] [address]

## 2021-02-01 NOTE — LETTER
2/1/2021         RE: Cely Ontiveros  [address] [place] [address]        Dear Colleague,    Thank you for referring your patient, Cely Ontiveros, to the [facility]. Please see a copy of my visit note below.    [facility] Dermatology Note  Encounter Date: Feb 1, 2021  Office Visit     Dermatology Problem List:  1. AK s/p cryo  2. Inflamed SK s/p cryo  3. EIC, R paraspinal back  - patient to follow up for removal PRN  4. Xerosis cutis  -current t/x: gentle skin care  5. Family history of skin cancer  -Mother and brother    ____________________________________________    Assessment & Plan:    # Benign lesions: Multiple benign nevi, solar lentigos, seborrheic keratoses, cherry angiomas. Explained to patient benign nature of lesion. No treatment is necessary at this time unless the lesion changes or becomes symptomatic.   - ABCDs of melanoma were discussed and self skin checks were advised.  - Sun precaution was advised including the use of sun screens of SPF 30 or higher, sun protective clothing, and avoidance of tanning beds.    # EIC, left lower back   - We discussed removal of the lesion via elliptical excision. Patient will consider this, and will schedule an appointment if interested in removal.   - Photodocumentation completed today.    Procedures Performed:   None.    Follow-up: 1 year(s) in-person, or earlier for new or changing lesions    Staff and Scribe:     Scribe Disclosure:   I, Nic Huang, am serving as a scribe to document services personally performed by this physician, Dr. Pb Lewis, based on data collection and the provider's statements to me.     Provider Disclosure:   The documentation recorded by the scribe accurately reflects the services I personally performed and the decisions made by me.    Pb Lewis MD    Department of Dermatology  [facility]  Clinics: Phone: [phone], Fax:[phone]  [facility]: Phone: [phone] Fax: [phone]    ____________________________________________    CC: Derm Problem (annual skin check, Sebaceous cyst on lower back, no personal history of skin cancer, skin cancer history in mother and brother)    HPI:  Ms. Cely Ontiveros is a(n) 75 year old female who presents today as a return patient for a skin check.    Last seen 01/27/20 for a skin check when 3 ISKs and 1 AK was treated with cryo. An EIC was noted on the right upper abdomen and patient was instructed to follow up for removal.     Today, she reports a cyst on her lower back. She had this evaluated by her PCP recently as well. She is not currently taking any antibiotics for this. She had a similar cyst at her previous visit, and notes it has since resolved.    The patient otherwise denies any new or concerning lesions. No bleeding, painful, pruritic, or changing lesions. They report no personal history of skin cancer. There is  family history of skin cancer in her mother and brother. No history of immunosuppression. No history of indoor tanning. They do use sunscreen and protective clothing when outdoors for sun protection. No occupational exposure to ultraviolet light or other forms of radiation. Patient is a retired . Health otherwise stable. No other skin concerns.     Patient is otherwise feeling well, without additional concerns.    Labs:  NA    Physical Exam:  Vitals: LMP  (LMP Unknown)   SKIN: Total skin excluding the undergarment areas was performed. The exam included the head/face, neck, both arms, chest, back, abdomen, both legs, buttock, digits and/or nails.   - Patient is wearing a bra.  - Patient declines genital exam.  - There is a raised dome shaped nodule with a central punctum located on right paraspinal back  - There are dome shaped bright red papules on the trunk and extremities.    - Multiple regular brown pigmented macules and papules are identified on the trunk and extremities.   - Scattered brown macules on sun exposed areas.  - There are waxy stuck on tan to brown papules on the trunk and extremities  - No other lesions of concern on areas examined.         Medications:  Current Outpatient Medications   Medication     citalopram (CELEXA) 20 MG tablet     doxycycline hyclate (VIBRAMYCIN) 100 MG capsule     fluticasone (FLONASE) 50 MCG/ACT nasal spray     loratadine-pseudoePHEDrine (EQL ALLERGY/CONGESTION RELIEF)  MG 24 hr tablet     valACYclovir (VALTREX) 500 MG tablet     No current facility-administered medications for this visit.       Past Medical History:   Patient Active Problem List   Diagnosis     Allergic rhinitis     Herpes simplex     CARDIOVASCULAR SCREENING; LDL GOAL LESS THAN 160     Degenerative joint disease     Posterior vitreous detachment of both eyes     Seasonal allergic rhinitis     Hypermetropia     Astigmatism with presbyopia     Blepharitis of both eyes     Epiphora     Chronic otitis externa of left ear, unspecified type     Dysthymic disorder     Meibomian gland dysfunction     Chondromalacia of patella, left     Complex tear of medial meniscus of left knee as current injury, subsequent encounter     Pseudophakia, Yag Caps, of both eyes     Primary osteoarthritis of left knee     S/P left knee arthroscopy     Epiretinal membrane, mild, of left eye     Posterior capsular opacification visually significant of right eye     Past Medical History:   Diagnosis Date     Actinic keratosis      Allergic rhinitis      Cataract      Degenerative joint disease     cervical disease     Depression with anxiety      Herpes simplex      Hypoglycemia     eats small meals and is fine     Impingement syndrome, shoulder         CC Mara Varma, APRN CNP  [address] [place] [address] on close of this encounter.      Again, thank you for allowing me to  participate in the care of your patient.        Sincerely,        Pb Lewis MD

## 2021-02-08 DIAGNOSIS — J31.0 CHRONIC RHINITIS: ICD-10-CM

## 2021-02-09 RX ORDER — LORATADINE AND PSEUDOEPHEDRINE 10; 240 MG/1; MG/1
1 TABLET, FILM COATED, EXTENDED RELEASE ORAL DAILY
Qty: 90 TABLET | Refills: 0 | Status: SHIPPED | OUTPATIENT
Start: 2021-02-09 | End: 2021-04-28

## 2021-02-09 NOTE — TELEPHONE ENCOUNTER
Routing refill request to provider for review/approval because:  Drug not on the [facility] refill protocol     Luisa PUGAN, RN

## 2021-02-19 ENCOUNTER — IMMUNIZATION (OUTPATIENT)
Dept: PEDIATRICS | Facility: CLINIC | Age: 76
End: 2021-02-19
Attending: INTERNAL MEDICINE
Payer: COMMERCIAL

## 2021-02-19 PROCEDURE — 91300 PR COVID VAC PFIZER DIL RECON 30 MCG/0.3 ML IM: CPT

## 2021-02-19 PROCEDURE — 0002A PR COVID VAC PFIZER DIL RECON 30 MCG/0.3 ML IM: CPT

## 2021-04-28 DIAGNOSIS — J31.0 CHRONIC RHINITIS: ICD-10-CM

## 2021-04-28 RX ORDER — LORATADINE AND PSEUDOEPHEDRINE 10; 240 MG/1; MG/1
1 TABLET, FILM COATED, EXTENDED RELEASE ORAL DAILY
Qty: 90 TABLET | Refills: 0 | Status: SHIPPED | OUTPATIENT
Start: 2021-04-28 | End: 2021-08-04

## 2021-05-02 DIAGNOSIS — J31.0 CHRONIC RHINITIS: ICD-10-CM

## 2021-05-04 RX ORDER — LORATADINE AND PSEUDOEPHEDRINE 10; 240 MG/1; MG/1
1 TABLET, FILM COATED, EXTENDED RELEASE ORAL DAILY
Qty: 90 TABLET | Refills: 0 | OUTPATIENT
Start: 2021-05-04

## 2021-05-07 ENCOUNTER — TELEPHONE (OUTPATIENT)
Dept: FAMILY MEDICINE | Facility: CLINIC | Age: 76
End: 2021-05-07

## 2021-05-07 DIAGNOSIS — E78.5 HYPERLIPIDEMIA LDL GOAL <100: ICD-10-CM

## 2021-05-07 LAB
CHOLEST SERPL-MCNC: 213 MG/DL
HDLC SERPL-MCNC: 90 MG/DL
LDLC SERPL CALC-MCNC: 112 MG/DL
NONHDLC SERPL-MCNC: 123 MG/DL
TRIGL SERPL-MCNC: 56 MG/DL

## 2021-05-07 PROCEDURE — 36415 COLL VENOUS BLD VENIPUNCTURE: CPT | Performed by: NURSE PRACTITIONER

## 2021-05-07 PROCEDURE — 80061 LIPID PANEL: CPT | Performed by: NURSE PRACTITIONER

## 2021-05-07 NOTE — TELEPHONE ENCOUNTER
Reason for Call:  Medication or medication refill:    Do you use a [facility]?  Name of the pharmacy and phone number for the current request: [facility]     Name of the medication requested: CLARITIN     Other request: PT WOULD LIKE A YEAR SUPPLY     Can we leave a detailed message on this number? YES    Phone number patient can be reached at: Home number on file [phone] (home)    Best Time: ANY    Call taken on 5/7/2021 at 8:28 AM by Jordyn Vale

## 2021-05-07 NOTE — TELEPHONE ENCOUNTER
This writer attempted to contact Cely on 05/07/21      Reason for call clarify medication request and left detailed message.      If patient calls back:   Clarify medication refill- on 04/28/21 received 90 day supply of loratidinne-pseudophedrine too soon to refill. If looking for a different medication would need provider to order instead.         Christine Valdovinos RN

## 2021-05-10 DIAGNOSIS — Z91.89 FRAMINGHAM CARDIAC RISK >20% IN NEXT 10 YEARS: Primary | ICD-10-CM

## 2021-05-10 RX ORDER — ATORVASTATIN CALCIUM 20 MG/1
20 TABLET, FILM COATED ORAL DAILY
Qty: 90 TABLET | Refills: 1 | Status: SHIPPED | OUTPATIENT
Start: 2021-05-10 | End: 2021-12-28

## 2021-05-10 NOTE — TELEPHONE ENCOUNTER
This writer attempted to contact patient on 05/10/21      Reason for call clarify med request and left detailed message.      If patient calls back:   90 day supply of Loratadine-pseudoephedrine was refilled on 4/28/2021. Too soon to refill now.         America Sims RN

## 2021-05-12 NOTE — TELEPHONE ENCOUNTER
This writer attempted to contact patient on 05/12/21      Reason for call clarify med request and left detailed message.      If patient calls back:   90 day supply of Loratadine-pseudoephedrine was refilled on 4/28/2021. Too soon to refill now.         America Sims RN

## 2021-05-26 NOTE — TELEPHONE ENCOUNTER
Appointment rescheduled   Problem: Mobility Impaired (Adult and Pediatric)  Goal: *Acute Goals and Plan of Care (Insert Text)  Description: FUNCTIONAL STATUS PRIOR TO ADMISSION: Patient was independent and active without use of DME. Pt takes care of her sister with ALS. HOME SUPPORT PRIOR TO ADMISSION: The patient lived with sister - pt primary caregiver to sister. Pt's will have assistance of other family members at discharge, other sisters, niece. Physical Therapy Goals  Initiated 5/26/2021    1. Patient will move from supine to sit and sit to supine  and scoot up and down in bed with modified independence within 4 days. 2. Patient will perform sit to stand with modified independence within 4 days. 3. Patient will ambulate with modified independence for > 150 feet with the least restrictive device within 4 days. 4. Patient will ascend/descend 4 stairs with one handrail(s) with modified independence within 4 days. 5. Patient will perform THR home exercise program per protocol with supervision/set-up within 4 days. PHYSICAL THERAPY EVALUATION  Patient: Joaquín Tripp (58 y.o. female)  Date: 5/26/2021  Primary Diagnosis: Primary localized osteoarthritis of left hip [M16.12]  Procedure(s) (LRB):  LEFT TOTAL HIP ARTHROPLASTY (Left) Day of Surgery   Precautions:   PWB (50% Left leg)    ASSESSMENT  Based on the objective data described below, the patient presents POD # 0 Left NIRU with decreased AROM/strength and function left leg - left ankle especially - decreased ankle pump and instability of left ankle and knee in standing; decreased activity tolerance, decline in functional mobility and impaired standing balance/gait with assistive device. Patient did well mobilizing OOB with stable VS - however - limited by instability of left ankle/knee - therapist provided external support and pt was able to safely transfer to [facility] and take steps to [facility] with assist x 2.   Anticipate pt will require 2-3 PT sessions to clear for loratadine-pseudoePHEDrine (EQL ALLERGY/CONGESTION RELIEF)  MG 24 hr tablet 90 tablet 0 4/28/2021        discharge from PT standpoint. Current Level of Function Impacting Discharge (mobility/balance): Mod assist x 2 for bed mobility, sit to stand CGA to min assist, Amb 5 feet with CGA x 2 for safety secondary to knee buckling    Functional Outcome Measure: The patient scored 50/100 on the Barthel Index outcome measure. Other factors to consider for discharge: Pt will have assistance in home at discharge - family members     Patient will benefit from skilled therapy intervention to address the above noted impairments. PLAN :  Recommendations and Planned Interventions: bed mobility training, transfer training, gait training, therapeutic exercises, patient and family training/education, and therapeutic activities      Frequency/Duration: Patient will be followed by physical therapy:  twice daily to address goals. Recommendation for discharge: (in order for the patient to meet his/her long term goals)  Physical therapy at least 2 days/week in the home     This discharge recommendation:  Has been made in collaboration with the attending provider and/or case management    IF patient discharges home will need the following DME: patient owns DME required for discharge         SUBJECTIVE:   Patient stated I would like to go home tomorrow if it's safe.     OBJECTIVE DATA SUMMARY:   HISTORY:    Past Medical History:   Diagnosis Date    Arthritis     DDD BACK PAIN    Chest pain, atypical     Chronic pain     HIP    Diverticulitis of colon     Fibrocystic disease of breast     GERD (gastroesophageal reflux disease)     Hypercholesterolemia     Hypertension     IBS (irritable bowel syndrome)     Menopause 1968    Neuropathy     Ovarian cyst      Past Surgical History:   Procedure Laterality Date    ENDOSCOPY, COLON, DIAGNOSTIC  10/22/2010    5 yr fu, [facility]    HX APPENDECTOMY      HX COLONOSCOPY  10/20/2016    Dr. Elliot Plummer - 5 yr f/u    HX DILATION AND CURETTAGE      HX TONSILLECTOMY         Personal factors and/or comorbidities impacting plan of care: as above    Home Situation  Home Environment: Private residence  # Steps to Enter: 4 (down)  One/Two Story Residence: One story  Living Alone: Yes  Support Systems: Family member(s)  Patient Expects to be Discharged to[de-identified] Private residence  Current DME Used/Available at Home: Cane, straight, Walker, rolling, Raised toilet seat, Grab bars  Tub or Shower Type: Shower    EXAMINATION/PRESENTATION/DECISION MAKING:   Critical Behavior:  Neurologic State: Alert  Orientation Level: Oriented X4  Cognition: Appropriate decision making, Appropriate safety awareness  Safety/Judgement: Awareness of environment, Good awareness of safety precautions  Skin:  Left hip dressing in place - well adhered - no drainage noted    Range Of Motion:  AROM: Within functional limits (except left leg)                       Strength:    Strength: Within functional limits (except left leg)                    Tone & Sensation:   Tone: Normal              Sensation: Intact               Coordination:  Coordination: Within functional limits  Functional Mobility:  Bed Mobility:     Supine to Sit: Moderate assistance;Assist x2  Sit to Supine: Minimum assistance;Assist x1  Scooting: Stand-by assistance; Additional time  Transfers:  Sit to Stand: Minimum assistance;Assist x2  Stand to Sit: Minimum assistance;Assist x2  Stand Pivot Transfers: Minimum assistance;Assist x2; Additional time                    Balance:   Sitting: Intact; Without support  Standing: Impaired; With support  Standing - Static: Fair;Constant support  Standing - Dynamic : Fair;Constant support  Ambulation/Gait Training:  Distance (ft): 5 Feet (ft) (side stepping to [facility])  Assistive Device: Walker, rolling;Gait belt  Ambulation - Level of Assistance: Contact guard assistance;Assist x2 (occ knee slight buckle)        Gait Abnormalities: Decreased step clearance (Left knee buckling)  Right Side Weight Bearing: Full  Left Side Weight Bearing: Partial (%) (50 %)  Base of Support: Center of gravity altered;Shift to right  Stance: Left decreased  Speed/Genia: Slow  Step Length: Right shortened;Left shortened  Swing Pattern: Left asymmetrical       Therapeutic Exercises:   Pt instructed and performed ankle pumps and demonstrated proper use of incentive spirometer - instructed to perform 10 times once hr when awake. Also instructed to perform as tolerated once hr - 3-5 reps gentle heel slides, quad sets and gluteal sets. Written instructions provided on pt's communication board. Functional Measure:  Barthel Index:    Bathin  Bladder: 5  Bowels: 10  Groomin  Dressin  Feeding: 10  Mobility: 0  Stairs: 0  Toilet Use: 5  Transfer (Bed to Chair and Back): 10  Total: 50/100       The Barthel ADL Index: Guidelines  1. The index should be used as a record of what a patient does, not as a record of what a patient could do. 2. The main aim is to establish degree of independence from any help, physical or verbal, however minor and for whatever reason. 3. The need for supervision renders the patient not independent. 4. A patient's performance should be established using the best available evidence. Asking the patient, friends/relatives and nurses are the usual sources, but direct observation and common sense are also important. However direct testing is not needed. 5. Usually the patient's performance over the preceding 24-48 hours is important, but occasionally longer periods will be relevant. 6. Middle categories imply that the patient supplies over 50 per cent of the effort. 7. Use of aids to be independent is allowed. Abelardo Williamson., Barthel, D.W. (2030). Functional evaluation: the Barthel Index. [facility] (14)2. William Triana chandan ABI Eaton, Divya Johnson., Milvia Araujo., [place], [address] [place], [place] [address]e (). Measuring the change indisability after inpatient rehabilitation; comparison of the responsiveness of the Barthel Index and Functional [place] Measure. Journal of Neurology, Neurosurgery, and Psychiatry, 66(4), 707-374. CATHERINE Mar.A, FRITZ Ruvalcaba, & Bk Rendon M.A. (2004.) Assessment of post-stroke quality of life in cost-effectiveness studies: The usefulness of the Barthel Index and the EuroQoL-5D. Quality of Life Research, 15, 817-92           Physical Therapy Evaluation Charge Determination   History Examination Presentation Decision-Making   LOW Complexity : Zero comorbidities / personal factors that will impact the outcome / POC LOW Complexity : 1-2 Standardized tests and measures addressing body structure, function, activity limitation and / or participation in recreation  LOW Complexity : Stable, uncomplicated  LOW Complexity : FOTO score of       Based on the above components, the patient evaluation is determined to be of the following complexity level: LOW     Pain Rating:  Pt reports no pain left hip. Activity Tolerance:   Fair - tolerated OOB with stable VS - however limited secondary to decreased sensation/motor function and control of left ankle/knee. After treatment patient left in no apparent distress:   Supine in bed, Call bell within reach, Caregiver / family present, and Ice applied to left hip. COMMUNICATION/EDUCATION:   The patients plan of care was discussed with: Registered nurse. Fall prevention education was provided and the patient/caregiver indicated understanding., Patient/family have participated as able in goal setting and plan of care. , and Patient/family agree to work toward stated goals and plan of care.     Thank you for this referral.  Becka Najera, PT   Time Calculation: 25 mins

## 2021-07-01 ENCOUNTER — OFFICE VISIT (OUTPATIENT)
Dept: ORTHOPEDICS | Facility: CLINIC | Age: 76
End: 2021-07-01
Payer: COMMERCIAL

## 2021-07-01 ENCOUNTER — TELEPHONE (OUTPATIENT)
Dept: FAMILY MEDICINE | Facility: CLINIC | Age: 76
End: 2021-07-01

## 2021-07-01 VITALS — WEIGHT: 148 LBS | DIASTOLIC BLOOD PRESSURE: 71 MMHG | SYSTOLIC BLOOD PRESSURE: 136 MMHG | BODY MASS INDEX: 25.21 KG/M2

## 2021-07-01 DIAGNOSIS — M85.80 OSTEOPENIA, UNSPECIFIED LOCATION: Primary | ICD-10-CM

## 2021-07-01 DIAGNOSIS — N95.9 UNSPECIFIED MENOPAUSAL AND PERIMENOPAUSAL DISORDER: ICD-10-CM

## 2021-07-01 DIAGNOSIS — M17.0 BILATERAL PRIMARY OSTEOARTHRITIS OF KNEE: Primary | ICD-10-CM

## 2021-07-01 DIAGNOSIS — E28.39 OVARIAN DEFICIENCY: ICD-10-CM

## 2021-07-01 PROCEDURE — 99207 PR DROP WITH A PROCEDURE: CPT | Performed by: FAMILY MEDICINE

## 2021-07-01 PROCEDURE — 20610 DRAIN/INJ JOINT/BURSA W/O US: CPT | Mod: 50 | Performed by: FAMILY MEDICINE

## 2021-07-01 RX ORDER — TRIAMCINOLONE ACETONIDE 40 MG/ML
40 INJECTION, SUSPENSION INTRA-ARTICULAR; INTRAMUSCULAR
Status: DISCONTINUED | OUTPATIENT
Start: 2021-07-01 | End: 2024-01-24

## 2021-07-01 RX ADMIN — TRIAMCINOLONE ACETONIDE 40 MG: 40 INJECTION, SUSPENSION INTRA-ARTICULAR; INTRAMUSCULAR at 09:46

## 2021-07-01 NOTE — LETTER
7/1/2021         RE: Cely Ontiveros  [address] [place] [address]        Dear Colleague,    Thank you for referring your patient, Cely Ontiveros, to the [facility] [place]. Please see a copy of my visit note below.          [facility]  7/1/2021    Cely Ontiveros's chief complaint for this visit includes:  Chief Complaint   Patient presents with     Consult     bilateral knee pain, would like to diuscss cortisone injections, last injection was 2019      PCP: Mara Varma    Referring Provider:  Referred Self, MD  No address on file    /71   Wt 67.1 kg (148 lb)   LMP  (LMP Unknown)   BMI 25.21 kg/m    Data Unavailable       Reason for visit:     What part of your body is injured / painful?  bilateral knee    What caused the injury /pain? No inciting event     How long ago did your injury occur or pain begin? problem is longstanding    What are your most bothersome symptoms? Pain    How would you characterize your symptom?  aching    What makes your symptoms better? Rest    What makes your symptoms worse? Movement    Have you been previously seen for this problem? Yes, Dr. Clark    Medical History:    Any recent changes to your medical history? No    Any new medication prescribed since last visit? No    Have you had surgery on this body part before? Yes, 2019     Social History:    Occupation: Retired     Review of Systems:    Do you have fever, chills, weight loss? No    Do you have any vision problems? No    Do you have any chest pain or edema? No    Do you have any shortness of breath or wheezing?  No    Do you have stomach problems? No    Do you have any urinary track issues? No    Do you have any numbness or focal weakness? No    Do you have diabetes? No    Do you have problems with bleeding or clotting? No    Do you have an rashes or other skin lesions? No       CHIEF COMPLAINT:  Consult (bilateral knee pain, would  like to elaine cortisone injections, last injection was 2019 )       HISTORY OF PRESENT ILLNESS  Ms. Ontiveros is a pleasant 75 year old female who presents to clinic today with bilateral knee pain.  Cely has a known history of knee osteoarthritis, she has had a corticosteroid injection in the past with some relief.  Her knees have been generally painful for months, she feels this at the end of a long day, whenever she goes on a long walk.  Both knees hurt about equally, sometimes they do feel swollen.  She has tried physical therapy in the past, this was intermittently helpful.        Additional history: as documented    MEDICAL HISTORY  Patient Active Problem List   Diagnosis     Allergic rhinitis     Herpes simplex     CARDIOVASCULAR SCREENING; LDL GOAL LESS THAN 160     Degenerative joint disease     Posterior vitreous detachment of both eyes     Seasonal allergic rhinitis     Hypermetropia     Astigmatism with presbyopia     Blepharitis of both eyes     Epiphora     Chronic otitis externa of left ear, unspecified type     Dysthymic disorder     Meibomian gland dysfunction     Chondromalacia of patella, left     Complex tear of medial meniscus of left knee as current injury, subsequent encounter     Pseudophakia, Yag Caps, of both eyes     Primary osteoarthritis of left knee     S/P left knee arthroscopy     Epiretinal membrane, mild, of left eye     Posterior capsular opacification visually significant of right eye       Current Outpatient Medications   Medication Sig Dispense Refill     atorvastatin (LIPITOR) 20 MG tablet Take 1 tablet (20 mg) by mouth daily 90 tablet 1     citalopram (CELEXA) 20 MG tablet Take 1 tablet (20 mg) by mouth daily 90 tablet 3     doxycycline hyclate (VIBRAMYCIN) 100 MG capsule        fluticasone (FLONASE) 50 MCG/ACT nasal spray Spray 2 sprays in nostril       loratadine-pseudoePHEDrine (EQL ALLERGY/CONGESTION RELIEF)  MG 24 hr tablet Take 1 tablet by mouth daily 90 tablet 0      valACYclovir (VALTREX) 500 MG tablet Take 1 tablet (500 mg) by mouth daily 90 tablet 3       Allergies   Allergen Reactions     Sulfa Drugs Swelling     Cats Swelling     Lips swollen     Wool Fiber        Family History   Problem Relation Age of Onset     Hypertension Mother      Arthritis Mother      Cardiovascular Mother      Circulatory Mother      Heart Disease Mother      Lipids Mother      Obesity Mother      Osteoporosis Mother      Cancer Mother         skin     Glaucoma Mother      Cancer - colorectal Father      Cancer Father      Macular Degeneration Father      Diabetes No family hx of      Cerebrovascular Disease No family hx of      Thyroid Disease No family hx of        Additional medical/Social/Surgical histories reviewed in [facility] and updated as appropriate.        PHYSICAL EXAM  /71   Wt 67.1 kg (148 lb)   LMP  (LMP Unknown)   BMI 25.21 kg/m    General  - normal appearance, in no obvious distress  HEENT  - conjunctivae not injected, moist mucous membranes  CV  - normal popliteal pulse  Pulm  - normal respiratory pattern, non-labored  Musculoskeletal - right and left knee  - palpation: medial and lateral joint line tenderness bilaterally, patella and patellar tendon non-tender, normal popliteal pulse  - special tests:  (-) Lachman  (-) Obed  (-) varus at 0 and 30 degrees flexion  (-) valgus at 0 and 30 degrees flexion  Neuro  - no sensory or motor deficit, grossly normal coordination, normal muscle tone  Skin  - no ecchymosis, erythema, warmth, or induration, no obvious rash  Psych  - interactive, appropriate, normal mood and affect                 ASSESSMENT & PLAN  Ms. Ontiveros is a 75 year old female who presents to clinic today with bilateral knee osteoarthritis.    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    Cely and I had a good discussion centering around the spectrum of  treatment options for osteoarthritis that preclude surgery.  We discussed nonoperative treatment with pain relievers, icing, low impact exercise, and weight loss.  We also talked about the role of injection therapy with corticosteroids and hyaluronic acid.    Cady is going to keep a close eye on how she feels after these injections.  If these injections do not bring her relief over the coming months we could consider hyaluronic acid treatment.    It was a pleasure seeing Cely today.    Luis Le DO, [facility]  Primary Care Sports Medicine      This note was constructed using Dragon dictation software, please excuse any minor errors in spelling, grammar, or syntax.    Large Joint Injection/Arthocentesis: bilateral knee    Date/Time: 7/1/2021 9:46 AM  Performed by: Luis Le DO  Authorized by: Luis Le DO     Indications:  Pain  Needle Size:  25 G  Approach:  Lateral  Location:  Knee  Laterality:  Bilateral      Medications (Right):  40 mg triamcinolone 40 MG/ML  Medications (Left):  40 mg triamcinolone 40 MG/ML  Outcome:  Tolerated well, no immediate complications  Procedure discussed: discussed risks, benefits, and alternatives    Consent Given by:  Patient  Timeout: timeout called immediately prior to procedure    Prep: patient was prepped and draped in usual sterile fashion         PROCEDURE    Knee Injections - Intraarticular    The patient was informed of the risks and the benefits of the procedure and a written consent was signed.    The patient s left knee was prepped with chlorhexidine in sterile fashion.   40 mg of triamcinolone suspension was drawn up into a 5 mL syringe with 4 mL of 1% lidocaine.  Injection was performed using substerile technique.  A 1.5-inch 22-gauge needle was used to enter the lateral aspect of the left knee.  Injection performed successfully without difficulty.  There were no complications. The patient tolerated the procedure well. There was negligible bleeding.      The patient's right knee was prepped with chlorhexidine in sterile fashion.   40 mg of triamcinolone suspension was drawn up into a 5 mL syringe with 4 mL of 1% lidocaine.  Injection was performed using substerile technique.  A 1.5-inch 22-gauge needle was used to enter the lateral aspect of the right knee.  Injection performed successfully without difficulty.  There were no complications. The patient tolerated the procedure well. There was negligible bleeding.                   Again, thank you for allowing me to participate in the care of your patient.        Sincerely,        Luis Le DO

## 2021-07-01 NOTE — PROGRESS NOTES
[facility]  7/1/2021    Cely Ontiveros's chief complaint for this visit includes:  Chief Complaint   Patient presents with     Consult     bilateral knee pain, would like to diuscss cortisone injections, last injection was 2019      PCP: Mara Varma    Referring Provider:  Referred Self, MD  No address on file    /71   Wt 67.1 kg (148 lb)   LMP  (LMP Unknown)   BMI 25.21 kg/m    Data Unavailable       Reason for visit:     What part of your body is injured / painful?  bilateral knee    What caused the injury /pain? No inciting event     How long ago did your injury occur or pain begin? problem is longstanding    What are your most bothersome symptoms? Pain    How would you characterize your symptom?  aching    What makes your symptoms better? Rest    What makes your symptoms worse? Movement    Have you been previously seen for this problem? Yes, Dr. Clark    Medical History:    Any recent changes to your medical history? No    Any new medication prescribed since last visit? No    Have you had surgery on this body part before? Yes, 2019     Social History:    Occupation: Retired     Review of Systems:    Do you have fever, chills, weight loss? No    Do you have any vision problems? No    Do you have any chest pain or edema? No    Do you have any shortness of breath or wheezing?  No    Do you have stomach problems? No    Do you have any urinary track issues? No    Do you have any numbness or focal weakness? No    Do you have diabetes? No    Do you have problems with bleeding or clotting? No    Do you have an rashes or other skin lesions? No       CHIEF COMPLAINT:  Consult (bilateral knee pain, would like to diuscss cortisone injections, last injection was 2019 )       HISTORY OF PRESENT ILLNESS  Ms. Ontiveros is a pleasant 75 year old female who presents to clinic today with bilateral knee pain.  Cely has a known history of knee osteoarthritis, she has had a corticosteroid  injection in the past with some relief.  Her knees have been generally painful for months, she feels this at the end of a long day, whenever she goes on a long walk.  Both knees hurt about equally, sometimes they do feel swollen.  She has tried physical therapy in the past, this was intermittently helpful.        Additional history: as documented    MEDICAL HISTORY  Patient Active Problem List   Diagnosis     Allergic rhinitis     Herpes simplex     CARDIOVASCULAR SCREENING; LDL GOAL LESS THAN 160     Degenerative joint disease     Posterior vitreous detachment of both eyes     Seasonal allergic rhinitis     Hypermetropia     Astigmatism with presbyopia     Blepharitis of both eyes     Epiphora     Chronic otitis externa of left ear, unspecified type     Dysthymic disorder     Meibomian gland dysfunction     Chondromalacia of patella, left     Complex tear of medial meniscus of left knee as current injury, subsequent encounter     Pseudophakia, Yag Caps, of both eyes     Primary osteoarthritis of left knee     S/P left knee arthroscopy     Epiretinal membrane, mild, of left eye     Posterior capsular opacification visually significant of right eye       Current Outpatient Medications   Medication Sig Dispense Refill     atorvastatin (LIPITOR) 20 MG tablet Take 1 tablet (20 mg) by mouth daily 90 tablet 1     citalopram (CELEXA) 20 MG tablet Take 1 tablet (20 mg) by mouth daily 90 tablet 3     doxycycline hyclate (VIBRAMYCIN) 100 MG capsule        fluticasone (FLONASE) 50 MCG/ACT nasal spray Spray 2 sprays in nostril       loratadine-pseudoePHEDrine (EQL ALLERGY/CONGESTION RELIEF)  MG 24 hr tablet Take 1 tablet by mouth daily 90 tablet 0     valACYclovir (VALTREX) 500 MG tablet Take 1 tablet (500 mg) by mouth daily 90 tablet 3       Allergies   Allergen Reactions     Sulfa Drugs Swelling     Cats Swelling     Lips swollen     Wool Fiber        Family History   Problem Relation Age of Onset     Hypertension  Mother      Arthritis Mother      Cardiovascular Mother      Circulatory Mother      Heart Disease Mother      Lipids Mother      Obesity Mother      Osteoporosis Mother      Cancer Mother         skin     Glaucoma Mother      Cancer - colorectal Father      Cancer Father      Macular Degeneration Father      Diabetes No family hx of      Cerebrovascular Disease No family hx of      Thyroid Disease No family hx of        Additional medical/Social/Surgical histories reviewed in [facility] and updated as appropriate.        PHYSICAL EXAM  /71   Wt 67.1 kg (148 lb)   LMP  (LMP Unknown)   BMI 25.21 kg/m    General  - normal appearance, in no obvious distress  HEENT  - conjunctivae not injected, moist mucous membranes  CV  - normal popliteal pulse  Pulm  - normal respiratory pattern, non-labored  Musculoskeletal - right and left knee  - palpation: medial and lateral joint line tenderness bilaterally, patella and patellar tendon non-tender, normal popliteal pulse  - special tests:  (-) Lachman  (-) Obed  (-) varus at 0 and 30 degrees flexion  (-) valgus at 0 and 30 degrees flexion  Neuro  - no sensory or motor deficit, grossly normal coordination, normal muscle tone  Skin  - no ecchymosis, erythema, warmth, or induration, no obvious rash  Psych  - interactive, appropriate, normal mood and affect                 ASSESSMENT & PLAN  Ms. Ontiveros is a 75 year old female who presents to clinic today with bilateral knee osteoarthritis.    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    Cely and I had a good discussion centering around the spectrum of treatment options for osteoarthritis that preclude surgery.  We discussed nonoperative treatment with pain relievers, icing, low impact exercise, and weight loss.  We also talked about the role of injection therapy with corticosteroids and hyaluronic acid.    Cady is going to keep  a close eye on how she feels after these injections.  If these injections do not bring her relief over the coming months we could consider hyaluronic acid treatment.    It was a pleasure seeing Cely today.    Luis Le DO, [facility]  Primary Care Sports Medicine      This note was constructed using Dragon dictation software, please excuse any minor errors in spelling, grammar, or syntax.    Large Joint Injection/Arthocentesis: bilateral knee    Date/Time: 7/1/2021 9:46 AM  Performed by: Luis Le DO  Authorized by: Luis Le DO     Indications:  Pain  Needle Size:  25 G  Approach:  Lateral  Location:  Knee  Laterality:  Bilateral      Medications (Right):  40 mg triamcinolone 40 MG/ML  Medications (Left):  40 mg triamcinolone 40 MG/ML  Outcome:  Tolerated well, no immediate complications  Procedure discussed: discussed risks, benefits, and alternatives    Consent Given by:  Patient  Timeout: timeout called immediately prior to procedure    Prep: patient was prepped and draped in usual sterile fashion         PROCEDURE    Knee Injections - Intraarticular    The patient was informed of the risks and the benefits of the procedure and a written consent was signed.    The patient s left knee was prepped with chlorhexidine in sterile fashion.   40 mg of triamcinolone suspension was drawn up into a 5 mL syringe with 4 mL of 1% lidocaine.  Injection was performed using substerile technique.  A 1.5-inch 22-gauge needle was used to enter the lateral aspect of the left knee.  Injection performed successfully without difficulty.  There were no complications. The patient tolerated the procedure well. There was negligible bleeding.     The patient's right knee was prepped with chlorhexidine in sterile fashion.   40 mg of triamcinolone suspension was drawn up into a 5 mL syringe with 4 mL of 1% lidocaine.  Injection was performed using substerile technique.  A 1.5-inch 22-gauge needle was used to enter the  lateral aspect of the right knee.  Injection performed successfully without difficulty.  There were no complications. The patient tolerated the procedure well. There was negligible bleeding.

## 2021-07-01 NOTE — TELEPHONE ENCOUNTER
Reason for Call: Request for an order or referral:    Order or referral being requested: Dexa Scan    Date needed: at your convenience    Has the patient been seen by the PCP for this problem? YES    Additional comments: Patient checked with insurance and now wants to schedule bone density test to f/u for her Osteoarthritis    Phone number Patient can be reached at:  Cell number on file:    Telephone Information:   Mobile [phone]       Best Time:  any    Can we leave a detailed message on this number?  YES    Call taken on 7/1/2021 at 12:29 PM by Edwin Claudio

## 2021-07-02 NOTE — TELEPHONE ENCOUNTER
Called and spoke to the patient and explained that the order has been placed. Patient understands and will call to schedule.  Leslie Louis [facility]  2nd Floor  Primary Care

## 2021-07-05 NOTE — TELEPHONE ENCOUNTER
"Copied staff message from Consuelo Ramos, Senior Intake Financial Couselor (please route back to her once this is addressed or send her a staff message)    \"Good Afternoon,     I am unsure of the provider pool number for BK provider. If someone can please route this to the provider covering for Mara that would be great.     After reviewing the medical policy for the dexa order. The dx code of Postmenopausal estrogen deficiency [Z78.0]  does not meet insurance guidelines.       Below is a list of medically necessary Dx codes per the Medicare coverage policy:     E21.0 Primary hyperparathyroidism   E21.3 Hyperparathyroidism, unspecified   E23.0 Hypopituitarism   E24.0 Pituitary-dependent Cushing's disease   E24.2 Drug-induced Cushing's syndrome   E24.3 Ectopic ACTH syndrome   E24.4 Alcohol-induced pseudo-Cushing's syndrome   E24.8 Other Cushing's syndrome   E24.9 Cushing's syndrome, unspecified   E28.310 Symptomatic premature menopause   E28.319 Asymptomatic premature menopause   E28.39 Other primary ovarian failure   E34.2 Ectopic hormone secretion, not elsewhere classified   E89.40 Asymptomatic postprocedural ovarian failure   E89.41 Symptomatic postprocedural ovarian failure   M84.68xA Pathological fracture in other disease, other site, initial encounter for fracture   M85.811 Other specified disorders of bone density and structure, right shoulder   M85.812 Other specified disorders of bone density and structure, left shoulder   M85.821 Other specified disorders of bone density and structure, right upper arm   M85.822 Other specified disorders of bone density and structure, left upper arm   M85.831 Other specified disorders of bone density and structure, right forearm   M85.832 Other specified disorders of bone density and structure, left forearm   M85.841 Other specified disorders of bone density and structure, right hand   M85.842 Other specified disorders of bone density and structure, left hand   M85.851 Other " specified disorders of bone density and structure, right thigh   M85.852 Other specified disorders of bone density and structure, left thigh   M85.861 Other specified disorders of bone density and structure, right lower leg   M85.862 Other specified disorders of bone density and structure, left lower leg   M85.871 Other specified disorders of bone density and structure, right ankle and foot   M85.872 Other specified disorders of bone density and structure, left ankle and foot   M85.88 Other specified disorders of bone density and structure, other site   M85.89 Other specified disorders of bone density and structure, multiple sites   N95.8 Other specified menopausal and perimenopausal disorders   N95.9 Unspecified menopausal and perimenopausal disorder   Q78.0 Osteogenesis imperfecta   Q96.0 Karyotype 45, X   Q96.1 Karyotype 46, X iso (Xq)   Q96.2 Karyotype 46, X with abnormal sex chromosome, except iso (Xq)   Q96.3 Mosaicism, 45, X/46, XX or XY   Q96.4 Mosaicism, 45, X/other cell line(s) with abnormal sex chromosome   Q96.8 Other variants of Ramirez's syndrome   S12.110A Anterior displaced Type II dens fracture, initial encounter for closed fracture   S12.110B Anterior displaced Type II dens fracture, initial encounter for open fracture   S12.111A Posterior displaced Type II dens fracture, initial encounter for closed fracture   S12.111B Posterior displaced Type II dens fracture, initial encounter for open fracture   S12.112A Nondisplaced Type II dens fracture, initial encounter for closed fracture   S12.112B Nondisplaced Type II dens fracture, initial encounter for open fracture   S12.120A Other displaced dens fracture, initial encounter for closed fracture   S12.120B Other displaced dens fracture, initial encounter for open fracture   S12.121A Other nondisplaced dens fracture, initial encounter for closed fracture   S12.121B Other nondisplaced dens fracture, initial encounter for open fracture   Z79.3 Long term  "(current) use of hormonal contraceptives   Z79.51 Long term (current) use of inhaled steroids   Z79.52 Long term (current) use of systemic steroids   Z79.811 Long-term (current) use of aramatose inhibitors   Z79.83 Long term (current) use of bisphosphonates   Z87.310 Personal history of (healed) osteoporosis fracture       Do any of these codes apply to this patient? If so, please add the new code as the primary Dx. The patient is scheduled on 7/13/2021.     Thank you for your attention on this,     Consuelo Ramos   Senior Intake Financial Counselor   [facility]   [id number] [place] [id number]   Ph: [phone]   Fax: [phone]\"   "

## 2021-08-04 DIAGNOSIS — J31.0 CHRONIC RHINITIS: ICD-10-CM

## 2021-08-04 RX ORDER — LORATADINE AND PSEUDOEPHEDRINE 10; 240 MG/1; MG/1
1 TABLET, FILM COATED, EXTENDED RELEASE ORAL DAILY
Qty: 90 TABLET | Refills: 0 | Status: SHIPPED | OUTPATIENT
Start: 2021-08-04 | End: 2021-09-07

## 2021-08-04 NOTE — TELEPHONE ENCOUNTER
loratadine-pseudoePHEDrine (EQL ALLERGY/CONGESTION RELIEF)  MG 24 hr tablet 90 tablet 0 4/28/2021     Patient requesting a 6 month supply.

## 2021-08-04 NOTE — TELEPHONE ENCOUNTER
Routing refill request to provider for review/approval because:  Drug not on the [facility] refill protocol   Juany Hernandez BSN, RN

## 2021-08-31 ENCOUNTER — OFFICE VISIT (OUTPATIENT)
Dept: OPTOMETRY | Facility: CLINIC | Age: 76
End: 2021-08-31
Payer: COMMERCIAL

## 2021-08-31 DIAGNOSIS — H52.4 ASTIGMATISM OF BOTH EYES WITH PRESBYOPIA: ICD-10-CM

## 2021-08-31 DIAGNOSIS — H04.123 DRY EYE SYNDROME OF BOTH EYES: ICD-10-CM

## 2021-08-31 DIAGNOSIS — H18.519 FUCHS ENDOTHELIAL CORNEAL DYSTROPHY TYPE 1: ICD-10-CM

## 2021-08-31 DIAGNOSIS — H52.03 HYPEROPIA OF BOTH EYES: ICD-10-CM

## 2021-08-31 DIAGNOSIS — H10.13 ALLERGIC CONJUNCTIVITIS OF BOTH EYES: ICD-10-CM

## 2021-08-31 DIAGNOSIS — H02.839 DERMATOCHALASIS OF EYELID: ICD-10-CM

## 2021-08-31 DIAGNOSIS — H52.203 ASTIGMATISM OF BOTH EYES WITH PRESBYOPIA: ICD-10-CM

## 2021-08-31 DIAGNOSIS — H02.889 MEIBOMIAN GLAND DYSFUNCTION: ICD-10-CM

## 2021-08-31 DIAGNOSIS — Z96.1 PSEUDOPHAKIA OF BOTH EYES: ICD-10-CM

## 2021-08-31 DIAGNOSIS — Z01.00 EXAMINATION OF EYES AND VISION: Primary | ICD-10-CM

## 2021-08-31 DIAGNOSIS — H35.372 EPIRETINAL MEMBRANE (ERM) OF LEFT EYE: ICD-10-CM

## 2021-08-31 PROCEDURE — 92015 DETERMINE REFRACTIVE STATE: CPT | Performed by: OPTOMETRIST

## 2021-08-31 PROCEDURE — 92014 COMPRE OPH EXAM EST PT 1/>: CPT | Performed by: OPTOMETRIST

## 2021-08-31 RX ORDER — OLOPATADINE HYDROCHLORIDE 2 MG/ML
1 SOLUTION/ DROPS OPHTHALMIC DAILY
Qty: 2.5 ML | Refills: 11 | Status: CANCELLED | OUTPATIENT
Start: 2021-08-31 | End: 2022-08-31

## 2021-08-31 ASSESSMENT — REFRACTION_WEARINGRX
OD_ADD: +2.75
OD_CYLINDER: +1.00
OD_SPHERE: -0.75
OS_SPHERE: -0.75
OD_AXIS: 015
OS_CYLINDER: +1.50
OS_AXIS: 015
OS_CYLINDER: +1.25
SPECS_TYPE: PAL
OD_SPHERE: -0.50
OS_ADD: +2.75
OS_AXIS: 002
OS_SPHERE: -0.50
OS_ADD: +2.50
SPECS_TYPE: PAL
OD_ADD: +2.50
OS_AXIS: 010
SPECS_TYPE: EXAM
OS_SPHERE: -0.50
OD_CYLINDER: +1.00
OS_ADD: +2.75
OD_AXIS: 030
OD_ADD: +2.75
OD_AXIS: 010
OD_CYLINDER: +0.75
OS_CYLINDER: +1.25
OD_SPHERE: -0.50

## 2021-08-31 ASSESSMENT — KERATOMETRY
OS_K2POWER_DIOPTERS: 43.75
OS_K1POWER_DIOPTERS: 42.75
OD_AXISANGLE2_DEGREES: 108
OD_K2POWER_DIOPTERS: 44.25
OS_AXISANGLE2_DEGREES: 84
OD_K1POWER_DIOPTERS: 43.25

## 2021-08-31 ASSESSMENT — VISUAL ACUITY
OS_CC: 20/40
OD_CC: 20/50
OD_CC: 20/40
OD_PH_CC: 20/40
METHOD: SNELLEN - LINEAR
OS_CC: 20/50
OS_PH_CC: 20/40
CORRECTION_TYPE: GLASSES

## 2021-08-31 ASSESSMENT — CUP TO DISC RATIO
OS_RATIO: 0.3
OD_RATIO: 0.2

## 2021-08-31 ASSESSMENT — CONF VISUAL FIELD
OS_NORMAL: 1
OD_NORMAL: 1

## 2021-08-31 ASSESSMENT — REFRACTION_MANIFEST
OS_ADD: +2.75
OD_ADD: +2.75
OS_AXIS: 180
OD_AXIS: 017
OD_CYLINDER: +1.50
OS_SPHERE: -1.00
OS_CYLINDER: +1.25
OD_SPHERE: -1.25

## 2021-08-31 ASSESSMENT — EXTERNAL EXAM - RIGHT EYE: OD_EXAM: NORMAL

## 2021-08-31 ASSESSMENT — SLIT LAMP EXAM - LIDS
COMMENTS: DERMATOCHALASIS, MEIBOMIAN GLAND DYSFUNCTION
COMMENTS: DERMATOCHALASIS, MEIBOMIAN GLAND DYSFUNCTION

## 2021-08-31 ASSESSMENT — EXTERNAL EXAM - LEFT EYE: OS_EXAM: NORMAL

## 2021-08-31 ASSESSMENT — TONOMETRY
IOP_METHOD: TONOPEN
OS_IOP_MMHG: 19
OD_IOP_MMHG: 18

## 2021-08-31 NOTE — PROGRESS NOTES
Chief Complaint   Patient presents with     Annual Eye Exam         Last Eye Exam: 02/06/2020  Dilated Previously: Yes, side effects of dilation explained today    What are you currently using to see?  glasses       Distance Vision Acuity: Noticed gradual change in both eyes    Near Vision Acuity: Satisfied with vision while reading  with glasses    Eye Comfort: good  Do you use eye drops? : Yes: OTC - unknown brand  Occupation or Hobbies:     History of cataract surgery with Dr. Ken Aguilera   Right eye-2-   Left eye-1-     Yag caps with Dr. Peter  Right eye-2/6/2020  Left eye-1/29/2020    Swapna Orozco - Optometric Assistant          Medical, surgical and family histories reviewed and updated 8/31/2021.       OBJECTIVE: See Ophthalmology exam    ASSESSMENT:    ICD-10-CM    1. Examination of eyes and vision  Z01.00 EYE EXAM (SIMPLE-NONBILLABLE)   2. Hyperopia of both eyes  H52.03 REFRACTION   3. Astigmatism of both eyes with presbyopia  H52.203 REFRACTION    H52.4    4. Pseudophakia, Yag Caps, of both eyes  Z96.1 EYE EXAM (SIMPLE-NONBILLABLE)   5. Meibomian gland dysfunction  H02.889 EYE EXAM (SIMPLE-NONBILLABLE)   6. Dry eye syndrome of both eyes  H04.123 EYE EXAM (SIMPLE-NONBILLABLE)   7. Epiretinal membrane (ERM) of left eye  H35.372    8. Allergic conjunctivitis of both eyes  H10.13    9. Dermatochalasis of eyelid  H02.839 Adult Eye Referral   10. Fuchs endothelial corneal dystrophy type 1  H18.519       PLAN:     Patient Instructions   Recommend new glasses.    Heat to the eyes daily for 10-15 minutes nightly with warm washcloth or reusable gel masks from the pharmacy or  PingSome heat masks can be purchased at [employer].    Ocusoft Hypochlor- spray solution onto cotton pad.  Close eyes and gently apply to eyelids and eyelashes using side to side motion.  Use morning and evening.    Artificial tears- 1 drop both eyes 2-4 x day.    You have an epiretinal membrane.  As we grow older, the thick  vitreous gel in the middle of our eyes begins to shrink and pull away from the macula. As the vitreous pulls away, scar tissue may develop on the macula. Sometimes the scar tissue can warp and contract, causing the retina to wrinkle or become swollen or distorted.  Monitor unless any new changes in vision.    Referral to Dr. Diego Vela at the [facility] for evaluation- [phone] if interested.    Monitor corneal dystrophy with yearly exams unless any changes in vision.    Return in 1 year for a complete eye exam or sooner if needed.    Brandt Richardson, OD

## 2021-08-31 NOTE — PATIENT INSTRUCTIONS
Recommend new glasses.    Heat to the eyes daily for 10-15 minutes nightly with warm washcloth or reusable gel masks from the pharmacy or  Wannyi heat masks can be purchased at [employer].    Ocusoft Hypochlor- spray solution onto cotton pad.  Close eyes and gently apply to eyelids and eyelashes using side to side motion.  Use morning and evening.    Artificial tears- 1 drop both eyes 2-4 x day.    You have an epiretinal membrane.  As we grow older, the thick vitreous gel in the middle of our eyes begins to shrink and pull away from the macula. As the vitreous pulls away, scar tissue may develop on the macula. Sometimes the scar tissue can warp and contract, causing the retina to wrinkle or become swollen or distorted.  Monitor unless any new changes in vision.    Referral to Dr. Diego Vela at the [facility] for evaluation- [phone] if interested.    Monitor corneal dystrophy with yearly exams unless any changes in vision.    Return in 1 year for a complete eye exam or sooner if needed.    Brandt Richardson, OD    The affects of the dilating drops last for 4- 6 hours.  You will be more sensitive to light and vision will be blurry up close.  Do not drive if you do not feel comfortable.  Mydriatic sunglasses were given if needed.    There is a combination of three treatments which can greatly improve symptoms of dry eyes.    1.  Artificial tears  2. Heat (eyes closed)  3. Eyelid and eyelash cleansing (eyes closed)     Use one drop of artificial tears both eyes 4 x daily.  Once in the morning, lunch, dinner and bedtime. Continue to use the drops regardless if your eyes are comfortable or not.  Artificial tears work best as a preventative and not as well after your eyes are starting to bother you.  It may take 4- 6 weeks of using the drops before you notice improvement.  If after that time you are still having problems schedule an appointment for an evaluation and discussion of different treatments such as Restasis  or Xiidra.  Dry eyes are a chronic condition and you may have more symptoms at certain times of the year.    Excess tearing can be due to the right tears not working properly or a blockage in the tear drainage system.  You can try using artificial tears 1 drop both eyes 4 x day.  If the excess tearing is bothersome after 4-6 weeks of treatment then we can send you for further testing.  This would entail a referral to our oculoplastic specialist Dr. Diego Vela at the [facility]-[phone].    Recommended brands are:    Systane Complete  Systane Ultra  Systane Balance  Refresh Advanced Optive  Refresh Relieva  Blink    Recommended brands for contact lens wearers are:    Systane contacts  Refresh contacts  Blink contacts    If you are using drops more than 4 x day or have sensitivities to preservatives I recommend non preserved artificial tears.  These come in 1 use vials.  They can be used every 1-2 hours.  Do not reuse the vials.    Recommended brands are:    Refresh Optive Mehrdad-3  Systane- preservative free  Refresh-  preservative free  Blink- preservative free    Gels or ointment can be used at night.    Recommended brands are:    Systane Gel  Refresh Gel  Blink Gel  Genteal Gel    Systane night time (ointment)  Refresh Celluvisc  Refresh PM (ointment)      Visine, Clear Eyes or Murine (drops that get the red out) can irritate the eyes and cause a rebound effect where the eyes become more red and you end up using more drops.  Avoid drops containing tetrahydrozoline, naphazoline, phenylephrine, oxymetazoline.      OTC Lumify is a newer product that gives immediate redness relief without the rebound effect.  Use as needed to take the redness out.    Artificial tears may be used with other drops (such as allergy, glaucoma, antibiotics) around the same time.  Be sure to wait 5 minutes in between drops.    Heat to the eyelids can also improve your symptoms of dry eyes.  Corinne heat masks can be purchased at  [employer] to be used nightly for 10-15 minutes.  Other options are gel masks that can be put in the microwave and purchased at most pharmacies.      Tea Tree Oil eyelid cleansers recommended are Ocusoft Oust foam cleanser to cleanse eyelids/lashes at night and in the am. Other options are Blephadex or Cliradex eyelid wipes.  KEEP EYES CLOSED when using these products.  These can be purchased on amazon.com   A good product for make up remover with tea tree oil is WeLoveEyes.  This can be found at www.[employer] or [employer].    Other good eyelid cleansers have hypochlorous acid which removes excess bacteria and is safe around the eyes. Products are Avenova, Ocusoft Hypochlor or Heyedrate. Spray solution onto cotton pad, close eyes and gently apply to eyelids and eyelashes using side to side motion.  You can also KEEP EYES CLOSED spray and rub into eyelashes.  You do not need to rinse it off. Use morning and evening. These products can be found on [employer].  You can check with your local pharmacy and see if they can order if for you if they don't have it.    Other brands of eyelid cleansing wipes are:    Ocusoft wipes  Systane wipes    A great eye make up line is [email or web address]      Optometry Providers       Clinic Locations                                 Telephone Number   Dr. Daysi Valles  [place] [phone]     [facility] Hours:                [facility] Hours:       [facility] Hours:   [address]   [address]     [address] [place] [address]   [place] [address]    [place] [address]  Phone: [phone]                    Phone: [phone]     Phone: [phone]                      Monday 8:00-7:00                          Monday 8:00-7:00                          Monday 8:00-7:00              Tuesday 8:00-6:00                          Tuesday 8:00-7:00                           Tuesday 8:00-7:00              Wednesday 8:00-6:00                  Wednesday 8:00-7:00                   Wednesday 8:00-7:00      Thursday 8:00-6:00                        Thursday 8:00-7:00                         Thursday 8:00-7:00            Friday 8:00-5:00                              Friday 8:00-5:00                              Friday 8:00-5:00    [facility] Hours:   [address] [place] [address]  [phone]    Monday 8:00-7:00  Tuesday 8:00-7:00  Wednesday 8:00-7:00  Thursday 8:00-7:00  Friday 8:00-5:00  Please log on to [email or web address] to order your contact lenses.  The link is found on the [facility] page.  As always, Thank you for trusting us with your health care needs!

## 2021-08-31 NOTE — LETTER
8/31/2021         RE: Cely Ontiveros  [address]  [facility] [address]        Dear Colleague,    Thank you for referring your patient, Cely Ontiveros, to the [facility]. Please see a copy of my visit note below.    Chief Complaint   Patient presents with     Annual Eye Exam         Last Eye Exam: 02/06/2020  Dilated Previously: Yes, side effects of dilation explained today    What are you currently using to see?  glasses       Distance Vision Acuity: Noticed gradual change in both eyes    Near Vision Acuity: Satisfied with vision while reading  with glasses    Eye Comfort: good  Do you use eye drops? : Yes: OTC - unknown brand  Occupation or Hobbies:     History of cataract surgery with Dr. Ken Aguilera   Right eye-2-   Left eye-1-     Yag caps with Dr. Peter  Right eye-2/6/2020  Left eye-1/29/2020    Denstanley Orozco - Optometric Assistant          Medical, surgical and family histories reviewed and updated 8/31/2021.       OBJECTIVE: See Ophthalmology exam    ASSESSMENT:    ICD-10-CM    1. Examination of eyes and vision  Z01.00 EYE EXAM (SIMPLE-NONBILLABLE)   2. Hyperopia of both eyes  H52.03 REFRACTION   3. Astigmatism of both eyes with presbyopia  H52.203 REFRACTION    H52.4    4. Pseudophakia, Yag Caps, of both eyes  Z96.1 EYE EXAM (SIMPLE-NONBILLABLE)   5. Meibomian gland dysfunction  H02.889 EYE EXAM (SIMPLE-NONBILLABLE)   6. Dry eye syndrome of both eyes  H04.123 EYE EXAM (SIMPLE-NONBILLABLE)   7. Epiretinal membrane (ERM) of left eye  H35.372    8. Allergic conjunctivitis of both eyes  H10.13    9. Dermatochalasis of eyelid  H02.839 Adult Eye Referral   10. Fuchs endothelial corneal dystrophy type 1  H18.519       PLAN:     Patient Instructions   Recommend new glasses.    Heat to the eyes daily for 10-15 minutes nightly with warm washcloth or reusable gel masks from the pharmacy or  Corinne heat masks can be purchased at [employer].    usoft  Hypochlor- spray solution onto cotton pad.  Close eyes and gently apply to eyelids and eyelashes using side to side motion.  Use morning and evening.    Artificial tears- 1 drop both eyes 2-4 x day.    You have an epiretinal membrane.  As we grow older, the thick vitreous gel in the middle of our eyes begins to shrink and pull away from the macula. As the vitreous pulls away, scar tissue may develop on the macula. Sometimes the scar tissue can warp and contract, causing the retina to wrinkle or become swollen or distorted.  Monitor unless any new changes in vision.    Referral to Dr. Diego Vela at the [facility] for evaluation- [phone] if interested.    Monitor corneal dystrophy with yearly exams unless any changes in vision.    Return in 1 year for a complete eye exam or sooner if needed.    Brandt Richardson, OD           Again, thank you for allowing me to participate in the care of your patient.        Sincerely,        Brandt Richardson, OD

## 2021-09-04 DIAGNOSIS — J31.0 CHRONIC RHINITIS: ICD-10-CM

## 2021-09-07 RX ORDER — LORATADINE AND PSEUDOEPHEDRINE 10; 240 MG/1; MG/1
1 TABLET, FILM COATED, EXTENDED RELEASE ORAL DAILY
Qty: 90 TABLET | Refills: 0 | Status: SHIPPED | OUTPATIENT
Start: 2021-09-07 | End: 2022-02-01

## 2021-09-07 NOTE — TELEPHONE ENCOUNTER
loratadine-pseudoePHEDrine (EQL ALLERGY/CONGESTION RELIEF)  MG 24 hr tablet 90 tablet 0 8/4/2021

## 2021-09-26 ENCOUNTER — HEALTH MAINTENANCE LETTER (OUTPATIENT)
Age: 76
End: 2021-09-26

## 2021-11-10 ENCOUNTER — TELEPHONE (OUTPATIENT)
Dept: FAMILY MEDICINE | Facility: CLINIC | Age: 76
End: 2021-11-10
Payer: COMMERCIAL

## 2021-11-10 NOTE — TELEPHONE ENCOUNTER
You have remaining refill(s) 9/7/21.  Often time the pharmacy's automated system does not recognize new medications that are sent.  You will need to call and speak with someone directly.    Verbalized good understanding.   Gardenia Lerma RN

## 2021-11-10 NOTE — TELEPHONE ENCOUNTER
Reason for Call:  Medication or medication refill:    Do you use a [facility]?  Name of the pharmacy and phone number for the current request:  [facility] [address] (this is a new pharmacy)    Name of the medication requested: Claritin declined calling the pharmacy first    Other request:     Can we leave a detailed message on this number? NO    Phone number patient can be reached at: Home number on file [phone] (home)    Best Time:     Call taken on 11/10/2021 at 1:02 PM by Virginia Hobson

## 2021-11-17 ENCOUNTER — OFFICE VISIT (OUTPATIENT)
Dept: URGENT CARE | Facility: URGENT CARE | Age: 76
End: 2021-11-17
Payer: COMMERCIAL

## 2021-11-17 ENCOUNTER — ANCILLARY PROCEDURE (OUTPATIENT)
Dept: GENERAL RADIOLOGY | Facility: CLINIC | Age: 76
End: 2021-11-17
Attending: PHYSICIAN ASSISTANT
Payer: COMMERCIAL

## 2021-11-17 VITALS
HEIGHT: 64 IN | BODY MASS INDEX: 24.31 KG/M2 | SYSTOLIC BLOOD PRESSURE: 145 MMHG | OXYGEN SATURATION: 96 % | WEIGHT: 142.4 LBS | TEMPERATURE: 98.4 F | HEART RATE: 89 BPM | DIASTOLIC BLOOD PRESSURE: 81 MMHG

## 2021-11-17 DIAGNOSIS — M79.642 PAIN OF LEFT HAND: Primary | ICD-10-CM

## 2021-11-17 DIAGNOSIS — M79.642 PAIN OF LEFT HAND: ICD-10-CM

## 2021-11-17 PROCEDURE — 73130 X-RAY EXAM OF HAND: CPT | Mod: LT | Performed by: RADIOLOGY

## 2021-11-17 PROCEDURE — 99213 OFFICE O/P EST LOW 20 MIN: CPT | Performed by: PHYSICIAN ASSISTANT

## 2021-11-17 ASSESSMENT — ENCOUNTER SYMPTOMS
SORE THROAT: 0
NAUSEA: 0
HEADACHES: 0
PALPITATIONS: 0
CARDIOVASCULAR NEGATIVE: 1
FEVER: 0
WEAKNESS: 0
ENDOCRINE NEGATIVE: 1
WOUND: 0
ARTHRALGIAS: 1
NECK STIFFNESS: 0
JOINT SWELLING: 0
BACK PAIN: 0
MYALGIAS: 0
ALLERGIC/IMMUNOLOGIC NEGATIVE: 1
VOMITING: 0
RESPIRATORY NEGATIVE: 1
DIARRHEA: 0
SHORTNESS OF BREATH: 0
LIGHT-HEADEDNESS: 0
DIZZINESS: 0
EYES NEGATIVE: 1
NECK PAIN: 0
COUGH: 0
RHINORRHEA: 0
CHILLS: 0

## 2021-11-17 ASSESSMENT — MIFFLIN-ST. JEOR: SCORE: 1115.92

## 2021-11-17 NOTE — PROGRESS NOTES
"Chief Complaint:    Chief Complaint   Patient presents with     Hand Injury     fell today and injures left hand & front of knee swollen, bruising         Medical Decision Making:    Vital signs reviewed by Tapan Cortes PA-C  BP (!) 145/81 (BP Location: Left arm, Patient Position: Sitting, Cuff Size: Adult Regular)   Pulse 89   Temp 98.4  F (36.9  C) (Oral)   Ht 1.626 m (5' 4\")   Wt 64.6 kg (142 lb 6.4 oz)   LMP  (LMP Unknown)   SpO2 96%   BMI 24.44 kg/m      Differential Diagnosis:  MS Injury Pain: sprain, fracture, contusion and dislocation      ASSESSMENT:     1. Pain of left hand           PLAN:     XR of the L hand was negative for any acute fracture per my read.  RICE discussed.  Patient declined thumb spica brace today.  LAKESHIA discussed.  Ibuprofen for pain.    Patient instructed to follow up with PCP in 1 week if symptoms are not improving.  Sooner if symptoms worsen.  Worrisome symptoms discussed with instructions to go to the ED.  Patient verbalized understanding and agreed with this plan.    Labs:     Results for orders placed or performed in visit on 11/17/21   XR Hand Left G/E 3 Views     Status: None    Narrative    XR HAND LT G/E 3 VW 11/17/2021 11:42 AM     HISTORY: Pain of left hand      Impression    IMPRESSION: IP joint degenerative arthropathy. Osteopenia. No evidence  of acute fracture.    ROGER CONNER MD         SYSTEM ID:  [id number]       Current Meds:    Current Outpatient Medications:      atorvastatin (LIPITOR) 20 MG tablet, Take 1 tablet (20 mg) by mouth daily, Disp: 90 tablet, Rfl: 1     citalopram (CELEXA) 20 MG tablet, Take 1 tablet (20 mg) by mouth daily, Disp: 90 tablet, Rfl: 3     fluticasone (FLONASE) 50 MCG/ACT nasal spray, Spray 2 sprays in nostril, Disp: , Rfl:      loratadine-pseudoePHEDrine (EQL ALLERGY/CONGESTION RELIEF)  MG 24 hr tablet, Take 1 tablet by mouth daily, Disp: 90 tablet, Rfl: 0     valACYclovir (VALTREX) 500 MG tablet, Take 1 tablet (500 mg) by " mouth daily, Disp: 90 tablet, Rfl: 3     doxycycline hyclate (VIBRAMYCIN) 100 MG capsule, , Disp: , Rfl:     Current Facility-Administered Medications:      triamcinolone (KENALOG-40) injection 40 mg, 40 mg, , , Luis Le, , 40 mg at 07/01/21 0946     triamcinolone (KENALOG-40) injection 40 mg, 40 mg, , , Luis Le, DO, 40 mg at 07/01/21 0946    Allergies:  Allergies   Allergen Reactions     Sulfa Drugs Swelling     Cats Swelling     Lips swollen     Wool Fiber        SUBJECTIVE    HPI: Cely Ontiveros is an 75 year old female who presents for evaluation and treatment of L hand injury following fall this morning. Tripped in the garage. Landed on left knee and left hand. Some bruising and swelling over left thumb. Reports most of the weight when landing was on left hand. No numbness or tingling in left hand, reports some tightness. No loss of sensation. Limited ROM. Pain 2/10 at this time. Pt has no concerns for injury to left leg. No swelling bruising or pain to left leg, ROM WNL.  .   ROS:      Review of Systems   Constitutional: Negative for chills and fever.   HENT: Negative for congestion, ear pain, rhinorrhea and sore throat.    Eyes: Negative.    Respiratory: Negative.  Negative for cough and shortness of breath.    Cardiovascular: Negative.  Negative for chest pain and palpitations.   Gastrointestinal: Negative for diarrhea, nausea and vomiting.   Endocrine: Negative.    Genitourinary: Negative.    Musculoskeletal: Positive for arthralgias. Negative for back pain, joint swelling, myalgias, neck pain and neck stiffness.   Skin: Negative.  Negative for rash and wound.   Allergic/Immunologic: Negative.  Negative for immunocompromised state.   Neurological: Negative for dizziness, weakness, light-headedness and headaches.        Family History   Family History   Problem Relation Age of Onset     Hypertension Mother      Arthritis Mother      Cardiovascular Mother      Circulatory Mother      Heart  Disease Mother      Lipids Mother      Obesity Mother      Osteoporosis Mother      Cancer Mother         skin     Glaucoma Mother      Cancer - colorectal Father      Cancer Father      Macular Degeneration Father      Diabetes No family hx of      Cerebrovascular Disease No family hx of      Thyroid Disease No family hx of        Social History  Social History     Socioeconomic History     Marital status: Single     Spouse name: Not on file     Number of children: Not on file     Years of education: Not on file     Highest education level: Not on file   Occupational History     Employer: [employer]   Tobacco Use     Smoking status: Never Smoker     Smokeless tobacco: Never Used   Substance and Sexual Activity     Alcohol use: No     Drug use: No     Sexual activity: Not Currently   Other Topics Concern     Parent/sibling w/ CABG, MI or angioplasty before 65F 55M? No   Social History Narrative     Not on file     Social Determinants of Health     Financial Resource Strain: Not on file   Food Insecurity: Not on file   Transportation Needs: Not on file   Physical Activity: Not on file   Stress: Not on file   Social Connections: Not on file   Intimate Partner Violence: Not on file   Housing Stability: Not on file        Surgical History:  Past Surgical History:   Procedure Laterality Date     ARTHROSCOPY KNEE Left 9/20/2019    Procedure: Left knee arthroscopy, partial medial meniscectomy and chondral debridement;  Surgeon: Nic Singh MD;  Location: [facility] OR     CATARACT IOL, RT/LT Left 01/24/2019     COMBINED CYSTOSCOPY, URETEROSCOPY, LASER HOLMIUM LITHOTRIPSY URETER(S) Right 8/23/2018    Procedure: COMBINED CYSTOSCOPY, URETEROSCOPY, LASER HOLMIUM LITHOTRIPSY URETER(S);   Cystoscopy,Right ureteroscopy with laser lithotripsy and stent placement;  Surgeon: Pino Hawk MD;  Location: [facility] OR     HC ENLARGE BREAST WITH IMPLANT       HC LAP,FULGURATE/EXCISE LESIONS       HC REMOVAL OF BREAST IMPLANT        "HYSTERECTOMY, PAP NO LONGER INDICATED  1998    ovaries and uterus out for benign reasons(fibroids and ovarian cyst)     LASER YAG CAPSULOTOMY  01/2020; 2/2020    left eye; right eye     PHACOEMULSIFICATION WITH STANDARD INTRAOCULAR LENS IMPLANT Left 1/24/2019    Procedure: PHACOEMULSIFICATION WITH STANDARD INTRAOCULAR LENS IMPLANT, LEFT;  Surgeon: Wagner Aguilera MD;  Location: [facility] OR     PHACOEMULSIFICATION WITH STANDARD INTRAOCULAR LENS IMPLANT Right 2/14/2019    Procedure: PHACOEMULSIFICATION WITH STANDARD INTRAOCULAR LENS IMPLANT, RIGHT;  Surgeon: Wagner Aguilera MD;  Location: [facility] OR     SINUS SURGERY          Problem List:  Patient Active Problem List   Diagnosis     Allergic rhinitis     Herpes simplex     CARDIOVASCULAR SCREENING; LDL GOAL LESS THAN 160     Degenerative joint disease     Posterior vitreous detachment of both eyes     Seasonal allergic rhinitis     Hypermetropia     Astigmatism with presbyopia     Blepharitis of both eyes     Epiphora     Chronic otitis externa of left ear, unspecified type     Dysthymic disorder     Meibomian gland dysfunction     Chondromalacia of patella, left     Complex tear of medial meniscus of left knee as current injury, subsequent encounter     Pseudophakia, Yag Caps, of both eyes     Primary osteoarthritis of left knee     S/P left knee arthroscopy     Epiretinal membrane, mild, of left eye     Posterior capsular opacification visually significant of right eye           OBJECTIVE:     Vital signs noted and reviewed by Tapan Cortes PA-C  BP (!) 145/81 (BP Location: Left arm, Patient Position: Sitting, Cuff Size: Adult Regular)   Pulse 89   Temp 98.4  F (36.9  C) (Oral)   Ht 1.626 m (5' 4\")   Wt 64.6 kg (142 lb 6.4 oz)   LMP  (LMP Unknown)   SpO2 96%   BMI 24.44 kg/m       PEFR:    Physical Exam  Vitals and nursing note reviewed.   Constitutional:       General: She is not in acute distress.     Appearance: She is well-developed. She is not " ill-appearing, toxic-appearing or diaphoretic.   HENT:      Head: Normocephalic and atraumatic.      Right Ear: Tympanic membrane and external ear normal. No drainage, swelling or tenderness. Tympanic membrane is not perforated, erythematous, retracted or bulging.      Left Ear: Tympanic membrane and external ear normal. No drainage, swelling or tenderness. Tympanic membrane is not perforated, erythematous, retracted or bulging.      Nose: No mucosal edema, congestion or rhinorrhea.      Right Sinus: No maxillary sinus tenderness or frontal sinus tenderness.      Left Sinus: No maxillary sinus tenderness or frontal sinus tenderness.      Mouth/Throat:      Pharynx: No pharyngeal swelling, oropharyngeal exudate, posterior oropharyngeal erythema or uvula swelling.      Tonsils: No tonsillar abscesses.   Eyes:      Pupils: Pupils are equal, round, and reactive to light.   Neck:      Trachea: Trachea normal.   Cardiovascular:      Rate and Rhythm: Normal rate and regular rhythm.      Heart sounds: Normal heart sounds, S1 normal and S2 normal. No murmur heard.  No friction rub. No gallop.    Pulmonary:      Effort: Pulmonary effort is normal. No respiratory distress.      Breath sounds: Normal breath sounds. No decreased breath sounds, wheezing, rhonchi or rales.   Abdominal:      General: Bowel sounds are normal. There is no distension.      Palpations: Abdomen is soft. Abdomen is not rigid. There is no mass.      Tenderness: There is no abdominal tenderness. There is no guarding or rebound.   Musculoskeletal:      Left hand: Swelling, deformity and tenderness present. No lacerations. Decreased range of motion. Normal strength. Normal sensation. Normal pulse.        Arms:       Cervical back: Full passive range of motion without pain, normal range of motion and neck supple.   Lymphadenopathy:      Cervical: No cervical adenopathy.   Skin:     General: Skin is warm and dry.   Neurological:      Mental Status: She is  alert and oriented to person, place, and time.      Cranial Nerves: No cranial nerve deficit.      Deep Tendon Reflexes: Reflexes are normal and symmetric.   Psychiatric:         Behavior: Behavior normal. Behavior is cooperative.         Thought Content: Thought content normal.         Judgment: Judgment normal.             Tapan Cortes PA-C  11/17/2021, 11:28 AM

## 2021-12-27 DIAGNOSIS — Z91.89 FRAMINGHAM CARDIAC RISK >20% IN NEXT 10 YEARS: ICD-10-CM

## 2021-12-27 NOTE — TELEPHONE ENCOUNTER
Reason for Call:  Medication or medication refill:    Do you use a [facility]?  Name of the pharmacy and phone number for the current request:  [facility] [place] on [address]    Name of the medication requested: Lipitor    Other request: Refill request. Pt has 10 days remaining.    Can we leave a detailed message on this number? YES    Phone number patient can be reached at: Cell number on file:    Telephone Information:   Mobile [phone]       Call taken on 12/27/2021 at 4:31 PM by Jennifer Hayes,   [facility]

## 2021-12-28 RX ORDER — ATORVASTATIN CALCIUM 20 MG/1
20 TABLET, FILM COATED ORAL DAILY
Qty: 90 TABLET | Refills: 0 | Status: SHIPPED | OUTPATIENT
Start: 2021-12-28 | End: 2022-03-23

## 2022-01-31 DIAGNOSIS — J31.0 CHRONIC RHINITIS: ICD-10-CM

## 2022-02-01 RX ORDER — LORATADINE AND PSEUDOEPHEDRINE SULFATE 10; 240 MG/1; MG/1
TABLET, FILM COATED, EXTENDED RELEASE ORAL
Qty: 90 TABLET | Refills: 0 | Status: SHIPPED | OUTPATIENT
Start: 2022-02-01 | End: 2022-03-23

## 2022-02-01 NOTE — TELEPHONE ENCOUNTER
Routing refill request to provider for review/approval because:  Drug not on the [facility] refill protocol     Danielle Ortega RN, BSN  [facility]

## 2022-02-07 ENCOUNTER — OFFICE VISIT (OUTPATIENT)
Dept: DERMATOLOGY | Facility: CLINIC | Age: 77
End: 2022-02-07
Payer: COMMERCIAL

## 2022-02-07 DIAGNOSIS — L84 CORN OF FOOT: ICD-10-CM

## 2022-02-07 DIAGNOSIS — L82.1 SEBORRHEIC KERATOSIS: ICD-10-CM

## 2022-02-07 DIAGNOSIS — D22.9 MULTIPLE BENIGN NEVI: ICD-10-CM

## 2022-02-07 DIAGNOSIS — L82.0 SEBORRHEIC KERATOSIS, INFLAMED: ICD-10-CM

## 2022-02-07 DIAGNOSIS — L57.0 ACTINIC KERATOSIS: Primary | ICD-10-CM

## 2022-02-07 DIAGNOSIS — L81.4 SOLAR LENTIGO: ICD-10-CM

## 2022-02-07 DIAGNOSIS — D18.01 CHERRY ANGIOMA: ICD-10-CM

## 2022-02-07 DIAGNOSIS — L57.8 SUN-DAMAGED SKIN: ICD-10-CM

## 2022-02-07 PROCEDURE — 99213 OFFICE O/P EST LOW 20 MIN: CPT | Mod: 25 | Performed by: DERMATOLOGY

## 2022-02-07 PROCEDURE — 17110 DESTRUCTION B9 LES UP TO 14: CPT | Performed by: DERMATOLOGY

## 2022-02-07 PROCEDURE — 17000 DESTRUCT PREMALG LESION: CPT | Mod: 59 | Performed by: DERMATOLOGY

## 2022-02-07 NOTE — PATIENT INSTRUCTIONS
Cryotherapy    What is it?    Use of a very cold liquid, such as liquid nitrogen, to freeze and destroy abnormal skin cells that need to be removed    What should I expect?    Tenderness and redness    A small blister that might grow and fill with dark purple blood. There may be crusting.    More than one treatment may be needed if the lesions do not go away.    How do I care for the treated area?    Gently wash the area with your hands when bathing.    Use a thin layer of Vaseline to help with healing. You may use a Band-Aid.     The area should heal within 7-10 days and may leave behind a pink or lighter color.     Do not use an antibiotic or Neosporin ointment.     You may take acetaminophen (Tylenol) for pain.     Call your doctor if you have:    Severe pain    Signs of infection (warmth, redness, cloudy yellow drainage, and or a bad smell)    Questions or concerns    Who should I call with questions?       [facility]: [phone]       [facility]: [phone]       For urgent needs outside of business hours call the [facility] at [phone] and ask for the dermatology resident on call

## 2022-02-07 NOTE — LETTER
2/7/2022         RE: Cely Ontiveros  [address] [place] [address]        Dear Colleague,    Thank you for referring your patient, Cely Ontiveros, to the [facility]. Please see a copy of my visit note below.    [facility] Dermatology Note  Encounter Date: Feb 7, 2022  Office Visit     Dermatology Problem List:  Last skin check 2/7/22  1. AKs. Liq N2.  2. Inflamed SK. Liq N2.  3. EIC, right paraspinal ack  - patient to follow up for removal PRN  4. Xerosis cutis  - current tx: gentle skin care  5. Family history of skin cancer (mother and brother, unknown type)  ____________________________________________    Assessment & Plan:    # Benign lesions: Multiple benign nevi, solar lentigos, seborrheic keratoses, cherry angiomas. Explained to patient benign nature of lesion. No treatment is necessary at this time unless the lesion changes or becomes symptomatic.   - ABCDs of melanoma were discussed and self skin checks were advised.  - Sun precaution was advised including the use of sun screens of SPF 30 or higher, sun protective clothing, and avoidance of tanning beds.    # AK x 1. Discussed precancerous nature of these lesions. Recommended treatment to prevent progression to a squamous cell carcinoma. Advised patient to call for follow up if not resolved in 1 month.   - See cryotherapy procedure notes below.    # Inflamed/Irritated seborrheic keratosis. Based on the location and chronic irritation to this lesion, treatment with cryotherapy is warranted.  - See cryotherapy procedure notes below.    # Corn, left plantar foot.  - Recommended OTC salicylic acid treatment.    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  - Reviewed this is generally caused due to pressure.  - Recommended avoiding pressure on this ear.  - Discussed donut pillow.    Procedures Performed:   - Cryotherapy procedure note, location: right  forehead. After verbal consent and discussion of risks and  benefits including, but not limited to, dyspigmentation/scar, blister, and pain, 1 AK was treated with 1-2 mm freeze border for 1-2 cycles with liquid nitrogen. Post cryotherapy instructions were provided.    - Cryotherapy procedure note, locations: right lower leg, left dorsal hand, and right chin. After verbal consent and discussion of risks and benefits including, but not limited to, dyspigmentation/scar, blister, and pain, 3 inflamed SKs were  treated with 1-2 mm freeze border for 1-2 cycles with liquid nitrogen. Post cryotherapy instructions were provided.    Follow-up: 1 year in-person for skin check, or earlier for new or changing lesions.    Staff and Scribe:     Scribe Disclosure:   I, Christi Dickson, am serving as a scribe to document services personally performed by this physician, Dr. Pb Lewis, based on data collection and the provider's statements to me.    Provider Disclosure:   The documentation recorded by the scribe accurately reflects the services I personally performed and the decisions made by me.    Pb Lewis MD    Department of Dermatology  [facility]: Phone: [phone], Fax:[phone]  [facility]: Phone: [phone] Fax: [phone]  ____________________________________________    CC: Skin Check (Full body skin check. Bump on right ear and wart on bottom of left foot. No personal history of skin cancer. Family history of skin cancer in mother and brother- unknown type.)    HPI:  Ms. Cely Ontiveros is a(n) 76 year old female who presents today as a return patient for skin check.    Last seen 21 for skin check. Discussed option of excision of an EIC on the left lower back. Photo taken, patient was to consider excision.    Today, Cely notes the followin. Concern of a bump on the right ear. Feels it when they sleep on this ear. Annoying. A  little painful.  2. Possible wart on the bottom of the left foot. Sometimes painful. Had one many years ago.  3. Wondering about a lesion on the chin. Requesting removal.    The patient otherwise denies any new or concerning lesions. No bleeding, painful, pruritic, or changing lesions. Health otherwise stable. No other skin concerns.    Labs Reviewed:  N/A    Physical Exam:  Vitals: LMP  (LMP Unknown)   SKIN: Full skin, which includes the head/face, both arms, chest, back, abdomen, both legs, genitalia and/or groin, buttocks, digits and/or nails, was examined.  - Brown macules on sun exposed areas  - Brown waxy, stuck-on appearing papules on face, trunk, and extremities.   - Brown macules and papules on trunk and extremities without concerning dermoscopy features  - Red vascular papules on face, trunk and extremities.   - Pink gritty papule on the right forehead x 1.  - There are tan to brown waxy stuck on papules with surrounding erythema on the right lower leg x 1, left dorsal hand x 1, and right chin x 1.  - Corn on the left plantar foot.  - Right ear appears normal.  - No other lesions of concern on areas examined.     Medications:  Current Outpatient Medications   Medication     atorvastatin (LIPITOR) 20 MG tablet     citalopram (CELEXA) 20 MG tablet     fluticasone (FLONASE) 50 MCG/ACT nasal spray     SM ALLERGY RELIEF D  MG 24 hr tablet     valACYclovir (VALTREX) 500 MG tablet     doxycycline hyclate (VIBRAMYCIN) 100 MG capsule     Current Facility-Administered Medications   Medication     triamcinolone (KENALOG-40) injection 40 mg     triamcinolone (KENALOG-40) injection 40 mg      Past Medical History:   Patient Active Problem List   Diagnosis     Allergic rhinitis     Herpes simplex     CARDIOVASCULAR SCREENING; LDL GOAL LESS THAN 160     Degenerative joint disease     Posterior vitreous detachment of both eyes     Seasonal allergic rhinitis     Hypermetropia     Astigmatism with presbyopia      Blepharitis of both eyes     Epiphora     Chronic otitis externa of left ear, unspecified type     Dysthymic disorder     Meibomian gland dysfunction     Chondromalacia of patella, left     Complex tear of medial meniscus of left knee as current injury, subsequent encounter     Pseudophakia, Yag Caps, of both eyes     Primary osteoarthritis of left knee     S/P left knee arthroscopy     Epiretinal membrane, mild, of left eye     Posterior capsular opacification visually significant of right eye     Past Medical History:   Diagnosis Date     Actinic keratosis      Allergic rhinitis      Cataract      Degenerative joint disease     cervical disease     Depression with anxiety      Herpes simplex      Hypoglycemia     eats small meals and is fine     Impingement syndrome, shoulder             Again, thank you for allowing me to participate in the care of your patient.        Sincerely,        Pb Lewis MD

## 2022-02-07 NOTE — PROGRESS NOTES
[facility] Dermatology Note  Encounter Date: Feb 7, 2022  Office Visit     Dermatology Problem List:  Last skin check 2/7/22  1. AKs. Liq N2.  2. Inflamed SK. Liq N2.  3. EIC, right paraspinal ack  - patient to follow up for removal PRN  4. Xerosis cutis  - current tx: gentle skin care  5. Family history of skin cancer (mother and brother, unknown type)  ____________________________________________    Assessment & Plan:    # Benign lesions: Multiple benign nevi, solar lentigos, seborrheic keratoses, cherry angiomas. Explained to patient benign nature of lesion. No treatment is necessary at this time unless the lesion changes or becomes symptomatic.   - ABCDs of melanoma were discussed and self skin checks were advised.  - Sun precaution was advised including the use of sun screens of SPF 30 or higher, sun protective clothing, and avoidance of tanning beds.    # AK x 1. Discussed precancerous nature of these lesions. Recommended treatment to prevent progression to a squamous cell carcinoma. Advised patient to call for follow up if not resolved in 1 month.   - See cryotherapy procedure notes below.    # Inflamed/Irritated seborrheic keratosis. Based on the location and chronic irritation to this lesion, treatment with cryotherapy is warranted.  - See cryotherapy procedure notes below.    # Corn, left plantar foot.  - Recommended OTC salicylic acid treatment.    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  - Reviewed this is generally caused due to pressure.  - Recommended avoiding pressure on this ear.  - Discussed donut pillow.    Procedures Performed:   - Cryotherapy procedure note, location: right  forehead. After verbal consent and discussion of risks and benefits including, but not limited to, dyspigmentation/scar, blister, and pain, 1 AK was treated with 1-2 mm freeze border for 1-2 cycles with liquid nitrogen. Post cryotherapy instructions were provided.    - Cryotherapy procedure note, locations: right  lower leg, left dorsal hand, and right chin. After verbal consent and discussion of risks and benefits including, but not limited to, dyspigmentation/scar, blister, and pain, 3 inflamed SKs were  treated with 1-2 mm freeze border for 1-2 cycles with liquid nitrogen. Post cryotherapy instructions were provided.    Follow-up: 1 year in-person for skin check, or earlier for new or changing lesions.    Staff and Scribe:     Scribe Disclosure:   I, Christi Dickson, am serving as a scribe to document services personally performed by this physician, Dr. Pb Lewis, based on data collection and the provider's statements to me.    Provider Disclosure:   The documentation recorded by the scribe accurately reflects the services I personally performed and the decisions made by me.    Pb Lewis MD    Department of Dermatology  [facility]: Phone: [phone], Fax:[phone]  [facility]: Phone: [phone] Fax: [phone]  ____________________________________________    CC: Skin Check (Full body skin check. Bump on right ear and wart on bottom of left foot. No personal history of skin cancer. Family history of skin cancer in mother and brother- unknown type.)    HPI:  Ms. Cely Ontiveros is a(n) 76 year old female who presents today as a return patient for skin check.    Last seen 21 for skin check. Discussed option of excision of an EIC on the left lower back. Photo taken, patient was to consider excision.    Today, Cely notes the followin. Concern of a bump on the right ear. Feels it when they sleep on this ear. Annoying. A little painful.  2. Possible wart on the bottom of the left foot. Sometimes painful. Had one many years ago.  3. Wondering about a lesion on the chin. Requesting removal.    The patient otherwise denies any new or concerning lesions. No bleeding, painful,  pruritic, or changing lesions. Health otherwise stable. No other skin concerns.    Labs Reviewed:  N/A    Physical Exam:  Vitals: LMP  (LMP Unknown)   SKIN: Full skin, which includes the head/face, both arms, chest, back, abdomen, both legs, genitalia and/or groin, buttocks, digits and/or nails, was examined.  - Brown macules on sun exposed areas  - Brown waxy, stuck-on appearing papules on face, trunk, and extremities.   - Brown macules and papules on trunk and extremities without concerning dermoscopy features  - Red vascular papules on face, trunk and extremities.   - Pink gritty papule on the right forehead x 1.  - There are tan to brown waxy stuck on papules with surrounding erythema on the right lower leg x 1, left dorsal hand x 1, and right chin x 1.  - Corn on the left plantar foot.  - Right ear appears normal.  - No other lesions of concern on areas examined.     Medications:  Current Outpatient Medications   Medication     atorvastatin (LIPITOR) 20 MG tablet     citalopram (CELEXA) 20 MG tablet     fluticasone (FLONASE) 50 MCG/ACT nasal spray     SM ALLERGY RELIEF D  MG 24 hr tablet     valACYclovir (VALTREX) 500 MG tablet     doxycycline hyclate (VIBRAMYCIN) 100 MG capsule     Current Facility-Administered Medications   Medication     triamcinolone (KENALOG-40) injection 40 mg     triamcinolone (KENALOG-40) injection 40 mg      Past Medical History:   Patient Active Problem List   Diagnosis     Allergic rhinitis     Herpes simplex     CARDIOVASCULAR SCREENING; LDL GOAL LESS THAN 160     Degenerative joint disease     Posterior vitreous detachment of both eyes     Seasonal allergic rhinitis     Hypermetropia     Astigmatism with presbyopia     Blepharitis of both eyes     Epiphora     Chronic otitis externa of left ear, unspecified type     Dysthymic disorder     Meibomian gland dysfunction     Chondromalacia of patella, left     Complex tear of medial meniscus of left knee as current injury,  subsequent encounter     Pseudophakia, Yag Caps, of both eyes     Primary osteoarthritis of left knee     S/P left knee arthroscopy     Epiretinal membrane, mild, of left eye     Posterior capsular opacification visually significant of right eye     Past Medical History:   Diagnosis Date     Actinic keratosis      Allergic rhinitis      Cataract      Degenerative joint disease     cervical disease     Depression with anxiety      Herpes simplex      Hypoglycemia     eats small meals and is fine     Impingement syndrome, shoulder

## 2022-02-07 NOTE — NURSING NOTE
Cely Ontiveros's goals for this visit include:   Chief Complaint   Patient presents with     Skin Check     Full body skin check. Bump on right ear and wart on bottom of left foot. No personal history of skin cancer. Family history of skin cancer in mother and brother- unknown type.       She requests these members of her care team be copied on today's visit information:     PCP: Mara Varma    Referring Provider:  No referring provider defined for this encounter.    LMP  (LMP Unknown)     Do you need any medication refills at today's visit? No  Karla Almendarez, DON

## 2022-03-13 ENCOUNTER — HEALTH MAINTENANCE LETTER (OUTPATIENT)
Age: 77
End: 2022-03-13

## 2022-03-23 ENCOUNTER — ANCILLARY PROCEDURE (OUTPATIENT)
Dept: GENERAL RADIOLOGY | Facility: CLINIC | Age: 77
End: 2022-03-23
Attending: PREVENTIVE MEDICINE
Payer: COMMERCIAL

## 2022-03-23 ENCOUNTER — OFFICE VISIT (OUTPATIENT)
Dept: FAMILY MEDICINE | Facility: CLINIC | Age: 77
End: 2022-03-23
Payer: COMMERCIAL

## 2022-03-23 VITALS
HEART RATE: 72 BPM | HEIGHT: 64 IN | OXYGEN SATURATION: 96 % | RESPIRATION RATE: 16 BRPM | WEIGHT: 146.25 LBS | SYSTOLIC BLOOD PRESSURE: 136 MMHG | TEMPERATURE: 97 F | DIASTOLIC BLOOD PRESSURE: 77 MMHG | BODY MASS INDEX: 24.97 KG/M2

## 2022-03-23 DIAGNOSIS — M79.673 FOOT ARCH PAIN, UNSPECIFIED LATERALITY: ICD-10-CM

## 2022-03-23 DIAGNOSIS — E78.2 MIXED HYPERLIPIDEMIA: ICD-10-CM

## 2022-03-23 DIAGNOSIS — Z12.11 COLON CANCER SCREENING: ICD-10-CM

## 2022-03-23 DIAGNOSIS — J31.0 CHRONIC RHINITIS: ICD-10-CM

## 2022-03-23 DIAGNOSIS — M17.0 PRIMARY OSTEOARTHRITIS OF BOTH KNEES: ICD-10-CM

## 2022-03-23 DIAGNOSIS — Z00.00 ENCOUNTER FOR MEDICARE ANNUAL WELLNESS EXAM: Primary | ICD-10-CM

## 2022-03-23 DIAGNOSIS — B00.9 HERPES SIMPLEX VIRUS INFECTION: ICD-10-CM

## 2022-03-23 DIAGNOSIS — Z91.89 FRAMINGHAM CARDIAC RISK >20% IN NEXT 10 YEARS: ICD-10-CM

## 2022-03-23 DIAGNOSIS — Z23 ENCOUNTER FOR IMMUNIZATION: ICD-10-CM

## 2022-03-23 DIAGNOSIS — F34.1 DYSTHYMIC DISORDER: ICD-10-CM

## 2022-03-23 DIAGNOSIS — E28.39 ESTROGEN DEFICIENCY: ICD-10-CM

## 2022-03-23 DIAGNOSIS — M85.80 OSTEOPENIA, UNSPECIFIED LOCATION: ICD-10-CM

## 2022-03-23 LAB
ALT SERPL W P-5'-P-CCNC: 23 U/L (ref 0–50)
ANION GAP SERPL CALCULATED.3IONS-SCNC: 4 MMOL/L (ref 3–14)
BUN SERPL-MCNC: 24 MG/DL (ref 7–30)
CALCIUM SERPL-MCNC: 9.7 MG/DL (ref 8.5–10.1)
CHLORIDE BLD-SCNC: 105 MMOL/L (ref 94–109)
CHOLEST SERPL-MCNC: 161 MG/DL
CO2 SERPL-SCNC: 30 MMOL/L (ref 20–32)
CREAT SERPL-MCNC: 0.73 MG/DL (ref 0.52–1.04)
FASTING STATUS PATIENT QL REPORTED: NO
GFR SERPL CREATININE-BSD FRML MDRD: 84 ML/MIN/1.73M2
GLUCOSE BLD-MCNC: 91 MG/DL (ref 70–99)
HDLC SERPL-MCNC: 96 MG/DL
LDLC SERPL CALC-MCNC: 53 MG/DL
NONHDLC SERPL-MCNC: 65 MG/DL
POTASSIUM BLD-SCNC: 4.2 MMOL/L (ref 3.4–5.3)
SODIUM SERPL-SCNC: 139 MMOL/L (ref 133–144)
TRIGL SERPL-MCNC: 62 MG/DL

## 2022-03-23 PROCEDURE — 80048 BASIC METABOLIC PNL TOTAL CA: CPT | Performed by: PREVENTIVE MEDICINE

## 2022-03-23 PROCEDURE — 90471 IMMUNIZATION ADMIN: CPT | Performed by: PREVENTIVE MEDICINE

## 2022-03-23 PROCEDURE — 99214 OFFICE O/P EST MOD 30 MIN: CPT | Mod: 25 | Performed by: PREVENTIVE MEDICINE

## 2022-03-23 PROCEDURE — 73565 X-RAY EXAM OF KNEES: CPT | Performed by: RADIOLOGY

## 2022-03-23 PROCEDURE — 84460 ALANINE AMINO (ALT) (SGPT): CPT | Performed by: PREVENTIVE MEDICINE

## 2022-03-23 PROCEDURE — 36415 COLL VENOUS BLD VENIPUNCTURE: CPT | Performed by: PREVENTIVE MEDICINE

## 2022-03-23 PROCEDURE — 90714 TD VACC NO PRESV 7 YRS+ IM: CPT | Performed by: PREVENTIVE MEDICINE

## 2022-03-23 PROCEDURE — 99397 PER PM REEVAL EST PAT 65+ YR: CPT | Mod: 25 | Performed by: PREVENTIVE MEDICINE

## 2022-03-23 PROCEDURE — 80061 LIPID PANEL: CPT | Performed by: PREVENTIVE MEDICINE

## 2022-03-23 RX ORDER — CITALOPRAM HYDROBROMIDE 20 MG/1
20 TABLET ORAL DAILY
Qty: 90 TABLET | Refills: 3 | Status: SHIPPED | OUTPATIENT
Start: 2022-03-23 | End: 2022-09-01

## 2022-03-23 RX ORDER — ATORVASTATIN CALCIUM 20 MG/1
20 TABLET, FILM COATED ORAL DAILY
Qty: 90 TABLET | Refills: 3 | Status: SHIPPED | OUTPATIENT
Start: 2022-03-23 | End: 2023-02-15

## 2022-03-23 RX ORDER — VALACYCLOVIR HYDROCHLORIDE 500 MG/1
500 TABLET, FILM COATED ORAL DAILY
Qty: 90 TABLET | Refills: 3 | Status: SHIPPED | OUTPATIENT
Start: 2022-03-23 | End: 2023-06-23

## 2022-03-23 RX ORDER — LORATADINE 10 MG/1
10 TABLET ORAL DAILY
Qty: 90 TABLET | Refills: 3 | Status: SHIPPED | OUTPATIENT
Start: 2022-03-23 | End: 2023-02-15

## 2022-03-23 ASSESSMENT — ENCOUNTER SYMPTOMS
NAUSEA: 0
JOINT SWELLING: 0
DYSURIA: 0
HEMATURIA: 0
CONSTIPATION: 0
COUGH: 0
SORE THROAT: 0
CHILLS: 0
ARTHRALGIAS: 1
MYALGIAS: 0
BREAST MASS: 0
PALPITATIONS: 0
FEVER: 0
PARESTHESIAS: 0
WEAKNESS: 0
HEADACHES: 0
DIARRHEA: 0
ABDOMINAL PAIN: 0
FREQUENCY: 0
NERVOUS/ANXIOUS: 0
SHORTNESS OF BREATH: 0
HEMATOCHEZIA: 0
DIZZINESS: 0
EYE PAIN: 0
HEARTBURN: 0

## 2022-03-23 ASSESSMENT — PAIN SCALES - GENERAL: PAINLEVEL: NO PAIN (0)

## 2022-03-23 ASSESSMENT — ACTIVITIES OF DAILY LIVING (ADL): CURRENT_FUNCTION: NO ASSISTANCE NEEDED

## 2022-03-23 ASSESSMENT — PATIENT HEALTH QUESTIONNAIRE - PHQ9: SUM OF ALL RESPONSES TO PHQ QUESTIONS 1-9: 0

## 2022-03-23 NOTE — PROGRESS NOTES
"SUBJECTIVE:   Cely Ontiveros is a 76 year old female who presents for Preventive Visit.      Patient has been advised of split billing requirements and indicates understanding: Yes  Are you in the first 12 months of your Medicare coverage?  No    Healthy Habits:   PHQ-2 Total Score: 0        Physical:  In general, how would you rate your overall physical health?: Good  Frequency of exercise:: 4-5 days/week  Do you usually eat at least 4 servings of fruit and vegetables a day, include whole grains & fiber, and avoid regularly eating high fat or \"junk\" foods?: Yes  Taking medications regularly:: No  Medication side effects:: None  Activities of Daily Living: NO assistance is needed  Home safety: None of the above  Hearing Impairment:: No concerns  In the past 6 months, have you been bothered by leaking of urine?: No  Associated symptoms:   Positive: arthralgias  Negative: abdominal pain, Blood in stool, Blood in urine, chest pain, chills, congestion, constipation, cough, diarrhea, dizziness, ear pain, eye pain, nervous/anxious, fever, frequency, genital sores, headaches, hearing loss, heartburn, joint swelling, peripheral edema, mood changes, myalgias, nausea, dysuria, palpitations, Skin sensation changes, sore throat, urgency, rash, shortness of breath, visual disturbance, weakness  pelvic pain: No  vaginal bleeding: No  vaginal discharge: No  tenderness: No  breast mass: No  breast discharge: No  In general, how would you rate your overall mental or emotional health?: Good  Additional concerns today:: No  Duration of exercise:: 45-60 minutes      Do you feel safe in your environment? Yes    Have you ever done Advance Care Planning? (For example, a Health Directive, POLST, or a discussion with a medical provider or your loved ones about your wishes): No    Fall risk  Has not fallen   Fall Risk Assessment not completed.  click delete button to remove this line now  Cognitive Screening   1) Repeat 3 items (Leader, " Season, Table)    2) Clock draw: NORMAL  3) 3 item recall: Recalls 2 objects   Results: NORMAL clock, 1-2 items recalled: COGNITIVE IMPAIRMENT LESS LIKELY    Mini-CogTM Copyright SOLANGE Worley. Licensed by the author for use in [facility]; reprinted with permission (neno@[facility]). All rights reserved.      Do you have sleep apnea, excessive snoring or daytime drowsiness?: no    Reviewed and updated as needed this visit by clinical staff   Tobacco  Allergies  Meds  Problems  Med Hx  Surg Hx  Fam Hx  Soc   Hx        Reviewed and updated as needed this visit by Provider   Tobacco  Allergies  Meds  Problems  Med Hx  Surg Hx  Fam Hx         Social History     Tobacco Use     Smoking status: Never Smoker     Smokeless tobacco: Never Used   Substance Use Topics     Alcohol use: No     If you drink alcohol do you typically have >3 drinks per day or >7 drinks per week? No    Alcohol Use 3/23/2022   Prescreen: >3 drinks/day or >7 drinks/week? Not Applicable   Prescreen: >3 drinks/day or >7 drinks/week? -     Additional concerns to address:      Need for Orthotics:   Needs new orthotics for arches of the feet, metatarsals on both feet can hurt, has been using Orthotics for some time     Bilateral Knee pain:  Would like X rays of the knees done  Has had cortisone injections done in the past, but the effect wears off  Stays physically active  No new injuries except sprained left ankle recently  Has had Arthroscopy of the left knee in the past    Mood:  -stable  -no depressive symptoms  -no thoughts of self harm  -would like to continue with selective serotonin reuptake inhibitor     Refills on Valtrex:  -taking daily  -if does not take daily then has outbreak on left buttock  -no medication side effects     Refills on allergy medication   -has been on Claritin D for some time  -risk of long term use of Sudafed reviewed, can increase heart rate and blood pressure  -advise using regular Claritin  instead    Current providers sharing in care for this patient include:   Patient Care Team:  Mara Varma APRN CNP as PCP - General (Nurse Practitioner - Family)  Mara Varma APRN CNP as Assigned PCP  Pb Lewis MD as Assigned Surgical Provider    The following health maintenance items are reviewed in Epic and correct as of today:  There are no preventive care reminders to display for this patient.  Lab work is in process  Labs reviewed in EPIC  BP Readings from Last 3 Encounters:   03/23/22 136/77   11/17/21 (!) 145/81   07/01/21 136/71    Wt Readings from Last 3 Encounters:   03/23/22 66.3 kg (146 lb 4 oz)   11/17/21 64.6 kg (142 lb 6.4 oz)   07/01/21 67.1 kg (148 lb)                  Patient Active Problem List   Diagnosis     Allergic rhinitis     Herpes simplex     CARDIOVASCULAR SCREENING; LDL GOAL LESS THAN 160     Degenerative joint disease     Posterior vitreous detachment of both eyes     Seasonal allergic rhinitis     Hypermetropia     Astigmatism with presbyopia     Blepharitis of both eyes     Epiphora     Chronic otitis externa of left ear, unspecified type     Dysthymic disorder     Meibomian gland dysfunction     Chondromalacia of patella, left     Complex tear of medial meniscus of left knee as current injury, subsequent encounter     Pseudophakia, Yag Caps, of both eyes     Primary osteoarthritis of left knee     S/P left knee arthroscopy     Epiretinal membrane, mild, of left eye     Posterior capsular opacification visually significant of right eye     Past Surgical History:   Procedure Laterality Date     ARTHROSCOPY KNEE Left 9/20/2019    Procedure: Left knee arthroscopy, partial medial meniscectomy and chondral debridement;  Surgeon: Nic Singh MD;  Location: [facility] OR     CATARACT IOL, RT/LT Left 01/24/2019     COMBINED CYSTOSCOPY, URETEROSCOPY, LASER HOLMIUM LITHOTRIPSY URETER(S) Right 8/23/2018    Procedure: COMBINED CYSTOSCOPY, URETEROSCOPY,  LASER HOLMIUM LITHOTRIPSY URETER(S);   Cystoscopy,Right ureteroscopy with laser lithotripsy and stent placement;  Surgeon: Pino Hawk MD;  Location: [facility] OR     HC ENLARGE BREAST WITH IMPLANT       HC LAP,FULGURATE/EXCISE LESIONS       HC REMOVAL OF BREAST IMPLANT       HYSTERECTOMY, PAP NO LONGER INDICATED  1998    ovaries and uterus out for benign reasons(fibroids and ovarian cyst)     LASER YAG CAPSULOTOMY  01/2020; 2/2020    left eye; right eye     PHACOEMULSIFICATION WITH STANDARD INTRAOCULAR LENS IMPLANT Left 1/24/2019    Procedure: PHACOEMULSIFICATION WITH STANDARD INTRAOCULAR LENS IMPLANT, LEFT;  Surgeon: Wagner Aguilera MD;  Location: [facility] OR     PHACOEMULSIFICATION WITH STANDARD INTRAOCULAR LENS IMPLANT Right 2/14/2019    Procedure: PHACOEMULSIFICATION WITH STANDARD INTRAOCULAR LENS IMPLANT, RIGHT;  Surgeon: Wagner Aguilera MD;  Location: [facility] OR     SINUS SURGERY         Social History     Tobacco Use     Smoking status: Never Smoker     Smokeless tobacco: Never Used   Substance Use Topics     Alcohol use: No     Family History   Problem Relation Age of Onset     Hypertension Mother      Arthritis Mother      Cardiovascular Mother      Circulatory Mother      Heart Disease Mother      Lipids Mother      Obesity Mother      Osteoporosis Mother      Cancer Mother         skin     Glaucoma Mother      Cancer - colorectal Father      Cancer Father      Macular Degeneration Father      Diabetes No family hx of      Cerebrovascular Disease No family hx of      Thyroid Disease No family hx of          Current Outpatient Medications   Medication Sig Dispense Refill     atorvastatin (LIPITOR) 20 MG tablet Take 1 tablet (20 mg) by mouth daily 90 tablet 3     citalopram (CELEXA) 20 MG tablet Take 1 tablet (20 mg) by mouth daily 90 tablet 3     fluticasone (FLONASE) 50 MCG/ACT nasal spray Spray 2 sprays in nostril       loratadine (CLARITIN) 10 MG tablet Take 1 tablet (10 mg) by mouth daily 90 tablet  "3     valACYclovir (VALTREX) 500 MG tablet Take 1 tablet (500 mg) by mouth daily 90 tablet 3     Allergies   Allergen Reactions     Sulfa Drugs Swelling     Cats Swelling     Lips swollen     Wool Fiber      Pneumonia Vaccine:Adults age 65+ who received Pneumovax (PPSV23) at 65 years or older: Should be given PCV13 > 1 year after their most recent PPSV23  Mammogram Screening: Mammogram Screening - Patient over age 75, has elected to discontinue screenings.    Pertinent mammograms are reviewed under the imaging tab.    Review of Systems  Constitutional, HEENT, cardiovascular, pulmonary, gi and gu systems are negative, except as otherwise noted.    OBJECTIVE:   /77 (BP Location: Left arm, Patient Position: Sitting, Cuff Size: Adult Regular)   Pulse 72   Temp 97  F (36.1  C) (Tympanic)   Resp 16   Ht 1.628 m (5' 4.09\")   Wt 66.3 kg (146 lb 4 oz)   LMP  (LMP Unknown)   SpO2 96%   BMI 25.03 kg/m   Estimated body mass index is 25.03 kg/m  as calculated from the following:    Height as of this encounter: 1.628 m (5' 4.09\").    Weight as of this encounter: 66.3 kg (146 lb 4 oz).  Physical Exam  GENERAL APPEARANCE: healthy, alert and no distress  EYES: Eyes grossly normal to inspection and conjunctivae and sclerae normal  HENT: intact TMs with no erythema   NECK: no adenopathy and trachea midline and normal to palpation  RESP: lungs clear to auscultation - no rales, rhonchi or wheezes  CV: regular rates and rhythm, normal S1 S2  ABDOMEN: soft, non-tender and no rebound or guarding   MS: extremities normal- no gross deformities noted and peripheral pulses normal  SKIN: no suspicious lesions or rashes  NEURO: Normal strength and tone, mentation intact and speech normal  PSYCH: mentation appears normal      Diagnostic Test Results:  Labs reviewed in Epic  No results found for this or any previous visit (from the past 24 hour(s)).    ASSESSMENT / PLAN:   Cely was seen today for physical.    Diagnoses and all " orders for this visit:    Encounter for Medicare annual wellness exam  -     REVIEW OF HEALTH MAINTENANCE PROTOCOL ORDERS  -     Preventive guidelines reviewed   -     Lipid panel reflex to direct LDL Non-fasting    Mixed hyperlipidemia  -     Await labs  -     Lipid panel reflex to direct LDL Non-fasting  -     Basic metabolic panel  (Ca, Cl, CO2, Creat, Gluc, K, Na, BUN)  -     ALT    LDL Cholesterol Calculated   Date Value Ref Range Status   05/07/2021 112 (H) <100 mg/dL Final     Comment:     Above desirable:  100-129 mg/dl  Borderline High:  130-159 mg/dL  High:             160-189 mg/dL  Very high:       >189 mg/dl           Osteopenia, unspecified location  -     DEXA Done 2019  -     Basic metabolic panel  (Ca, Cl, CO2, Creat, Gluc, K, Na, BUN)  -discussed Calcium, Vitamin D and weight bearing exercises     Primary osteoarthritis of both knees  -     Is contemplating knee replacement  -     XR Knee AP Standing Bilateral; Future  -     Basic metabolic panel  (Ca, Cl, CO2, Creat, Gluc, K, Na, BUN)    Rufe cardiac risk >20% in next 10 years  -     atorvastatin (LIPITOR) 20 MG tablet; Take 1 tablet (20 mg) by mouth daily    Dysthymic disorder  -     citalopram (CELEXA) 20 MG tablet; Take 1 tablet (20 mg) by mouth daily  -PHQ score is 0  -do not stop the medication suddenly, would taper off if needed     Herpes simplex  -     valACYclovir (VALTREX) 500 MG tablet; Take 1 tablet (500 mg) by mouth daily  -refills provided     Chronic rhinitis  -     loratadine (CLARITIN) 10 MG tablet; Take 1 tablet (10 mg) by mouth daily  -discontinued Claritin D     Foot arch pain, unspecified laterality  -     Orthotics, Prosthetics and Custom Compression Order    Encounter for immunization  -     TD PRESERV FREE, IM (7+ YRS) (DECAVAC/TENIVAC)    Estrogen deficiency  -     DX Hip/Pelvis/Spine; Future    Colon cancer screening  -     Fecal colorectal cancer screen (FIT); Future        Patient has been advised of split billing  "requirements and indicates understanding: Yes    COUNSELING:  Reviewed preventive health counseling, as reflected in patient instructions       Regular exercise       Healthy diet/nutrition       Vision screening       Fall risk prevention       Immunizations    Vaccinated for: Td             Osteoporosis prevention/bone health       Colon cancer screening    Estimated body mass index is 25.03 kg/m  as calculated from the following:    Height as of this encounter: 1.628 m (5' 4.09\").    Weight as of this encounter: 66.3 kg (146 lb 4 oz).        She reports that she has never smoked. She has never used smokeless tobacco.      Appropriate preventive services were discussed with this patient, including applicable screening as appropriate for cardiovascular disease, diabetes, osteopenia/osteoporosis, and glaucoma.  As appropriate for age/gender, discussed screening for colorectal cancer, prostate cancer, breast cancer, and cervical cancer. Checklist reviewing preventive services available has been given to the patient.    Reviewed patients plan of care and provided an AVS. The Basic Care Plan (routine screening as documented in Health Maintenance) for Cely meets the Care Plan requirement. This Care Plan has been established and reviewed with the Patient.    Counseling Resources:  ATP IV Guidelines  Pooled Cohorts Equation Calculator  Breast Cancer Risk Calculator  Breast Cancer: Medication to Reduce Risk  FRAX Risk Assessment  ICSI Preventive Guidelines  Dietary Guidelines for Americans, 2010  [employer] MyPlate  ASA Prophylaxis  Lung CA Screening    Danyelle Espinosa MD MPH    [facility]    Identified Health Risks:  DME (Durable Medical Equipment) Orders and Documentation  Orders Placed This Encounter   Procedures     Orthotics, Prosthetics and Custom Compression Order      The patient was assessed and it was determined the patient is in need of the following listed DME Supplies/Equipment. " Please complete supporting documentation below to demonstrate medical necessity.      DME All Other Item(s) Documentation    List reason for need and supporting documentation for medical necessity below for each DME item.     1. Foot Arch pain

## 2022-03-23 NOTE — NURSING NOTE
Prior to immunization administration, verified patients identity using patient s name and date of birth. Please see Immunization Activity for additional information.     Screening Questionnaire for Adult Immunization    Are you sick today?   No   Do you have allergies to medications, food, a vaccine component or latex?   No   Have you ever had a serious reaction after receiving a vaccination?   No   Do you have a long-term health problem with heart, lung, kidney, or metabolic disease (e.g., diabetes), asthma, a blood disorder, no spleen, complement component deficiency, a cochlear implant, or a spinal fluid leak?  Are you on long-term aspirin therapy?   No   Do you have cancer, leukemia, HIV/AIDS, or any other immune system problem?   No   Do you have a parent, brother, or sister with an immune system problem?   No   In the past 3 months, have you taken medications that affect  your immune system, such as prednisone, other steroids, or anticancer drugs; drugs for the treatment of rheumatoid arthritis, Crohn s disease, or psoriasis; or have you had radiation treatments?   No   Have you had a seizure, or a brain or other nervous system problem?   No   During the past year, have you received a transfusion of blood or blood    products, or been given immune (gamma) globulin or antiviral drug?   No   For women: Are you pregnant or is there a chance you could become       pregnant during the next month?   No   Have you received any vaccinations in the past 4 weeks?   No     Immunization questionnaire answers were all negative.        Per orders of Dr. Espinosa, injection of TD given by Tequila Soto RN. Patient instructed to remain in clinic for 15 minutes afterwards, and to report any adverse reaction to me immediately.       Screening performed by Tequila Soto RN on 3/23/2022 at 11:47 AM.

## 2022-03-23 NOTE — RESULT ENCOUNTER NOTE
Cely,     X rays of the knees did not show any acute changes or fractures.  Possible mild arthritis changes seen.   Please follow up with Orthopedics for continued knee pain.     Please do not hesitate to call us at [phone] if you have any questions or concerns.    Thank you,    Danyelle Espinosa MD MPH

## 2022-03-24 NOTE — RESULT ENCOUNTER NOTE
Cely, your test results were within normal limits.  Cholesterol is improved from last check and is excellent.  ALT liver test is normal.  Electrolytes, glucose and kidney function are normal.     Please do not hesitate to call us at [phone] if you have any questions or concerns.    Thank you,    Danyelle Espinosa MD MPH

## 2022-03-25 ENCOUNTER — LAB (OUTPATIENT)
Dept: LAB | Facility: CLINIC | Age: 77
End: 2022-03-25
Payer: COMMERCIAL

## 2022-03-25 DIAGNOSIS — Z12.11 COLON CANCER SCREENING: ICD-10-CM

## 2022-03-25 PROCEDURE — 82274 ASSAY TEST FOR BLOOD FECAL: CPT

## 2022-03-29 LAB — HEMOCCULT STL QL IA: NEGATIVE

## 2022-04-06 NOTE — RESULT ENCOUNTER NOTE
Cely,     Stool test did not show any blood. Plan to check once a year as part of colon cancer screening.     Please do not hesitate to call us at [phone] if you have any questions or concerns.    Thank you,    Danyelle Espinosa MD MPH

## 2022-05-18 ENCOUNTER — ANCILLARY PROCEDURE (OUTPATIENT)
Dept: BONE DENSITY | Facility: CLINIC | Age: 77
End: 2022-05-18
Attending: PREVENTIVE MEDICINE
Payer: COMMERCIAL

## 2022-05-18 DIAGNOSIS — E28.39 ESTROGEN DEFICIENCY: ICD-10-CM

## 2022-05-18 PROCEDURE — 77080 DXA BONE DENSITY AXIAL: CPT | Performed by: RADIOLOGY

## 2022-05-18 NOTE — RESULT ENCOUNTER NOTE
Cely,     Bone density scan is showing osteopenia.    The bone density test shows osteopenia. This is an intermediate category that is in between normal and osteoporosis.  People with osteopenia should work on taking in 9663-3928 mg of calcium with vitamin D daily. They should also be getting daily weight bearing exercise.    Please do not hesitate to call us at [phone] if you have any questions or concerns.    Thank you,    Danyelle Espinosa MD MPH

## 2022-09-01 ENCOUNTER — OFFICE VISIT (OUTPATIENT)
Dept: OPTOMETRY | Facility: CLINIC | Age: 77
End: 2022-09-01
Payer: COMMERCIAL

## 2022-09-01 DIAGNOSIS — H52.03 HYPEROPIA OF BOTH EYES: ICD-10-CM

## 2022-09-01 DIAGNOSIS — H02.889 MEIBOMIAN GLAND DYSFUNCTION: ICD-10-CM

## 2022-09-01 DIAGNOSIS — H18.519 FUCHS ENDOTHELIAL CORNEAL DYSTROPHY TYPE 1: ICD-10-CM

## 2022-09-01 DIAGNOSIS — H10.13 ALLERGIC CONJUNCTIVITIS OF BOTH EYES: ICD-10-CM

## 2022-09-01 DIAGNOSIS — H35.372 EPIRETINAL MEMBRANE (ERM) OF LEFT EYE: ICD-10-CM

## 2022-09-01 DIAGNOSIS — H52.203 ASTIGMATISM OF BOTH EYES WITH PRESBYOPIA: ICD-10-CM

## 2022-09-01 DIAGNOSIS — Z96.1 PSEUDOPHAKIA OF BOTH EYES: ICD-10-CM

## 2022-09-01 DIAGNOSIS — H04.123 DRY EYE SYNDROME OF BOTH EYES: ICD-10-CM

## 2022-09-01 DIAGNOSIS — H02.839 DERMATOCHALASIS OF EYELID: ICD-10-CM

## 2022-09-01 DIAGNOSIS — Z01.00 EXAMINATION OF EYES AND VISION: Primary | ICD-10-CM

## 2022-09-01 DIAGNOSIS — H52.4 ASTIGMATISM OF BOTH EYES WITH PRESBYOPIA: ICD-10-CM

## 2022-09-01 PROCEDURE — 92014 COMPRE OPH EXAM EST PT 1/>: CPT | Performed by: OPTOMETRIST

## 2022-09-01 PROCEDURE — 92015 DETERMINE REFRACTIVE STATE: CPT | Performed by: OPTOMETRIST

## 2022-09-01 ASSESSMENT — TONOMETRY
OS_IOP_MMHG: 20
IOP_METHOD: TONOPEN
OD_IOP_MMHG: 18

## 2022-09-01 ASSESSMENT — VISUAL ACUITY
METHOD: SNELLEN - LINEAR
OD_CC: 20/30
OS_CC: 20/30-1
OS_CC: 20/30
OD_CC+: -2
OD_SC+: -1
OD_SC: 20/30
OS_CC+: -1
OD_CC: 20/20
OS_SC: 20/50
OS_SC+: -2

## 2022-09-01 ASSESSMENT — REFRACTION_MANIFEST
OS_CYLINDER: +1.25
OS_SPHERE: -1.00
OS_AXIS: 180
OS_ADD: +2.75
OD_CYLINDER: +1.50
OD_AXIS: 017
OD_SPHERE: -1.25
OD_ADD: +2.75

## 2022-09-01 ASSESSMENT — REFRACTION_WEARINGRX
OS_CYLINDER: +1.25
SPECS_TYPE: PAL
OD_AXIS: 017
OD_SPHERE: -1.25
OD_ADD: +2.75
OD_CYLINDER: +1.50
OS_AXIS: 180
OS_SPHERE: -1.00
OS_ADD: +2.75

## 2022-09-01 ASSESSMENT — KERATOMETRY
OS_K1POWER_DIOPTERS: 42.25
OD_AXISANGLE_DEGREES: 008
OD_K1POWER_DIOPTERS: 43.25
OD_AXISANGLE2_DEGREES: 098
OS_AXISANGLE_DEGREES: 178
OD_K2POWER_DIOPTERS: 44.00
OS_AXISANGLE2_DEGREES: 088
OS_K2POWER_DIOPTERS: 43.50

## 2022-09-01 ASSESSMENT — EXTERNAL EXAM - RIGHT EYE: OD_EXAM: NORMAL

## 2022-09-01 ASSESSMENT — EXTERNAL EXAM - LEFT EYE: OS_EXAM: NORMAL

## 2022-09-01 ASSESSMENT — CUP TO DISC RATIO
OD_RATIO: 0.2
OS_RATIO: 0.3

## 2022-09-01 ASSESSMENT — CONF VISUAL FIELD
OS_NORMAL: 1
OD_NORMAL: 1

## 2022-09-01 NOTE — PATIENT INSTRUCTIONS
Eyeglass prescription given.  No change in eyeglass prescription.    Continue artificial tears- 1 drop both eyes 2-4 x daily.    Heat to the eyes daily for 10-15 minutes nightly with warm washcloth or reusable gel masks from the pharmacy or  [employer] heat masks can be purchased at [employer].     Pataday to be used once daily for itchy eyes.  Use as needed. Contact your pharmacy for refills.    Referral to Dr. Diego Vela at the [facility] for evaluation- [phone].    Return in 1 year for a complete eye exam or sooner if needed.    Brandt Richardson, OD    The affects of the dilating drops last for 4- 6 hours.  You will be more sensitive to light and vision will be blurry up close.  Do not drive if you do not feel comfortable.  Mydriatic sunglasses were given if needed.      Optometry Providers       Clinic Locations                                 Telephone Number   Dr. Daysi Ruiz    [facility] [phone]     [facility] Hours:                [place] Hours:       [facility] Hours:   [address]   [address]      [address] [place] [address]   [place] [address]    [place] [address]  Phone: [phone]                    Phone: [phone]     Phone: [phone]                      Monday 8:00-6:00                          Monday 8:00-6:00                          Monday 8:00-6:00              Tuesday 8:00-6:00                          Tuesday 8:00-6:00                          Tuesday 8:00-6:00              Wednesday 8:00-6:00                  Wednesday 8:00-6:00                   Wednesday 8:00-6:00      Thursday 8:00-6:00                        Thursday 8:00-6:00                         Thursday 8:00-6:00            Friday 8:00-5:00                              Friday 8:00-5:00                              Friday  8:00-5:00    [facility] Hours:   [address] [address]  [phone]    Monday 9:00-6:00  Tuesday 9:00-6:00  Wednesday 9:00-6:00  Thursday 9:00-6:00  Friday 9:00-5:00  Please log on to [email or web address] to order your contact lenses.  The link is found on the [facility] page.  As always, Thank you for trusting us with your health care needs!

## 2022-09-01 NOTE — PROGRESS NOTES
Chief Complaint   Patient presents with     Annual Eye Exam      Accompanied by self  Last Eye Exam: 8-  Dilated Previously: Yes    What are you currently using to see?  glasses     Distance Vision Acuity: Satisfied with vision    Near Vision Acuity: Satisfied with vision while reading  with glasses    Eye Comfort: good  Do you use eye drops? : Yes: otc drops- once in am once in pm-  Occupation or Hobbies: retired -       History of cataract surgery with Dr. Ken Aguilera   Right eye-2-   Left eye-1-      Yag caps with Dr. Peter  Right eye-2/6/2020  Left eye-1/29/2020    Eyes seem more droopy- interested in eyelid surgery evaluation.    Shruthi Cantu Optometric Assistant, A.B.O.C.      Medical, surgical and family histories reviewed and updated 9/1/2022.       OBJECTIVE: See Ophthalmology exam    ASSESSMENT:    ICD-10-CM    1. Examination of eyes and vision  Z01.00 EYE EXAM (SIMPLE-NONBILLABLE)   2. Hyperopia of both eyes  H52.03 REFRACTION   3. Astigmatism of both eyes with presbyopia  H52.203 REFRACTION    H52.4    4. Pseudophakia, Yag Caps, of both eyes  Z96.1 EYE EXAM (SIMPLE-NONBILLABLE)   5. Meibomian gland dysfunction  H02.889 EYE EXAM (SIMPLE-NONBILLABLE)   6. Dry eye syndrome of both eyes  H04.123 EYE EXAM (SIMPLE-NONBILLABLE)   7. Fuchs endothelial corneal dystrophy type 1  H18.519 EYE EXAM (SIMPLE-NONBILLABLE)   8. Epiretinal membrane (ERM) of left eye  H35.372 EYE EXAM (SIMPLE-NONBILLABLE)   9. Allergic conjunctivitis of both eyes  H10.13 EYE EXAM (SIMPLE-NONBILLABLE)   10. Dermatochalasis of eyelid  H02.839 EYE EXAM (SIMPLE-NONBILLABLE)     Adult Eye  Referral       PLAN:     Patient Instructions   Eyeglass prescription given.  No change in eyeglass prescription.    Continue artificial tears- 1 drop both eyes 2-4 x daily.    Heat to the eyes daily for 10-15 minutes nightly with warm washcloth or reusable gel masks from the pharmacy or  [employer] heat masks  can be purchased at [employer].     Pataday to be used once daily for itchy eyes.  Use as needed. Contact your pharmacy for refills.    Referral to Dr. Diego Vela at the [facility] for evaluation- [phone].    Return in 1 year for a complete eye exam or sooner if needed.    Brandt Richardson, OD

## 2022-09-01 NOTE — LETTER
9/1/2022         RE: Cely Ontiveros  [address]  [facility] [address]        Dear Colleague,    Thank you for referring your patient, Cely Ontiveros, to the [facility]. Please see a copy of my visit note below.    Chief Complaint   Patient presents with     Annual Eye Exam      Accompanied by self  Last Eye Exam: 8-  Dilated Previously: Yes    What are you currently using to see?  glasses     Distance Vision Acuity: Satisfied with vision    Near Vision Acuity: Satisfied with vision while reading  with glasses    Eye Comfort: good  Do you use eye drops? : Yes: otc drops- once in am once in pm-  Occupation or Hobbies: retired -       History of cataract surgery with Dr. Ken Aguilera   Right eye-2-   Left eye-1-      Yag caps with Dr. Peter  Right eye-2/6/2020  Left eye-1/29/2020    Eyes seem more droopy- interested in eyelid surgery evaluation.    Shruthi Cantu Optometric Assistant, A.B.O.C.      Medical, surgical and family histories reviewed and updated 9/1/2022.       OBJECTIVE: See Ophthalmology exam    ASSESSMENT:    ICD-10-CM    1. Examination of eyes and vision  Z01.00 EYE EXAM (SIMPLE-NONBILLABLE)   2. Hyperopia of both eyes  H52.03 REFRACTION   3. Astigmatism of both eyes with presbyopia  H52.203 REFRACTION    H52.4    4. Pseudophakia, Yag Caps, of both eyes  Z96.1 EYE EXAM (SIMPLE-NONBILLABLE)   5. Meibomian gland dysfunction  H02.889 EYE EXAM (SIMPLE-NONBILLABLE)   6. Dry eye syndrome of both eyes  H04.123 EYE EXAM (SIMPLE-NONBILLABLE)   7. Fuchs endothelial corneal dystrophy type 1  H18.519 EYE EXAM (SIMPLE-NONBILLABLE)   8. Epiretinal membrane (ERM) of left eye  H35.372 EYE EXAM (SIMPLE-NONBILLABLE)   9. Allergic conjunctivitis of both eyes  H10.13 EYE EXAM (SIMPLE-NONBILLABLE)   10. Dermatochalasis of eyelid  H02.839 EYE EXAM (SIMPLE-NONBILLABLE)     Adult Eye  Referral       PLAN:     Patient Instructions    Eyeglass prescription given.  No change in eyeglass prescription.    Continue artificial tears- 1 drop both eyes 2-4 x daily.    Heat to the eyes daily for 10-15 minutes nightly with warm washcloth or reusable gel masks from the pharmacy or  [employer] heat masks can be purchased at [employer].     Pataday to be used once daily for itchy eyes.  Use as needed. Contact your pharmacy for refills.    Referral to Dr. Diego Vela at the [facility] for evaluation- [phone].    Return in 1 year for a complete eye exam or sooner if needed.    Brandt Richardson, OD                 Again, thank you for allowing me to participate in the care of your patient.        Sincerely,        Brandt Richardson, OD

## 2022-10-05 ENCOUNTER — OFFICE VISIT (OUTPATIENT)
Dept: OPHTHALMOLOGY | Facility: CLINIC | Age: 77
End: 2022-10-05
Attending: OPTOMETRIST
Payer: COMMERCIAL

## 2022-10-05 DIAGNOSIS — H02.831 DERMATOCHALASIS OF BOTH UPPER EYELIDS: Primary | ICD-10-CM

## 2022-10-05 DIAGNOSIS — H02.834 DERMATOCHALASIS OF BOTH UPPER EYELIDS: Primary | ICD-10-CM

## 2022-10-05 PROCEDURE — 92081 LIMITED VISUAL FIELD XM: CPT | Mod: GC | Performed by: OPHTHALMOLOGY

## 2022-10-05 PROCEDURE — 92285 EXTERNAL OCULAR PHOTOGRAPHY: CPT | Mod: GC | Performed by: OPHTHALMOLOGY

## 2022-10-05 PROCEDURE — 99214 OFFICE O/P EST MOD 30 MIN: CPT | Mod: GC | Performed by: OPHTHALMOLOGY

## 2022-10-05 ASSESSMENT — LAGOPHTHALMOS
OS_LAGOPHTHALMOS: 0
OD_LAGOPHTHALMOS: 0

## 2022-10-05 ASSESSMENT — MARGIN REFLEX DISTANCE
OD_MRD1: 2
OS_MRD1: 2

## 2022-10-05 ASSESSMENT — TONOMETRY
OS_IOP_MMHG: 18
IOP_METHOD: TONOPEN
OD_IOP_MMHG: 19

## 2022-10-05 ASSESSMENT — LEVATOR FUNCTION
OS_LEVATOR: 14
OD_LEVATOR: 14

## 2022-10-05 ASSESSMENT — VISUAL ACUITY
METHOD: SNELLEN - LINEAR
OD_SC: 20/40
OD_SC+: -1
OS_SC: 20/60

## 2022-10-05 ASSESSMENT — CONF VISUAL FIELD: COMMENTS: TANGENT VISUAL FIELD PERFORMED TODAY.

## 2022-10-05 ASSESSMENT — EXTERNAL EXAM - RIGHT EYE: OD_EXAM: BROW RECRUITMENT

## 2022-10-05 ASSESSMENT — EXTERNAL EXAM - LEFT EYE: OS_EXAM: NORMAL

## 2022-10-05 NOTE — PROGRESS NOTES
Chief Complaint(s) and History of Present Illness(es)     Droopy Eye Lid Evaluation     Laterality: right upper lid and left upper lid              Comments     Patient referred by Dr. Richardson for dermatochalasis evaluation. Patient has   noticed that gradually over the past few years that her eye lids have been   drooping. Patient has not noticed any interference with her vision because   it has gradually gotten worse. She has some concerns about the recovery   that she would like to discuss before she decided to do a procedure.             FUNCTIONAL COMPLAINTS RELATED TO DROOPY EYELIDS/BROWS:  Cely Ontiveros describes upper lids interfering with superior visual field and interfering with activities of daily living including reading, driving and watching television.     EXAM:   Dominant eye right     MRD1: Right eye 2   Left eye 2  Dermatochalasis with excess skin touching eyelashes yes  Moderate left brow ptosis compared to right    VISUAL FIELD:  Right eye untaped:30 degrees Right eye taped:50 degrees  Left eye untaped:35 degrees Left eye taped:50 degrees    Right eye visual field improves by: 20 degrees  Left eye visual field improves by: 15 degrees      Assessment & Plan     Cely Ontiveros is a 76 year old female with the following diagnoses:     ICD-10-CM    1. Dermatochalasis of eyelid  H02.839 Adult Eye  Referral     Not on anticoagulation or supplements    PLAN:  Bilateral upper blepharoplasty - S +/-NF        Celina Ervin MD  Oculoplastic Surgery Fellow       Attending Physician Attestation: Complete documentation of historical and exam elements from today's encounter can be found in the full encounter summary report (not reduplicated in this progress note). I personally obtained the chief complaint(s) and history of present illness. I confirmed and edited as necessary the review of systems, past medical/surgical history, family history, social history, and examination findings as  documented by others; and I examined the patient myself. I personally reviewed the relevant tests, images, and reports as documented above. I formulated and edited as necessary the assessment and plan and discussed the findings and management plan with the patient.  -Diego Vela MD

## 2022-10-05 NOTE — LETTER
10/5/2022         RE:  :  MRN: Cely Ontiveros  1945  [id number]     Dear Dr. Brandt Richardson,    Thank you for asking me to see your patient, Cely Ontiveros, for an oculoplastic   consultation.  My assessment and plan are below.  For further details, please see my attached clinic note.      Chief Complaint(s) and History of Present Illness(es)     Droopy Eye Lid Evaluation     Laterality: right upper lid and left upper lid              Comments     Patient referred by Dr. Richardson for dermatochalasis evaluation. Patient has   noticed that gradually over the past few years that her eye lids have been   drooping. Patient has not noticed any interference with her vision because   it has gradually gotten worse. She has some concerns about the recovery   that she would like to discuss before she decided to do a procedure.             FUNCTIONAL COMPLAINTS RELATED TO DROOPY EYELIDS/BROWS:  Cely Ontiveros describes upper lids interfering with superior visual field and interfering with activities of daily living including reading, driving and watching television.     EXAM:   Dominant eye right     MRD1: Right eye 2   Left eye 2  Dermatochalasis with excess skin touching eyelashes yes  Moderate left brow ptosis compared to right    VISUAL FIELD:  Right eye untaped:30 degrees Right eye taped:50 degrees  Left eye untaped:35 degrees Left eye taped:50 degrees    Right eye visual field improves by: 20 degrees  Left eye visual field improves by: 15 degrees      Assessment & Plan     Cely Ontiveros is a 76 year old female with the following diagnoses:     ICD-10-CM    1. Dermatochalasis of eyelid  H02.839 Adult Eye  Referral     Not on anticoagulation or supplements    PLAN:  Bilateral upper blepharoplasty - S +/-NF        Celina Ervin MD  Oculoplastic Surgery Fellow        Again, thank you for allowing me to participate in the care of your patient.      Sincerely,    Diego Vela MD  Department of  Ophthalmology and Visual Neurosciences  [facility]    CC: [place]  [address]  [place] [address]  Via In Basket

## 2022-10-05 NOTE — NURSING NOTE
Chief Complaints and History of Present Illnesses   Patient presents with     Droopy Eye Lid Evaluation       Chief Complaint(s) and History of Present Illness(es)     Droopy Eye Lid Evaluation     Laterality: right upper lid and left upper lid              Comments     Patient referred by Dr. Richardson for dermatochalasis evaluation. Patient has noticed that gradually over the past few years that her eye lids have been drooping. Patient has not noticed any interference with her vision because it has gradually gotten worse. She has some concerns about the recovery that she would like to discuss before she decided to do a procedure.                   Alexandro Hill, Ophthalmic Assistant

## 2022-10-05 NOTE — PATIENT INSTRUCTIONS
BLEPHAROPLASTY    Your eyes are often the first thing people notice about you and are an important aspect of your overall appearance. As we age, the tone and shape of our eyelids can loosen and sag. Heredity and sun exposure also contribute to this process. This excess, puffy or lax skin can make you appear more tired or appear older. Eyelid surgery or blepharoplasty (pronounced  ryxo-q-oh-plasty ) can give the eyes a more youthful look by removing excess skin, bulging fat, and lax muscle from the upper or lower eyelids. If the sagging upper eyelid skin obstructs peripheral vision, blepharoplasty can eliminate the obstruction and expand the visual field.     Upper Blepharoplasty     For the upper eyelids, excess skin and fat are removed through an incision hidden in the natural eyelid crease. If the lid is droopy (ptosis), the muscle that raises the upper eyelid can be tightened. The incision is then closed with fine sutures.     Lower Blepharoplasty     Fat in the lower eyelids can be removed or repositioned through an incision hidden on the inner surface of the eyelid.  If there is excessive skin in the lower lid, the skin can be removed through incision is made just below the lashes. Fat can be removed or repositioned through this incision, and the excess skin removed. The incision is then closed with fine sutures.     Upper and Lower Blepharoplasty     Upper and lower blepharoplasty can be performed together and also can be combined with other procedures such as eyebrow or forehead lift, or midface lift.  The procedures are typically performed as an outpatient procedure and typically take 45 min to 1.5 hours to perform.  Most patients can return to normal activities within 10-14 days. Makeup may be worn to camouflage any bruising after 10 days.       Who Should Perform A Blepharoplasty?     When choosing a surgeon to perform blepharoplasty, look for a cosmetic and reconstructive surgeon who specializes in the  eyelids, orbit, and tear drain system. Dr. Vela's membership in the American Society of Ophthalmic Plastic and Reconstructive Surgery (ASOPRS) indicates he is not only a board certified ophthalmologist who knows the anatomy and structure of the eyelids and orbit, but also has had extensive training in ophthalmic plastic reconstructive and cosmetic surgery.

## 2022-10-18 ENCOUNTER — TELEPHONE (OUTPATIENT)
Dept: OPHTHALMOLOGY | Facility: CLINIC | Age: 77
End: 2022-10-18

## 2022-10-18 NOTE — TELEPHONE ENCOUNTER
Called pt and let her know that she likely will get rx for erythromycin ointment and maxitrol drops after her surgery. They will be sent to the pharmacy the day of surgery, she stated she would like the prescription sent to the pharmacy here in [place]. Advised pt to let the staff upstairs know when she is here for surgery.    Estela Humphreys, COA, 1:27 PM 10/18/2022

## 2022-10-18 NOTE — TELEPHONE ENCOUNTER
[facility] Call Center    Phone Message    May a detailed message be left on voicemail: yes     Reason for Call: Other: Pt called to ask if there will be any eyedrops that she will have to use after her upcoming procedure. Please contact pt to discuss     Thank you     Action Taken: Message routed to:  [facility] ([facility] eye    Travel Screening: Not Applicable

## 2022-11-08 ENCOUNTER — OFFICE VISIT (OUTPATIENT)
Dept: FAMILY MEDICINE | Facility: CLINIC | Age: 77
End: 2022-11-08
Payer: COMMERCIAL

## 2022-11-08 VITALS
WEIGHT: 149.2 LBS | HEART RATE: 88 BPM | DIASTOLIC BLOOD PRESSURE: 78 MMHG | HEIGHT: 64 IN | TEMPERATURE: 97 F | BODY MASS INDEX: 25.47 KG/M2 | SYSTOLIC BLOOD PRESSURE: 138 MMHG | OXYGEN SATURATION: 97 %

## 2022-11-08 DIAGNOSIS — Z01.818 PREOP GENERAL PHYSICAL EXAM: Primary | ICD-10-CM

## 2022-11-08 DIAGNOSIS — H02.831 DERMATOCHALASIS OF BOTH UPPER EYELIDS: ICD-10-CM

## 2022-11-08 DIAGNOSIS — E78.2 MIXED HYPERLIPIDEMIA: ICD-10-CM

## 2022-11-08 DIAGNOSIS — H02.834 DERMATOCHALASIS OF BOTH UPPER EYELIDS: ICD-10-CM

## 2022-11-08 DIAGNOSIS — L82.0 INFLAMED SEBORRHEIC KERATOSIS: ICD-10-CM

## 2022-11-08 PROCEDURE — 99214 OFFICE O/P EST MOD 30 MIN: CPT | Mod: 25 | Performed by: PREVENTIVE MEDICINE

## 2022-11-08 PROCEDURE — 17110 DESTRUCTION B9 LES UP TO 14: CPT | Performed by: PREVENTIVE MEDICINE

## 2022-11-08 ASSESSMENT — PATIENT HEALTH QUESTIONNAIRE - PHQ9
10. IF YOU CHECKED OFF ANY PROBLEMS, HOW DIFFICULT HAVE THESE PROBLEMS MADE IT FOR YOU TO DO YOUR WORK, TAKE CARE OF THINGS AT HOME, OR GET ALONG WITH OTHER PEOPLE: NOT DIFFICULT AT ALL
SUM OF ALL RESPONSES TO PHQ QUESTIONS 1-9: 0
SUM OF ALL RESPONSES TO PHQ QUESTIONS 1-9: 0

## 2022-11-08 ASSESSMENT — PAIN SCALES - GENERAL: PAINLEVEL: NO PAIN (0)

## 2022-11-08 NOTE — PROGRESS NOTES
[address] [address]  Phone: [phone]  Primary Provider: Yariel Marrero  Pre-op Performing Provider: YARIEL MARRERO      PREOPERATIVE EVALUATION:  Today's date: 11/8/2022    Cely Ontiveros is a 76 year old female who presents for a preoperative evaluation.    Surgical Information:  Surgery/Procedure: bilateral upper lid blepharoplasty  Surgery Location: [facility]  Surgeon: Diego Vela MD  Surgery Date: 11/28/2022  Time of Surgery: 11:15 AM  Where patient plans to recover: At home with family  Fax number for surgical facility: To be determined    Type of Anesthesia Anticipated: Local with MAC    Assessment & Plan     The proposed surgical procedure is considered LOW risk.    Preop general physical exam  -procedure on 11/28/22    Dermatochalasis of both upper eyelids  -gradual drooping of the upper lids bilaterally     Mixed hyperlipidemia  -on statin     Inflamed seborrheic keratosis  -S/P cryo treatment   - DESTRUCT BENIGN LESION, UP TO 14      Risks and Recommendations:  The patient has the following additional risks and recommendations for perioperative complications:   - No identified additional risk factors other than previously addressed    Medication Instructions:  Patient is to take all scheduled medications on the day of surgery   Will take medication when she returns home from surgery     RECOMMENDATION:  APPROVAL GIVEN to proceed with proposed procedure, without further diagnostic evaluation.      27 minutes spent on the date of the encounter doing chart review, history and exam, documentation and further activities per the note        Subjective     HPI related to upcoming procedure: 76 year old female with gradual drooping of the upper lids interfering with visual fields and activities of daily living such as reading, driving and watching television.     Has been having acid reflux at night, having more gas, once the gas  is gone then sleeps good. No bowel changes and no melena. We discussed using over the counter Simethicone 125 mg.    Preop Questions 11/8/2022   1. Have you ever had a heart attack or stroke? No   2. Have you ever had surgery on your heart or blood vessels, such as a stent placement, a coronary artery bypass, or surgery on an artery in your head, neck, heart, or legs? No   3. Do you have chest pain with activity? No   4. Do you have a history of  heart failure? No   5. Do you currently have a cold, bronchitis or symptoms of other infection? No   6. Do you have a cough, shortness of breath, or wheezing? No   7. Do you or anyone in your family have previous history of blood clots? No   8. Do you or does anyone in your family have a serious bleeding problem such as prolonged bleeding following surgeries or cuts? No   9. Have you ever had problems with anemia or been told to take iron pills? No   10. Have you had any abnormal blood loss such as black, tarry or bloody stools, or abnormal vaginal bleeding? No   11. Have you ever had a blood transfusion? No   12. Are you willing to have a blood transfusion if it is medically needed before, during, or after your surgery? Yes   13. Have you or any of your relatives ever had problems with anesthesia? No   14. Do you have sleep apnea, excessive snoring or daytime drowsiness? No   15. Do you have any artifical heart valves or other implanted medical devices like a pacemaker, defibrillator, or continuous glucose monitor? No   16. Do you have artificial joints? No   17. Are you allergic to latex? No     Health Care Directive:  Patient does not have a Health Care Directive or Living Will: Discussed advance care planning with patient; however, patient declined at this time.    Preoperative Review of :   reviewed - no record of controlled substances prescribed.      Status of Chronic Conditions:  HYPERLIPIDEMIA - Patient has a long history of Hyperlipidemia requiring  medication for treatment with recent good control. Patient reports no problems or side effects with the medication.       Review of Systems  CONSTITUTIONAL: NEGATIVE for fever, chills, change in weight  INTEGUMENTARY/SKIN: NEGATIVE for worrisome rashes, moles or lesions  ENT/MOUTH: NEGATIVE for ear, mouth and throat problems  RESP: NEGATIVE for significant cough or SOB  CV: NEGATIVE for chest pain, palpitations or peripheral edema  GI: NEGATIVE for nausea, abdominal pain, heartburn, or change in bowel habits  : NEGATIVE for frequency, dysuria, or hematuria  MUSCULOSKELETAL: NEGATIVE for significant arthralgias or myalgia  NEURO: NEGATIVE for weakness, dizziness or paresthesias  ENDOCRINE: NEGATIVE for temperature intolerance, skin/hair changes  HEME: NEGATIVE for bleeding problems  PSYCHIATRIC: NEGATIVE for changes in mood or affect    Patient Active Problem List    Diagnosis Date Noted     Dermatochalasis of both upper eyelids 10/05/2022     Priority: Medium     Added automatically from request for surgery [id number]       Epiretinal membrane, mild, of left eye 01/29/2020     Priority: Medium     Posterior capsular opacification visually significant of right eye 01/29/2020     Priority: Medium     S/P left knee arthroscopy 10/29/2019     Priority: Medium     Primary osteoarthritis of left knee 09/09/2019     Priority: Medium     Pseudophakia, Yag Caps, of both eyes 03/13/2019     Priority: Medium     Chondromalacia of patella, left 01/22/2019     Priority: Medium     Complex tear of medial meniscus of left knee as current injury, subsequent encounter 01/22/2019     Priority: Medium     Meibomian gland dysfunction 12/03/2018     Priority: Medium     Chronic otitis externa of left ear, unspecified type 03/08/2017     Priority: Medium     Dysthymic disorder 03/08/2017     Priority: Medium     Epiphora 10/12/2014     Priority: Medium     Hypermetropia 10/10/2013     Priority: Medium     Astigmatism with presbyopia  10/10/2013     Priority: Medium     Blepharitis of both eyes 10/10/2013     Priority: Medium     Seasonal allergic rhinitis 08/16/2013     Priority: Medium     Posterior vitreous detachment of both eyes 10/09/2012     Priority: Medium     Degenerative joint disease      Priority: Medium     CARDIOVASCULAR SCREENING; LDL GOAL LESS THAN 160 10/31/2010     Priority: Medium     Herpes simplex      Priority: Medium     Recurs left buttock  Zostavax 2/10  IMO update changed this record. Please review for accuracy       Allergic rhinitis      Priority: Medium      Past Medical History:   Diagnosis Date     Actinic keratosis      Allergic rhinitis      Cataract      Degenerative joint disease     cervical disease     Depression with anxiety      Herpes simplex      Hypoglycemia     eats small meals and is fine     Impingement syndrome, shoulder      Past Surgical History:   Procedure Laterality Date     ARTHROSCOPY KNEE Left 9/20/2019    Procedure: Left knee arthroscopy, partial medial meniscectomy and chondral debridement;  Surgeon: Nic Singh MD;  Location: [facility] OR     CATARACT IOL, RT/LT Left 01/24/2019     COMBINED CYSTOSCOPY, URETEROSCOPY, LASER HOLMIUM LITHOTRIPSY URETER(S) Right 8/23/2018    Procedure: COMBINED CYSTOSCOPY, URETEROSCOPY, LASER HOLMIUM LITHOTRIPSY URETER(S);   Cystoscopy,Right ureteroscopy with laser lithotripsy and stent placement;  Surgeon: Pino Hawk MD;  Location: [facility] OR     HC ENLARGE BREAST WITH IMPLANT       HC LAP,FULGURATE/EXCISE LESIONS       HC REMOVAL OF BREAST IMPLANT       HYSTERECTOMY, PAP NO LONGER INDICATED  1998    ovaries and uterus out for benign reasons(fibroids and ovarian cyst)     LASER YAG CAPSULOTOMY  01/2020; 2/2020    left eye; right eye     PHACOEMULSIFICATION WITH STANDARD INTRAOCULAR LENS IMPLANT Left 1/24/2019    Procedure: PHACOEMULSIFICATION WITH STANDARD INTRAOCULAR LENS IMPLANT, LEFT;  Surgeon: Wagner Aguilera MD;  Location: [facility] OR      "PHACOEMULSIFICATION WITH STANDARD INTRAOCULAR LENS IMPLANT Right 2/14/2019    Procedure: PHACOEMULSIFICATION WITH STANDARD INTRAOCULAR LENS IMPLANT, RIGHT;  Surgeon: Wagner Aguilera MD;  Location: [facility] OR     SINUS SURGERY       Current Outpatient Medications   Medication Sig Dispense Refill     atorvastatin (LIPITOR) 20 MG tablet Take 1 tablet (20 mg) by mouth daily 90 tablet 3     fluticasone (FLONASE) 50 MCG/ACT nasal spray Spray 2 sprays in nostril       loratadine (CLARITIN) 10 MG tablet Take 1 tablet (10 mg) by mouth daily 90 tablet 3     valACYclovir (VALTREX) 500 MG tablet Take 1 tablet (500 mg) by mouth daily 90 tablet 3       Allergies   Allergen Reactions     Sulfa Drugs Swelling     Cats Swelling     Lips swollen     Wool Fiber         Social History     Tobacco Use     Smoking status: Never     Smokeless tobacco: Never   Substance Use Topics     Alcohol use: No     Family History   Problem Relation Age of Onset     Hypertension Mother      Arthritis Mother      Cardiovascular Mother      Circulatory Mother      Heart Disease Mother      Lipids Mother      Obesity Mother      Osteoporosis Mother      Cancer Mother         skin     Glaucoma Mother      Cancer - colorectal Father      Cancer Father      Macular Degeneration Father      Diabetes No family hx of      Cerebrovascular Disease No family hx of      Thyroid Disease No family hx of      History   Drug Use No         Objective     /78 (BP Location: Left arm, Patient Position: Sitting, Cuff Size: Adult Regular)   Pulse 88   Temp 97  F (36.1  C) (Tympanic)   Ht 1.635 m (5' 4.37\")   Wt 67.7 kg (149 lb 3.2 oz)   LMP  (LMP Unknown)   SpO2 97%   BMI 25.32 kg/m      Physical Exam  GENERAL APPEARANCE: healthy, alert and no distress  EYES: Eyes grossly normal to inspection and conjunctivae and sclerae normal  HENT: nose and mouth without ulcers or lesions  NECK: no adenopathy and trachea midline and normal to palpation  RESP: lungs clear " to auscultation - no rales, rhonchi or wheezes  CV: regular rates and rhythm, normal S1 S2, no S3 or S4 and no murmur, click or rub  ABDOMEN: soft, non-tender and no rebound or guarding   MS: extremities normal- no gross deformities noted and peripheral pulses normal  SKIN: Scattered brown macules on sun exposed areas.  There are waxy stuck on tan to brown papules on the dorsum of both hands.   NEURO: Normal strength and tone, mentation intact and speech normal  PSYCH: mentation appears normal    Procedure note:     After discussing, patient was agreeable to have  treatment with cryotherapy for lesions on the hands.Pros and cons and alternative treatment discussed. The wart was treated with cryotherapy with adequate ice ball and thaw time in between times three.Patient tolerated the procedure well.      Recent Labs   Lab Test 03/23/22  1151 01/20/21  1016   HGB  --  13.1    137   POTASSIUM 4.2 4.3   CR 0.73 0.83        Diagnostics:  No labs were ordered during this visit.   No EKG required, no history of coronary heart disease, significant arrhythmia, peripheral arterial disease or other structural heart disease.    Revised Cardiac Risk Index (RCRI):  The patient has the following serious cardiovascular risks for perioperative complications:   - No serious cardiac risks = 0 points     RCRI Interpretation: 0 points: Class I (very low risk - 0.4% complication rate)           Signed Electronically by: Danyelle Espinosa MD MPH    Copy of this evaluation report is provided to requesting physician.      Answers for HPI/ROS submitted by the patient on 11/8/2022  If you checked off any problems, how difficult have these problems made it for you to do your work, take care of things at home, or get along with other people?: Not difficult at all  PHQ9 TOTAL SCORE: 0

## 2022-11-08 NOTE — H&P (VIEW-ONLY)
[address] [address]  Phone: [phone]  Primary Provider: Yariel Marrero  Pre-op Performing Provider: YARIEL MARRERO      PREOPERATIVE EVALUATION:  Today's date: 11/8/2022    Cely Ontiveros is a 76 year old female who presents for a preoperative evaluation.    Surgical Information:  Surgery/Procedure: bilateral upper lid blepharoplasty  Surgery Location: [place]  Surgeon: Diego Vela MD  Surgery Date: 11/28/2022  Time of Surgery: 11:15 AM  Where patient plans to recover: At home with family  Fax number for surgical facility: To be determined    Type of Anesthesia Anticipated: Local with MAC    Assessment & Plan     The proposed surgical procedure is considered LOW risk.    Preop general physical exam  -procedure on 11/28/22    Dermatochalasis of both upper eyelids  -gradual drooping of the upper lids bilaterally     Mixed hyperlipidemia  -on statin     Inflamed seborrheic keratosis  -S/P cryo treatment   - DESTRUCT BENIGN LESION, UP TO 14      Risks and Recommendations:  The patient has the following additional risks and recommendations for perioperative complications:   - No identified additional risk factors other than previously addressed    Medication Instructions:  Patient is to take all scheduled medications on the day of surgery   Will take medication when she returns home from surgery     RECOMMENDATION:  APPROVAL GIVEN to proceed with proposed procedure, without further diagnostic evaluation.      27 minutes spent on the date of the encounter doing chart review, history and exam, documentation and further activities per the note        Subjective     HPI related to upcoming procedure: 76 year old female with gradual drooping of the upper lids interfering with visual fields and activities of daily living such as reading, driving and watching television.     Has been having acid reflux at night, having more gas, once the gas  is gone then sleeps good. No bowel changes and no melena. We discussed using over the counter Simethicone 125 mg.    Preop Questions 11/8/2022   1. Have you ever had a heart attack or stroke? No   2. Have you ever had surgery on your heart or blood vessels, such as a stent placement, a coronary artery bypass, or surgery on an artery in your head, neck, heart, or legs? No   3. Do you have chest pain with activity? No   4. Do you have a history of  heart failure? No   5. Do you currently have a cold, bronchitis or symptoms of other infection? No   6. Do you have a cough, shortness of breath, or wheezing? No   7. Do you or anyone in your family have previous history of blood clots? No   8. Do you or does anyone in your family have a serious bleeding problem such as prolonged bleeding following surgeries or cuts? No   9. Have you ever had problems with anemia or been told to take iron pills? No   10. Have you had any abnormal blood loss such as black, tarry or bloody stools, or abnormal vaginal bleeding? No   11. Have you ever had a blood transfusion? No   12. Are you willing to have a blood transfusion if it is medically needed before, during, or after your surgery? Yes   13. Have you or any of your relatives ever had problems with anesthesia? No   14. Do you have sleep apnea, excessive snoring or daytime drowsiness? No   15. Do you have any artifical heart valves or other implanted medical devices like a pacemaker, defibrillator, or continuous glucose monitor? No   16. Do you have artificial joints? No   17. Are you allergic to latex? No     Health Care Directive:  Patient does not have a Health Care Directive or Living Will: Discussed advance care planning with patient; however, patient declined at this time.    Preoperative Review of :   reviewed - no record of controlled substances prescribed.      Status of Chronic Conditions:  HYPERLIPIDEMIA - Patient has a long history of Hyperlipidemia requiring  medication for treatment with recent good control. Patient reports no problems or side effects with the medication.       Review of Systems  CONSTITUTIONAL: NEGATIVE for fever, chills, change in weight  INTEGUMENTARY/SKIN: NEGATIVE for worrisome rashes, moles or lesions  ENT/MOUTH: NEGATIVE for ear, mouth and throat problems  RESP: NEGATIVE for significant cough or SOB  CV: NEGATIVE for chest pain, palpitations or peripheral edema  GI: NEGATIVE for nausea, abdominal pain, heartburn, or change in bowel habits  : NEGATIVE for frequency, dysuria, or hematuria  MUSCULOSKELETAL: NEGATIVE for significant arthralgias or myalgia  NEURO: NEGATIVE for weakness, dizziness or paresthesias  ENDOCRINE: NEGATIVE for temperature intolerance, skin/hair changes  HEME: NEGATIVE for bleeding problems  PSYCHIATRIC: NEGATIVE for changes in mood or affect    Patient Active Problem List    Diagnosis Date Noted     Dermatochalasis of both upper eyelids 10/05/2022     Priority: Medium     Added automatically from request for surgery [id number]       Epiretinal membrane, mild, of left eye 01/29/2020     Priority: Medium     Posterior capsular opacification visually significant of right eye 01/29/2020     Priority: Medium     S/P left knee arthroscopy 10/29/2019     Priority: Medium     Primary osteoarthritis of left knee 09/09/2019     Priority: Medium     Pseudophakia, Yag Caps, of both eyes 03/13/2019     Priority: Medium     Chondromalacia of patella, left 01/22/2019     Priority: Medium     Complex tear of medial meniscus of left knee as current injury, subsequent encounter 01/22/2019     Priority: Medium     Meibomian gland dysfunction 12/03/2018     Priority: Medium     Chronic otitis externa of left ear, unspecified type 03/08/2017     Priority: Medium     Dysthymic disorder 03/08/2017     Priority: Medium     Epiphora 10/12/2014     Priority: Medium     Hypermetropia 10/10/2013     Priority: Medium     Astigmatism with presbyopia  10/10/2013     Priority: Medium     Blepharitis of both eyes 10/10/2013     Priority: Medium     Seasonal allergic rhinitis 08/16/2013     Priority: Medium     Posterior vitreous detachment of both eyes 10/09/2012     Priority: Medium     Degenerative joint disease      Priority: Medium     CARDIOVASCULAR SCREENING; LDL GOAL LESS THAN 160 10/31/2010     Priority: Medium     Herpes simplex      Priority: Medium     Recurs left buttock  Zostavax 2/10  IMO update changed this record. Please review for accuracy       Allergic rhinitis      Priority: Medium      Past Medical History:   Diagnosis Date     Actinic keratosis      Allergic rhinitis      Cataract      Degenerative joint disease     cervical disease     Depression with anxiety      Herpes simplex      Hypoglycemia     eats small meals and is fine     Impingement syndrome, shoulder      Past Surgical History:   Procedure Laterality Date     ARTHROSCOPY KNEE Left 9/20/2019    Procedure: Left knee arthroscopy, partial medial meniscectomy and chondral debridement;  Surgeon: Nic Singh MD;  Location: [facility]     CATARACT IOL, RT/LT Left 01/24/2019     COMBINED CYSTOSCOPY, URETEROSCOPY, LASER HOLMIUM LITHOTRIPSY URETER(S) Right 8/23/2018    Procedure: COMBINED CYSTOSCOPY, URETEROSCOPY, LASER HOLMIUM LITHOTRIPSY URETER(S);   Cystoscopy,Right ureteroscopy with laser lithotripsy and stent placement;  Surgeon: Pino Hawk MD;  Location: [facility]     HC ENLARGE BREAST WITH IMPLANT       HC LAP,FULGURATE/EXCISE LESIONS       HC REMOVAL OF BREAST IMPLANT       HYSTERECTOMY, PAP NO LONGER INDICATED  1998    ovaries and uterus out for benign reasons(fibroids and ovarian cyst)     LASER YAG CAPSULOTOMY  01/2020; 2/2020    left eye; right eye     PHACOEMULSIFICATION WITH STANDARD INTRAOCULAR LENS IMPLANT Left 1/24/2019    Procedure: PHACOEMULSIFICATION WITH STANDARD INTRAOCULAR LENS IMPLANT, LEFT;  Surgeon: Wagner Aguilera MD;  Location: [facility]      "PHACOEMULSIFICATION WITH STANDARD INTRAOCULAR LENS IMPLANT Right 2/14/2019    Procedure: PHACOEMULSIFICATION WITH STANDARD INTRAOCULAR LENS IMPLANT, RIGHT;  Surgeon: Wagner Aguilera MD;  Location: [facility]     SINUS SURGERY       Current Outpatient Medications   Medication Sig Dispense Refill     atorvastatin (LIPITOR) 20 MG tablet Take 1 tablet (20 mg) by mouth daily 90 tablet 3     fluticasone (FLONASE) 50 MCG/ACT nasal spray Spray 2 sprays in nostril       loratadine (CLARITIN) 10 MG tablet Take 1 tablet (10 mg) by mouth daily 90 tablet 3     valACYclovir (VALTREX) 500 MG tablet Take 1 tablet (500 mg) by mouth daily 90 tablet 3       Allergies   Allergen Reactions     Sulfa Drugs Swelling     Cats Swelling     Lips swollen     Wool Fiber         Social History     Tobacco Use     Smoking status: Never     Smokeless tobacco: Never   Substance Use Topics     Alcohol use: No     Family History   Problem Relation Age of Onset     Hypertension Mother      Arthritis Mother      Cardiovascular Mother      Circulatory Mother      Heart Disease Mother      Lipids Mother      Obesity Mother      Osteoporosis Mother      Cancer Mother         skin     Glaucoma Mother      Cancer - colorectal Father      Cancer Father      Macular Degeneration Father      Diabetes No family hx of      Cerebrovascular Disease No family hx of      Thyroid Disease No family hx of      History   Drug Use No         Objective     /78 (BP Location: Left arm, Patient Position: Sitting, Cuff Size: Adult Regular)   Pulse 88   Temp 97  F (36.1  C) (Tympanic)   Ht 1.635 m (5' 4.37\")   Wt 67.7 kg (149 lb 3.2 oz)   LMP  (LMP Unknown)   SpO2 97%   BMI 25.32 kg/m      Physical Exam  GENERAL APPEARANCE: healthy, alert and no distress  EYES: Eyes grossly normal to inspection and conjunctivae and sclerae normal  HENT: nose and mouth without ulcers or lesions  NECK: no adenopathy and trachea midline and normal to palpation  RESP: lungs clear " to auscultation - no rales, rhonchi or wheezes  CV: regular rates and rhythm, normal S1 S2, no S3 or S4 and no murmur, click or rub  ABDOMEN: soft, non-tender and no rebound or guarding   MS: extremities normal- no gross deformities noted and peripheral pulses normal  SKIN: Scattered brown macules on sun exposed areas.  There are waxy stuck on tan to brown papules on the dorsum of both hands.   NEURO: Normal strength and tone, mentation intact and speech normal  PSYCH: mentation appears normal    Procedure note:     After discussing, patient was agreeable to have  treatment with cryotherapy for lesions on the hands.Pros and cons and alternative treatment discussed. The wart was treated with cryotherapy with adequate ice ball and thaw time in between times three.Patient tolerated the procedure well.      Recent Labs   Lab Test 03/23/22  1151 01/20/21  1016   HGB  --  13.1    137   POTASSIUM 4.2 4.3   CR 0.73 0.83        Diagnostics:  No labs were ordered during this visit.   No EKG required, no history of coronary heart disease, significant arrhythmia, peripheral arterial disease or other structural heart disease.    Revised Cardiac Risk Index (RCRI):  The patient has the following serious cardiovascular risks for perioperative complications:   - No serious cardiac risks = 0 points     RCRI Interpretation: 0 points: Class I (very low risk - 0.4% complication rate)           Signed Electronically by: Danyelle Espinosa MD MPH    Copy of this evaluation report is provided to requesting physician.      Answers for HPI/ROS submitted by the patient on 11/8/2022  If you checked off any problems, how difficult have these problems made it for you to do your work, take care of things at home, or get along with other people?: Not difficult at all  PHQ9 TOTAL SCORE: 0

## 2022-11-08 NOTE — PATIENT INSTRUCTIONS
For gas, you can use over the counter Simethicone.         Preparing for Your Surgery  Getting started  A nurse will call you to review your health history and instructions. They will give you an arrival time based on your scheduled surgery time. Please be ready to share:  Your doctor s clinic name and phone number  Your medical, surgical, and anesthesia history  A list of allergies and sensitivities  A list of medicines, including herbal treatments and over-the-counter drugs  Whether the patient has a legal guardian (ask how to send us the papers in advance)  Please tell us if you re pregnant--or if there s any chance you might be pregnant. Some surgeries may injure a fetus (unborn baby), so they require a pregnancy test. Surgeries that are safe for a fetus don t always need a test, and you can choose whether to have one.   If you have a child who s having surgery, please ask for a copy of Preparing for Your Child s Surgery.    Preparing for surgery  Within 10 to 30 days of surgery: Have a pre-op exam (sometimes called an H&P, or History and Physical). This can be done at a clinic or pre-operative center.  If you re having a , you may not need this exam. Talk to your care team.  At your pre-op exam, talk to your care team about all medicines you take. If you need to stop any medicines before surgery, ask when to start taking them again.  We do this for your safety. Many medicines can make you bleed too much during surgery. Some change how well surgery (anesthesia) drugs work.  Call your insurance company to let them know you re having surgery. (If you don t have insurance, call [phone].)  Call your clinic if there s any change in your health. This includes signs of a cold or flu (sore throat, runny nose, cough, rash, fever). It also includes a scrape or scratch near the surgery site.  If you have questions on the day of surgery, call your hospital or surgery center.  COVID testing  You may need to be  tested for COVID-19 before having surgery. If so, we will give you instructions (or click here).  Eating and drinking guidelines  For your safety: Unless your surgeon tells you otherwise, follow the guidelines below.  Eat and drink as usual until 8 hours before you arrive for surgery. After that, no food or milk.  Drink clear liquids until 2 hours before you arrive. These are liquids you can see through, like water, Gatorade, and Propel Water. They also include plain black coffee and tea (no cream or milk), candy, and breath mints. You can spit out gum when you arrive.  If you drink alcohol: Stop drinking it the night before surgery.  If your care team tells you to take medicine on the morning of surgery, it s okay to take it with a sip of water.  Preventing infection  Shower or bathe the night before and morning of your surgery. Follow the instructions your clinic gave you. (If no instructions, use regular soap.)  Don t shave or clip hair near your surgery site. We ll remove the hair if needed.  Don t smoke or vape the morning of surgery. You may chew nicotine gum up to 2 hours before surgery. A nicotine patch is okay.  Note: Some surgeries require you to completely quit smoking and nicotine. Check with your surgeon.  Your care team will make every effort to keep you safe from infection. We will:  Clean our hands often with soap and water (or an alcohol-based hand rub).  Clean the skin at your surgery site with a special soap that kills germs.  Give you a special gown to keep you warm. (Cold raises the risk of infection.)  Wear special hair covers, masks, gowns and gloves during surgery.  Give antibiotic medicine, if prescribed. Not all surgeries need antibiotics.  What to bring on the day of surgery  Photo ID and insurance card  Copy of your health care directive, if you have one  Glasses and hearing aids (bring cases)  You can t wear contacts during surgery  Inhaler and eye drops, if you use them (tell us about  these when you arrive)  CPAP machine or breathing device, if you use them  A few personal items, if spending the night  If you have . . .  A pacemaker, ICD (cardiac defibrillator) or other implant: Bring the ID card.  An implanted stimulator: Bring the remote control.  A legal guardian: Bring a copy of the certified (court-stamped) guardianship papers.  Please remove any jewelry, including body piercings. Leave jewelry and other valuables at home.  If you re going home the day of surgery  You must have a responsible adult drive you home. They should stay with you overnight as well.  If you don t have someone to stay with you, and you aren t safe to go home alone, we may keep you overnight. Insurance often won t pay for this.  After surgery  If it s hard to control your pain or you need more pain medicine, please call your surgeon s office.  Questions?   If you have any questions for your care team, list them here:   ____________________________________________________________________________________________________________________________________________________________________________________________________________________________________________________________________  For informational purposes only. Not to replace the advice of your health care provider. Copyright   2003, 2019 [employer]. All rights reserved. Clinically reviewed by Lolly Rubio MD. [place] [id number] - REV 10/22.

## 2022-11-21 ENCOUNTER — MEDICAL CORRESPONDENCE (OUTPATIENT)
Dept: HEALTH INFORMATION MANAGEMENT | Facility: CLINIC | Age: 77
End: 2022-11-21

## 2022-11-25 ENCOUNTER — LAB (OUTPATIENT)
Dept: URGENT CARE | Facility: URGENT CARE | Age: 77
End: 2022-11-25
Payer: COMMERCIAL

## 2022-11-25 ENCOUNTER — ANESTHESIA EVENT (OUTPATIENT)
Dept: SURGERY | Facility: AMBULATORY SURGERY CENTER | Age: 77
End: 2022-11-25
Payer: COMMERCIAL

## 2022-11-25 DIAGNOSIS — Z20.822 ENCOUNTER FOR LABORATORY TESTING FOR COVID-19 VIRUS: ICD-10-CM

## 2022-11-25 LAB — SARS-COV-2 RNA RESP QL NAA+PROBE: NEGATIVE

## 2022-11-25 PROCEDURE — U0003 INFECTIOUS AGENT DETECTION BY NUCLEIC ACID (DNA OR RNA); SEVERE ACUTE RESPIRATORY SYNDROME CORONAVIRUS 2 (SARS-COV-2) (CORONAVIRUS DISEASE [COVID-19]), AMPLIFIED PROBE TECHNIQUE, MAKING USE OF HIGH THROUGHPUT TECHNOLOGIES AS DESCRIBED BY CMS-2020-01-R: HCPCS

## 2022-11-25 PROCEDURE — U0005 INFEC AGEN DETEC AMPLI PROBE: HCPCS

## 2022-11-28 ENCOUNTER — ANESTHESIA (OUTPATIENT)
Dept: SURGERY | Facility: AMBULATORY SURGERY CENTER | Age: 77
End: 2022-11-28
Payer: COMMERCIAL

## 2022-11-28 ENCOUNTER — HOSPITAL ENCOUNTER (OUTPATIENT)
Facility: AMBULATORY SURGERY CENTER | Age: 77
Discharge: HOME | End: 2022-11-28
Attending: OPHTHALMOLOGY | Admitting: OPHTHALMOLOGY
Payer: COMMERCIAL

## 2022-11-28 VITALS
SYSTOLIC BLOOD PRESSURE: 160 MMHG | BODY MASS INDEX: 25.32 KG/M2 | DIASTOLIC BLOOD PRESSURE: 52 MMHG | RESPIRATION RATE: 16 BRPM | TEMPERATURE: 97.4 F | WEIGHT: 149.2 LBS | OXYGEN SATURATION: 97 %

## 2022-11-28 DIAGNOSIS — H02.831 DERMATOCHALASIS OF BOTH UPPER EYELIDS: ICD-10-CM

## 2022-11-28 DIAGNOSIS — H02.834 DERMATOCHALASIS OF BOTH UPPER EYELIDS: ICD-10-CM

## 2022-11-28 PROCEDURE — 15823 BLEPHARP UPR EYELID XCSV SKN: CPT | Mod: E1

## 2022-11-28 PROCEDURE — 15823 BLEPHARP UPR EYELID XCSV SKN: CPT | Mod: 50 | Performed by: OPHTHALMOLOGY

## 2022-11-28 PROCEDURE — G8907 PT DOC NO EVENTS ON DISCHARG: HCPCS

## 2022-11-28 PROCEDURE — G8918 PT W/O PREOP ORDER IV AB PRO: HCPCS

## 2022-11-28 RX ORDER — FENTANYL CITRATE 50 UG/ML
25 INJECTION, SOLUTION INTRAMUSCULAR; INTRAVENOUS EVERY 5 MIN PRN
Status: DISCONTINUED | OUTPATIENT
Start: 2022-11-28 | End: 2022-11-29 | Stop reason: HOSPADM

## 2022-11-28 RX ORDER — SODIUM CHLORIDE, SODIUM LACTATE, POTASSIUM CHLORIDE, CALCIUM CHLORIDE 600; 310; 30; 20 MG/100ML; MG/100ML; MG/100ML; MG/100ML
INJECTION, SOLUTION INTRAVENOUS CONTINUOUS
Status: DISCONTINUED | OUTPATIENT
Start: 2022-11-28 | End: 2022-11-29 | Stop reason: HOSPADM

## 2022-11-28 RX ORDER — ACETAMINOPHEN 325 MG/1
975 TABLET ORAL ONCE
Status: COMPLETED | OUTPATIENT
Start: 2022-11-28 | End: 2022-11-28

## 2022-11-28 RX ORDER — ONDANSETRON 2 MG/ML
4 INJECTION INTRAMUSCULAR; INTRAVENOUS EVERY 30 MIN PRN
Status: DISCONTINUED | OUTPATIENT
Start: 2022-11-28 | End: 2022-11-29 | Stop reason: HOSPADM

## 2022-11-28 RX ORDER — FENTANYL CITRATE 50 UG/ML
50 INJECTION, SOLUTION INTRAMUSCULAR; INTRAVENOUS EVERY 5 MIN PRN
Status: DISCONTINUED | OUTPATIENT
Start: 2022-11-28 | End: 2022-11-29 | Stop reason: HOSPADM

## 2022-11-28 RX ORDER — ERYTHROMYCIN 5 MG/G
OINTMENT OPHTHALMIC
Qty: 3.5 G | Refills: 0 | Status: SHIPPED | OUTPATIENT
Start: 2022-11-28 | End: 2023-06-23

## 2022-11-28 RX ORDER — OXYCODONE HYDROCHLORIDE 5 MG/1
10 TABLET ORAL EVERY 4 HOURS PRN
Status: DISCONTINUED | OUTPATIENT
Start: 2022-11-28 | End: 2022-11-29 | Stop reason: HOSPADM

## 2022-11-28 RX ORDER — PROPOFOL 10 MG/ML
INJECTION, EMULSION INTRAVENOUS PRN
Status: DISCONTINUED | OUTPATIENT
Start: 2022-11-28 | End: 2022-11-28

## 2022-11-28 RX ORDER — METOPROLOL TARTRATE 1 MG/ML
1-2 INJECTION, SOLUTION INTRAVENOUS EVERY 5 MIN PRN
Status: DISCONTINUED | OUTPATIENT
Start: 2022-11-28 | End: 2022-11-29 | Stop reason: HOSPADM

## 2022-11-28 RX ORDER — HYDRALAZINE HYDROCHLORIDE 20 MG/ML
2.5-5 INJECTION INTRAMUSCULAR; INTRAVENOUS EVERY 10 MIN PRN
Status: DISCONTINUED | OUTPATIENT
Start: 2022-11-28 | End: 2022-11-29 | Stop reason: HOSPADM

## 2022-11-28 RX ORDER — TETRACAINE HYDROCHLORIDE 5 MG/ML
SOLUTION OPHTHALMIC PRN
Status: DISCONTINUED | OUTPATIENT
Start: 2022-11-28 | End: 2022-11-28 | Stop reason: HOSPADM

## 2022-11-28 RX ORDER — ONDANSETRON 4 MG/1
4 TABLET, ORALLY DISINTEGRATING ORAL EVERY 30 MIN PRN
Status: DISCONTINUED | OUTPATIENT
Start: 2022-11-28 | End: 2022-11-29 | Stop reason: HOSPADM

## 2022-11-28 RX ORDER — ERYTHROMYCIN 5 MG/G
OINTMENT OPHTHALMIC PRN
Status: DISCONTINUED | OUTPATIENT
Start: 2022-11-28 | End: 2022-11-28 | Stop reason: HOSPADM

## 2022-11-28 RX ORDER — FENTANYL CITRATE 50 UG/ML
25 INJECTION, SOLUTION INTRAMUSCULAR; INTRAVENOUS
Status: DISCONTINUED | OUTPATIENT
Start: 2022-11-28 | End: 2022-11-29 | Stop reason: HOSPADM

## 2022-11-28 RX ORDER — FENTANYL CITRATE 50 UG/ML
INJECTION, SOLUTION INTRAMUSCULAR; INTRAVENOUS PRN
Status: DISCONTINUED | OUTPATIENT
Start: 2022-11-28 | End: 2022-11-28

## 2022-11-28 RX ORDER — OXYCODONE HYDROCHLORIDE 5 MG/1
5 TABLET ORAL EVERY 4 HOURS PRN
Status: DISCONTINUED | OUTPATIENT
Start: 2022-11-28 | End: 2022-11-29 | Stop reason: HOSPADM

## 2022-11-28 RX ORDER — LIDOCAINE 40 MG/G
CREAM TOPICAL
Status: DISCONTINUED | OUTPATIENT
Start: 2022-11-28 | End: 2022-11-29 | Stop reason: HOSPADM

## 2022-11-28 RX ORDER — LIDOCAINE HYDROCHLORIDE 20 MG/ML
INJECTION, SOLUTION INFILTRATION; PERINEURAL PRN
Status: DISCONTINUED | OUTPATIENT
Start: 2022-11-28 | End: 2022-11-28

## 2022-11-28 RX ADMIN — PROPOFOL 60 MG: 10 INJECTION, EMULSION INTRAVENOUS at 12:03

## 2022-11-28 RX ADMIN — ACETAMINOPHEN 975 MG: 325 TABLET ORAL at 10:51

## 2022-11-28 RX ADMIN — FENTANYL CITRATE 25 MCG: 50 INJECTION, SOLUTION INTRAMUSCULAR; INTRAVENOUS at 12:16

## 2022-11-28 RX ADMIN — LIDOCAINE HYDROCHLORIDE 60 MG: 20 INJECTION, SOLUTION INFILTRATION; PERINEURAL at 12:03

## 2022-11-28 RX ADMIN — PROPOFOL 40 MCG/KG/MIN: 10 INJECTION, EMULSION INTRAVENOUS at 12:06

## 2022-11-28 RX ADMIN — SODIUM CHLORIDE, SODIUM LACTATE, POTASSIUM CHLORIDE, CALCIUM CHLORIDE: 600; 310; 30; 20 INJECTION, SOLUTION INTRAVENOUS at 11:54

## 2022-11-28 RX ADMIN — FENTANYL CITRATE 25 MCG: 50 INJECTION, SOLUTION INTRAMUSCULAR; INTRAVENOUS at 12:00

## 2022-11-28 NOTE — OP NOTE
PREOPERATIVE DIAGNOSIS: Bilateral upper eyelid dermatochalasis.   POSTOPERATIVE DIAGNOSIS: Bilateral upper eyelid dermatochalasis.   PROCEDURE: Bilateral upper blepharoplasty.   SURGEON: Diego Vela MD  ANESTHESIA: Monitored with local infiltration of a 50/50 mixture of 2% lidocaine with epinephrine and 0.5% Marcaine.   COMPLICATIONS: None.   ESTIMATED BLOOD LOSS: Less than 5 mL.   HISTORY: Cely Ontiveros  presented with upper lid dermatochalasis leading to mechanical ptosis of the upper lids, blocking the superior visual field and interfering with  activities of daily living. After the risks, benefits and alternatives to the proposed procedure were explained, informed consent was obtained.   DESCRIPTION OF PROCEDURE: Cely Ontiveros was brought to the operating room and placed supine on the operating table. IV sedation was given. The upper lid crease and excess upper eyelid skin was marked with marking pen and infiltrated with local anesthetic. The area was prepped and draped in the typical fashion. Attention was directed to the right side. Skin was incised following marked lines. Skin and muscle flap was excised with cautery. A row of cautery was placed in the orbicularis along the inferior incision line. Orbital septum was opened and nasal and central fat was conservatively debulked. Hemostasis was obtained and the skin closed with running 6-0 plain gut suture. Attention was directed to the left side where the same procedure was performed.  Ophthalmic antibiotic ointment was applied to the eyelids and into the eyes. Cely Ontiveros tolerated the procedure well and left the operating room in stable condition.   Diego Vela MD

## 2022-11-28 NOTE — DISCHARGE INSTRUCTIONS
[facility]   Adult Discharge Orders & Instructions     For 24 hours after surgery    Get plenty of rest.  A responsible adult must stay with you for at least 24 hours after you leave the hospital.   Do not drive or use heavy equipment.  If you have weakness or tingling, don't drive or use heavy equipment until this feeling goes away.  Do not drink alcohol.  Avoid strenuous or risky activities.  Ask for help when climbing stairs.   You may feel lightheaded.  IF so, sit for a few minutes before standing.  Have someone help you get up.   If you have nausea (feel sick to your stomach): Drink only clear liquids such as apple juice, ginger ale, broth or 7-Up.  Rest may also help.  Be sure to drink enough fluids.  Move to a regular diet as you feel able.  You may have a slight fever. Call the doctor if your fever is over 100 F (37.7 C) (taken under the tongue) or lasts longer than 24 hours.  You may have a dry mouth, a sore throat, muscle aches or trouble sleeping.  These should go away after 24 hours.  Do not make important or legal decisions.     Call your doctor for any of the followin.  Signs of infection (fever, growing tenderness at the surgery site, a large amount of drainage or bleeding, severe pain, foul-smelling drainage, redness, swelling).    2. It has been over 8 to 10 hours since surgery and you are still not able to urinate (pass water).    3.  Headache for over 24 hours.    4.  Numbness, tingling or weakness the day after surgery (if you had spinal anesthesia).                  5. Signs of Covid-19 infection (temperature over 100 degrees, shortness of breath, cough, loss of taste/smell, generalized body aches, persistent headache,                  chills, sore throat, nausea/vomiting/diarrhea).    ________________________________________      Post-operative Instructions  Ophthalmic Plastic and Reconstructive Surgery    Diego Vela M.D.     All instructions apply to the operated  eye(s) or eyelid(s).    Wound care and personal care  ? If a patch or bandage has been placed, please leave this in place until seen by your physician. Ensure that the bandage does not get wet when you take a shower.   ? Apply ice compresses 15 minutes of every hour while awake for 2 days. If you are sleeping, you don't need to wake up to ice. As long as there is no further bleeding, after two days, switch to warm water compresses for five minutes, four times a day until seen by your physician.   ? You may shower or wash your hair the day after surgery. Do not go swimming for at least 2 weeks to prevent contamination of your wounds.  ? You may go for walks and light activity is ok, but no heavy (over 15 pounds) lifting, bending or excessive straining for one week.   ? Do not apply make-up to the eyes or eyelids for 2 weeks after surgery.  ? Expect some swelling, bruising, black eye (even into the lower eyelids and cheeks). Also expect serum caking, crusting and discharge from the eye and/or incisions. A small amount of surface bleeding, and depending on the type of surgery, bleeding from the inside of the eyelid, is normal for the first 48 hours.  ? Avoid straining, bending at the waist, or lifting more than 15 pounds for 1 week. Sleeping with your head elevated, such as in a recliner, for the first several days can help swelling resolve more quickly.   ? Do continue to ambulate (walk) as you normally would - being sedentary after surgery can cause blood clots.   ? Your eye(s) and eyelid(s) may be painful and tender. This is normal after surgery.      Contact information and follow-up  ? Return to the Eye Clinic for a follow-up appointment with your physician as scheduled. If no appointment has been scheduled:     -  [facility] eye clinic: [phone] for an appointment with Dr. Vela within 2 to 3 weeks from your date of surgery.     ? For severe pain, bleeding, or loss of vision, call the  [facility] at [phone] or [facility] at [phone].     After hours or on weekends and holidays, call [phone] and ask to speak with the ophthalmologist on call.    An on call person can be reached after hours for concerns. The on call doctor should not call in medication refill requests after hours or on weekends, so please plan accordingly. An effort has been made to provide adequate pain medications following every surgery, and refills will not be provided in most instances.     Medications  ? Restart all regular home medications and eye drops. If you take Plavix or Aspirin on a regular basis, wait for 72 hours after your surgery before restarting these in order to decrease the risk of bleeding complications.  ? Avoid aspirin and aspirin-like medications (Motrin, Aleve, Ibuprofen, Brigitte-Florence etc) for 72 hours to reduce the risk of bleeding. You may take Tylenol (acetaminophen) for pain.  ? In addition to your home medications, take the following post-operative medications as prescribed by your physician.    ? Apply antibiotic ointment to all sutures three times a day.  Once you run out, you can apply Vaseline or Aquaphor (over the counter) to the incisions. Don't put the Vaseline or Aquaphor into your eyes.   ? If you have ocular irritation, you can use over the counter artificial tears such as Refresh, Systane, or Blink. Do not use Visine, Clear Eyes, or any other drop that gets the red out.       You had 975 mg of Tylenol at 1050. You may repeat this after 4:50.   Maximum amount of Tylenol/Acetaminophen in a 24 hour period is 4,000 mg.

## 2022-11-28 NOTE — ANESTHESIA POSTPROCEDURE EVALUATION
Patient: Cely Ontiveros    Procedure: Procedure(s):  bilateral upper lid blepharoplasty       Anesthesia Type:  MAC    Note:  Disposition: Outpatient   Postop Pain Control: Uneventful            Sign Out: Well controlled pain   PONV: No   Neuro/Psych: Uneventful            Sign Out: Acceptable/Baseline neuro status   Airway/Respiratory: Uneventful            Sign Out: Acceptable/Baseline resp. status   CV/Hemodynamics: Uneventful            Sign Out: Acceptable CV status   Other NRE: NONE   DID A NON-ROUTINE EVENT OCCUR? No           Last vitals:  Vitals Value Taken Time   /52 11/28/22 1251   Temp 36.3  C (97.4  F) 11/28/22 1234   Pulse     Resp 16 11/28/22 1251   SpO2 97 % 11/28/22 1251       Electronically Signed By: Alessandro Payne MD  November 28, 2022  3:44 PM

## 2022-11-28 NOTE — ANESTHESIA CARE TRANSFER NOTE
Patient: Cely Ontiveros    Procedure: Procedure(s):  bilateral upper lid blepharoplasty       Diagnosis: Dermatochalasis of both upper eyelids [H02.831, H02.834]  Diagnosis Additional Information: No value filed.    Anesthesia Type:   MAC     Note:    Oropharynx: oropharynx clear of all foreign objects and spontaneously breathing  Level of Consciousness: awake  Oxygen Supplementation: room air    Independent Airway: airway patency satisfactory and stable  Dentition: dentition unchanged  Vital Signs Stable: post-procedure vital signs reviewed and stable  Report to RN Given: handoff report given  Patient transferred to: Phase II    Handoff Report: Identifed the Patient, Identified the Reponsible Provider, Reviewed the pertinent medical history, Discussed the surgical course, Reviewed Intra-OP anesthesia mangement and issues during anesthesia, Set expectations for post-procedure period and Allowed opportunity for questions and acknowledgement of understanding      Vitals:  Vitals Value Taken Time   BP     Temp     Pulse     Resp     SpO2         Electronically Signed By: AUDREY Arnold CRNA  November 28, 2022  12:31 PM

## 2022-11-28 NOTE — INTERVAL H&P NOTE
"I have reviewed the surgical (or preoperative) H&P that is linked to this encounter, and examined the patient. There are no significant changes    Clinical Conditions Present on Arrival:  Clinically Significant Risk Factors Present on Admission                    # Overweight: Estimated body mass index is 25.32 kg/m  as calculated from the following:    Height as of 11/8/22: 1.635 m (5' 4.37\").    Weight as of this encounter: 67.7 kg (149 lb 3.2 oz).       "

## 2022-11-28 NOTE — ANESTHESIA PREPROCEDURE EVALUATION
Anesthesia Pre-Procedure Evaluation    Patient: Cely Ontiveros   MRN: [id number] : 1945        Procedure : Procedure(s):  bilateral upper lid blepharoplasty          Past Medical History:   Diagnosis Date     Actinic keratosis      Allergic rhinitis      Cataract      Degenerative joint disease     cervical disease     Depression with anxiety      Herpes simplex      Hypoglycemia     eats small meals and is fine     Impingement syndrome, shoulder       Past Surgical History:   Procedure Laterality Date     ARTHROSCOPY KNEE Left 2019    Procedure: Left knee arthroscopy, partial medial meniscectomy and chondral debridement;  Surgeon: Nic Singh MD;  Location: [facility]     CATARACT IOL, RT/LT Left 2019     COMBINED CYSTOSCOPY, URETEROSCOPY, LASER HOLMIUM LITHOTRIPSY URETER(S) Right 2018    Procedure: COMBINED CYSTOSCOPY, URETEROSCOPY, LASER HOLMIUM LITHOTRIPSY URETER(S);   Cystoscopy,Right ureteroscopy with laser lithotripsy and stent placement;  Surgeon: Pino Hawk MD;  Location: [facility]     HC ENLARGE BREAST WITH IMPLANT       HC LAP,FULGURATE/EXCISE LESIONS       HC REMOVAL OF BREAST IMPLANT       HYSTERECTOMY, PAP NO LONGER INDICATED  1998    ovaries and uterus out for benign reasons(fibroids and ovarian cyst)     LASER YAG CAPSULOTOMY  2020; 2020    left eye; right eye     PHACOEMULSIFICATION WITH STANDARD INTRAOCULAR LENS IMPLANT Left 2019    Procedure: PHACOEMULSIFICATION WITH STANDARD INTRAOCULAR LENS IMPLANT, LEFT;  Surgeon: Wagner Aguilera MD;  Location: [facility]     PHACOEMULSIFICATION WITH STANDARD INTRAOCULAR LENS IMPLANT Right 2019    Procedure: PHACOEMULSIFICATION WITH STANDARD INTRAOCULAR LENS IMPLANT, RIGHT;  Surgeon: Wagner Aguilera MD;  Location: [facility]     SINUS SURGERY        Allergies   Allergen Reactions     Sulfa Drugs Swelling     Cats Swelling     Lips swollen     Wool Fiber       Social History     Tobacco Use     Smoking status:  Never     Smokeless tobacco: Never   Substance Use Topics     Alcohol use: No      Wt Readings from Last 1 Encounters:   11/28/22 67.7 kg (149 lb 3.2 oz)        Anesthesia Evaluation            ROS/MED HX  ENT/Pulmonary:     (+) allergic rhinitis,     Neurologic:       Cardiovascular:       METS/Exercise Tolerance:     Hematologic:       Musculoskeletal:   (+) arthritis,     GI/Hepatic:       Renal/Genitourinary:       Endo:       Psychiatric/Substance Use:     (+) psychiatric history anxiety and depression     Infectious Disease:       Malignancy:       Other:            Physical Exam    Airway  airway exam normal      Mallampati: II   TM distance: > 3 FB   Neck ROM: full   Mouth opening: > 3 cm    Respiratory Devices and Support         Dental  no notable dental history         Cardiovascular          Rhythm and rate: regular and normal     Pulmonary   pulmonary exam normal        breath sounds clear to auscultation           OUTSIDE LABS:  CBC:   Lab Results   Component Value Date    WBC 9.3 12/27/2012    WBC 6.7 08/27/2010    HGB 13.1 01/20/2021    HGB 12.9 09/09/2019    HCT 41.4 12/27/2012    HCT 40.1 08/27/2010     12/27/2012     08/27/2010     BMP:   Lab Results   Component Value Date     03/23/2022     01/20/2021    POTASSIUM 4.2 03/23/2022    POTASSIUM 4.3 01/20/2021    CHLORIDE 105 03/23/2022    CHLORIDE 104 01/20/2021    CO2 30 03/23/2022    CO2 29 01/20/2021    BUN 24 03/23/2022    BUN 24 01/20/2021    CR 0.73 03/23/2022    CR 0.83 01/20/2021    GLC 91 03/23/2022    GLC 84 01/20/2021     COAGS: No results found for: PTT, INR, FIBR  POC: No results found for: BGM, HCG, HCGS  HEPATIC:   Lab Results   Component Value Date    ALBUMIN 3.9 01/20/2021    PROTTOTAL 7.4 01/20/2021    ALT 23 03/23/2022    AST 18 01/20/2021    ALKPHOS 61 01/20/2021    BILITOTAL 0.4 01/20/2021     OTHER:   Lab Results   Component Value Date    A1C 5.2 06/10/2019    KANIKA 9.7 03/23/2022    TSH 0.81 03/08/2017        Anesthesia Plan    ASA Status:  1   NPO Status:  NPO Appropriate    Anesthesia Type: MAC.     - Reason for MAC: immobility needed, straight local not clinically adequate   Induction: Intravenous.   Maintenance: TIVA.        Consents    Anesthesia Plan(s) and associated risks, benefits, and realistic alternatives discussed. Questions answered and patient/representative(s) expressed understanding.    - Discussed:     - Discussed with:  Patient      - Extended Intubation/Ventilatory Support Discussed: No.      - Patient is DNR/DNI Status: No    Use of blood products discussed: No .     Postoperative Care    Pain management: IV analgesics, Oral pain medications, Multi-modal analgesia.   PONV prophylaxis: Ondansetron (or other 5HT-3), Background Propofol Infusion     Comments:                Alessandro Payne MD

## 2022-12-02 ENCOUNTER — MEDICAL CORRESPONDENCE (OUTPATIENT)
Dept: FAMILY MEDICINE | Facility: CLINIC | Age: 77
End: 2022-12-02

## 2022-12-14 ENCOUNTER — OFFICE VISIT (OUTPATIENT)
Dept: OPHTHALMOLOGY | Facility: CLINIC | Age: 77
End: 2022-12-14
Payer: COMMERCIAL

## 2022-12-14 DIAGNOSIS — H02.834 DERMATOCHALASIS OF BOTH UPPER EYELIDS: Primary | ICD-10-CM

## 2022-12-14 DIAGNOSIS — H02.831 DERMATOCHALASIS OF BOTH UPPER EYELIDS: Primary | ICD-10-CM

## 2022-12-14 PROCEDURE — 99024 POSTOP FOLLOW-UP VISIT: CPT | Performed by: OPHTHALMOLOGY

## 2022-12-14 ASSESSMENT — CONF VISUAL FIELD
OD_NORMAL: 1
OD_SUPERIOR_NASAL_RESTRICTION: 0
OD_SUPERIOR_TEMPORAL_RESTRICTION: 0
OD_INFERIOR_TEMPORAL_RESTRICTION: 0
OS_SUPERIOR_NASAL_RESTRICTION: 0
OS_SUPERIOR_TEMPORAL_RESTRICTION: 0
OD_INFERIOR_NASAL_RESTRICTION: 0
OS_INFERIOR_NASAL_RESTRICTION: 0
OS_INFERIOR_TEMPORAL_RESTRICTION: 0
OS_NORMAL: 1

## 2022-12-14 ASSESSMENT — TONOMETRY
OS_IOP_MMHG: 19
OD_IOP_MMHG: 19
IOP_METHOD: ICARE

## 2022-12-14 ASSESSMENT — VISUAL ACUITY
METHOD: SNELLEN - LINEAR
CORRECTION_TYPE: GLASSES
OS_CC: 20/30
OD_CC: 20/25

## 2022-12-14 NOTE — PROGRESS NOTES
"Chief Complaint(s) and History of Present Illness(es)     Post Op (Ophthalmology) Both Eyes            Laterality: both eyes          Comments    S/P Bilateral upper blepharoplasty, 11/28/2022. Has been healing well.   Eyes seem more sensitive to light. Has been using lubricating eye drops.   Completed the EES luisa and is now using Aquaphor. Some irritation with   sutures on each eye.          Doing well. Happy with outcome.    Patient Instructions   - Apply warm compresses for 1 minute three times a day until your bruising has resolved.  - Apply Aquaphor to the incision at bedtime.   - If you have symptoms of eye irritation, it is good to use over the counter artificial tears. Good brands include Refresh, Blink, and Systane. Do NOT get drops that are for \"red eyes.\"   - It is normal for the incision to appear raised, red, itch and have small lumps. You can gently massage any small bumps along the incision line. These can take up to six months to resolve.      Return in about 2 months (around 2/14/2023).      Attending Physician Attestation: Complete documentation of historical and exam elements from today's encounter can be found in the full encounter summary report (not reduplicated in this progress note). I personally obtained the chief complaint(s) and history of present illness. I confirmed and edited as necessary the review of systems, past medical/surgical history, family history, social history, and examination findings as documented by others; and I examined the patient myself. I personally reviewed the relevant tests, images, and reports as documented above. I formulated and edited as necessary the assessment and plan and discussed the findings and management plan with the patient and family. I personally reviewed the ophthalmic test(s) associated with this encounter, agree with the interpretation(s) as documented by the resident/fellow, and have edited the corresponding report(s) as necessary. Diego " MD Dane

## 2022-12-14 NOTE — NURSING NOTE
Chief Complaints and History of Present Illnesses   Patient presents with     Post Op (Ophthalmology) Both Eyes       Chief Complaint(s) and History of Present Illness(es)     Post Op (Ophthalmology) Both Eyes            Laterality: both eyes          Comments    S/P Bilateral upper blepharoplasty, 11/28/2022. Has been healing well. Eyes seem more sensitive to light. Has been using lubricating eye drops. Completed the EES luisa and is now using Aquaphor. Some irritation with sutures on each eye.                  Alexandro Hill, Ophthalmic Assistant

## 2022-12-14 NOTE — PATIENT INSTRUCTIONS
"- Apply warm compresses for 1 minute three times a day until your bruising has resolved.  - Apply Aquaphor to the incision at bedtime.   - If you have symptoms of eye irritation, it is good to use over the counter artificial tears. Good brands include Refresh, Blink, and Systane. Do NOT get drops that are for \"red eyes.\"   - It is normal for the incision to appear raised, red, itch and have small lumps. You can gently massage any small bumps along the incision line. These can take up to six months to resolve.    "

## 2023-02-08 ENCOUNTER — ANCILLARY PROCEDURE (OUTPATIENT)
Dept: GENERAL RADIOLOGY | Facility: CLINIC | Age: 78
End: 2023-02-08
Attending: PHYSICIAN ASSISTANT
Payer: COMMERCIAL

## 2023-02-08 ENCOUNTER — TELEPHONE (OUTPATIENT)
Dept: ORTHOPEDICS | Facility: CLINIC | Age: 78
End: 2023-02-08

## 2023-02-08 ENCOUNTER — OFFICE VISIT (OUTPATIENT)
Dept: URGENT CARE | Facility: URGENT CARE | Age: 78
End: 2023-02-08
Payer: COMMERCIAL

## 2023-02-08 VITALS
SYSTOLIC BLOOD PRESSURE: 186 MMHG | TEMPERATURE: 97.4 F | HEART RATE: 70 BPM | DIASTOLIC BLOOD PRESSURE: 79 MMHG | OXYGEN SATURATION: 100 %

## 2023-02-08 DIAGNOSIS — S99.911A INJURY OF RIGHT ANKLE, INITIAL ENCOUNTER: ICD-10-CM

## 2023-02-08 DIAGNOSIS — S82.401A CLOSED FRACTURE OF RIGHT TIBIA AND FIBULA, INITIAL ENCOUNTER: ICD-10-CM

## 2023-02-08 DIAGNOSIS — S82.201A CLOSED FRACTURE OF RIGHT TIBIA AND FIBULA, INITIAL ENCOUNTER: ICD-10-CM

## 2023-02-08 DIAGNOSIS — S82.431A CLOSED DISPLACED OBLIQUE FRACTURE OF SHAFT OF RIGHT FIBULA, INITIAL ENCOUNTER: Primary | ICD-10-CM

## 2023-02-08 PROCEDURE — 73610 X-RAY EXAM OF ANKLE: CPT | Mod: TC | Performed by: RADIOLOGY

## 2023-02-08 PROCEDURE — 99214 OFFICE O/P EST MOD 30 MIN: CPT | Performed by: PHYSICIAN ASSISTANT

## 2023-02-08 ASSESSMENT — PAIN SCALES - GENERAL: PAINLEVEL: MODERATE PAIN (5)

## 2023-02-08 NOTE — TELEPHONE ENCOUNTER
Called Pt to set up follow up visit in 1 week to assess healing. Left VM stating Pt can call scheduling line to get a visit next week with Sports Medicine or Podiatry for a follow up visit with new X-Rays. Per Dr. Clifton there is good alignment now, if Pt stays non-weight bearing and there is no shifting can be managed conservatively. Get new X-Rays at follow up visit in 1 week to assess movement.     Jeb MCDERMOTT ATC

## 2023-02-08 NOTE — PATIENT INSTRUCTIONS
Elevate, ice, wear walking braces, crutches as needed    Ibuprofen 400-600 mg (2-3 of the 200 mg OTC tablets or 400-600 mg of the children's liquid) up to 4 times daily with food or milk  Tylenol 500-1000 mg every 8 hours as needed

## 2023-02-08 NOTE — TELEPHONE ENCOUNTER
[facility] Call Center    Phone Message    May a detailed message be left on voicemail: yes     Reason for Call: Other: Pt needs to be seen for Closed displaced oblique fracture of shaft of right fibula, and     Action Taken: Other: [facility] [place]    Travel Screening: Not Applicable

## 2023-02-08 NOTE — PROGRESS NOTES
Chief Complaint   Patient presents with     Ankle Pain     After fall this am        ASSESSMENT/PLAN:  Cely was seen today for ankle pain.    Diagnoses and all orders for this visit:    Closed displaced oblique fracture of shaft of right fibula, initial encounter  -     Ankle/Foot Bracing Supplies DME Walking Boot; Right; Non-pneumatic  -     Orthopedic  Referral; Future    Injury of right ankle, initial encounter  -     XR Ankle Right G/E 3 Views; Future    Closed fracture of right tibia and fibula, initial encounter  -     Ankle/Foot Bracing Supplies DME Walking Boot; Right; Non-pneumatic  -     Orthopedic  Referral; Future    Patient placed in walking boot and adjusted her crutches.  That she came in with  Follow-up with Ortho in 3 to 5 days.  Elevate, ice, wear walking braces, crutches as needed  Ibuprofen 400-600 mg (2-3 of the 200 mg OTC tablets or 400-600 mg of the children's liquid) up to 4 times daily with food or milk  Tylenol 500-1000 mg every 8 hours as neede    Pb Spain PA-C      SUBJECTIVE:  Cely is a 77 year old female who presents to urgent care with an ankle injury.  She slipped on ice had an inversion of her ankle and has had pain, swelling and difficulty weightbearing since.    ROS: Pertinent ROS neg other than the symptoms noted above in the HPI.     OBJECTIVE:  BP (!) 186/79   Pulse 70   Temp 97.4  F (36.3  C) (Tympanic)   LMP  (LMP Unknown)   SpO2 100%    GENERAL: healthy, alert and no distress  MS: Right lower extremity: Tender at the medial malleolus and surrounding ligaments, no tenderness at the base of the fifth metatarsal, heel tenderness, midfoot tenderness.  Tib-fib squeeze negative.  No ipsilateral knee tenderness.  Decreased range of motion.  SKIN: no suspicious lesions or rashes  NEURO: Normal strength and tone, mentation intact and speech normal, normal sensation distal to injury    DIAGNOSTICS  Xray - Reviewed and interpreted by me.  Oblique  minimally displaced fracture of the distal fibula, transverse fracture of the distal tibia  No results found for any visits on 02/08/23.     Current Outpatient Medications   Medication     atorvastatin (LIPITOR) 20 MG tablet     erythromycin (ROMYCIN) 5 MG/GM ophthalmic ointment     fluticasone (FLONASE) 50 MCG/ACT nasal spray     loratadine (CLARITIN) 10 MG tablet     valACYclovir (VALTREX) 500 MG tablet     Current Facility-Administered Medications   Medication     triamcinolone (KENALOG-40) injection 40 mg     triamcinolone (KENALOG-40) injection 40 mg      Patient Active Problem List   Diagnosis     Allergic rhinitis     Herpes simplex     CARDIOVASCULAR SCREENING; LDL GOAL LESS THAN 160     Degenerative joint disease     Posterior vitreous detachment of both eyes     Seasonal allergic rhinitis     Hypermetropia     Astigmatism with presbyopia     Blepharitis of both eyes     Epiphora     Chronic otitis externa of left ear, unspecified type     Dysthymic disorder     Meibomian gland dysfunction     Chondromalacia of patella, left     Complex tear of medial meniscus of left knee as current injury, subsequent encounter     Pseudophakia, Yag Caps, of both eyes     Primary osteoarthritis of left knee     S/P left knee arthroscopy     Epiretinal membrane, mild, of left eye     Posterior capsular opacification visually significant of right eye     Dermatochalasis of both upper eyelids      Past Medical History:   Diagnosis Date     Actinic keratosis      Allergic rhinitis      Cataract      Degenerative joint disease     cervical disease     Depression with anxiety      Herpes simplex      Hypoglycemia     eats small meals and is fine     Impingement syndrome, shoulder      Past Surgical History:   Procedure Laterality Date     ARTHROSCOPY KNEE Left 9/20/2019    Procedure: Left knee arthroscopy, partial medial meniscectomy and chondral debridement;  Surgeon: Nic Singh MD;  Location: [facility]     BLEPHAROPLASTY  Bilateral 11/28/2022    Procedure: bilateral upper lid blepharoplasty;  Surgeon: Diego Vela MD;  Location: [facility]     CATARACT IOL, RT/LT Left 01/24/2019     COMBINED CYSTOSCOPY, URETEROSCOPY, LASER HOLMIUM LITHOTRIPSY URETER(S) Right 8/23/2018    Procedure: COMBINED CYSTOSCOPY, URETEROSCOPY, LASER HOLMIUM LITHOTRIPSY URETER(S);   Cystoscopy,Right ureteroscopy with laser lithotripsy and stent placement;  Surgeon: Pino Hawk MD;  Location: [facility]     HC ENLARGE BREAST WITH IMPLANT       HC LAP,FULGURATE/EXCISE LESIONS       HC REMOVAL OF BREAST IMPLANT       HYSTERECTOMY, PAP NO LONGER INDICATED  1998    ovaries and uterus out for benign reasons(fibroids and ovarian cyst)     LASER YAG CAPSULOTOMY  01/2020; 2/2020    left eye; right eye     PHACOEMULSIFICATION WITH STANDARD INTRAOCULAR LENS IMPLANT Left 1/24/2019    Procedure: PHACOEMULSIFICATION WITH STANDARD INTRAOCULAR LENS IMPLANT, LEFT;  Surgeon: Wagner Aguilera MD;  Location: [facility]     PHACOEMULSIFICATION WITH STANDARD INTRAOCULAR LENS IMPLANT Right 2/14/2019    Procedure: PHACOEMULSIFICATION WITH STANDARD INTRAOCULAR LENS IMPLANT, RIGHT;  Surgeon: Wagner Aguilera MD;  Location: [facility]     SINUS SURGERY       Family History   Problem Relation Age of Onset     Hypertension Mother      Arthritis Mother      Cardiovascular Mother      Circulatory Mother      Heart Disease Mother      Lipids Mother      Obesity Mother      Osteoporosis Mother      Cancer Mother         skin     Glaucoma Mother      Cancer - colorectal Father      Cancer Father      Macular Degeneration Father      Diabetes No family hx of      Cerebrovascular Disease No family hx of      Thyroid Disease No family hx of      Social History     Tobacco Use     Smoking status: Never     Smokeless tobacco: Never   Substance Use Topics     Alcohol use: No              The plan of care was discussed with the patient. They understand and agree with the course of treatment  prescribed. A printed summary was given including instructions and medications.  The use of Dragon/[employer] dictation services may have been used to construct the content in this note; any grammatical or spelling errors are non-intentional. Please contact the author of this note directly if you are in need of any clarification.

## 2023-02-15 ENCOUNTER — TELEPHONE (OUTPATIENT)
Dept: PODIATRY | Facility: CLINIC | Age: 78
End: 2023-02-15

## 2023-02-15 ENCOUNTER — ANCILLARY PROCEDURE (OUTPATIENT)
Dept: GENERAL RADIOLOGY | Facility: CLINIC | Age: 78
End: 2023-02-15
Attending: PODIATRIST
Payer: COMMERCIAL

## 2023-02-15 ENCOUNTER — ANCILLARY ORDERS (OUTPATIENT)
Dept: PODIATRY | Facility: CLINIC | Age: 78
End: 2023-02-15

## 2023-02-15 ENCOUNTER — OFFICE VISIT (OUTPATIENT)
Dept: PODIATRY | Facility: CLINIC | Age: 78
End: 2023-02-15
Attending: PHYSICIAN ASSISTANT
Payer: COMMERCIAL

## 2023-02-15 ENCOUNTER — ANCILLARY PROCEDURE (OUTPATIENT)
Dept: CT IMAGING | Facility: CLINIC | Age: 78
End: 2023-02-15
Attending: PODIATRIST
Payer: COMMERCIAL

## 2023-02-15 DIAGNOSIS — S82.201A CLOSED FRACTURE OF RIGHT TIBIA AND FIBULA, INITIAL ENCOUNTER: ICD-10-CM

## 2023-02-15 DIAGNOSIS — S82.431A CLOSED DISPLACED OBLIQUE FRACTURE OF SHAFT OF RIGHT FIBULA, INITIAL ENCOUNTER: ICD-10-CM

## 2023-02-15 DIAGNOSIS — Z91.89 FRAMINGHAM CARDIAC RISK >20% IN NEXT 10 YEARS: ICD-10-CM

## 2023-02-15 DIAGNOSIS — S82.401A CLOSED FRACTURE OF RIGHT TIBIA AND FIBULA, INITIAL ENCOUNTER: ICD-10-CM

## 2023-02-15 DIAGNOSIS — J31.0 CHRONIC RHINITIS: ICD-10-CM

## 2023-02-15 PROCEDURE — 73610 X-RAY EXAM OF ANKLE: CPT | Mod: RT | Performed by: RADIOLOGY

## 2023-02-15 PROCEDURE — 73700 CT LOWER EXTREMITY W/O DYE: CPT | Mod: RT | Performed by: RADIOLOGY

## 2023-02-15 PROCEDURE — 99203 OFFICE O/P NEW LOW 30 MIN: CPT | Performed by: PODIATRIST

## 2023-02-15 RX ORDER — ATORVASTATIN CALCIUM 20 MG/1
20 TABLET, FILM COATED ORAL DAILY
Qty: 90 TABLET | Refills: 0 | Status: SHIPPED | OUTPATIENT
Start: 2023-02-15 | End: 2023-06-23

## 2023-02-15 RX ORDER — LORATADINE 10 MG/1
10 TABLET ORAL DAILY
Qty: 90 TABLET | Refills: 0 | Status: SHIPPED | OUTPATIENT
Start: 2023-02-15

## 2023-02-15 NOTE — PATIENT INSTRUCTIONS
We wish you continued good healing. If you have any questions or concerns, please do not hesitate to contact us at  [phone]    [employer] (secure e-mail communication and access to your chart) to send a message or to make an appointment.    Please remember to call and schedule a follow up appointment if one was recommended at your earliest convenience.     PODIATRY CLINIC HOURS  TELEPHONE NUMBER    Dr. Calin CORONADOPKARLI [facility]        Clinics:  [facility]   [place]  Tuesday 1PM-6PM  [place]  Wednesday 745AM-330PM  [place]  Thursday/Friday 745AM-230PM       [place] APPOINTMENTS  [phone]    [place] APPOINTMENTS  [phone]        If you need a medication refill, please contact us you may need lab work and/or a follow up visit prior to your refill (i.e. Antifungal medications).  If MRI needed please call Imaging at [phone]   HOW DO I GET MY KNEE SCOOTER? Knee scooters can be picked up at ANY Medical Supply stores with your knee scooter Prescription.  OR  Bring your signed prescription to an [facility] showroom.

## 2023-02-15 NOTE — LETTER
2/15/2023         RE: Cely Ontiveros  [address] [place] [address]        Dear Colleague,    Thank you for referring your patient, Cely Ontiveros, to the [facility]. Please see a copy of my visit note below.    Subjective:    Patient seen as a new patient consult from [facility] and is seen today  for right ankle fracture on 2/8/2023..  Patient fell on ice and had inversion sprain of her ankle with swelling pain and difficulty weightbearing.  Was seen in clinic that day and diagnosed with a bimalleolar ankle fracture.  Was given Aircast which is comfortable.  She was given crutches which she is having more problems with.  She has been nonweightbearing.  States pain and swelling is decreasing.  Denies calf pain shortness of breath erythema or increased deformity.  She is 77 years old but very avid walking every day.      ROS:  see above         Allergies   Allergen Reactions     Sulfa Drugs Swelling     Cats Swelling     Lips swollen     Wool Fiber        Current Outpatient Medications   Medication Sig Dispense Refill     atorvastatin (LIPITOR) 20 MG tablet Take 1 tablet (20 mg) by mouth daily 90 tablet 3     erythromycin (ROMYCIN) 5 MG/GM ophthalmic ointment Apply small amount to incision site three times a day 3.5 g 0     fluticasone (FLONASE) 50 MCG/ACT nasal spray Spray 2 sprays in nostril       loratadine (CLARITIN) 10 MG tablet Take 1 tablet (10 mg) by mouth daily 90 tablet 3     valACYclovir (VALTREX) 500 MG tablet Take 1 tablet (500 mg) by mouth daily 90 tablet 3       Patient Active Problem List   Diagnosis     Allergic rhinitis     Herpes simplex     CARDIOVASCULAR SCREENING; LDL GOAL LESS THAN 160     Degenerative joint disease     Posterior vitreous detachment of both eyes     Seasonal allergic rhinitis     Hypermetropia     Astigmatism with presbyopia     Blepharitis of both eyes     Epiphora     Chronic otitis externa of left ear, unspecified type      Dysthymic disorder     Meibomian gland dysfunction     Chondromalacia of patella, left     Complex tear of medial meniscus of left knee as current injury, subsequent encounter     Pseudophakia, Yag Caps, of both eyes     Primary osteoarthritis of left knee     S/P left knee arthroscopy     Epiretinal membrane, mild, of left eye     Posterior capsular opacification visually significant of right eye     Dermatochalasis of both upper eyelids       Past Medical History:   Diagnosis Date     Actinic keratosis      Allergic rhinitis      Cataract      Degenerative joint disease     cervical disease     Depression with anxiety      Herpes simplex      Hypoglycemia     eats small meals and is fine     Impingement syndrome, shoulder        Past Surgical History:   Procedure Laterality Date     ARTHROSCOPY KNEE Left 9/20/2019    Procedure: Left knee arthroscopy, partial medial meniscectomy and chondral debridement;  Surgeon: Nic Singh MD;  Location: [facility]     BLEPHAROPLASTY Bilateral 11/28/2022    Procedure: bilateral upper lid blepharoplasty;  Surgeon: Diego Vela MD;  Location: [facility]     CATARACT IOL, RT/LT Left 01/24/2019     COMBINED CYSTOSCOPY, URETEROSCOPY, LASER HOLMIUM LITHOTRIPSY URETER(S) Right 8/23/2018    Procedure: COMBINED CYSTOSCOPY, URETEROSCOPY, LASER HOLMIUM LITHOTRIPSY URETER(S);   Cystoscopy,Right ureteroscopy with laser lithotripsy and stent placement;  Surgeon: Pino Hawk MD;  Location: [facility]     HC ENLARGE BREAST WITH IMPLANT       HC LAP,FULGURATE/EXCISE LESIONS       HC REMOVAL OF BREAST IMPLANT       HYSTERECTOMY, PAP NO LONGER INDICATED  1998    ovaries and uterus out for benign reasons(fibroids and ovarian cyst)     LASER YAG CAPSULOTOMY  01/2020; 2/2020    left eye; right eye     PHACOEMULSIFICATION WITH STANDARD INTRAOCULAR LENS IMPLANT Left 1/24/2019    Procedure: PHACOEMULSIFICATION WITH STANDARD INTRAOCULAR LENS IMPLANT, LEFT;  Surgeon: Wagner Aguilera MD;   Location: [facility]     PHACOEMULSIFICATION WITH STANDARD INTRAOCULAR LENS IMPLANT Right 2/14/2019    Procedure: PHACOEMULSIFICATION WITH STANDARD INTRAOCULAR LENS IMPLANT, RIGHT;  Surgeon: Wagner Aguilera MD;  Location: [facility]     SINUS SURGERY         Family History   Problem Relation Age of Onset     Hypertension Mother      Arthritis Mother      Cardiovascular Mother      Circulatory Mother      Heart Disease Mother      Lipids Mother      Obesity Mother      Osteoporosis Mother      Cancer Mother         skin     Glaucoma Mother      Cancer - colorectal Father      Cancer Father      Macular Degeneration Father      Diabetes No family hx of      Cerebrovascular Disease No family hx of      Thyroid Disease No family hx of        Social History     Tobacco Use     Smoking status: Never     Smokeless tobacco: Never   Substance Use Topics     Alcohol use: No         Exam:    Vitals: LMP  (LMP Unknown)   BMI: There is no height or weight on file to calculate BMI.  Height: Data Unavailable    Constitutional/ general:  Pt is in no apparent distress, appears well-nourished.  Cooperative with history and physical exam.  Patient seen with son and daughter today.    Psych:  The patient answered questions appropriately.  Normal affect.  Seems to have reasonable expectations, in terms of treatment.     Lungs:  Non labored breathing, non labored speech. No cough.  No audible wheezing. Even, quiet breathing.       Vascular: Difficult to palpate pedal pulses on right lower extremity because of swelling.  Capillary refill less than 3 seconds in the digits.    Neuro:  Alert and oriented x 3. Coordinated gait.  Light touch sensation is intact      Derm: Somewhat thin with scant hair growth noted.    Musculoskeletal:    No gross deformities.   Normal arch .  Muscle compartments intact.   Normal ROM all forefoot and rearfoot joints.  Anterior drawer equal and symmetrical bilateral.  Inversion equal and symmetrical bilateral.        negative erythema  negative pain at TMTJs or styloid process.  negative Achilles or calcaneal tubercle pain  No pain with stressing any tendons and all tendons are intact  Pain both medial and lateral ankle with slightly greater on lateral.  Edema and ecchymosis noted around ankle and foot.  No crepitus with palpation.    Radiographic Exam:    ANKLE THREE VIEWS RIGHT  2/8/2023 2:11 PM      HISTORY: inversion injury, tender at lateral malleolus; Injury of  right ankle, initial encounter  COMPARISON: None.                                                                      IMPRESSION: Acute oblique minimally displaced fracture at the distal  fibular metaphysis. Acute nondisplaced fracture at the medial  malleolus. Soft tissue swelling laterally. There is normal joint  alignment. The ankle mortise appears congruent.    X-rays today show medial malleoli or fracture nondisplaced.  There is increased space in her medial ankle gutter.  Possibly small posterior fracture.    Assessment: Right ankle fracture    Plan: X-rays taken today of right ankle.  Discussed with patient there is increased space in her medial gutter.  Explained she has bimalleolar fracture which is unstable.  Patient 77 but quite avid walking.  We will order CT scan for further evaluation of ankle and will call patient with results and decide on how to proceed.  Thank you for allowing me participate in the care of this patient.          Calin Clifton DPM, FACFAS          Again, thank you for allowing me to participate in the care of your patient.        Sincerely,        Calin Clifton DPM

## 2023-02-15 NOTE — CONFIDENTIAL NOTE
Called patient with CT scan results.  Discussed she has a trimalleolar fracture.  There is some displacement of the fibula and increase In medial gutter.  Will refer to Dr. Kelley at [facility] this Tuesday February 21 for further evaluation to see if ORIF is warranted.  Continue nonweightbearing.  She will keep this elevated.

## 2023-02-15 NOTE — TELEPHONE ENCOUNTER
[facility] Call Center    Phone Message    May a detailed message be left on voicemail: yes     Reason for Call: Other: Patient is requesting a call back from Dr. Clifton's care team to explain why she was referred to see Dr. Kelley?     Action Taken: Message routed to:  Adult Clinics: Podiatry p [phone]    Travel Screening: Not Applicable

## 2023-02-15 NOTE — PROGRESS NOTES
Subjective:    Patient seen as a new patient consult from [facility] and is seen today  for right ankle fracture on 2/8/2023..  Patient fell on ice and had inversion sprain of her ankle with swelling pain and difficulty weightbearing.  Was seen in clinic that day and diagnosed with a bimalleolar ankle fracture.  Was given Aircast which is comfortable.  She was given crutches which she is having more problems with.  She has been nonweightbearing.  States pain and swelling is decreasing.  Denies calf pain shortness of breath erythema or increased deformity.  She is 77 years old but very avid walking every day.      ROS:  see above         Allergies   Allergen Reactions     Sulfa Drugs Swelling     Cats Swelling     Lips swollen     Wool Fiber        Current Outpatient Medications   Medication Sig Dispense Refill     atorvastatin (LIPITOR) 20 MG tablet Take 1 tablet (20 mg) by mouth daily 90 tablet 3     erythromycin (ROMYCIN) 5 MG/GM ophthalmic ointment Apply small amount to incision site three times a day 3.5 g 0     fluticasone (FLONASE) 50 MCG/ACT nasal spray Spray 2 sprays in nostril       loratadine (CLARITIN) 10 MG tablet Take 1 tablet (10 mg) by mouth daily 90 tablet 3     valACYclovir (VALTREX) 500 MG tablet Take 1 tablet (500 mg) by mouth daily 90 tablet 3       Patient Active Problem List   Diagnosis     Allergic rhinitis     Herpes simplex     CARDIOVASCULAR SCREENING; LDL GOAL LESS THAN 160     Degenerative joint disease     Posterior vitreous detachment of both eyes     Seasonal allergic rhinitis     Hypermetropia     Astigmatism with presbyopia     Blepharitis of both eyes     Epiphora     Chronic otitis externa of left ear, unspecified type     Dysthymic disorder     Meibomian gland dysfunction     Chondromalacia of patella, left     Complex tear of medial meniscus of left knee as current injury, subsequent encounter     Pseudophakia, Yag Caps, of both eyes     Primary osteoarthritis of left knee      S/P left knee arthroscopy     Epiretinal membrane, mild, of left eye     Posterior capsular opacification visually significant of right eye     Dermatochalasis of both upper eyelids       Past Medical History:   Diagnosis Date     Actinic keratosis      Allergic rhinitis      Cataract      Degenerative joint disease     cervical disease     Depression with anxiety      Herpes simplex      Hypoglycemia     eats small meals and is fine     Impingement syndrome, shoulder        Past Surgical History:   Procedure Laterality Date     ARTHROSCOPY KNEE Left 9/20/2019    Procedure: Left knee arthroscopy, partial medial meniscectomy and chondral debridement;  Surgeon: Nic Singh MD;  Location: [facility]     BLEPHAROPLASTY Bilateral 11/28/2022    Procedure: bilateral upper lid blepharoplasty;  Surgeon: Diego Vela MD;  Location: [facility]     CATARACT IOL, RT/LT Left 01/24/2019     COMBINED CYSTOSCOPY, URETEROSCOPY, LASER HOLMIUM LITHOTRIPSY URETER(S) Right 8/23/2018    Procedure: COMBINED CYSTOSCOPY, URETEROSCOPY, LASER HOLMIUM LITHOTRIPSY URETER(S);   Cystoscopy,Right ureteroscopy with laser lithotripsy and stent placement;  Surgeon: Pino Hawk MD;  Location: [facility]     HC ENLARGE BREAST WITH IMPLANT       HC LAP,FULGURATE/EXCISE LESIONS       HC REMOVAL OF BREAST IMPLANT       HYSTERECTOMY, PAP NO LONGER INDICATED  1998    ovaries and uterus out for benign reasons(fibroids and ovarian cyst)     LASER YAG CAPSULOTOMY  01/2020; 2/2020    left eye; right eye     PHACOEMULSIFICATION WITH STANDARD INTRAOCULAR LENS IMPLANT Left 1/24/2019    Procedure: PHACOEMULSIFICATION WITH STANDARD INTRAOCULAR LENS IMPLANT, LEFT;  Surgeon: Wagner Aguilera MD;  [facility]: [facility]     PHACOEMULSIFICATION WITH STANDARD INTRAOCULAR LENS IMPLANT Right 2/14/2019    Procedure: PHACOEMULSIFICATION WITH STANDARD INTRAOCULAR LENS IMPLANT, RIGHT;  Surgeon: Wagner Aguilera MD;  Location: [facility]     SINUS SURGERY         Family  History   Problem Relation Age of Onset     Hypertension Mother      Arthritis Mother      Cardiovascular Mother      Circulatory Mother      Heart Disease Mother      Lipids Mother      Obesity Mother      Osteoporosis Mother      Cancer Mother         skin     Glaucoma Mother      Cancer - colorectal Father      Cancer Father      Macular Degeneration Father      Diabetes No family hx of      Cerebrovascular Disease No family hx of      Thyroid Disease No family hx of        Social History     Tobacco Use     Smoking status: Never     Smokeless tobacco: Never   Substance Use Topics     Alcohol use: No         Exam:    Vitals: LMP  (LMP Unknown)   BMI: There is no height or weight on file to calculate BMI.  Height: Data Unavailable    Constitutional/ general:  Pt is in no apparent distress, appears well-nourished.  Cooperative with history and physical exam.  Patient seen with son and daughter today.    Psych:  The patient answered questions appropriately.  Normal affect.  Seems to have reasonable expectations, in terms of treatment.     Lungs:  Non labored breathing, non labored speech. No cough.  No audible wheezing. Even, quiet breathing.       Vascular: Difficult to palpate pedal pulses on right lower extremity because of swelling.  Capillary refill less than 3 seconds in the digits.    Neuro:  Alert and oriented x 3. Coordinated gait.  Light touch sensation is intact      Derm: Somewhat thin with scant hair growth noted.    Musculoskeletal:    No gross deformities.   Normal arch .  Muscle compartments intact.   Normal ROM all forefoot and rearfoot joints.  Anterior drawer equal and symmetrical bilateral.  Inversion equal and symmetrical bilateral.       negative erythema  negative pain at TMTJs or styloid process.  negative Achilles or calcaneal tubercle pain  No pain with stressing any tendons and all tendons are intact  Pain both medial and lateral ankle with slightly greater on lateral.  Edema and  ecchymosis noted around ankle and foot.  No crepitus with palpation.    Radiographic Exam:    ANKLE THREE VIEWS RIGHT  2/8/2023 2:11 PM      HISTORY: inversion injury, tender at lateral malleolus; Injury of  right ankle, initial encounter  COMPARISON: None.                                                                      IMPRESSION: Acute oblique minimally displaced fracture at the distal  fibular metaphysis. Acute nondisplaced fracture at the medial  malleolus. Soft tissue swelling laterally. There is normal joint  alignment. The ankle mortise appears congruent.    X-rays today show medial malleoli or fracture nondisplaced.  There is increased space in her medial ankle gutter.  Possibly small posterior fracture.    Assessment: Right ankle fracture    Plan: X-rays taken today of right ankle.  Discussed with patient there is increased space in her medial gutter.  Explained she has bimalleolar fracture which is unstable.  Patient 77 but quite avid walking.  We will order CT scan for further evaluation of ankle and will call patient with results and decide on how to proceed.  Thank you for allowing me participate in the care of this patient.          Calin Clifton DPM, FACFAS

## 2023-02-21 ENCOUNTER — OFFICE VISIT (OUTPATIENT)
Dept: ORTHOPEDICS | Facility: CLINIC | Age: 78
End: 2023-02-21
Payer: COMMERCIAL

## 2023-02-21 VITALS — HEART RATE: 88 BPM | SYSTOLIC BLOOD PRESSURE: 139 MMHG | OXYGEN SATURATION: 98 % | DIASTOLIC BLOOD PRESSURE: 81 MMHG

## 2023-02-21 DIAGNOSIS — S82.891A CLOSED FRACTURE OF RIGHT ANKLE, INITIAL ENCOUNTER: Primary | ICD-10-CM

## 2023-02-21 PROCEDURE — 99203 OFFICE O/P NEW LOW 30 MIN: CPT | Mod: 57 | Performed by: ORTHOPAEDIC SURGERY

## 2023-02-21 PROCEDURE — 27816 TREATMENT OF ANKLE FRACTURE: CPT | Mod: RT | Performed by: ORTHOPAEDIC SURGERY

## 2023-02-21 ASSESSMENT — PAIN SCALES - GENERAL: PAINLEVEL: MODERATE PAIN (4)

## 2023-02-21 NOTE — PROGRESS NOTES
SUBJECTIVE:  Cely Ontiveros is a 77 year old female who is seen in consultation at the request of  for  right ankle injury that occurred 2 weeks ago  Cause: Following acute injury.  Mechanism of injury: slipped on ice    Previous treatment::  Saw in Urgent Care and allowed weight bearing as tolerated in boot. Walked with boot x 10 days.  Had the CT scan after she had been weight bearing x 1 week in the boot.    Prior history of related problems: no prior problems with this area in the past.    Past Medical History:   Diagnosis Date     Actinic keratosis      Allergic rhinitis      Cataract      Degenerative joint disease     cervical disease     Depression with anxiety      Herpes simplex      Hypoglycemia     eats small meals and is fine     Impingement syndrome, shoulder      Trimalleolar fracture of ankle, closed 02/08/2023      Past Surgical History:   Procedure Laterality Date     ARTHROSCOPY KNEE Left 9/20/2019    Procedure: Left knee arthroscopy, partial medial meniscectomy and chondral debridement;  Surgeon: Nic Singh MD;  Location: [facility]     BLEPHAROPLASTY Bilateral 11/28/2022    Procedure: bilateral upper lid blepharoplasty;  Surgeon: Diego Vela MD;  Location: [facility]     CATARACT IOL, RT/LT Left 01/24/2019     COMBINED CYSTOSCOPY, URETEROSCOPY, LASER HOLMIUM LITHOTRIPSY URETER(S) Right 8/23/2018    Procedure: COMBINED CYSTOSCOPY, URETEROSCOPY, LASER HOLMIUM LITHOTRIPSY URETER(S);   Cystoscopy,Right ureteroscopy with laser lithotripsy and stent placement;  Surgeon: Pino Hawk MD;  Location: [facility]     HC ENLARGE BREAST WITH IMPLANT       HC LAP,FULGURATE/EXCISE LESIONS       HC REMOVAL OF BREAST IMPLANT       HYSTERECTOMY, PAP NO LONGER INDICATED  1998    ovaries and uterus out for benign reasons(fibroids and ovarian cyst)     LASER YAG CAPSULOTOMY  01/2020; 2/2020    left eye; right eye     PHACOEMULSIFICATION WITH STANDARD INTRAOCULAR LENS IMPLANT Left 1/24/2019    Procedure:  PHACOEMULSIFICATION WITH STANDARD INTRAOCULAR LENS IMPLANT, LEFT;  Surgeon: Wagner Aguilera MD;  Location: [facility]     PHACOEMULSIFICATION WITH STANDARD INTRAOCULAR LENS IMPLANT Right 2/14/2019    Procedure: PHACOEMULSIFICATION WITH STANDARD INTRAOCULAR LENS IMPLANT, RIGHT;  Surgeon: Wagner Aguilera MD;  Location: [facility]     SINUS SURGERY         REVIEW OF SYSTEMS:  CONSTITUTIONAL:  NEGATIVE for fever, chills, change in weight  INTEGUMENTARY/SKIN:  NEGATIVE for worrisome rashes, moles or lesions  EYES:  NEGATIVE for vision changes or irritation  ENT/MOUTH:  NEGATIVE for ear, mouth and throat problems  RESP:  NEGATIVE for significant cough or SOB  BREAST:  NEGATIVE for masses, tenderness or discharge  CV:  NEGATIVE for chest pain, palpitations or peripheral edema  GI:  NEGATIVE for nausea, abdominal pain, heartburn, or change in bowel habits  :  Negative   MUSCULOSKELETAL:  See HPI above  NEURO:  NEGATIVE for weakness, dizziness or paresthesias  ENDOCRINE:  NEGATIVE for temperature intolerance, skin/hair changes  HEME/ALLERGY/IMMUNE:  NEGATIVE for bleeding problems  PSYCHIATRIC:  NEGATIVE for changes in mood or affect    EXAM:  /81 (BP Location: Right arm, Patient Position: Sitting, Cuff Size: Adult Regular)   Pulse 88   LMP  (LMP Unknown)   SpO2 98%   GENERAL APPEARANCE: healthy, alert and no distress   GAIT: not tested   SKIN: intact  NEURO: Normal strength and tone, sensory exam grossly normal, mentation intact and speech normal  Sensation: intact  PSYCH:  mentation appears normal and affect normal/bright    MUSCULOSKELETAL:    ANKLE  Inspection: Swelling: moderate,   Skin: intact  Tender:lateral malleolus, medial malleolus  Range of Motion: limited --all movements painful      X-RAY INTERPRETATION  Xrays from 2/15 show non displaced lateral and medial malleolar fractures. The mortice view looks good    CT of the ankle 2/15 shows:   Oblique distal fibular shaft fracture with  minimal, 2  mm of lateral displacement. Minimal, 1 mm of posterior  displacement. Nondisplaced posterior malleolus fracture (series 7  image 54). Transverse nondisplaced fracture of the medial malleolus.  Mortise and syndesmosis appear congruent on this nonweight bearing  study.     Type II os navicularis. Partially visualized subchondral cysts in the  distal medial cuneiform.    ASSESSMENT/PLAN  Trimalleolar ankle fracture. Nondisplaced.  Slight mortice widening medially, but there is no abnormal separation of the tib-fib articulation..    --continue with boot. Since the fractures did n't displace with weight bearing intitially, she could conceivably be weight bearing as tolerated in the boot now, but to be safe, I would continue with 25% weight bearing in the boot x 4 more weeks.   She agrees with this.   Start range of motion in 2wks.  Return to clinic 4 weeks    TYSON Kelley MD  Dept. Orthopedic Surgery  [facility]

## 2023-02-21 NOTE — LETTER
2/21/2023         RE: Cely Ontiveros  [address] [place] [address]        Dear Colleague,    Thank you for referring your patient, Cely Ontiveros, to the [facility]. Please see a copy of my visit note below.    SUBJECTIVE:  Cely Ontiveros is a 77 year old female who is seen in consultation at the request of  for  right ankle injury that occurred 2 weeks ago  Cause: Following acute injury.  Mechanism of injury: slipped on ice    Previous treatment::  Saw in Urgent Care and allowed weight bearing as tolerated in boot. Walked with boot x 10 days.  Had the CT scan after she had been weight bearing x 1 week in the boot.    Prior history of related problems: no prior problems with this area in the past.    Past Medical History:   Diagnosis Date     Actinic keratosis      Allergic rhinitis      Cataract      Degenerative joint disease     cervical disease     Depression with anxiety      Herpes simplex      Hypoglycemia     eats small meals and is fine     Impingement syndrome, shoulder      Trimalleolar fracture of ankle, closed 02/08/2023      Past Surgical History:   Procedure Laterality Date     ARTHROSCOPY KNEE Left 9/20/2019    Procedure: Left knee arthroscopy, partial medial meniscectomy and chondral debridement;  Surgeon: Nic Singh MD;  Location: [facility] OR     BLEPHAROPLASTY Bilateral 11/28/2022    Procedure: bilateral upper lid blepharoplasty;  Surgeon: Diego Vela MD;  Location: [facility] OR     CATARACT IOL, RT/LT Left 01/24/2019     COMBINED CYSTOSCOPY, URETEROSCOPY, LASER HOLMIUM LITHOTRIPSY URETER(S) Right 8/23/2018    Procedure: COMBINED CYSTOSCOPY, URETEROSCOPY, LASER HOLMIUM LITHOTRIPSY URETER(S);   Cystoscopy,Right ureteroscopy with laser lithotripsy and stent placement;  Surgeon: Pino Hawk MD;  Location: [facility] OR     HC ENLARGE BREAST WITH IMPLANT       HC LAP,FULGURATE/EXCISE LESIONS       HC REMOVAL OF BREAST IMPLANT       HYSTERECTOMY, PAP  NO LONGER INDICATED  1998    ovaries and uterus out for benign reasons(fibroids and ovarian cyst)     LASER YAG CAPSULOTOMY  01/2020; 2/2020    left eye; right eye     PHACOEMULSIFICATION WITH STANDARD INTRAOCULAR LENS IMPLANT Left 1/24/2019    Procedure: PHACOEMULSIFICATION WITH STANDARD INTRAOCULAR LENS IMPLANT, LEFT;  Surgeon: Wagner Aguilera MD;  Location: [facility]     PHACOEMULSIFICATION WITH STANDARD INTRAOCULAR LENS IMPLANT Right 2/14/2019    Procedure: PHACOEMULSIFICATION WITH STANDARD INTRAOCULAR LENS IMPLANT, RIGHT;  Surgeon: Wagner Aguilera MD;  Location: [facility]     SINUS SURGERY         REVIEW OF SYSTEMS:  CONSTITUTIONAL:  NEGATIVE for fever, chills, change in weight  INTEGUMENTARY/SKIN:  NEGATIVE for worrisome rashes, moles or lesions  EYES:  NEGATIVE for vision changes or irritation  ENT/MOUTH:  NEGATIVE for ear, mouth and throat problems  RESP:  NEGATIVE for significant cough or SOB  BREAST:  NEGATIVE for masses, tenderness or discharge  CV:  NEGATIVE for chest pain, palpitations or peripheral edema  GI:  NEGATIVE for nausea, abdominal pain, heartburn, or change in bowel habits  :  Negative   MUSCULOSKELETAL:  See HPI above  NEURO:  NEGATIVE for weakness, dizziness or paresthesias  ENDOCRINE:  NEGATIVE for temperature intolerance, skin/hair changes  HEME/ALLERGY/IMMUNE:  NEGATIVE for bleeding problems  PSYCHIATRIC:  NEGATIVE for changes in mood or affect    EXAM:  /81 (BP Location: Right arm, Patient Position: Sitting, Cuff Size: Adult Regular)   Pulse 88   LMP  (LMP Unknown)   SpO2 98%   GENERAL APPEARANCE: healthy, alert and no distress   GAIT: not tested   SKIN: intact  NEURO: Normal strength and tone, sensory exam grossly normal, mentation intact and speech normal  Sensation: intact  PSYCH:  mentation appears normal and affect normal/bright    MUSCULOSKELETAL:    ANKLE  Inspection: Swelling: moderate,   Skin: intact  Tender:lateral malleolus, medial malleolus  Range of  Motion: limited --all movements painful      X-RAY INTERPRETATION  Xrays from 2/15 show non displaced lateral and medial malleolar fractures. The mortice view looks good    CT of the ankle 2/15 shows:   Oblique distal fibular shaft fracture with  minimal, 2 mm of lateral displacement. Minimal, 1 mm of posterior  displacement. Nondisplaced posterior malleolus fracture (series 7  image 54). Transverse nondisplaced fracture of the medial malleolus.  Mortise and syndesmosis appear congruent on this nonweight bearing  study.     Type II os navicularis. Partially visualized subchondral cysts in the  distal medial cuneiform.    ASSESSMENT/PLAN  Trimalleolar ankle fracture. Nondisplaced.  Slight mortice widening medially, but there is no abnormal separation of the tib-fib articulation..    --continue with boot. Since the fractures did n't displace with weight bearing intitially, she could conceivably be weight bearing as tolerated in the boot now, but to be safe, I would continue with 25% weight bearing in the boot x 4 more weeks.   She agrees with this.   Start range of motion in 2wks.  Return to clinic 4 weeks    TYSON Kelley MD  Dept. Orthopedic Surgery  [facility]         Again, thank you for allowing me to participate in the care of your patient.        Sincerely,        Geovanni Kelley MD

## 2023-02-21 NOTE — TELEPHONE ENCOUNTER
MD dicussed CT results with Cely on 2/16/23.  Referred to  to discuss if surgery is warranted .    Eryn Vargas CMA

## 2023-03-17 NOTE — PROGRESS NOTES
SUBJECTIVE:   Cely Ontiveros is here in follow up of her 2/8/23 right ankle tri malleolar fracture.     Plan last visit (2/21):  Trimalleolar ankle fracture. Nondisplaced.  Slight mortice widening medially, but there is no abnormal separation of the tib-fib articulation..     --continue with boot. Since the fractures did n't displace with weight bearing intitially, she could conceivably be weight bearing as tolerated in the boot now, but to be safe, I would continue with 25% weight bearing in the boot x 4 more weeks.   She agrees with this.   Start range of motion in 2wks.    Interim: has not been putting much weight on that side.    Present symptoms: mild soreness with range of motion      Review of Systems:  Constitutional/General: Negative for fever, chills, change in weight  Integumentary/Skin: Negative for worrisome rashes, moles, or lesions  Neuro: Negative for weakness, dizziness, or paresthesias   Psychiatric: negative for changes in mood or affect    OBJECTIVE:  Physical Exam:  /69 (BP Location: Right arm, Patient Position: Sitting, Cuff Size: Adult Regular)   Pulse 77   LMP  (LMP Unknown)   SpO2 100%   General Appearance: healthy, alert and no distress   Skin: no suspicious lesions or rashes  Neuro: Normal strength and tone, mentation intact and speech normal  Vascular: good pulses, and capillary refill   Lymph: no lymphadenopathy   Psych:  mentation appears normal and affect normal/bright  Resp: no increased work of breathing    Right Ankle Exam:  Less swelling  Minimal lateral tenderness  No tib-fib or medial tenderness    X-rays:  Obtained today of the right ankle: 3-views, reviewed in the office with the patient by myself today and show the fractures healing.  Again tilt of the talus but equal lateral and medial gutter spaces.  I think the tilt could be at least partially degnerative with asymmetric wear.     ASSESSMENT:   Trimalleolar fracture non displaced    PLAN:   Weight bearing as  tolerated in boot  Ok for rotational range of motion  physical therapy ordered  Follow up 4 weeks  Re exam first    TYSON Kelley MD  Dept. Orthopedic Surgery  [facility]

## 2023-03-21 ENCOUNTER — TELEPHONE (OUTPATIENT)
Dept: ORTHOPEDICS | Facility: CLINIC | Age: 78
End: 2023-03-21

## 2023-03-21 ENCOUNTER — ANCILLARY PROCEDURE (OUTPATIENT)
Dept: GENERAL RADIOLOGY | Facility: CLINIC | Age: 78
End: 2023-03-21
Attending: ORTHOPAEDIC SURGERY
Payer: COMMERCIAL

## 2023-03-21 ENCOUNTER — OFFICE VISIT (OUTPATIENT)
Dept: ORTHOPEDICS | Facility: CLINIC | Age: 78
End: 2023-03-21
Payer: COMMERCIAL

## 2023-03-21 VITALS — DIASTOLIC BLOOD PRESSURE: 69 MMHG | HEART RATE: 77 BPM | OXYGEN SATURATION: 100 % | SYSTOLIC BLOOD PRESSURE: 128 MMHG

## 2023-03-21 DIAGNOSIS — S82.891A CLOSED FRACTURE OF RIGHT ANKLE, INITIAL ENCOUNTER: ICD-10-CM

## 2023-03-21 DIAGNOSIS — S82.891A CLOSED FRACTURE OF RIGHT ANKLE, INITIAL ENCOUNTER: Primary | ICD-10-CM

## 2023-03-21 PROCEDURE — 99207 PR FRACTURE CARE IN GLOBAL PERIOD: CPT | Performed by: ORTHOPAEDIC SURGERY

## 2023-03-21 PROCEDURE — 73610 X-RAY EXAM OF ANKLE: CPT | Mod: TC | Performed by: RADIOLOGY

## 2023-03-21 ASSESSMENT — PAIN SCALES - GENERAL: PAINLEVEL: MILD PAIN (3)

## 2023-03-21 NOTE — TELEPHONE ENCOUNTER
Reason for Call:  Other call back    Detailed comments: Cely is asking for a call back to talk to Dr. Kelley and his care team and ask a few questions    Phone Number Patient can be reached at: Cell number on file:    Telephone Information:   Mobile [phone]       Best Time: anytime    Can we leave a detailed message on this number? NO    Call taken on 3/21/2023 at 1:27 PM by Jeanna Juarez

## 2023-03-21 NOTE — TELEPHONE ENCOUNTER
Per JBR pt does not have to sleep with boot on. She can drive when she can put pressure to the pedals of the vehicle. She does not have to wear the boot during showers. Called and I informed pt of this she was appreciative for the call back.  Aby García CMA 3/21/2023 2:05 PM

## 2023-03-21 NOTE — TELEPHONE ENCOUNTER
Attempted to call [facility] due to mailbox being full. Will try again later.  April St Bunny CMA 3/21/2023 1:21 PM

## 2023-03-21 NOTE — TELEPHONE ENCOUNTER
[facility] Call Center    Phone Message    May a detailed message be left on voicemail: yes     Reason for Call: Other: pt seen today by Dr. Kelley 03/21 and she is asking for call back regarding questinos she forgot to ask.      Pt asking if she should continue to sleep with boot on?   Driving?  How does pt determine when she is ready to drive?   Shower? Taking shower while standing and weight bearing on foot/ankle.   Pt can be reached at # [phone]      Action Taken: Message routed to:  [facility] - Dr. Kelley    Travel Screening: Not Applicable

## 2023-04-12 ENCOUNTER — THERAPY VISIT (OUTPATIENT)
Dept: PHYSICAL THERAPY | Facility: CLINIC | Age: 78
End: 2023-04-12
Attending: ORTHOPAEDIC SURGERY
Payer: COMMERCIAL

## 2023-04-12 DIAGNOSIS — S82.891A CLOSED FRACTURE OF RIGHT ANKLE, INITIAL ENCOUNTER: ICD-10-CM

## 2023-04-12 DIAGNOSIS — M25.571 ACUTE RIGHT ANKLE PAIN: ICD-10-CM

## 2023-04-12 DIAGNOSIS — M25.671 ANKLE STIFFNESS, RIGHT: ICD-10-CM

## 2023-04-12 PROCEDURE — 97110 THERAPEUTIC EXERCISES: CPT | Mod: GP | Performed by: PHYSICAL THERAPIST

## 2023-04-12 PROCEDURE — 97140 MANUAL THERAPY 1/> REGIONS: CPT | Mod: GP | Performed by: PHYSICAL THERAPIST

## 2023-04-12 PROCEDURE — 97161 PT EVAL LOW COMPLEX 20 MIN: CPT | Mod: GP | Performed by: PHYSICAL THERAPIST

## 2023-04-12 NOTE — PROGRESS NOTES
[facility]    OUTPATIENT Physical Therapy ORTHOPEDIC EVALUATION  PLAN OF TREATMENT FOR OUTPATIENT REHABILITATION  (COMPLETE FOR INITIAL CLAIMS ONLY)  Patient's Last Name, First Name, M.I.  YOB: 1945  Cely Ontiveros    Provider s Name:  [facility]   Medical Record No.  [id number]   Start of Care Date:  04/12/23   Onset Date:   02/08/23   Treatment Diagnosis:  R ankle pain and stiffness Medical Diagnosis:     Closed fracture of right ankle, initial encounter  Acute right ankle pain  Ankle stiffness, right       Goals:     04/12/23 0500   Body Part   Goals listed below are for R ankle pain, stiffness   Goal #1   Goal #1 ambulation   Previous Functional Level Miles patient could walk   Performance Level 1, 0/10 pain   Current Functional Level Feet patient can walk   Performance Level 1000, bilateral crutches, step to gait, 4/10 pain   STG Target Performance Minutes patient will be able to walk   Performance Level 15 minutes bilateral crutches, step through gait, in boot   Rationale for safe household ambulation;for safe outdoor household ambulation;for safe community ambulation;to maintain proper body mechanics/posture while ambulating to avoid additional compensatory injury due to improper gait mechanics;to promote a healthy and active lifestyle   Due Date 05/03/23    LTG Target Performance Minutes patient will be able to  walk   Performance Level 30 minutes, no assistive device2, normalized gait pattern   Rationale for safe household ambulation;for safe outdoor household ambulation;for safe community ambulation;to maintain proper body mechanics/posture while ambulating to avoid additional compensatory injury due to improper gait mechanics;to promote a healthy and active lifestyle   Due Date 07/05/23   Goal #2   Goal #2 stairs   Previous Functional Level No  restrictions   Current Functional Level Ascend/descend stairs;one step at a time;with a railing;with the following number of stairs   Performance Level 3 steps, crutch on L, railing on R   STG Target Performance Ascend/descend stairs;in a normal reciprocal pattern;with a railing;with the following number of stairs   Performance Level 6 steps, in boot   Rationale for safe community access to buildings;for safe community ambulaton   Due Date 05/03/23   LTG Target Performance Ascend/descend stairs;in a normal reciprocal pattern;with railing;with the following number of stairs   Performance Level 12, no boot   Rationale for safe community access to buildings;for safe community ambulaton   Due Date 07/05/23       Therapy Frequency:  1x daily/week for 6 weeks, tapering to 1x /2 weeks over 2 months (12 weeks total)  Predicted Duration of Therapy Intervention:  12 weeks    SCOTTIE FAIRCHILD                 I CERTIFY THE NEED FOR THESE SERVICES FURNISHED UNDER        THIS PLAN OF TREATMENT AND WHILE UNDER MY CARE     (Physician attestation of this document indicates review and certification of the therapy plan).                     Certification Date From:  04/12/23   Certification Date To:  07/12/23    Referring Provider:  Geovanni Kelley    Initial Assessment        See Epic Evaluation SOC Date: 04/12/23

## 2023-04-12 NOTE — PROGRESS NOTES
Physical Therapy Initial Evaluation  Subjective:  The history is provided by the patient. No  was used.   Patient Health History  Cely Ontiveros being seen for Broken Right ankle.     Problem began: 2/8/2023.   Problem occurred: fall on ice   Pain is reported as 4/10 on pain scale.  General health as reported by patient is good.  Pertinent medical history includes: osteoarthritis.   Red flags:  None as reported by patient.   Other medical allergies details: sulfa.   Surgeries include:  Orthopedic surgery. Other surgery history details: knee, hysterectomy.    Current medications:  Pain medication.    Current occupation is retired.                     Therapist Generated HPI Evaluation  Problem details: The patient is a pleasant 77 year old female who presents to the clinic with complaints of right ankle pain and stiffness following a fall on the ice on 02/08/2023. She notes that she has been consistent with ankle AROM in a non weight bearing position, doing ankle circles and ankle alphabet laying on her bed. She notes that she went in for a massage and felt a great deal of relief in her ankle after direct work through the painful part of her ankle. The patient notes dissatisfaction with the amount of swelling in her R ankle, edema mobilization performed to the site which the patient notes is pain relieving, but no firm pressure soft tissue mobilization performed at the site of injury. She notes that she was very active prior to her injury doing yoga daily and walking frequently. Notes that she has been walking without a boot on for short distances at home and occasionally walks without her crutches if she is wearing her boot. The patient's presentation is consistent with the referring medical provider's medical diagnosis, and the patient responds well to initiation of gentle ROM..         Type of problem:  Right ankle.    This is a new condition.  Condition occurred with:  A fall/slip.  Where  condition occurred: in the community.  Patient reports pain:  Lateral and anterior.  Pain is described as aching and is constant.  Pain radiates to:  No radiation. Pain is worse in the P.M..  Since onset symptoms are gradually improving.  Associated symptoms:  Loss of motion/stiffness, loss of strength and edema. Symptoms are exacerbated by activity, ascending stairs, bending/squatting, kneeling, lying on the extremity, descending stairs, walking, weight bearing, standing and transfers  and relieved by rest, ice and bracing/immobilizing.  Special tests included:  X-ray.    Barriers include:  None as reported by patient.                        Objective:    Gait:  Antalgic with weight bearing on R, decreased step length noted bilaterally, ambulated with step to gait utilizing bilateral crutches  Gait Type:  Antalgic   Weight Bearing Status:  WBAT   Assistive Devices:  Crutches            Ankle/Foot Evaluation  ROM:    AROM:    Dorsiflexion:  Left:   11  Right:   2  Plantarflexion:  Left:  52    Right:  28  Inversion:  Left:  31     Right:  11  Eversion:  22     Right:  19                EDEMA:   Left ankle edema present at: 44cm  Right ankle edema present at:  47cm      Figure 8 left: 44cmFigure 8 right: 47cm                                                      General     ROS    Assessment/Plan:    Patient is a 77 year old female with right side ankle complaints.    Patient has the following significant findings with corresponding treatment plan.                Diagnosis 1:  Right ankle pain and stiffness  Pain -  hot/cold therapy, US, electric stimulation, manual therapy, self management, education and home program  Decreased ROM/flexibility - manual therapy, therapeutic exercise and home program  Decreased strength - therapeutic exercise, therapeutic activities and home program  Impaired gait - gait training and home program  Impaired muscle performance - neuro re-education and home program  Decreased function -  therapeutic activities and home program    Therapy Evaluation Codes:   1) History comprised of:   Personal factors that impact the plan of care:      Age.    Comorbidity factors that impact the plan of care are:      Osteoarthritis.     Medications impacting care: Pain.  2) Examination of Body Systems comprised of:   Body structures and functions that impact the plan of care:      Ankle.   Activity limitations that impact the plan of care are:      Bending, Dressing, Squatting/kneeling, Stairs, Standing and Walking.  3) Clinical presentation characteristics are:   Stable/Uncomplicated.  4) Decision-Making    Low complexity using standardized patient assessment instrument and/or measureable assessment of functional outcome.  Cumulative Therapy Evaluation is: Low complexity.    Previous and current functional limitations:  (See Goal Flow Sheet for this information)    Short term and Long term goals: (See Goal Flow Sheet for this information)     Communication ability:  Patient appears to be able to clearly communicate and understand verbal and written communication and follow directions correctly.  Treatment Explanation - The following has been discussed with the patient:   RX ordered/plan of care  Anticipated outcomes  Possible risks and side effects  This patient would benefit from PT intervention to resume normal activities.   Rehab potential is good.    Frequency:  1 X week, once daily  Duration:  for 4 weeks tapering to 1 X /2 weeks over 2 months (12 weeks total)  Discharge Plan:  Achieve all LTG.  Independent in home treatment program.  Reach maximal therapeutic benefit.    Please refer to the daily flowsheet for treatment today, total treatment time and time spent performing 1:1 timed codes.

## 2023-04-18 NOTE — PROGRESS NOTES
"Cely Ontiveros is here in follow up of her 2/8/23 right ankle tri malleolar fracture.     Plan at last visit on 3/21/23:    Weight bearing as tolerated in boot  Ok for rotational range of motion  physical therapy ordered.     Still using crutches because boot irritates  She is happy there is much less pain, and swelling.    Exam:  BP (!) 152/72 (BP Location: Left arm, Patient Position: Sitting, Cuff Size: Adult Regular)   Pulse 82   Ht 1.638 m (5' 4.5\")   Wt 63.5 kg (140 lb)   LMP  (LMP Unknown)   SpO2 95%   BMI 23.66 kg/m     General: Well appearing, awake, alert,  oriented to place, in no apparent distress with respirations unlabored.    Skin: No apparent rashes, lesions, or ulcerations; no palpable induration or subcutaneous nodules in area examined.  Musculoskeletal:  Tenderness: mild over the lateral malleolus  Less swelling.  No deformity seen.    Range of Motion of ankle improved, but still some limitations of dorsiflexion   Non-tender over the syndesmosis and over the deltoid lig.     X-rays:   Obtained today included 3 views of the right  show the fracture maintaining position.  Fracture of the lateral mal is still visible, but less-so. Medial and posterior healed  Still slight tilt of the talus..     Assessment/Plan:   Doing well  Fracture is nearly healed  Weight bearing as toleratied  Appliance: lace up brace given.  Wear as needed. Discontinue boot. Wean crutches  physical therapy-- continue.  Can start proprio     Return to clinic in 4  week(s):  Xrays to be done at follow up: none       TYSON Kelley MD  Dept. Orthopedic Surgery  [facility]    "

## 2023-04-20 ENCOUNTER — THERAPY VISIT (OUTPATIENT)
Dept: PHYSICAL THERAPY | Facility: CLINIC | Age: 78
End: 2023-04-20
Payer: COMMERCIAL

## 2023-04-20 ENCOUNTER — PATIENT OUTREACH (OUTPATIENT)
Dept: CARE COORDINATION | Facility: CLINIC | Age: 78
End: 2023-04-20

## 2023-04-20 DIAGNOSIS — M25.671 ANKLE STIFFNESS, RIGHT: ICD-10-CM

## 2023-04-20 DIAGNOSIS — M25.571 ACUTE RIGHT ANKLE PAIN: Primary | ICD-10-CM

## 2023-04-20 PROCEDURE — 97140 MANUAL THERAPY 1/> REGIONS: CPT | Mod: GP | Performed by: PHYSICAL THERAPIST

## 2023-04-20 PROCEDURE — 97110 THERAPEUTIC EXERCISES: CPT | Mod: GP | Performed by: PHYSICAL THERAPIST

## 2023-04-21 NOTE — PROGRESS NOTES
Subjective:  The history is provided by the patient. No  was used.     Physical Exam                    Objective:  System    Physical Exam    General     ROS    Assessment/Plan:    PROGRESS  REPORT    Progress reporting period is from 4/12/2023  to 4/21/2023.       SUBJECTIVE  Subjective changes noted by patient:The patient presents to the clinic stating she has been consistent with her home exercises, reports that she feels she shoulde be progressing faster, but states she has been feeling more mobile.  Current pain level is 4/10.      4/10.   Changes in function:  Yes (See Goal flowsheet attached for changes in current functional level)  Adverse reaction to treatment or activity: None    OBJECTIVE  Changes noted in objective findings:  Yes,   Objective: Ankle dorsiflexion AROM 6, plantarflexion AROM 32     ASSESSMENT/PLAN  Updated problem list and treatment plan: Diagnosis 1:  Acute right ankle pain  Pain -  hot/cold therapy, manual therapy, education and home program  Decreased ROM/flexibility - manual therapy and therapeutic exercise  Decreased joint mobility - manual therapy and therapeutic exercise  Decreased strength - therapeutic exercise and therapeutic activities  Impaired gait - gait training  Impaired muscle performance - neuro re-education  Decreased function - therapeutic activities  STG/LTGs have been met or progress has been made towards goals:  Yes (See Goal flow sheet completed today.)  Assessment of Progress: The patient's condition is improving.  Self Management Plans:  Patient has been instructed in a home treatment program.  I have re-evaluated this patient and find that the nature, scope, duration and intensity of the therapy is appropriate for the medical condition of the patient.  Cely continues to require the following intervention to meet STG and LTG's:  PT    Recommendations:  This patient would benefit from continued therapy.     Frequency:  1 X week, once  daily  Duration:  for 4 weeks tapering to 1 X /2 weeks over 2 months (12 weeks total)        Please refer to the daily flowsheet for treatment today, total treatment time and time spent performing 1:1 timed codes.

## 2023-04-23 ENCOUNTER — HEALTH MAINTENANCE LETTER (OUTPATIENT)
Age: 78
End: 2023-04-23

## 2023-04-25 ENCOUNTER — TELEPHONE (OUTPATIENT)
Dept: FAMILY MEDICINE | Facility: CLINIC | Age: 78
End: 2023-04-25

## 2023-04-25 ENCOUNTER — TELEPHONE (OUTPATIENT)
Dept: ORTHOPEDICS | Facility: CLINIC | Age: 78
End: 2023-04-25

## 2023-04-25 ENCOUNTER — ANCILLARY PROCEDURE (OUTPATIENT)
Dept: GENERAL RADIOLOGY | Facility: CLINIC | Age: 78
End: 2023-04-25
Attending: ORTHOPAEDIC SURGERY
Payer: COMMERCIAL

## 2023-04-25 ENCOUNTER — OFFICE VISIT (OUTPATIENT)
Dept: ORTHOPEDICS | Facility: CLINIC | Age: 78
End: 2023-04-25
Payer: COMMERCIAL

## 2023-04-25 VITALS
SYSTOLIC BLOOD PRESSURE: 152 MMHG | OXYGEN SATURATION: 95 % | BODY MASS INDEX: 23.32 KG/M2 | WEIGHT: 140 LBS | HEIGHT: 65 IN | DIASTOLIC BLOOD PRESSURE: 72 MMHG | HEART RATE: 82 BPM

## 2023-04-25 DIAGNOSIS — S82.851D CLOSED TRIMALLEOLAR FRACTURE OF RIGHT ANKLE WITH ROUTINE HEALING: ICD-10-CM

## 2023-04-25 DIAGNOSIS — S82.891D CLOSED FRACTURE OF RIGHT ANKLE WITH ROUTINE HEALING, SUBSEQUENT ENCOUNTER: ICD-10-CM

## 2023-04-25 PROCEDURE — 99207 PR FRACTURE CARE IN GLOBAL PERIOD: CPT | Performed by: ORTHOPAEDIC SURGERY

## 2023-04-25 PROCEDURE — 73610 X-RAY EXAM OF ANKLE: CPT | Mod: TC | Performed by: RADIOLOGY

## 2023-04-25 ASSESSMENT — PAIN SCALES - GENERAL: PAINLEVEL: NO PAIN (0)

## 2023-04-25 NOTE — TELEPHONE ENCOUNTER
Patient Returning Call    Reason for call:  patient    Information relayed to patient:  Clinic will call her back    Patient has additional questions:  Yes    What are your questions/concerns:  Pt was fitted with a small boot for rehab today & states it's too small for her to fit her foot into. Says label says size small & her other boot was size medium. Needs to exchange for larger size asap.    Who does the patient want to speak with:  Anyone on Dr. Kelley' team    Is an  needed?:  No      Could we send this information to you in [place] or would you prefer to receive a phone call?:   Patient would prefer a phone call   Okay to leave a detailed message?: Yes at Home number on file [phone] (home)

## 2023-04-25 NOTE — LETTER
"    4/25/2023         RE: Cely Ontiveros  [address] [place] [address]        Dear Colleague,    Thank you for referring your patient, Cely Ontiveros, to the [facility]. Please see a copy of my visit note below.    Cely Ontiveros is here in follow up of her 2/8/23 right ankle tri malleolar fracture.     Plan at last visit on 3/21/23:    Weight bearing as tolerated in boot  Ok for rotational range of motion  physical therapy ordered.     Still using crutches because boot irritates  She is happy there is much less pain, and swelling.    Exam:  BP (!) 152/72 (BP Location: Left arm, Patient Position: Sitting, Cuff Size: Adult Regular)   Pulse 82   Ht 1.638 m (5' 4.5\")   Wt 63.5 kg (140 lb)   LMP  (LMP Unknown)   SpO2 95%   BMI 23.66 kg/m     General: Well appearing, awake, alert,  oriented to place, in no apparent distress with respirations unlabored.    Skin: No apparent rashes, lesions, or ulcerations; no palpable induration or subcutaneous nodules in area examined.  Musculoskeletal:  Tenderness: mild over the lateral malleolus  Less swelling.  No deformity seen.    Range of Motion of ankle improved, but still some limitations of dorsiflexion   Non-tender over the syndesmosis and over the deltoid lig.     X-rays:   Obtained today included 3 views of the right  show the fracture maintaining position.  Fracture of the lateral mal is still visible, but less-so. Medial and posterior healed  Still slight tilt of the talus..     Assessment/Plan:   Doing well  Fracture is nearly healed  Weight bearing as toleratied  Appliance: lace up brace given.  Wear as needed. Discontinue boot. Wean crutches  physical therapy-- continue.  Can start proprio     Return to clinic in 4  week(s):  Xrays to be done at follow up: none       TYSON Kelley MD  Dept. Orthopedic Surgery  [facility]        Again, thank you for allowing me to participate in the care of your patient.  "       Sincerely,        Geovanni Kelley MD

## 2023-04-25 NOTE — TELEPHONE ENCOUNTER
Patient will RTC and p/u a different  (medium) ankle brace as she feels this will fit her better. She is advised to get this at main floor lobby/. No other questions.. Roni HARMAN

## 2023-04-25 NOTE — TELEPHONE ENCOUNTER
Patient was transferred to RN from [facility] to see if she can get a larger boot size at the [facility].     She states she was sent home today with a size small smaller boot and she is looking for a medium size boot with velcro straps, if possible.     RN sent Teams message to group at [place] to see if this is possible and was advised patient needs to return the boot at a medical supply store listed on the yellow form signed when she received the equipment.      RN relayed this to the patient. Patient asked to be transferred back to [facility] to complain as none of the sites on the yellow form are close to her home, in [place].     RN apologized for the inconvenience and transferred patient to [facility].     Grace Chun, EDDN, RN  [facility]  Nurse Triage, Family Practice

## 2023-04-25 NOTE — TELEPHONE ENCOUNTER
Pt was transferred to family practice    RN unable to help- transferred to ortho.    Adele Mondragon RN

## 2023-04-26 ENCOUNTER — THERAPY VISIT (OUTPATIENT)
Dept: PHYSICAL THERAPY | Facility: CLINIC | Age: 78
End: 2023-04-26
Payer: COMMERCIAL

## 2023-04-26 DIAGNOSIS — M25.671 ANKLE STIFFNESS, RIGHT: ICD-10-CM

## 2023-04-26 DIAGNOSIS — M25.571 ACUTE RIGHT ANKLE PAIN: Primary | ICD-10-CM

## 2023-04-26 PROCEDURE — 97110 THERAPEUTIC EXERCISES: CPT | Mod: GP | Performed by: PHYSICAL THERAPIST

## 2023-04-26 PROCEDURE — 97140 MANUAL THERAPY 1/> REGIONS: CPT | Mod: GP | Performed by: PHYSICAL THERAPIST

## 2023-04-26 PROCEDURE — 97112 NEUROMUSCULAR REEDUCATION: CPT | Mod: GP | Performed by: PHYSICAL THERAPIST

## 2023-05-03 ENCOUNTER — DOCUMENTATION ONLY (OUTPATIENT)
Dept: ORTHOPEDICS | Facility: CLINIC | Age: 78
End: 2023-05-03

## 2023-05-03 ENCOUNTER — THERAPY VISIT (OUTPATIENT)
Dept: PHYSICAL THERAPY | Facility: CLINIC | Age: 78
End: 2023-05-03
Payer: COMMERCIAL

## 2023-05-03 DIAGNOSIS — M25.671 ANKLE STIFFNESS, RIGHT: ICD-10-CM

## 2023-05-03 DIAGNOSIS — M25.571 ACUTE RIGHT ANKLE PAIN: Primary | ICD-10-CM

## 2023-05-03 PROCEDURE — 97110 THERAPEUTIC EXERCISES: CPT | Mod: GP | Performed by: PHYSICAL THERAPIST

## 2023-05-03 PROCEDURE — 97116 GAIT TRAINING THERAPY: CPT | Mod: GP | Performed by: PHYSICAL THERAPIST

## 2023-05-03 PROCEDURE — 97140 MANUAL THERAPY 1/> REGIONS: CPT | Mod: GP | Performed by: PHYSICAL THERAPIST

## 2023-05-10 ENCOUNTER — THERAPY VISIT (OUTPATIENT)
Dept: PHYSICAL THERAPY | Facility: CLINIC | Age: 78
End: 2023-05-10
Payer: COMMERCIAL

## 2023-05-10 DIAGNOSIS — M25.571 ACUTE RIGHT ANKLE PAIN: Primary | ICD-10-CM

## 2023-05-10 DIAGNOSIS — M25.671 ANKLE STIFFNESS, RIGHT: ICD-10-CM

## 2023-05-10 PROCEDURE — 97140 MANUAL THERAPY 1/> REGIONS: CPT | Mod: GP | Performed by: PHYSICAL THERAPIST

## 2023-05-10 PROCEDURE — 97110 THERAPEUTIC EXERCISES: CPT | Mod: GP | Performed by: PHYSICAL THERAPIST

## 2023-05-10 PROCEDURE — 97116 GAIT TRAINING THERAPY: CPT | Mod: GP | Performed by: PHYSICAL THERAPIST

## 2023-05-17 ENCOUNTER — THERAPY VISIT (OUTPATIENT)
Dept: PHYSICAL THERAPY | Facility: CLINIC | Age: 78
End: 2023-05-17
Payer: COMMERCIAL

## 2023-05-17 DIAGNOSIS — M25.571 ACUTE RIGHT ANKLE PAIN: Primary | ICD-10-CM

## 2023-05-17 DIAGNOSIS — M25.671 ANKLE STIFFNESS, RIGHT: ICD-10-CM

## 2023-05-17 PROCEDURE — 97110 THERAPEUTIC EXERCISES: CPT | Mod: GP | Performed by: PHYSICAL THERAPIST

## 2023-05-17 PROCEDURE — 97140 MANUAL THERAPY 1/> REGIONS: CPT | Mod: GP | Performed by: PHYSICAL THERAPIST

## 2023-05-17 NOTE — PROGRESS NOTES
PLAN  Continue therapy per current plan of care.    Beginning/End Dates of Progress Note Reporting Period:  05/17/23 to 05/17/2023    Referring Provider:  Geovanni Kelley       05/17/23 0500   Appointment Info   Signing clinician's name / credentials Kalen Cunningham DPT   Total/Authorized Visits 8 per PT POC   Visits Used 6   Medical Diagnosis Closed fracture of right ankle, initial encounter  Acute right ankle pain  Ankle stiffness, right   PT Tx Diagnosis R ankle pain and stiffness   Quick Adds Certification   Progress Note/Certification   Start of Care Date 04/12/23   Onset of illness/injury or Date of Surgery 02/08/23   Therapy Frequency 1x daily/2 weeks   Predicted Duration 4 weeks   Certification date from 04/12/23   Certification date to 07/12/23   Progress Note Due Date 05/23/23   Progress Note Completed Date 05/17/23   GOALS   PT Goals 2   PT Goal 1   Goal Identifier Ambulation   Goal Description The patient will ambulate for 30 minutes independently without the use of a brace utilizing proper gait mechanics   Rationale to maximize safety and independence with transportation;to maximize safety and independence within the community;to maximize safety and independence within the home;to maximize safety and independence with performance of ADLs and functional tasks   Goal Progress The patient currently ambulates roughly 20 minutes utilizing a lace up brace on her R ankle, decreased stance time noted on R lower extremity   Target Date 07/05/23   PT Goal 2   Goal Identifier Stairs   Goal Description The patient will ascend/descend 12 steps with a normal reciprocal pattern utilizing a railing on the R   Rationale to maximize safety and independence with transportation;to maximize safety and independence within the community;to maximize safety and independence within the home;to maximize safety and independence with performance of ADLs and functional tasks   Goal Progress The patient currently  ascends/descends 12 steps with a normal reciprocal pattern utilizing a cane in her L hand   Target Date 07/05/23   Subjective Report   Subjective Report The patient presents to the clinic stating she attempted to go for a 20 minute walk outside 3 days ago, and notes that it caused no pain but states there was some swelling noted in her R ankle. She states she has been using a cane to ascend/descend stairs.   Objective Measures   Objective Measures Objective Measure 1;Objective Measure 2;Objective Measure 3;Objective Measure 4;Objective Measure 5   Objective Measure 1   Objective Measure Ankle dorsiflexion AROM R   Details 11   Objective Measure 2   Objective Measure Ankle plantarflexion AROM R   Details 52   Objective Measure 3   Objective Measure Ankle inversion AROM R   Details 28   Objective Measure 4   Objective Measure Ankle eversion AROM R   Details 19   Objective Measure 5   Objective Measure Tandem stance   Details x30s each position   Treatment Interventions (PT)   Interventions Therapeutic Procedure/Exercise;Manual Therapy   Therapeutic Procedure/Exercise   PTRx Ther Proc 1 Standing IT Band Stretch Using Wall   PTRx Ther Proc 1 - Details x30s   PTRx Ther Proc 2 Standing Gastroc Stretch   PTRx Ther Proc 2 - Details x45s   PTRx Ther Proc 3 Hip AROM Standing Abduction   PTRx Ther Proc 3 - Details 2x10   PTRx Ther Proc 4 Walking Backwards   PTRx Ther Proc 4 - Details x60s   PTRx Ther Proc 5 Tandem Stance   PTRx Ther Proc 5 - Details x30s each   PTRx Ther Proc 6 Ankle Eversion Strengthening With Theraband   PTRx Ther Proc 6 - Details green TBx20   PTRx Ther Proc 7 Theraband Ankle Inversion   PTRx Ther Proc 7 - Details green TBx20   PTRx Ther Proc 8 Theraband Ankle Dorsiflexion   PTRx Ther Proc 8 - Details black TB x20   PTRx Ther Proc 9 Theraband Ankle Plantarflexion   PTRx Ther Proc 9 - Details black TB x20   PTRx Ther Proc 10 Repeated Hip External Rotation with Overpressure in Sitting   PTRx Ther Proc 10 -  Details x45s   PTRx Ther Proc 11 Towel Gather   PTRx Ther Proc 11 - Details x60s   Therapeutic Procedures: strength, endurance, ROM, flexibillity minutes (10427) 14   Ther Proc 1 plantarflexion stretch   Ther Proc 1 - Details x45s in long sitting and in standing with PT overpressure   Neuromuscular Re-education   PTRx Neuro Re-ed 1 Balance Single Leg Stance Supported and Unsupported   PTRx Neuro Re-ed 1 - Details x30s on R   Manual Therapy   Manual Therapy: Mobilization, MFR, MLD, friction massage minutes (05156) 12   Manual Therapy 1 Soft tissue mobilization to R anterior ankle   Manual Therapy 1 - Details utilizing lacrosse ball with sustained ankle plantarflexion   Skilled Intervention Patient education related to perfroming self soft tissue mobilization   Patient Response/Progress R ankle plantarflexion AROM increases from 47 degrees to 52 degrees   Plan   Updates to plan of care reduce frequency to 1x/2 weeks   Plan for next session Assess stairs without an assistive device   Comments   Comments 14 minutes spend performing objective measure assessment for progress note   Total Session Time   Timed Code Treatment Minutes 26   Total Treatment Time (sum of timed and untimed services) 26

## 2023-05-19 NOTE — PROGRESS NOTES
"SUBJECTIVE:  Cely Ontiveros is here in follow up of her 2/8/23 right ankle tri malleolar fracture.     Weight bearing as tolerated   Has had very little pain. But still has some lateral pain  Feels like she has lost her arch because of swelling    Review of Systems:  Constitutional/General: Negative for fever, chills, change in weight  Integumentary/Skin: Negative for worrisome rashes, moles, or lesions  Neuro: Negative for weakness, dizziness, or paresthesias   Psychiatric: negative for changes in mood or affect    OBJECTIVE:  Physical Exam:  BP (!) 152/74 (BP Location: Left arm, Patient Position: Sitting, Cuff Size: Adult Regular)   Pulse 77   Ht 1.638 m (5' 4.5\")   Wt 63.5 kg (140 lb)   LMP  (LMP Unknown)   SpO2 97%   BMI 23.66 kg/m    General Appearance: healthy, alert and no distress   Skin: no suspicious lesions or rashes  Neuro: Normal strength and tone, mentation intact and speech normal  Vascular: good pulses, and capillary refill   Lymph: no lymphadenopathy   Psych:  mentation appears normal and affect normal/bright  Resp: no increased work of breathing     OBJECTIVE:   BP (!) 152/74 (BP Location: Left arm, Patient Position: Sitting, Cuff Size: Adult Regular)   Pulse 77   Ht 1.638 m (5' 4.5\")   Wt 63.5 kg (140 lb)   LMP  (LMP Unknown)   SpO2 97%   BMI 23.66 kg/m     Patient appears to be alert and in no apparent distress.  Skin intact.    Neurovascularly Intact.    ROM: full DF,  Mild decrease in plantar flexion   Tenderness: mild over the distal fibula  She has slight hindfoot valgus, but no pes planus per se. Arch is present non weight bearing   Gait is somewhat abbreviated on the right side.    X-rays today:  Show the fracture maintaining position.  Mortice looks good.  Fracture line is still faintly visible on the fibula    ASSESSMENT:     ICD-10-CM    1. Closed fracture of right ankle with routine healing, subsequent encounter  S82.891D XR Ankle Right G/E 3 Views      based on tenderness, " the fracture may not be healed yet   Doing well    PLAN:   Weight Bearing: full  Rehab: continue to work on exercises on own.  Range of motion in plantar flexion may help her gait.    Return to clinic 2 months if still problems, or as needed     TYSON Kelley MD  Dept. Orthopedic Surgery  [facility]

## 2023-05-23 ENCOUNTER — ANCILLARY PROCEDURE (OUTPATIENT)
Dept: GENERAL RADIOLOGY | Facility: CLINIC | Age: 78
End: 2023-05-23
Attending: ORTHOPAEDIC SURGERY
Payer: COMMERCIAL

## 2023-05-23 ENCOUNTER — OFFICE VISIT (OUTPATIENT)
Dept: ORTHOPEDICS | Facility: CLINIC | Age: 78
End: 2023-05-23
Payer: COMMERCIAL

## 2023-05-23 VITALS
BODY MASS INDEX: 23.32 KG/M2 | OXYGEN SATURATION: 97 % | HEART RATE: 77 BPM | DIASTOLIC BLOOD PRESSURE: 74 MMHG | HEIGHT: 65 IN | WEIGHT: 140 LBS | SYSTOLIC BLOOD PRESSURE: 152 MMHG

## 2023-05-23 DIAGNOSIS — S82.891D CLOSED FRACTURE OF RIGHT ANKLE WITH ROUTINE HEALING, SUBSEQUENT ENCOUNTER: Primary | ICD-10-CM

## 2023-05-23 DIAGNOSIS — S82.891D CLOSED FRACTURE OF RIGHT ANKLE WITH ROUTINE HEALING, SUBSEQUENT ENCOUNTER: ICD-10-CM

## 2023-05-23 PROCEDURE — 73610 X-RAY EXAM OF ANKLE: CPT | Mod: TC | Performed by: RADIOLOGY

## 2023-05-23 PROCEDURE — 99207 PR FRACTURE CARE IN GLOBAL PERIOD: CPT | Performed by: ORTHOPAEDIC SURGERY

## 2023-05-23 ASSESSMENT — PAIN SCALES - GENERAL: PAINLEVEL: NO PAIN (0)

## 2023-05-23 NOTE — LETTER
"    5/23/2023         RE: Cely Ontiveros  [address] [place] [address]        Dear Colleague,    Thank you for referring your patient, Cely Ontiveros, to the [facility]. Please see a copy of my visit note below.    SUBJECTIVE:  Cely Ontiveros is here in follow up of her 2/8/23 right ankle tri malleolar fracture.     Weight bearing as tolerated   Has had very little pain. But still has some lateral pain  Feels like she has lost her arch because of swelling    Review of Systems:  Constitutional/General: Negative for fever, chills, change in weight  Integumentary/Skin: Negative for worrisome rashes, moles, or lesions  Neuro: Negative for weakness, dizziness, or paresthesias   Psychiatric: negative for changes in mood or affect    OBJECTIVE:  Physical Exam:  BP (!) 152/74 (BP Location: Left arm, Patient Position: Sitting, Cuff Size: Adult Regular)   Pulse 77   Ht 1.638 m (5' 4.5\")   Wt 63.5 kg (140 lb)   LMP  (LMP Unknown)   SpO2 97%   BMI 23.66 kg/m    General Appearance: healthy, alert and no distress   Skin: no suspicious lesions or rashes  Neuro: Normal strength and tone, mentation intact and speech normal  Vascular: good pulses, and capillary refill   Lymph: no lymphadenopathy   Psych:  mentation appears normal and affect normal/bright  Resp: no increased work of breathing     OBJECTIVE:   BP (!) 152/74 (BP Location: Left arm, Patient Position: Sitting, Cuff Size: Adult Regular)   Pulse 77   Ht 1.638 m (5' 4.5\")   Wt 63.5 kg (140 lb)   LMP  (LMP Unknown)   SpO2 97%   BMI 23.66 kg/m     Patient appears to be alert and in no apparent distress.  Skin intact.    Neurovascularly Intact.    ROM: full DF,  Mild decrease in plantar flexion   Tenderness: mild over the distal fibula  She has slight hindfoot valgus, but no pes planus per se. Arch is present non weight bearing   Gait is somewhat abbreviated on the right side.    X-rays today:  Show the fracture maintaining " position.  Mortice looks good.  Fracture line is still faintly visible on the fibula    ASSESSMENT:     ICD-10-CM    1. Closed fracture of right ankle with routine healing, subsequent encounter  S82.891D XR Ankle Right G/E 3 Views      based on tenderness, the fracture may not be healed yet   Doing well    PLAN:   Weight Bearing: full  Rehab: continue to work on exercises on own.  Range of motion in plantar flexion may help her gait.    Return to clinic 2 months if still problems, or as needed     TYSON Kelley MD  Dept. Orthopedic Surgery  [facility]        Again, thank you for allowing me to participate in the care of your patient.        Sincerely,        Geovanni Kelley MD

## 2023-05-24 ENCOUNTER — THERAPY VISIT (OUTPATIENT)
Dept: PHYSICAL THERAPY | Facility: CLINIC | Age: 78
End: 2023-05-24
Payer: COMMERCIAL

## 2023-05-24 DIAGNOSIS — M25.571 ACUTE RIGHT ANKLE PAIN: Primary | ICD-10-CM

## 2023-05-24 DIAGNOSIS — M25.671 ANKLE STIFFNESS, RIGHT: ICD-10-CM

## 2023-05-24 PROCEDURE — 97530 THERAPEUTIC ACTIVITIES: CPT | Mod: GP | Performed by: PHYSICAL THERAPIST

## 2023-05-24 PROCEDURE — 97140 MANUAL THERAPY 1/> REGIONS: CPT | Mod: GP | Performed by: PHYSICAL THERAPIST

## 2023-05-24 PROCEDURE — 97110 THERAPEUTIC EXERCISES: CPT | Mod: GP | Performed by: PHYSICAL THERAPIST

## 2023-05-25 NOTE — ANESTHESIA POSTPROCEDURE EVALUATION
Anesthesia POST Procedure Evaluation    Patient: Cely Ontiveros   MRN:     [id number] Gender:   female   Age:    73 year old :      1945        Preoperative Diagnosis: RIGHT CATARACT   Procedure(s):  PHACOEMULSIFICATION WITH STANDARD INTRAOCULAR LENS IMPLANT, RIGHT   Postop Comments: No value filed.       Anesthesia Type:  MAC    Reportable Event: NO     PAIN: Uncomplicated   Sign Out status: Comfortable, Well controlled pain     PONV: No PONV   Sign Out status:  No Nausea or Vomiting     Neuro/Psych: Uneventful perioperative course   Sign Out Status: Preoperative baseline; Age appropriate mentation     Airway/Resp.: Uneventful perioperative course   Sign Out Status: Non labored breathing, age appropriate RR; Resp. Status within EXPECTED Parameters     CV: Uneventful perioperative course   Sign Out status: Appropriate BP and perfusion indices; Appropriate HR/Rhythm     Disposition:   Sign Out in:  PACU  Disposition:  Phase II; Home  Recovery Course: Uneventful  Follow-Up: Not required           Last Anesthesia Record Vitals:  CRNA VITALS  2019 0810 - 2019 0910      2019             Pulse:  65    SpO2:  99 %          Last PACU/Preop Vitals:  Vitals:    19 0710 19 0845 19 0858   BP: 116/55 128/58 137/48   Resp:    Temp: 36.1  C (96.9  F) 36.5  C (97.7  F)    SpO2: 99% 96%          Electronically Signed By: Alessandro Payne MD, 2019, 4:17 PM  
Waiting ambulance service

## 2023-06-23 ENCOUNTER — OFFICE VISIT (OUTPATIENT)
Dept: FAMILY MEDICINE | Facility: CLINIC | Age: 78
End: 2023-06-23
Payer: COMMERCIAL

## 2023-06-23 VITALS
RESPIRATION RATE: 16 BRPM | BODY MASS INDEX: 24.52 KG/M2 | OXYGEN SATURATION: 96 % | HEART RATE: 74 BPM | WEIGHT: 143.6 LBS | TEMPERATURE: 97.6 F | SYSTOLIC BLOOD PRESSURE: 137 MMHG | DIASTOLIC BLOOD PRESSURE: 74 MMHG | HEIGHT: 64 IN

## 2023-06-23 DIAGNOSIS — E28.39 ESTROGEN DEFICIENCY: ICD-10-CM

## 2023-06-23 DIAGNOSIS — Z12.11 COLON CANCER SCREENING: ICD-10-CM

## 2023-06-23 DIAGNOSIS — Z00.00 ENCOUNTER FOR MEDICARE ANNUAL WELLNESS EXAM: Primary | ICD-10-CM

## 2023-06-23 DIAGNOSIS — E78.5 DYSLIPIDEMIA: ICD-10-CM

## 2023-06-23 DIAGNOSIS — B00.9 HERPES SIMPLEX VIRUS INFECTION: ICD-10-CM

## 2023-06-23 DIAGNOSIS — Z12.31 ENCOUNTER FOR SCREENING MAMMOGRAM FOR BREAST CANCER: ICD-10-CM

## 2023-06-23 DIAGNOSIS — Z91.89 FRAMINGHAM CARDIAC RISK >20% IN NEXT 10 YEARS: ICD-10-CM

## 2023-06-23 LAB
ALT SERPL W P-5'-P-CCNC: 14 U/L (ref 0–50)
ANION GAP SERPL CALCULATED.3IONS-SCNC: 11 MMOL/L (ref 7–15)
BUN SERPL-MCNC: 26.6 MG/DL (ref 8–23)
CALCIUM SERPL-MCNC: 10 MG/DL (ref 8.8–10.2)
CHLORIDE SERPL-SCNC: 104 MMOL/L (ref 98–107)
CHOLEST SERPL-MCNC: 176 MG/DL
CREAT SERPL-MCNC: 0.86 MG/DL (ref 0.51–0.95)
DEPRECATED HCO3 PLAS-SCNC: 26 MMOL/L (ref 22–29)
GFR SERPL CREATININE-BSD FRML MDRD: 68 ML/MIN/1.73M2
GLUCOSE SERPL-MCNC: 87 MG/DL (ref 70–99)
HDLC SERPL-MCNC: 80 MG/DL
LDLC SERPL CALC-MCNC: 79 MG/DL
NONHDLC SERPL-MCNC: 96 MG/DL
POTASSIUM SERPL-SCNC: 5.1 MMOL/L (ref 3.4–5.3)
SODIUM SERPL-SCNC: 141 MMOL/L (ref 136–145)
TRIGL SERPL-MCNC: 83 MG/DL

## 2023-06-23 PROCEDURE — G0439 PPPS, SUBSEQ VISIT: HCPCS | Performed by: INTERNAL MEDICINE

## 2023-06-23 PROCEDURE — 84460 ALANINE AMINO (ALT) (SGPT): CPT | Performed by: INTERNAL MEDICINE

## 2023-06-23 PROCEDURE — 99213 OFFICE O/P EST LOW 20 MIN: CPT | Mod: 25 | Performed by: INTERNAL MEDICINE

## 2023-06-23 PROCEDURE — 36415 COLL VENOUS BLD VENIPUNCTURE: CPT | Performed by: INTERNAL MEDICINE

## 2023-06-23 PROCEDURE — 80061 LIPID PANEL: CPT | Performed by: INTERNAL MEDICINE

## 2023-06-23 PROCEDURE — 80048 BASIC METABOLIC PNL TOTAL CA: CPT | Performed by: INTERNAL MEDICINE

## 2023-06-23 RX ORDER — ATORVASTATIN CALCIUM 20 MG/1
20 TABLET, FILM COATED ORAL DAILY
Qty: 90 TABLET | Refills: 3 | Status: SHIPPED | OUTPATIENT
Start: 2023-06-23 | End: 2024-07-02

## 2023-06-23 RX ORDER — VALACYCLOVIR HYDROCHLORIDE 500 MG/1
500 TABLET, FILM COATED ORAL DAILY
Qty: 90 TABLET | Refills: 3 | Status: SHIPPED | OUTPATIENT
Start: 2023-06-23 | End: 2024-08-20

## 2023-06-23 ASSESSMENT — ENCOUNTER SYMPTOMS
WEAKNESS: 0
PALPITATIONS: 0
ARTHRALGIAS: 1
SORE THROAT: 0
DIZZINESS: 0
PARESTHESIAS: 0
DIARRHEA: 0
CHILLS: 0
MYALGIAS: 1
COUGH: 0
DYSURIA: 0
FEVER: 0
HEMATOCHEZIA: 0
HEMATURIA: 0
SHORTNESS OF BREATH: 0
BREAST MASS: 0
NAUSEA: 0
FREQUENCY: 1
HEADACHES: 0
EYE PAIN: 0
HEARTBURN: 0
NERVOUS/ANXIOUS: 0
JOINT SWELLING: 1
ABDOMINAL PAIN: 0
CONSTIPATION: 0

## 2023-06-23 ASSESSMENT — PATIENT HEALTH QUESTIONNAIRE - PHQ9
SUM OF ALL RESPONSES TO PHQ QUESTIONS 1-9: 0
10. IF YOU CHECKED OFF ANY PROBLEMS, HOW DIFFICULT HAVE THESE PROBLEMS MADE IT FOR YOU TO DO YOUR WORK, TAKE CARE OF THINGS AT HOME, OR GET ALONG WITH OTHER PEOPLE: NOT DIFFICULT AT ALL
SUM OF ALL RESPONSES TO PHQ QUESTIONS 1-9: 0

## 2023-06-23 ASSESSMENT — ACTIVITIES OF DAILY LIVING (ADL): CURRENT_FUNCTION: NO ASSISTANCE NEEDED

## 2023-06-23 ASSESSMENT — PAIN SCALES - GENERAL: PAINLEVEL: NO PAIN (0)

## 2023-06-23 NOTE — PATIENT INSTRUCTIONS
At [facility], we strive to deliver an exceptional experience to you, every time we see you. If you receive a survey, please complete it as we do value your feedback.  If you have MyChart, you can expect to receive results automatically within 24 hours of their completion.  Your provider will send a note interpreting your results as well.   If you do not have MyChart, you should receive your results in about a week by mail.    Your care team:                            Family Medicine Internal Medicine   MD Mao Duran MD Shantel Branch-Fleming, MD Srinivasa Vaka, MD Katya Belousova, MICHELLE JimenezHillAUDREY Cabrera CNP, MD Pediatrics   Jefferson Gallardo, MD Jasmin Pierre MD Amelia Massimini APRN CNP   Mary Sarabia APRN MD Shonna Escobar MD          Clinic hours: Monday - Thursday 7 am-6 pm; Fridays 7 am-5 pm.   Urgent care: Monday - Friday 10 am- 8 pm; Saturday and Sunday 9 am-5 pm.    Clinic: [phone]       [facility]: Monday - Thursday 8 am - 7 pm; Friday 8 am - 6 pm  [facility]: [phone]     Patient Education   Personalized Prevention Plan  You are due for the preventive services outlined below.  Your care team is available to assist you in scheduling these services.  If you have already completed any of these items, please share that information with your care team to update in your medical record.  Health Maintenance Due   Topic Date Due     COVID-19 Vaccine (6 - Pfizer series) 01/07/2023     Annual Wellness Visit  03/23/2023     ANNUAL REVIEW OF HM ORDERS  03/23/2023       Preventing Falls at Home  A person can fall for many reasons. Older adults may fall because reaction time slows down as we age. Your muscles and joints may get stiff, weak, or less flexible because of illness, medicines, or a physical condition.   Other  health problems that make falls more likely include:     Arthritis    Dizziness or lightheadedness when you stand up (orthostatic hypotension)    History of a stroke    Dizziness    Anemia    Certain medicines taken for mental illness or to control blood pressure.    Problems with balance or gait    Bladder or urinary problems    History of falling    Changes in vision (vision impairment)    Changes in thinking skills and memory (cognitive impairment)  Injuries from a fall can include serious injuries such as broken bones, dislocated joints, internal bleeding and cuts. Injuries like these can limit your independence.   Prevention tips  To help prevent falls and fall-related injuries, follow the tips below.    Floors  To make floors safer:     Put nonskid pads under area rugs.    Remove small rugs.    Replace worn floor coverings.    Tack carpets firmly to each step on carpeted stairs. Put nonskid strips on the edges of uncarpeted stairs.    Keep floors and stairs free of clutter and cords.    Arrange furniture so there are clear pathways.    Clean up any spills right away.  Bathrooms    To make bathrooms safer:     Install grab bars in the tub or shower.    Apply nonskid strips or put a nonskid rubber mat in the tub or shower.    Sit on a bath chair to bathe.    Use bathmats with nonskid backing.  Lighting  To improve visibility in your home:      Keep a flashlight in each room. Or put a lamp next to the bed within easy reach.    Put nightlights in the bedrooms, hallways, kitchen, and bathrooms.    Make sure all stairways have good lighting.    Take your time when going up and down stairs.    Put handrails on both sides of stairs and in walkways for more support. To prevent injury to your wrist or arm, don t use handrails to pull yourself up.    Install grab bars to pull yourself up.    Move or rearrange items that you use often. This will make them easier to find or reach.    Look at your home to find any safety  hazards. Especially look at doorways, walkways, and the driveway. Remove or repair any safety problems that you find.  Other changes to make    Look around to find any safety hazards. Look closely at doorways, walkways, and the driveway. Remove or repair any safety problems that you find.    Wear shoes that fit well.    Take your time when going up and down stairs.    Put handrails on both sides of stairs and in walkways for more support. To prevent injury to your wrist or arm, don t use handrails to pull yourself up.    Install grab bars wherever needed to pull yourself up.    Arrange items that you use often. This will make them easier to find or reach.    [employer] last reviewed this educational content on 3/1/2020    7806-4967 [employer]. All rights reserved. This information is not intended as a substitute for professional medical care. Always follow your healthcare professional's instructions.

## 2023-06-23 NOTE — PROGRESS NOTES
"  {PROVIDER CHARTING PREFERENCE:124712}    Bimal Ta is a 77 year old, presenting for the following health issues:  Hypertension        6/23/2023     9:03 AM   Additional Questions   Roomed by wes     Healthy Habits:     In general, how would you rate your overall health?  Good    Frequency of exercise:  6-7 days/week    Duration of exercise:  45-60 minutes    Do you usually eat at least 4 servings of fruit and vegetables a day, include whole grains    & fiber and avoid regularly eating high fat or \"junk\" foods?  Yes    Taking medications regularly:  Yes    Medication side effects:  Not applicable    Ability to successfully perform activities of daily living:  No assistance needed    Home Safety:  No safety concerns identified    Hearing Impairment:  No hearing concerns    In the past 6 months, have you been bothered by leaking of urine? Yes    In general, how would you rate your overall mental or emotional health?  Excellent      PHQ-2 Total Score: 0    Additional concerns today:  No       {SUPERLIST (Optional):670990}  {additonal problems for provider to add (Optional):549115}      Review of Systems   Constitutional: Negative for chills and fever.   HENT: Negative for congestion, ear pain, hearing loss and sore throat.    Eyes: Negative for pain and visual disturbance.   Respiratory: Negative for cough and shortness of breath.    Cardiovascular: Negative for chest pain, palpitations and peripheral edema.   Gastrointestinal: Negative for abdominal pain, constipation, diarrhea, heartburn, hematochezia and nausea.   Breasts:  Negative for tenderness, breast mass and discharge.   Genitourinary: Positive for frequency and urgency. Negative for dysuria, genital sores, hematuria, pelvic pain, vaginal bleeding and vaginal discharge.   Musculoskeletal: Positive for arthralgias, joint swelling and myalgias.   Skin: Negative for rash.   Neurological: Negative for dizziness, weakness, headaches and paresthesias. "   Psychiatric/Behavioral: Negative for mood changes. The patient is not nervous/anxious.       {ROS COMP (Optional):287662}      Objective    LMP  (LMP Unknown)   There is no height or weight on file to calculate BMI.  Physical Exam   {Exam List (Optional):217288}    {Diagnostic Test Results (Optional):530473}    {AMBULATORY ATTESTATION (Optional):003248}            Answers for HPI/ROS submitted by the patient on 6/23/2023  If you checked off any problems, how difficult have these problems made it for you to do your work, take care of things at home, or get along with other people?: Not difficult at all  PHQ9 TOTAL SCORE: 0      Answers for HPI/ROS submitted by the patient on 6/23/2023  If you checked off any problems, how difficult have these problems made it for you to do your work, take care of things at home, or get along with other people?: Not difficult at all  PHQ9 TOTAL SCORE: 0

## 2023-06-23 NOTE — PROGRESS NOTES
"Answers for HPI/ROS submitted by the patient on 6/23/2023  If you checked off any problems, how difficult have these problems made it for you to do your work, take care of things at home, or get along with other people?: Not difficult at all  PHQ9 TOTAL SCORE: 0    SUBJECTIVE:   Cely is a 77 year old who presents for Preventive Visit.      6/23/2023     9:03 AM   Additional Questions   Roomed by wes     Are you in the first 12 months of your Medicare coverage?  No    Healthy Habits:     In general, how would you rate your overall health?  Good    Frequency of exercise:  6-7 days/week    Duration of exercise:  45-60 minutes    Do you usually eat at least 4 servings of fruit and vegetables a day, include whole grains    & fiber and avoid regularly eating high fat or \"junk\" foods?  Yes    Taking medications regularly:  Yes    Medication side effects:  Not applicable    Ability to successfully perform activities of daily living:  No assistance needed    Home Safety:  No safety concerns identified    Hearing Impairment:  No hearing concerns    In the past 6 months, have you been bothered by leaking of urine? Yes    In general, how would you rate your overall mental or emotional health?  Excellent      PHQ-2 Total Score: 0    Additional concerns today:  No    The 10-year ASCVD risk score (Alton BISWAS, et al., 2019) is: 29.8%    Values used to calculate the score:      Age: 77 years      Sex: Female      Is Non- : No      Diabetic: No      Tobacco smoker: No      Systolic Blood Pressure: 137 mmHg      Is BP treated: No      HDL Cholesterol: 96 mg/dL      Total Cholesterol: 161 mg/dL    Have you ever done Advance Care Planning? (For example, a Health Directive, POLST, or a discussion with a medical provider or your loved ones about your wishes): No, advance care planning information given to patient to review.  Patient declined advance care planning discussion at this time.       Fall risk  Fallen " 2 or more times in the past year?: No  Any fall with injury in the past year?: Yes    Cognitive Screening   1) Repeat 3 items (Leader, Season, Table)    2) Clock draw: NORMAL  3) 3 item recall: Recalls 3 objects  Results: NORMAL clock, 1-2 items recalled: COGNITIVE IMPAIRMENT LESS LIKELY    Mini-CogTM Copyright SOLANGE Worley. Licensed by the author for use in [facility]; reprinted with permission (neno@[facility]). All rights reserved.      Do you have sleep apnea, excessive snoring or daytime drowsiness?: no    Reviewed and updated as needed this visit by clinical staff   Tobacco  Allergies  Meds              Reviewed and updated as needed this visit by Provider                 Social History     Tobacco Use     Smoking status: Never     Smokeless tobacco: Never   Substance Use Topics     Alcohol use: No             6/23/2023     9:04 AM   Alcohol Use   Prescreen: >3 drinks/day or >7 drinks/week? No          No data to display              Do you have a current opioid prescription? No  Do you use any other controlled substances or medications that are not prescribed by a provider? None              Current providers sharing in care for this patient include:   Patient Care Team:  Danyelle Espinosa MD as PCP - General (Family Medicine)  Danyelle Espinosa MD as Assigned PCP  Diego Vela MD as MD (Ophthalmology)  Brandt Richardson OD as Referring Physician (Optometry)  Geovanni Kelley MD as Assigned Musculoskeletal Provider  Diego Vela MD as Assigned Surgical Provider    The following health maintenance items are reviewed in Epic and correct as of today:  Health Maintenance   Topic Date Due     COVID-19 Vaccine (6 - Pfizer series) 01/07/2023     MEDICARE ANNUAL WELLNESS VISIT  03/23/2023     ANNUAL REVIEW OF HM ORDERS  03/23/2023     EYE EXAM  09/01/2023     PHQ-9  12/23/2023     FALL RISK ASSESSMENT  06/23/2024     ADVANCE CARE PLANNING  01/20/2026     LIPID  03/23/2027     DEXA  05/18/2027      "DTAP/TDAP/TD IMMUNIZATION (5 - Td or Tdap) 03/23/2032     HEPATITIS C SCREENING  Completed     DEPRESSION ACTION PLAN  Completed     INFLUENZA VACCINE  Completed     Pneumococcal Vaccine: 65+ Years  Completed     ZOSTER IMMUNIZATION  Completed     IPV IMMUNIZATION  Aged Out     MENINGITIS IMMUNIZATION  Aged Out     COLORECTAL CANCER SCREENING  Discontinued           Mammogram Screening - Patient over age 75, has elected to continue with screening.  Pertinent mammograms are reviewed under the imaging tab.    Review of Systems   Constitutional: Negative for chills and fever.   HENT: Negative for congestion, ear pain, hearing loss and sore throat.    Eyes: Negative for pain and visual disturbance.   Respiratory: Negative for cough and shortness of breath.    Cardiovascular: Negative for chest pain, palpitations and peripheral edema.   Gastrointestinal: Negative for abdominal pain, constipation, diarrhea, heartburn, hematochezia and nausea.   Breasts:  Negative for tenderness, breast mass and discharge.   Genitourinary: Positive for frequency and urgency. Negative for dysuria, genital sores, hematuria, pelvic pain, vaginal bleeding and vaginal discharge.   Musculoskeletal: Positive for arthralgias, joint swelling and myalgias.   Skin: Negative for rash.   Neurological: Negative for dizziness, weakness, headaches and paresthesias.   Psychiatric/Behavioral: Negative for mood changes. The patient is not nervous/anxious.          OBJECTIVE:   LMP  (LMP Unknown)  Estimated body mass index is 23.66 kg/m  as calculated from the following:    Height as of 5/23/23: 1.638 m (5' 4.5\").    Weight as of 5/23/23: 63.5 kg (140 lb).  Physical Exam  GENERAL: healthy, alert and no distress  EYES: Eyes grossly normal to inspection, PERRL and conjunctivae and sclerae normal  HENT: Wax in left ear  NECK: no adenopathy, no asymmetry, masses, or scars and thyroid normal to palpation  RESP: lungs clear to auscultation - no rales, rhonchi or " wheezes  CV: regular rate and rhythm, normal S1 S2, no S3 or S4, no murmur, click or rub, no peripheral edema and peripheral pulses strong  ABDOMEN: soft, nontender, no hepatosplenomegaly, no masses and bowel sounds normal  MS: no gross musculoskeletal defects noted, no edema  SKIN: no suspicious lesions or rashes  NEURO: Normal strength and tone, mentation intact and speech normal  PSYCH: mentation appears normal, affect normal/bright    Diagnostic Test Results:  Labs reviewed in Epic    ASSESSMENT / PLAN:   (Z00.00) Encounter for Medicare annual wellness exam  (primary encounter diagnosis)  Comment: She is up-to-date with all her immunizations  She exercises regularly  She does not do colonoscopies however she does annual FIT tests  We discussed about mammograms  We will continue to do them once in 2 years  She fell and broke her ankle recently but that was a mechanical fall from slipping on the ice  Plan: PRIMARY CARE FOLLOW-UP SCHEDULING, Basic         metabolic panel  (Ca, Cl, CO2, Creat, Gluc, K,         Na, BUN)            (Z91.89) Ruffin cardiac risk >20% in next 10 years  Comment:   Plan: atorvastatin (LIPITOR) 20 MG tablet            (B00.9) Herpes simplex  Comment: She takes Valtrex for this on a regular basis  Plan: valACYclovir (VALTREX) 500 MG tablet            (E28.39) Estrogen deficiency  Comment:   Plan: DX Hip/Pelvis/Spine        Last DEXA scan showed osteopenia    (Z12.31) Encounter for screening mammogram for breast cancer  Comment:   Plan: *MA Screening Digital Bilateral            (Z12.11) Colon cancer screening  Comment:   Plan: Fecal colorectal cancer screen (FIT)            (E78.5) Dyslipidemia  Comment: She is developing some myalgias from being on Lipitor  Advised her to try taking some co-Q10 with this  Plan: Lipid panel reflex to direct LDL Non-fasting,         ALT              Patient has been advised of split billing requirements and indicates understanding:  No      COUNSELING:  Reviewed preventive health counseling, as reflected in patient instructions       Regular exercise       Healthy diet/nutrition       Vision screening       Hearing screening       Dental care       Bladder control       Fall risk prevention       Colon cancer screening        She reports that she has never smoked. She has never used smokeless tobacco.      Appropriate preventive services were discussed with this patient, including applicable screening as appropriate for cardiovascular disease, diabetes, osteopenia/osteoporosis, and glaucoma.  As appropriate for age/gender, discussed screening for colorectal cancer, prostate cancer, breast cancer, and cervical cancer. Checklist reviewing preventive services available has been given to the patient.    Reviewed patients plan of care and provided an AVS. The Basic Care Plan (routine screening as documented in Health Maintenance) for Cely meets the Care Plan requirement. This Care Plan has been established and reviewed with the Patient.          Jorge Mcgregor MD  [facility]    Identified Health Risks:    I have reviewed Opioid Use Disorder and Substance Use Disorder risk factors and made any needed referrals.       She is at risk for falling and has been provided with information to reduce the risk of falling at home.

## 2023-06-24 LAB — HEMOCCULT STL QL IA: NEGATIVE

## 2023-06-24 PROCEDURE — 82274 ASSAY TEST FOR BLOOD FECAL: CPT | Performed by: INTERNAL MEDICINE

## 2023-10-26 ENCOUNTER — OFFICE VISIT (OUTPATIENT)
Dept: URGENT CARE | Facility: URGENT CARE | Age: 78
End: 2023-10-26
Payer: COMMERCIAL

## 2023-10-26 VITALS
TEMPERATURE: 97.8 F | WEIGHT: 142.4 LBS | DIASTOLIC BLOOD PRESSURE: 72 MMHG | RESPIRATION RATE: 16 BRPM | SYSTOLIC BLOOD PRESSURE: 139 MMHG | OXYGEN SATURATION: 97 % | HEIGHT: 64 IN | BODY MASS INDEX: 24.31 KG/M2 | HEART RATE: 81 BPM

## 2023-10-26 DIAGNOSIS — R30.9 URINARY PAIN: ICD-10-CM

## 2023-10-26 DIAGNOSIS — N30.00 ACUTE CYSTITIS WITHOUT HEMATURIA: ICD-10-CM

## 2023-10-26 DIAGNOSIS — H61.21 IMPACTED CERUMEN OF RIGHT EAR: Primary | ICD-10-CM

## 2023-10-26 LAB
ALBUMIN UR-MCNC: NEGATIVE MG/DL
APPEARANCE UR: CLEAR
BACTERIA #/AREA URNS HPF: ABNORMAL /HPF
BILIRUB UR QL STRIP: NEGATIVE
COLOR UR AUTO: YELLOW
GLUCOSE UR STRIP-MCNC: NEGATIVE MG/DL
HGB UR QL STRIP: ABNORMAL
KETONES UR STRIP-MCNC: NEGATIVE MG/DL
LEUKOCYTE ESTERASE UR QL STRIP: ABNORMAL
NITRATE UR QL: NEGATIVE
PH UR STRIP: 5.5 [PH] (ref 5–7)
RBC #/AREA URNS AUTO: ABNORMAL /HPF
SP GR UR STRIP: 1.02 (ref 1–1.03)
SQUAMOUS #/AREA URNS AUTO: ABNORMAL /LPF
TRANS CELLS #/AREA URNS HPF: ABNORMAL /HPF
UROBILINOGEN UR STRIP-ACNC: 0.2 E.U./DL
WBC #/AREA URNS AUTO: ABNORMAL /HPF

## 2023-10-26 PROCEDURE — 87086 URINE CULTURE/COLONY COUNT: CPT | Performed by: PHYSICIAN ASSISTANT

## 2023-10-26 PROCEDURE — 81001 URINALYSIS AUTO W/SCOPE: CPT | Performed by: PHYSICIAN ASSISTANT

## 2023-10-26 PROCEDURE — 69210 REMOVE IMPACTED EAR WAX UNI: CPT | Performed by: PHYSICIAN ASSISTANT

## 2023-10-26 PROCEDURE — 87186 SC STD MICRODIL/AGAR DIL: CPT | Performed by: PHYSICIAN ASSISTANT

## 2023-10-26 PROCEDURE — 99213 OFFICE O/P EST LOW 20 MIN: CPT | Mod: 25 | Performed by: PHYSICIAN ASSISTANT

## 2023-10-26 RX ORDER — CEPHALEXIN 500 MG/1
500 CAPSULE ORAL 2 TIMES DAILY
Qty: 14 CAPSULE | Refills: 0 | Status: SHIPPED | OUTPATIENT
Start: 2023-10-26 | End: 2023-11-02

## 2023-10-26 ASSESSMENT — ENCOUNTER SYMPTOMS
SORE THROAT: 0
NECK PAIN: 0
WOUND: 0
ENDOCRINE NEGATIVE: 1
FREQUENCY: 1
ARTHRALGIAS: 0
NAUSEA: 0
PALPITATIONS: 0
ALLERGIC/IMMUNOLOGIC NEGATIVE: 1
LIGHT-HEADEDNESS: 0
HEMATURIA: 0
RESPIRATORY NEGATIVE: 1
HEADACHES: 0
SHORTNESS OF BREATH: 0
COUGH: 0
NECK STIFFNESS: 0
WEAKNESS: 0
DIZZINESS: 0
CARDIOVASCULAR NEGATIVE: 1
EYES NEGATIVE: 1
RHINORRHEA: 0
CHILLS: 0
JOINT SWELLING: 0
VOMITING: 0
BACK PAIN: 0
DIARRHEA: 0
MYALGIAS: 0
DYSURIA: 1
MUSCULOSKELETAL NEGATIVE: 1
FEVER: 0

## 2023-10-26 NOTE — PROGRESS NOTES
"Chief Complaint:    Chief Complaint   Patient presents with    Plugged Ears     Right ear.     Urinary Burn    Frequency    Gastrophageal Reflux       ASSESSMENT    Vital signs reviewed by Tapan Cortes PA-C  /72   Pulse 81   Temp 97.8  F (36.6  C) (Tympanic)   Resp 16   Ht 1.626 m (5' 4\")   Wt 64.6 kg (142 lb 6.4 oz)   LMP  (LMP Unknown)   SpO2 97%   BMI 24.44 kg/m       1. Impacted cerumen of right ear    2. Acute cystitis without hematuria    3. Urinary pain         PLAN    right ear has impacted cerumen.  This was lavaged, and impacted cerumen removed with curette by me.  TM visualized post impacted cerumen removal.  Patient tolerated this procedure well.      UA discussed with patient.  Rx for Keflex sent in.    We will call with culture results only if resistant.  Fluids, vaporizer, acetaminophen, and or ibuprofen for pain.  Follow up with PCP if symptoms are not improving in 1 week. Sooner if symptoms worsen.   Worrisome symptoms discussed with instructions to go to the ED.  Patient verbalized understanding and agreed with this plan.     LABS:    Results for orders placed or performed in visit on 10/26/23   UA Macroscopic with reflex to Microscopic and Culture - Lab Collect     Status: Abnormal    Specimen: Urine, Midstream   Result Value Ref Range    Color Urine Yellow Colorless, Straw, Light Yellow, Yellow    Appearance Urine Clear Clear    Glucose Urine Negative Negative mg/dL    Bilirubin Urine Negative Negative    Ketones Urine Negative Negative mg/dL    Specific Gravity Urine 1.020 1.003 - 1.035    Blood Urine Trace (A) Negative    pH Urine 5.5 5.0 - 7.0    Protein Albumin Urine Negative Negative mg/dL    Urobilinogen Urine 0.2 0.2, 1.0 E.U./dL    Nitrite Urine Negative Negative    Leukocyte Esterase Urine Large (A) Negative       Respiratory History  occasional episodes of bronchitis    Current Meds    Current Outpatient Medications:     atorvastatin (LIPITOR) 20 MG tablet, Take 1 " Asked by Radha Jonas MD to attend  section delivery of Gestational Age: 40w6d.       Supervising Neonatologist:  Dr. Boyle    Prenatal labs include:  Information for the patient's mother:  Marie Cassidy [[id number]]     Lab Results   Component Value Date/Time    CULT  2018 04:12 PM     NEGATIVE FOR STREPTOCOCCUS AGALACTIAE (STREP GROUP B)    ABR A POSITIVE 2018 01:25 PM    HBAG NEGATIVE 2018 02:45 PM    SYPIGG NONREACTIVE 2018 02:45 PM    HIV NONREACTIVE 2018 02:45 PM    RUBEL 12018 02:45 PM    GCPT NEGATIVE 2018 01:35 PM    GCPT NEGATIVE 10/05/2017 11:35 AM    GCPT NEGATIVE 2017 02:52 PM    GCPT NEGATIVE 2017 06:00 PM    GCPT NEGATIVE 2016 11:25 AM    GCPT NEGATIVE 2016 11:15 PM    CHPT NEGATIVE 2018 01:35 PM    CHPT NEGATIVE 10/05/2017 11:35 AM    CHPT NEGATIVE 2017 02:52 PM    CHPT NEGATIVE 2017 06:00 PM    CHPT NEGATIVE 2016 11:25 AM    CHPT NEGATIVE 2016 11:15 PM       Pregnancy complications:  Fetal Macrosomia and concern for fetal club foot.    Maternal history includes:   Information for the patient's mother:  Sondra Cassidyperry [place]STER [[id number]]   21 year old    female.  Information for the patient's mother:  Marie Cassidy [[id number]]       Information for the patient's mother:  Marie Cassidy [[id number]]     Past Medical History:   Diagnosis Date   • Abdominal pain 2014    past 5 weeks pain   • Allergy    • Anxiety    • Apraxia of speech    • Crohn's disease (CMS/HCC) 3/12/14    repeat in 1 year ()   • Depressive disorder    • Dyslexia    • RAD (reactive airway disease)     Excercise induced asthma     Information for the patient's mother:  Marie Cassidy [[id number]]     Social History     Tobacco Use   • Smoking status: Former Smoker     Packs/day: 0.10     Years: 0.10     Pack years: 0.01     Types: Cigarettes     Last attempt to quit: 2018     Years since quittin.7   •  Smokeless tobacco: Never Used   Substance Use Topics   • Alcohol use: No     Alcohol/week: 0.0 oz   • Drug use: No     Information for the patient's mother:  Marie Cassidy [[id number]]     Medications Prior to Admission   Medication Sig Dispense Refill   • fluoxetine (PROZAC) 20 MG tablet Take 1 tablet by mouth daily. 30 tablet 0   • busPIRone (BUSPAR) 7.5 MG tablet Take 1 tablet by mouth 2 times daily. 60 tablet 0   • Cholecalciferol (VITAMIN D PO) Take 1 tablet by mouth daily. Pt unsure of strength     • Prenatal Vit-Fe Fumarate-FA (PRENATAL VITAMIN PO) Take 1 tablet by mouth daily.     • ferrous sulfate 325 (65 FE) MG tablet Take 325 mg by mouth daily (with breakfast).     • albuterol (PROAIR HFA) 108 (90 Base) MCG/ACT inhaler Inhale 2 puffs into the lungs as needed for Shortness of Breath or Wheezing (or cough). 1 Inhaler 3   • Probiotic Product (FLORAJEN3) Cap Take 1 capsule by mouth daily. 30 capsule 5   • loratadine (CLARITIN) 10 MG tablet Take 10 mg by mouth as needed for Allergies (in the fall).        Cefazolin antibiotic(s) x 1 dose(s) prior to delivery.    Labor:  Delivery Method:   Section   Rupture Date:  at delivery  Rupture Time:  at delivery   Date and time of delivery: 2018,  3:52 PM    Delivery Complications:  Meconium stained fluid      Birth:  Spontaneous cry.  Delayed cord clamping for 45 seconds.  Placed on radiant warmer.   Resuscitation included tactile stimulation and suctioning with bulb syringe.  Vigorous with strong cry.    Face Mask Ventilation: No    Mask CPAP: No     APGARS One minute Five minutes   Skin color 0  1    Heart rate 2  2     Grimace 2  2    Muscle tone 2  2    Breathing 2  2    Totals 8  9        Weight:    pending  Length:    pending  Head circumference:   pending       PHYSICAL EXAM    GENERAL:  Alert, vigorous female with appropriate behavior.  She is in no acute distress.  SKIN:  Her skin is warm with normal turgor.  The color of the skin is pink. There  tablet (20 mg) by mouth daily, Disp: 90 tablet, Rfl: 3    cephALEXin (KEFLEX) 500 MG capsule, Take 1 capsule (500 mg) by mouth 2 times daily for 7 days, Disp: 14 capsule, Rfl: 0    fluticasone (FLONASE) 50 MCG/ACT nasal spray, Spray 2 sprays in nostril, Disp: , Rfl:     loratadine (CLARITIN) 10 MG tablet, Take 1 tablet (10 mg) by mouth daily, Disp: 90 tablet, Rfl: 0    valACYclovir (VALTREX) 500 MG tablet, Take 1 tablet (500 mg) by mouth daily, Disp: 90 tablet, Rfl: 3    Current Facility-Administered Medications:     triamcinolone (KENALOG-40) injection 40 mg, 40 mg, , , Luis Le DO, 40 mg at 07/01/21 0946    triamcinolone (KENALOG-40) injection 40 mg, 40 mg, , , Luis Le DO, 40 mg at 07/01/21 0946    Problem history  Patient Active Problem List   Diagnosis    Allergic rhinitis    Herpes simplex    CARDIOVASCULAR SCREENING; LDL GOAL LESS THAN 160    Degenerative joint disease    Posterior vitreous detachment of both eyes    Seasonal allergic rhinitis    Hypermetropia    Astigmatism with presbyopia    Blepharitis of both eyes    Epiphora    Chronic otitis externa of left ear, unspecified type    Dysthymic disorder    Meibomian gland dysfunction    Chondromalacia of patella, left    Complex tear of medial meniscus of left knee as current injury, subsequent encounter    Pseudophakia, Yag Caps, of both eyes    Primary osteoarthritis of left knee    S/P left knee arthroscopy    Epiretinal membrane, mild, of left eye    Posterior capsular opacification visually significant of right eye    Dermatochalasis of both upper eyelids    Acute right ankle pain    Ankle stiffness, right       Allergies  Allergies   Allergen Reactions    Sulfa Antibiotics Swelling    Cats Swelling     Lips swollen    Wool Fiber        SUBJECTIVE    HPI:Cely Ontiveros is an 77 year old female who presents for possible ear infection. Symptoms include ear pain on right and plugged sensation on right. Onset 1 week, unchanged since that  is no rash.  There are no bruises or other signs of injury.    HEAD:  The head is atraumatic and normocephalic.  The anterior fontanel is open and flat.  EYES:  Opens both eyes equally.  Red reflexes not checked at delivery.  EARS:  Pinnae and external ear canals normal.  NOSE: There is no nasal flaring, nares patent bilaterally.  THROAT:  The oropharynx is normal.  There is no cleft of the palate.  NECK:  Clavicles without crepitus.  TRUNK AND THORAX:  There are no lesions on the trunk; there is no dimple over the presacral area.  There are no retractions.  LUNGS:  The lung fields are clear to auscultation.  HEART:  The precordium is quiet.  The heart rhythm is grossly regular.  There are no murmurs.  The femoral pulses are normal.  ABDOMEN:  The umbilical cord stump is normal.  3-vessel cord.  There is not an umbilical hernia.  The abdomen is flat and soft.   GENITALIA:  normal female genitalia   RECTAL:  Anus appears patent  EXTREMITIES:  Moving all 4 extremities.  The hip exam is normal.  There are no hip clicks or clunks.  Foot appears to move to midline without difficulty, bilaterally.  Overlapping toes bilaterally.   NEUROLOGIC:  she displays normal tone throughout.  She is not jittery and she has normal  reflexes.  Radha reflex present.    ASSESSMENT:  Well appearing 0 day old female infant.      PLAN:  Routine care.  To room in with mother.   time. Ear history: few episodes of otitis.    Patient is eating and drinking well.  No fever, diarrhea or vomiting.  No cough, or wheezing.    She is also complaining of some urinary frequency, and dysuria for the past several days.    ROS:    Review of Systems   Constitutional:  Negative for chills and fever.   HENT:  Negative for congestion, ear pain, rhinorrhea and sore throat.         Plugged Ears   Eyes: Negative.    Respiratory: Negative.  Negative for cough and shortness of breath.    Cardiovascular: Negative.  Negative for chest pain and palpitations.   Gastrointestinal:  Negative for diarrhea, nausea and vomiting.   Endocrine: Negative.    Genitourinary:  Positive for dysuria and frequency. Negative for hematuria, pelvic pain, urgency, vaginal bleeding and vaginal discharge.   Musculoskeletal: Negative.  Negative for arthralgias, back pain, joint swelling, myalgias, neck pain and neck stiffness.   Skin: Negative.  Negative for rash and wound.   Allergic/Immunologic: Negative.  Negative for immunocompromised state.   Neurological:  Negative for dizziness, weakness, light-headedness and headaches.        Family History   Family History   Problem Relation Age of Onset    Hypertension Mother     Arthritis Mother     Cardiovascular Mother     Circulatory Mother     Heart Disease Mother     Lipids Mother     Obesity Mother     Osteoporosis Mother     Cancer Mother         skin    Glaucoma Mother     Cancer - colorectal Father     Cancer Father     Macular Degeneration Father     Diabetes No family hx of     Cerebrovascular Disease No family hx of     Thyroid Disease No family hx of         Social History  Social History     Socioeconomic History    Marital status: Single     Spouse name: Not on file    Number of children: Not on file    Years of education: Not on file    Highest education level: Not on file   Occupational History     Employer: [employer]   Tobacco Use    Smoking status: Never    Smokeless  "tobacco: Never   Vaping Use    Vaping Use: Never used   Substance and Sexual Activity    Alcohol use: No    Drug use: No    Sexual activity: Not Currently   Other Topics Concern    Parent/sibling w/ CABG, MI or angioplasty before 65F 55M? No   Social History Narrative    Not on file     Social Determinants of Health     Financial Resource Strain: Not on file   Food Insecurity: Not on file   Transportation Needs: Not on file   Physical Activity: Not on file   Stress: Not on file   Social Connections: Not on file   Interpersonal Safety: Not on file   Housing Stability: Not on file        OBJECTIVE     Physical Exam:     Vital signs reviewed by Tapan Cortes PA-C  /72   Pulse 81   Temp 97.8  F (36.6  C) (Tympanic)   Resp 16   Ht 1.626 m (5' 4\")   Wt 64.6 kg (142 lb 6.4 oz)   LMP  (LMP Unknown)   SpO2 97%   BMI 24.44 kg/m       Physical Exam:    Physical Exam  Vitals and nursing note reviewed.   Constitutional:       General: She is not in acute distress.     Appearance: She is well-developed. She is not ill-appearing, toxic-appearing or diaphoretic.   HENT:      Head: Normocephalic and atraumatic.      Right Ear: Hearing, tympanic membrane, ear canal and external ear normal. No drainage, swelling or tenderness. There is impacted cerumen. Tympanic membrane is not perforated, erythematous, retracted or bulging.      Left Ear: Hearing, tympanic membrane, ear canal and external ear normal. No drainage, swelling or tenderness. There is no impacted cerumen. Tympanic membrane is not perforated, erythematous, retracted or bulging.      Nose: No mucosal edema, congestion or rhinorrhea.      Right Sinus: No maxillary sinus tenderness or frontal sinus tenderness.      Left Sinus: No maxillary sinus tenderness or frontal sinus tenderness.      Mouth/Throat:      Pharynx: No pharyngeal swelling, oropharyngeal exudate, posterior oropharyngeal erythema or uvula swelling.      Tonsils: No tonsillar exudate or " tonsillar abscesses. 0 on the right. 0 on the left.   Eyes:      General:         Right eye: No discharge.         Left eye: No discharge.      Pupils: Pupils are equal, round, and reactive to light.   Cardiovascular:      Rate and Rhythm: Normal rate and regular rhythm.      Heart sounds: Normal heart sounds. No murmur heard.     No friction rub. No gallop.   Pulmonary:      Effort: Pulmonary effort is normal. No respiratory distress.      Breath sounds: Normal breath sounds. No decreased breath sounds, wheezing, rhonchi or rales.   Chest:      Chest wall: No tenderness.   Abdominal:      General: Bowel sounds are normal. There is no distension.      Palpations: Abdomen is soft. There is no mass.      Tenderness: There is no abdominal tenderness. There is no guarding.   Musculoskeletal:      Cervical back: Normal range of motion and neck supple.   Lymphadenopathy:      Head:      Right side of head: No submental, submandibular, tonsillar, preauricular or posterior auricular adenopathy.      Left side of head: No submental, submandibular, tonsillar, preauricular or posterior auricular adenopathy.      Cervical: No cervical adenopathy.      Right cervical: No superficial or posterior cervical adenopathy.     Left cervical: No superficial or posterior cervical adenopathy.   Skin:     General: Skin is warm and dry.      Findings: No rash.   Neurological:      Mental Status: She is alert and oriented to person, place, and time.      Cranial Nerves: No cranial nerve deficit.      Deep Tendon Reflexes: Reflexes are normal and symmetric.   Psychiatric:         Behavior: Behavior normal. Behavior is cooperative.         Thought Content: Thought content normal.         Judgment: Judgment normal.            Tapan Cortes PA-C  10/26/2023, 10:44 AM

## 2023-10-27 LAB — BACTERIA UR CULT: ABNORMAL

## 2023-11-17 ENCOUNTER — OFFICE VISIT (OUTPATIENT)
Dept: URGENT CARE | Facility: URGENT CARE | Age: 78
End: 2023-11-17
Payer: COMMERCIAL

## 2023-11-17 VITALS
WEIGHT: 142.6 LBS | BODY MASS INDEX: 24.48 KG/M2 | RESPIRATION RATE: 12 BRPM | OXYGEN SATURATION: 100 % | HEART RATE: 83 BPM | DIASTOLIC BLOOD PRESSURE: 82 MMHG | TEMPERATURE: 97.7 F | SYSTOLIC BLOOD PRESSURE: 157 MMHG

## 2023-11-17 DIAGNOSIS — R21 RASH AND NONSPECIFIC SKIN ERUPTION: Primary | ICD-10-CM

## 2023-11-17 PROCEDURE — 99213 OFFICE O/P EST LOW 20 MIN: CPT | Performed by: PHYSICIAN ASSISTANT

## 2023-11-17 RX ORDER — FLUCONAZOLE 150 MG/1
150 TABLET ORAL ONCE
Qty: 1 TABLET | Refills: 0 | Status: SHIPPED | OUTPATIENT
Start: 2023-11-17 | End: 2023-11-17

## 2023-11-17 ASSESSMENT — ENCOUNTER SYMPTOMS
MUSCULOSKELETAL NEGATIVE: 1
CARDIOVASCULAR NEGATIVE: 1
CHILLS: 0
SORE THROAT: 0
WOUND: 0
NECK STIFFNESS: 0
ALLERGIC/IMMUNOLOGIC NEGATIVE: 1
VOMITING: 0
SHORTNESS OF BREATH: 0
DIZZINESS: 0
RESPIRATORY NEGATIVE: 1
MYALGIAS: 0
PALPITATIONS: 0
NAUSEA: 0
COUGH: 0
LIGHT-HEADEDNESS: 0
HEADACHES: 0
ARTHRALGIAS: 0
NECK PAIN: 0
FEVER: 0
DIARRHEA: 0
ENDOCRINE NEGATIVE: 1
JOINT SWELLING: 0
EYES NEGATIVE: 1
RHINORRHEA: 0
WEAKNESS: 0
BACK PAIN: 0

## 2023-11-17 NOTE — PROGRESS NOTES
Chief Complaint:    Chief Complaint   Patient presents with    Urgent Care    Derm Problem     Itching abdomen for couple days and worse today, some on back too, pretty sure it is yeast infection, ate a lot sugar today and think it make it worse      Medical Decision Making:    Vital signs reviewed by Tapan Cortes PA-C  BP (!) 157/82 (BP Location: Left arm, Patient Position: Sitting, Cuff Size: Adult Regular)   Pulse 83   Temp 97.7  F (36.5  C) (Tympanic)   Resp 12   Wt 64.7 kg (142 lb 9.6 oz)   LMP  (LMP Unknown)   SpO2 100%   BMI 24.48 kg/m       ASSESSMENT:     1. Rash and nonspecific skin eruption       PLAN:     Rx for Diflucan per patient request.  Patient instructed to follow up with PCP in 1 week if symptoms are not improving.  Sooner if symptoms worsen.  Worrisome symptoms discussed with instructions to go to the ED.  Patient verbalized understanding and agreed with this plan.    Labs:     No results found for any visits on 11/17/23.    Current Meds:    Current Outpatient Medications:     fluconazole (DIFLUCAN) 150 MG tablet, Take 1 tablet (150 mg) by mouth once for 1 dose, Disp: 1 tablet, Rfl: 0    atorvastatin (LIPITOR) 20 MG tablet, Take 1 tablet (20 mg) by mouth daily, Disp: 90 tablet, Rfl: 3    fluticasone (FLONASE) 50 MCG/ACT nasal spray, Spray 2 sprays in nostril, Disp: , Rfl:     loratadine (CLARITIN) 10 MG tablet, Take 1 tablet (10 mg) by mouth daily, Disp: 90 tablet, Rfl: 0    valACYclovir (VALTREX) 500 MG tablet, Take 1 tablet (500 mg) by mouth daily, Disp: 90 tablet, Rfl: 3    Current Facility-Administered Medications:     triamcinolone (KENALOG-40) injection 40 mg, 40 mg, , , Luis Le DO, 40 mg at 07/01/21 0946    triamcinolone (KENALOG-40) injection 40 mg, 40 mg, , , Luis Le, DO, 40 mg at 07/01/21 0946    Allergies:  Allergies   Allergen Reactions    Sulfa Antibiotics Swelling    Cats Swelling     Lips swollen    Wool Fiber        SUBJECTIVE    HPI: Cely Ontiveros  is an 77 year old female who presents for evaluation and treatment of rash.  She noticed an itchy rash roughly 3-4 days on the stomach and back.  She states that she has had a similar rash in the past and Diflucan helps to clear it up.  She denies any new soaps, lotions, detergents, foods, or medications.    ROS:      Review of Systems   Constitutional:  Negative for chills and fever.   HENT:  Negative for congestion, ear pain, rhinorrhea and sore throat.    Eyes: Negative.    Respiratory: Negative.  Negative for cough and shortness of breath.    Cardiovascular: Negative.  Negative for chest pain and palpitations.   Gastrointestinal:  Negative for diarrhea, nausea and vomiting.   Endocrine: Negative.    Genitourinary: Negative.    Musculoskeletal: Negative.  Negative for arthralgias, back pain, joint swelling, myalgias, neck pain and neck stiffness.   Skin:  Positive for rash. Negative for wound.   Allergic/Immunologic: Negative.  Negative for immunocompromised state.   Neurological:  Negative for dizziness, weakness, light-headedness and headaches.        Family History   Family History   Problem Relation Age of Onset    Hypertension Mother     Arthritis Mother     Cardiovascular Mother     Circulatory Mother     Heart Disease Mother     Lipids Mother     Obesity Mother     Osteoporosis Mother     Cancer Mother         skin    Glaucoma Mother     Cancer - colorectal Father     Cancer Father     Macular Degeneration Father     Diabetes No family hx of     Cerebrovascular Disease No family hx of     Thyroid Disease No family hx of        Social History  Social History     Socioeconomic History    Marital status: Single     Spouse name: Not on file    Number of children: Not on file    Years of education: Not on file    Highest education level: Not on file   Occupational History     Employer: [employer]   Tobacco Use    Smoking status: Never    Smokeless tobacco: Never   Vaping Use    Vaping Use: Never used    Substance and Sexual Activity    Alcohol use: No    Drug use: No    Sexual activity: Not Currently   Other Topics Concern    Parent/sibling w/ CABG, MI or angioplasty before 65F 55M? No   Social History Narrative    Not on file     Social Determinants of Health     Financial Resource Strain: Not on file   Food Insecurity: Not on file   Transportation Needs: Not on file   Physical Activity: Not on file   Stress: Not on file   Social Connections: Not on file   Interpersonal Safety: Not on file   Housing Stability: Not on file        Surgical History:  Past Surgical History:   Procedure Laterality Date    ARTHROSCOPY KNEE Left 9/20/2019    Procedure: Left knee arthroscopy, partial medial meniscectomy and chondral debridement;  Surgeon: Nic Singh MD;  Location: [facility]    BLEPHAROPLASTY Bilateral 11/28/2022    Procedure: bilateral upper lid blepharoplasty;  Surgeon: Diego Vela MD;  Location: [facility]    CATARACT IOL, RT/LT Left 01/24/2019    COMBINED CYSTOSCOPY, URETEROSCOPY, LASER HOLMIUM LITHOTRIPSY URETER(S) Right 8/23/2018    Procedure: COMBINED CYSTOSCOPY, URETEROSCOPY, LASER HOLMIUM LITHOTRIPSY URETER(S);   Cystoscopy,Right ureteroscopy with laser lithotripsy and stent placement;  Surgeon: Pino Hawk MD;  Location: [facility] OR    HC ENLARGE BREAST WITH IMPLANT      HC LAP,FULGURATE/EXCISE LESIONS      HC REMOVAL OF BREAST IMPLANT      HYSTERECTOMY, PAP NO LONGER INDICATED  1998    ovaries and uterus out for benign reasons(fibroids and ovarian cyst)    LASER YAG CAPSULOTOMY  01/2020; 2/2020    left eye; right eye    PHACOEMULSIFICATION WITH STANDARD INTRAOCULAR LENS IMPLANT Left 1/24/2019    Procedure: PHACOEMULSIFICATION WITH STANDARD INTRAOCULAR LENS IMPLANT, LEFT;  Surgeon: Wagner Aguilera MD;  Location: [facility]    PHACOEMULSIFICATION WITH STANDARD INTRAOCULAR LENS IMPLANT Right 2/14/2019    Procedure: PHACOEMULSIFICATION WITH STANDARD INTRAOCULAR LENS IMPLANT, RIGHT;  Surgeon: Ale  Wagner Richardson MD;  Location: [facility]    SINUS SURGERY          Problem List:  Patient Active Problem List   Diagnosis    Allergic rhinitis    Herpes simplex    CARDIOVASCULAR SCREENING; LDL GOAL LESS THAN 160    Degenerative joint disease    Posterior vitreous detachment of both eyes    Seasonal allergic rhinitis    Hypermetropia    Astigmatism with presbyopia    Blepharitis of both eyes    Epiphora    Chronic otitis externa of left ear, unspecified type    Dysthymic disorder    Meibomian gland dysfunction    Chondromalacia of patella, left    Complex tear of medial meniscus of left knee as current injury, subsequent encounter    Pseudophakia, Yag Caps, of both eyes    Primary osteoarthritis of left knee    S/P left knee arthroscopy    Epiretinal membrane, mild, of left eye    Posterior capsular opacification visually significant of right eye    Dermatochalasis of both upper eyelids    Acute right ankle pain    Ankle stiffness, right           OBJECTIVE:     Vital signs noted and reviewed by Tapan Cortes PA-C  BP (!) 157/82 (BP Location: Left arm, Patient Position: Sitting, Cuff Size: Adult Regular)   Pulse 83   Temp 97.7  F (36.5  C) (Tympanic)   Resp 12   Wt 64.7 kg (142 lb 9.6 oz)   LMP  (LMP Unknown)   SpO2 100%   BMI 24.48 kg/m       PEFR:    Physical Exam  Vitals and nursing note reviewed.   Constitutional:       General: She is not in acute distress.     Appearance: She is well-developed. She is not ill-appearing, toxic-appearing or diaphoretic.   HENT:      Head: Normocephalic and atraumatic.      Right Ear: Tympanic membrane and external ear normal. No drainage, swelling or tenderness. Tympanic membrane is not perforated, erythematous, retracted or bulging.      Left Ear: Tympanic membrane and external ear normal. No drainage, swelling or tenderness. Tympanic membrane is not perforated, erythematous, retracted or bulging.      Nose: No mucosal edema, congestion or rhinorrhea.      Right Sinus: No  maxillary sinus tenderness or frontal sinus tenderness.      Left Sinus: No maxillary sinus tenderness or frontal sinus tenderness.      Mouth/Throat:      Pharynx: No pharyngeal swelling, oropharyngeal exudate, posterior oropharyngeal erythema or uvula swelling.      Tonsils: No tonsillar abscesses.   Eyes:      Pupils: Pupils are equal, round, and reactive to light.   Neck:      Trachea: Trachea normal.   Cardiovascular:      Rate and Rhythm: Normal rate and regular rhythm.      Heart sounds: Normal heart sounds, S1 normal and S2 normal. No murmur heard.     No friction rub. No gallop.   Pulmonary:      Effort: Pulmonary effort is normal. No respiratory distress.      Breath sounds: Normal breath sounds. No decreased breath sounds, wheezing, rhonchi or rales.   Abdominal:      General: Bowel sounds are normal. There is no distension.      Palpations: Abdomen is soft. Abdomen is not rigid. There is no mass.      Tenderness: There is no abdominal tenderness. There is no guarding or rebound.   Musculoskeletal:      Cervical back: Full passive range of motion without pain, normal range of motion and neck supple.   Lymphadenopathy:      Cervical: No cervical adenopathy.   Skin:     General: Skin is warm and dry.      Comments: Patches of light colored erythema on the abdomen and less on the back that are sporadic in nature.     Neurological:      Mental Status: She is alert and oriented to person, place, and time.      Cranial Nerves: No cranial nerve deficit.      Deep Tendon Reflexes: Reflexes are normal and symmetric.   Psychiatric:         Behavior: Behavior normal. Behavior is cooperative.         Thought Content: Thought content normal.         Judgment: Judgment normal.             Tapan Cortes PA-C  11/17/2023, 5:41 PM

## 2023-11-28 ENCOUNTER — OFFICE VISIT (OUTPATIENT)
Dept: OPTOMETRY | Facility: CLINIC | Age: 78
End: 2023-11-28
Payer: COMMERCIAL

## 2023-11-28 DIAGNOSIS — H18.053: ICD-10-CM

## 2023-11-28 DIAGNOSIS — H52.03 HYPEROPIA OF BOTH EYES: ICD-10-CM

## 2023-11-28 DIAGNOSIS — Z96.1 PSEUDOPHAKIA OF BOTH EYES: ICD-10-CM

## 2023-11-28 DIAGNOSIS — Z98.890 HISTORY OF EYELID SURGERY: ICD-10-CM

## 2023-11-28 DIAGNOSIS — H52.4 ASTIGMATISM OF BOTH EYES WITH PRESBYOPIA: ICD-10-CM

## 2023-11-28 DIAGNOSIS — H02.889 MEIBOMIAN GLAND DYSFUNCTION: ICD-10-CM

## 2023-11-28 DIAGNOSIS — H52.203 ASTIGMATISM OF BOTH EYES WITH PRESBYOPIA: ICD-10-CM

## 2023-11-28 DIAGNOSIS — H04.123 DRY EYE SYNDROME OF BOTH EYES: ICD-10-CM

## 2023-11-28 DIAGNOSIS — H10.13 ALLERGIC CONJUNCTIVITIS OF BOTH EYES: ICD-10-CM

## 2023-11-28 DIAGNOSIS — Z01.00 EXAMINATION OF EYES AND VISION: Primary | ICD-10-CM

## 2023-11-28 DIAGNOSIS — H35.372 EPIRETINAL MEMBRANE (ERM) OF LEFT EYE: ICD-10-CM

## 2023-11-28 PROCEDURE — 92015 DETERMINE REFRACTIVE STATE: CPT | Performed by: OPTOMETRIST

## 2023-11-28 PROCEDURE — 92014 COMPRE OPH EXAM EST PT 1/>: CPT | Performed by: OPTOMETRIST

## 2023-11-28 ASSESSMENT — REFRACTION_MANIFEST
OD_AXIS: 017
OD_SPHERE: -1.00
OS_CYLINDER: +1.50
OS_SPHERE: -1.00
OD_SPHERE: -1.25
OD_CYLINDER: +1.75
OD_AXIS: 008
OD_ADD: +2.75
OS_SPHERE: -1.25
OS_CYLINDER: +1.25
OD_CYLINDER: +1.50
OS_AXIS: 180
OS_AXIS: 180
METHOD_AUTOREFRACTION: 1
OS_ADD: +2.75

## 2023-11-28 ASSESSMENT — REFRACTION_WEARINGRX
OS_ADD: +2.75
OS_CYLINDER: +1.25
SPECS_TYPE: PAL
OS_AXIS: 180
OD_CYLINDER: +1.50
OD_SPHERE: -1.25
OS_SPHERE: -1.00
OD_AXIS: 017
OD_ADD: +2.75

## 2023-11-28 ASSESSMENT — CONF VISUAL FIELD
OD_SUPERIOR_NASAL_RESTRICTION: 0
OD_INFERIOR_TEMPORAL_RESTRICTION: 0
OS_INFERIOR_NASAL_RESTRICTION: 0
OS_INFERIOR_TEMPORAL_RESTRICTION: 0
OD_NORMAL: 1
OS_NORMAL: 1
OS_SUPERIOR_NASAL_RESTRICTION: 0
OD_SUPERIOR_TEMPORAL_RESTRICTION: 0
OS_SUPERIOR_TEMPORAL_RESTRICTION: 0
OD_INFERIOR_NASAL_RESTRICTION: 0

## 2023-11-28 ASSESSMENT — CUP TO DISC RATIO
OS_RATIO: 0.3
OD_RATIO: 0.2

## 2023-11-28 ASSESSMENT — LEVATOR FUNCTION
OS_LEVATOR: 14
OD_LEVATOR: 14

## 2023-11-28 ASSESSMENT — VISUAL ACUITY
OD_CC: 20/50
OS_CC: 20/30
OD_SC+: -1
OS_CC: 20/40
CORRECTION_TYPE: GLASSES
OD_SC: 20/50
OD_CC: 20/25
OS_SC: 20/60
OD_CC+: -1
METHOD: SNELLEN - LINEAR

## 2023-11-28 ASSESSMENT — LAGOPHTHALMOS
OS_LAGOPHTHALMOS: 0
OD_LAGOPHTHALMOS: 0

## 2023-11-28 ASSESSMENT — TONOMETRY
IOP_METHOD: TONOPEN
OD_IOP_MMHG: 18
OS_IOP_MMHG: 20

## 2023-11-28 ASSESSMENT — SLIT LAMP EXAM - LIDS
COMMENTS: MEIBOMIAN GLAND DYSFUNCTION
COMMENTS: MEIBOMIAN GLAND DYSFUNCTION

## 2023-11-28 ASSESSMENT — KERATOMETRY
OS_AXISANGLE_DEGREES: 001
OS_K2POWER_DIOPTERS: 43.75
OD_AXISANGLE_DEGREES: 011
OS_K1POWER_DIOPTERS: 43.00
OD_K1POWER_DIOPTERS: 42.50
OS_AXISANGLE2_DEGREES: 091
OD_K2POWER_DIOPTERS: 44.00
OD_AXISANGLE2_DEGREES: 101

## 2023-11-28 ASSESSMENT — EXTERNAL EXAM - RIGHT EYE: OD_EXAM: NORMAL

## 2023-11-28 ASSESSMENT — MARGIN REFLEX DISTANCE
OS_MRD1: 2
OD_MRD1: 2

## 2023-11-28 ASSESSMENT — EXTERNAL EXAM - LEFT EYE: OS_EXAM: NORMAL

## 2023-11-28 NOTE — LETTER
11/28/2023         RE: Cely Ontiveros  [address]  [facility] [address]        Dear Colleague,    Thank you for referring your patient, Cely Ontiveros, to the [facility]. Please see a copy of my visit note below.    Chief Complaint   Patient presents with     Annual Eye Exam         Last Eye Exam: 9-1-2022  Dilated Previously: Yes    What are you currently using to see?  glasses       Distance Vision Acuity: Satisfied with vision    Near Vision Acuity: Satisfied with vision while reading  with glasses    Eye Comfort: good- uses wipes at night and in am to clean eyelids/lashes  Do you use eye drops? : Yes: pataday sometimes  Occupation or Hobbies: retired     History of cataract surgery with Dr. Ken Aguilera   Right eye-2-   Left eye-1-      Yag caps with Dr. Peter  Right eye-2/6/2020  Left eye-1/29/2020    BULB- 11/2/2022- Dr. Deigo Cantu Optometric Assistant, A.B.O.C.          Medical, surgical and family histories reviewed and updated 11/28/2023.       OBJECTIVE: See Ophthalmology exam    ASSESSMENT:    ICD-10-CM    1. Examination of eyes and vision  Z01.00 EYE EXAM (SIMPLE-NONBILLABLE)      2. Hyperopia of both eyes  H52.03 REFRACTION      3. Astigmatism of both eyes with presbyopia  H52.203 REFRACTION    H52.4       4. Pseudophakia, Yag Caps, of both eyes  Z96.1 EYE EXAM (SIMPLE-NONBILLABLE)      5. Meibomian gland dysfunction  H02.889 EYE EXAM (SIMPLE-NONBILLABLE)      6. Dry eye syndrome of both eyes  H04.123 EYE EXAM (SIMPLE-NONBILLABLE)      7. Epiretinal membrane (ERM) of left eye  H35.372 EYE EXAM (SIMPLE-NONBILLABLE)      8. Allergic conjunctivitis of both eyes  H10.13 EYE EXAM (SIMPLE-NONBILLABLE)      9. History of eyelid surgery  Z98.890 EYE EXAM (SIMPLE-NONBILLABLE)      10. Corneal pigmentation, posterior, bilateral  H18.053           PLAN:     Patient Instructions   Eyeglass prescription given.  No  change in eyeglass prescription.    Heat to the eyes daily for 10-15 minutes nightly with warm washcloth or reusable gel masks from the pharmacy or  Pulsity heat masks can be purchased at [employer].     Ocusoft Hypochlor- spray solution onto cotton pad.  Close eyes and gently apply to eyelids and eyelashes using side to side motion.  Use morning and evening.     Artificial tears- 1 drop both eyes 2-4 x day.    You have an epiretinal membrane.  As we grow older, the thick vitreous gel in the middle of our eyes begins to shrink and pull away from the macula. As the vitreous pulls away, scar tissue may develop on the macula. Sometimes the scar tissue can warp and contract, causing the retina to wrinkle or become swollen or distorted.  Monitor unless any new changes in vision.     Return in 1 year for a complete eye exam or sooner if needed.     Brandt Richardson, OD            Again, thank you for allowing me to participate in the care of your patient.        Sincerely,        Brandt Richardson, OD

## 2023-11-28 NOTE — PATIENT INSTRUCTIONS
Eyeglass prescription given.  No change in eyeglass prescription.    Heat to the eyes daily for 10-15 minutes nightly with warm washcloth or reusable gel masks from the pharmacy or  PenteoSurround heat masks can be purchased at [employer]     Continue cleansing eyelids at night and in am with wipes.     Artificial tears- 1 drop both eyes 2-4 x day.    You have an epiretinal membrane.  As we grow older, the thick vitreous gel in the middle of our eyes begins to shrink and pull away from the macula. As the vitreous pulls away, scar tissue may develop on the macula. Sometimes the scar tissue can warp and contract, causing the retina to wrinkle or become swollen or distorted.  Monitor unless any new changes in vision.     Return in 1 year for a complete eye exam or sooner if needed.     Brandt Richardson, OD    The affects of the dilating drops last for 4- 6 hours.  You will be more sensitive to light and vision will be blurry up close.  Do not drive if you do not feel comfortable.  Mydriatic sunglasses were given if needed.      Optometry Providers       Clinic Locations                                 Telephone Number   Dr. Daysi Catalan   [facility] [phone]     [facility] Hours:                [facility] Hours:       [facility] Hours:   [address] [facility] [address]   [address]     [address] [place] [address]   [place] [address]    [place] [address]  Phone: [phone]                    Phone: [phone]     Phone: [phone]                      Monday 8:00-6:00                          Monday 8:00-6:00                          Monday 8:00-6:00              Tuesday 8:00-6:00                          Tuesday 8:00-6:00                          Tuesday 8:00-6:00              Wednesday 8:00-6:00                  Wednesday  8:00-6:00                   Wednesday 8:00-6:00      Thursday 8:00-6:00                        Thursday 8:00-6:00                         Thursday 8:00-6:00 Friday 8:00-5:00 Friday 8:00-5:00 Friday 8:00-5:00    [facility] Hours:   [address] [place] [address]  [phone]    Monday 9:00-6:00 Tuesday 9:00-6:00 Wednesday 9:00-6:00 Thursday 9:00-6:00 Friday 9:00-5:00  As always, Thank you for trusting us with your health care needs!  There is a combination of three treatments which can greatly improve symptoms of dry eyes.     Artificial tears  Heat (eyes closed)  Eyelid and eyelash cleansing (eyes closed)     Use one drop of artificial tears both eyes 4 x daily.  Once in the morning, lunch, dinner and bedtime. Continue to use the drops regardless if your eyes are comfortable or not.  Artificial tears work best as a preventative and not as well after your eyes are starting to bother you.  It may take 4- 6 weeks of using the drops before you notice improvement.  If after that time you are still having problems schedule an appointment for an evaluation and discussion of different treatments such as Restasis or Xiidra.  Dry eyes are a chronic condition and you may have more symptoms at certain times of the year.    Excess tearing can be due to the right tears not working properly or a blockage in the tear drainage system.  You can try using artificial tears 1 drop both eyes 4 x day.  If the excess tearing is bothersome after 4-6 weeks of treatment then we can send you for further testing.  This would entail a referral to our oculoplastic specialist Dr. Diego Vela at the [facility]-[phone].    Recommended brands are:    Systane Complete  Systane Ultra  Systane Balance  Refresh Advanced Optive  Refresh Relieva  Blink    Recommended brands for contact lens wearers are:    Systane contacts  Refresh contacts  Blink  contacts    If you are using drops more than 4 x day or have sensitivities to preservatives I recommend non preserved artificial tears.  These come in 1 use vials.  They can be used every 1-2 hours.  Do not reuse the vials.    Recommended brands are:    Refresh Optive Mehrdad-3  Systane- preservative free  Refresh-  preservative free  Blink- preservative free    Gels or ointment can be used at night.    Recommended brands are:    Systane Gel  Refresh Gel  Blink Gel  Genteal Gel    Systane night time (ointment)  Refresh Celluvisc  Refresh PM (ointment)    Optase dry eye spray.  Spray to eyelids 3-4 x daily.  This can be purchased on [employer].      Visine, Clear Eyes or Murine (drops that get the red out) can irritate the eyes and cause a rebound effect where the eyes become more red and you end up using more drops.  Avoid drops containing tetrahydrozoline, naphazoline, phenylephrine, oxymetazoline.      OTC Lumify is a newer product that gives immediate redness relief without the rebound effect.  Use as needed to take the redness out.    Artificial tears may be used with other drops (such as allergy, glaucoma, antibiotics) around the same time.  Be sure to wait 5 minutes in between drops.    Heat to the eyelids can also improve your symptoms of dry eyes.  Corinne heat masks can be purchased at [employer] to be used nightly for 10-15 minutes.  Other options are gel masks that can be put in the microwave and purchased at most pharmacies.      Tea Tree Oil eyelid cleansers recommended are Ocusoft Oust foam cleanser to cleanse eyelids/lashes at night and in the am. Other options are Blephadex or Cliradex eyelid wipes.  KEEP EYES CLOSED when using these products.  These can be purchased on amazon.com   A good product for make up remover with tea tree oil is WeLoveEyes.  This can be found at www.[employer] or Ium.    Other good eyelid cleansers have hypochlorous which removes excess bacteria and is safe around the eyes.  Products are Avenova, Ocusoft Hypochlor or Heyedrate. Spray solution onto cotton pad, close eyes and gently apply to eyelids and eyelashes using side to side motion.  You can also KEEP EYES CLOSED spray and rub into eyelashes.  You do not need to rinse it off. Use morning and evening. These products can be found on [employer].  You can check with your local pharmacy and see if they can order if for you if they don't have it.    Other brands of eyelid cleansing wipes are:    Ocusoft wipes  Systane wipes    A great eye make up line is [email or web address]

## 2023-11-28 NOTE — PROGRESS NOTES
Chief Complaint   Patient presents with    Annual Eye Exam         Last Eye Exam: 9-1-2022  Dilated Previously: Yes    What are you currently using to see?  glasses       Distance Vision Acuity: Satisfied with vision    Near Vision Acuity: Satisfied with vision while reading  with glasses    Eye Comfort: good- uses wipes at night and in am to clean eyelids/lashes  Do you use eye drops? : Yes: pataday sometimes  Occupation or Hobbies: retired     History of cataract surgery with Dr. Ken Aguilera   Right eye-2-   Left eye-1-      Yag caps with Dr. Peter  Right eye-2/6/2020  Left eye-1/29/2020    BULB- 11/2/2022- Dr. Diego Cantu Optometric Assistant, A.B.O.C.          Medical, surgical and family histories reviewed and updated 11/28/2023.       OBJECTIVE: See Ophthalmology exam    ASSESSMENT:    ICD-10-CM    1. Examination of eyes and vision  Z01.00 EYE EXAM (SIMPLE-NONBILLABLE)      2. Hyperopia of both eyes  H52.03 REFRACTION      3. Astigmatism of both eyes with presbyopia  H52.203 REFRACTION    H52.4       4. Pseudophakia, Yag Caps, of both eyes  Z96.1 EYE EXAM (SIMPLE-NONBILLABLE)      5. Meibomian gland dysfunction  H02.889 EYE EXAM (SIMPLE-NONBILLABLE)      6. Dry eye syndrome of both eyes  H04.123 EYE EXAM (SIMPLE-NONBILLABLE)      7. Epiretinal membrane (ERM) of left eye  H35.372 EYE EXAM (SIMPLE-NONBILLABLE)      8. Allergic conjunctivitis of both eyes  H10.13 EYE EXAM (SIMPLE-NONBILLABLE)      9. History of eyelid surgery  Z98.890 EYE EXAM (SIMPLE-NONBILLABLE)      10. Corneal pigmentation, posterior, bilateral  H18.053           PLAN:     Patient Instructions   Eyeglass prescription given.  No change in eyeglass prescription.    Heat to the eyes daily for 10-15 minutes nightly with warm washcloth or reusable gel masks from the pharmacy or  Cognea heat masks can be purchased at [employer].     Ocusoft Hypochlor- spray solution onto cotton pad.  Close eyes  and gently apply to eyelids and eyelashes using side to side motion.  Use morning and evening.     Artificial tears- 1 drop both eyes 2-4 x day.    You have an epiretinal membrane.  As we grow older, the thick vitreous gel in the middle of our eyes begins to shrink and pull away from the macula. As the vitreous pulls away, scar tissue may develop on the macula. Sometimes the scar tissue can warp and contract, causing the retina to wrinkle or become swollen or distorted.  Monitor unless any new changes in vision.     Return in 1 year for a complete eye exam or sooner if needed.     Brandt Richardson, OD

## 2024-01-24 ENCOUNTER — OFFICE VISIT (OUTPATIENT)
Dept: URGENT CARE | Facility: URGENT CARE | Age: 79
End: 2024-01-24
Payer: COMMERCIAL

## 2024-01-24 VITALS
RESPIRATION RATE: 16 BRPM | HEIGHT: 64 IN | OXYGEN SATURATION: 97 % | WEIGHT: 139 LBS | SYSTOLIC BLOOD PRESSURE: 158 MMHG | DIASTOLIC BLOOD PRESSURE: 76 MMHG | BODY MASS INDEX: 23.73 KG/M2 | HEART RATE: 78 BPM | TEMPERATURE: 97.1 F

## 2024-01-24 DIAGNOSIS — R30.0 DYSURIA: ICD-10-CM

## 2024-01-24 DIAGNOSIS — B37.2 YEAST INFECTION OF THE SKIN: ICD-10-CM

## 2024-01-24 DIAGNOSIS — M79.671 RIGHT FOOT PAIN: ICD-10-CM

## 2024-01-24 DIAGNOSIS — N30.01 ACUTE CYSTITIS WITH HEMATURIA: Primary | ICD-10-CM

## 2024-01-24 DIAGNOSIS — R32 BLADDER LEAK: ICD-10-CM

## 2024-01-24 LAB
ALBUMIN UR-MCNC: NEGATIVE MG/DL
APPEARANCE UR: CLEAR
BACTERIA #/AREA URNS HPF: ABNORMAL /HPF
BILIRUB UR QL STRIP: NEGATIVE
COLOR UR AUTO: YELLOW
GLUCOSE UR STRIP-MCNC: NEGATIVE MG/DL
HGB UR QL STRIP: ABNORMAL
KETONES UR STRIP-MCNC: NEGATIVE MG/DL
LEUKOCYTE ESTERASE UR QL STRIP: ABNORMAL
NITRATE UR QL: NEGATIVE
PH UR STRIP: 7 [PH] (ref 5–7)
RBC #/AREA URNS AUTO: ABNORMAL /HPF
SP GR UR STRIP: 1.01 (ref 1–1.03)
SQUAMOUS #/AREA URNS AUTO: ABNORMAL /LPF
UROBILINOGEN UR STRIP-ACNC: 0.2 E.U./DL
WBC #/AREA URNS AUTO: ABNORMAL /HPF

## 2024-01-24 PROCEDURE — 87186 SC STD MICRODIL/AGAR DIL: CPT | Performed by: NURSE PRACTITIONER

## 2024-01-24 PROCEDURE — 99214 OFFICE O/P EST MOD 30 MIN: CPT | Performed by: NURSE PRACTITIONER

## 2024-01-24 PROCEDURE — 81001 URINALYSIS AUTO W/SCOPE: CPT | Performed by: NURSE PRACTITIONER

## 2024-01-24 PROCEDURE — 87086 URINE CULTURE/COLONY COUNT: CPT | Performed by: NURSE PRACTITIONER

## 2024-01-24 RX ORDER — NITROFURANTOIN 25; 75 MG/1; MG/1
100 CAPSULE ORAL 2 TIMES DAILY
Qty: 10 CAPSULE | Refills: 0 | Status: SHIPPED | OUTPATIENT
Start: 2024-01-24 | End: 2024-01-29

## 2024-01-24 RX ORDER — FLUCONAZOLE 150 MG/1
150 TABLET ORAL
Qty: 3 TABLET | Refills: 0 | Status: SHIPPED | OUTPATIENT
Start: 2024-01-24 | End: 2024-01-31

## 2024-01-24 NOTE — PROGRESS NOTES
"SUBJECTIVE:   Cely Ontiveros is a 78 year old female who  presents today for a possible UTI. Symptoms of dysuria and frequency have been going on for 2day(s).  Hematuria no.  sudden onsetand mild.  There is no history of fever, chills, nausea or vomiting.  No history of vaginal discharge. This patient does have a history of urinary tract infections. Patient denies flank pain and temperature > 101 degrees F.     Past Medical History:   Diagnosis Date    Actinic keratosis     Allergic rhinitis     Cataract     Degenerative joint disease     cervical disease    Depression with anxiety     Herpes simplex     Hypoglycemia     eats small meals and is fine    Impingement syndrome, shoulder     Trimalleolar fracture of ankle, closed 02/08/2023     Current Outpatient Medications   Medication Sig Dispense Refill    atorvastatin (LIPITOR) 20 MG tablet Take 1 tablet (20 mg) by mouth daily 90 tablet 3    fluticasone (FLONASE) 50 MCG/ACT nasal spray Spray 2 sprays in nostril      loratadine (CLARITIN) 10 MG tablet Take 1 tablet (10 mg) by mouth daily 90 tablet 0    valACYclovir (VALTREX) 500 MG tablet Take 1 tablet (500 mg) by mouth daily 90 tablet 3     Social History     Tobacco Use    Smoking status: Never    Smokeless tobacco: Never   Substance Use Topics    Alcohol use: No       OBJECTIVE:  BP (!) 158/76 (BP Location: Left arm, Patient Position: Sitting, Cuff Size: Adult Regular)   Pulse 78   Temp 97.1  F (36.2  C) (Tympanic)   Resp 16   Ht 1.625 m (5' 3.98\")   Wt 63 kg (139 lb)   LMP  (LMP Unknown)   SpO2 97%   BMI 23.87 kg/m    GENERAL APPEARANCE: healthy, alert and no distress  RESP: Normal respirations  CV: adequately perfused.  ABDOMEN:  no abdominal pain  BACK: No CVA tenderness  SKIN: no suspicious lesions or rashes    UA +leukesterase, trace blood  UC: pending  Review of last 2 BMP's: kidney functions ok for macrobid.    Pt also requesting refill for orthotic prescription. Advised she needs podiatry or her " primary provider for this.  Also noted bladder leakage, states she had pelvic PT in the past which was helpful, wonders if she can try this again. Will send referral, and information on Kegels.   Pt also has gotten severe yeast infections recently from antibiotics, will send for Diflucan in the event she gets a yeast infection from Macrobid.    ASSESSMENT:   1. Acute cystitis with hematuria    - nitroFURantoin macrocrystal-monohydrate (MACROBID) 100 MG capsule; Take 1 capsule (100 mg) by mouth 2 times daily for 5 days  Dispense: 10 capsule; Refill: 0  - Physical Therapy Referral; Future    2. Bladder leak    - Physical Therapy Referral; Future    3. Dysuria    - UA Macroscopic with reflex to Microscopic and Culture - Lab Collect; Future  - UA Macroscopic with reflex to Microscopic and Culture - Lab Collect  - UA Microscopic with Reflex to Culture    4. Yeast infection of the skin    - fluconazole (DIFLUCAN) 150 MG tablet; Take 1 tablet (150 mg) by mouth every 3 days for 3 doses  Dispense: 3 tablet; Refill: 0    5. Right foot pain    - Orthopedic  Referral; Future    PLAN:  1. Increase fluid intake  2. Complete antibiotic regimen as prescribed. You will be notified if the treatment plan needs to be changed based on the urine culture results.   3. Follow if you have a fever of 100.4 F (38 C) or higher, no improvement after three days of treatment, trouble urinating because of pain,new or increased discharge from the vagina, rash or joint pain, Increased back or abdominal pain.    Follow up with PT for pelvic floor exercises.  Follow up with podiatry for orthotics.

## 2024-01-24 NOTE — PATIENT INSTRUCTIONS
1. Increase fluid intake  2. Complete antibiotic regimen as prescribed. You will be notified if the treatment plan needs to be changed based on the urine culture results.   3. Follow if you have a fever of 100.4 F (38 C) or higher, no improvement after three days of treatment, trouble urinating because of pain,new or increased discharge from the vagina, rash or joint pain, Increased back or abdominal pain.    Follow up with PT for pelvic floor exercises.  Follow up with podiatry for orthotics.

## 2024-01-25 LAB — BACTERIA UR CULT: ABNORMAL

## 2024-02-05 ENCOUNTER — MYC MEDICAL ADVICE (OUTPATIENT)
Dept: UROLOGY | Facility: CLINIC | Age: 79
End: 2024-02-05

## 2024-02-05 ENCOUNTER — TELEPHONE (OUTPATIENT)
Dept: UROLOGY | Facility: CLINIC | Age: 79
End: 2024-02-05

## 2024-02-05 ENCOUNTER — OFFICE VISIT (OUTPATIENT)
Dept: URGENT CARE | Facility: URGENT CARE | Age: 79
End: 2024-02-05
Payer: COMMERCIAL

## 2024-02-05 VITALS
BODY MASS INDEX: 24.24 KG/M2 | RESPIRATION RATE: 20 BRPM | WEIGHT: 142 LBS | TEMPERATURE: 96.9 F | HEART RATE: 74 BPM | DIASTOLIC BLOOD PRESSURE: 77 MMHG | SYSTOLIC BLOOD PRESSURE: 154 MMHG | HEIGHT: 64 IN | OXYGEN SATURATION: 97 %

## 2024-02-05 DIAGNOSIS — N39.0 RECURRENT UTI: Primary | ICD-10-CM

## 2024-02-05 LAB
ALBUMIN UR-MCNC: NEGATIVE MG/DL
APPEARANCE UR: CLEAR
BACTERIA #/AREA URNS HPF: ABNORMAL /HPF
BILIRUB UR QL STRIP: NEGATIVE
CLUE CELLS: ABNORMAL
COLOR UR AUTO: YELLOW
GLUCOSE UR STRIP-MCNC: NEGATIVE MG/DL
HGB UR QL STRIP: ABNORMAL
KETONES UR STRIP-MCNC: NEGATIVE MG/DL
LEUKOCYTE ESTERASE UR QL STRIP: ABNORMAL
NITRATE UR QL: NEGATIVE
PH UR STRIP: 7.5 [PH] (ref 5–7)
RBC #/AREA URNS AUTO: ABNORMAL /HPF
SP GR UR STRIP: 1.01 (ref 1–1.03)
SQUAMOUS #/AREA URNS AUTO: ABNORMAL /LPF
TRICHOMONAS, WET PREP: ABNORMAL
UROBILINOGEN UR STRIP-ACNC: 0.2 E.U./DL
WBC #/AREA URNS AUTO: ABNORMAL /HPF
WBC CLUMPS #/AREA URNS HPF: PRESENT /HPF
WBC'S/HIGH POWER FIELD, WET PREP: ABNORMAL
YEAST, WET PREP: ABNORMAL

## 2024-02-05 PROCEDURE — 87210 SMEAR WET MOUNT SALINE/INK: CPT | Performed by: EMERGENCY MEDICINE

## 2024-02-05 PROCEDURE — 87086 URINE CULTURE/COLONY COUNT: CPT | Performed by: EMERGENCY MEDICINE

## 2024-02-05 PROCEDURE — 87088 URINE BACTERIA CULTURE: CPT | Performed by: EMERGENCY MEDICINE

## 2024-02-05 PROCEDURE — 87186 SC STD MICRODIL/AGAR DIL: CPT | Performed by: EMERGENCY MEDICINE

## 2024-02-05 PROCEDURE — 99213 OFFICE O/P EST LOW 20 MIN: CPT | Performed by: EMERGENCY MEDICINE

## 2024-02-05 PROCEDURE — 81001 URINALYSIS AUTO W/SCOPE: CPT | Performed by: EMERGENCY MEDICINE

## 2024-02-05 RX ORDER — CIPROFLOXACIN 500 MG/1
500 TABLET, FILM COATED ORAL 2 TIMES DAILY
Qty: 10 TABLET | Refills: 0 | Status: SHIPPED | OUTPATIENT
Start: 2024-02-05 | End: 2024-02-10

## 2024-02-05 RX ORDER — FLUCONAZOLE 150 MG/1
150 TABLET ORAL ONCE
Qty: 1 TABLET | Refills: 0 | Status: SHIPPED | OUTPATIENT
Start: 2024-02-05 | End: 2024-02-05

## 2024-02-05 ASSESSMENT — PAIN SCALES - GENERAL: PAINLEVEL: NO PAIN (0)

## 2024-02-05 NOTE — PROGRESS NOTES
Assessment & Plan     Diagnosis:    ICD-10-CM    1. Recurrent UTI  N39.0 UA with Microscopic reflex to Culture - lab collect     Wet prep - lab collect     UA with Microscopic reflex to Culture - lab collect     Wet prep - lab collect     UA Microscopic with Reflex to Culture     Urine Culture     ciprofloxacin (CIPRO) 500 MG tablet     fluconazole (DIFLUCAN) 150 MG tablet     Adult Urology [facility] Referral          Medical Decision Making:  Cely Ontiveros is a 78 year old female who presents to clinic today for evaluation of urinary frequency, urgency and dysuria.     This clinically is consistent with a urinary tract infection.  Urinalysis confirms the infection.  Patient has not had multiple UTIs over the last few months, would like to see a urologist as she has seen her in the past. There has been no fever, confusion, flank pain or significant abdominal pain.  There is no clinical evidence of pyelonephritis, appendicitis, colitis, diverticulitis or any intraabdominal catastrophe. The patient will be started on antibiotics for the infection. Go to the ER if increasing pain, vomiting, fever, or inability to tolerate the oral antibiotic.  Follow up with primary physician is indicated if not improving in 2-3 days.       Nishant Frost PA-C  [facility]    Subjective     Cely Ontiveros is a 78 year old female who presents to clinic today for the following health issues:  Chief Complaint   Patient presents with    UTI     Going more often been here multi times    Pelvic Pain         HPI  Patient states that for the past 24 hours she has experienced a burning sensation, and frequency and urgency of urination. This has gotten worse over time. They note that they have mild lower abdominal pain. Patient denies any flank or back pain, fever, nausea, vomiting, diarrhea, inability to urinate, vaginal discharge/bleeding. Has had multiple UTIs in the past few months; last treated a few weeks ago with  "Macrobid.     Review of Systems    See HPI    Objective      Vitals:    Patient Vitals for the past 24 hrs:   BP Temp Temp src Pulse Resp SpO2 Height Weight   02/05/24 1016 (!) 154/77 96.9  F (36.1  C) Tympanic 74 20 97 % 1.619 m (5' 3.75\") 64.4 kg (142 lb)         Vital signs reviewed by: Nishant Frost PA-C    Physical Exam   Constitutional: The patient is oriented to person, place, and time. Alert and cooperative. No acute distress.  Mouth: Mucous membranes are moist.  Cardiovascular: Regular rate and rhythm.  Pulmonary/Chest: Effort normal. No respiratory distress.   GI: Abdomen with mild suprapubic tenderness to palpation. No rebound or guarding. No McBurney point tenderness.   MSK: No CVA tenderness bilaterally.  Neurological: Alert and oriented x3.     Labs/Imaging:    Results for orders placed or performed in visit on 02/05/24   UA with Microscopic reflex to Culture - lab collect     Status: Abnormal    Specimen: Urine, Midstream   Result Value Ref Range    Color Urine Yellow Colorless, Straw, Light Yellow, Yellow    Appearance Urine Clear Clear    Glucose Urine Negative Negative mg/dL    Bilirubin Urine Negative Negative    Ketones Urine Negative Negative mg/dL    Specific Gravity Urine 1.015 1.003 - 1.035    Blood Urine Small (A) Negative    pH Urine 7.5 (H) 5.0 - 7.0    Protein Albumin Urine Negative Negative mg/dL    Urobilinogen Urine 0.2 0.2, 1.0 E.U./dL    Nitrite Urine Negative Negative    Leukocyte Esterase Urine Large (A) Negative   UA Microscopic with Reflex to Culture     Status: Abnormal   Result Value Ref Range    Bacteria Urine Moderate (A) None Seen /HPF    RBC Urine 2-5 (A) 0-2 /HPF /HPF    WBC Urine 10-25 (A) 0-5 /HPF /HPF    Squamous Epithelials Urine None Seen None Seen /LPF    WBC Clumps Urine Present (A) None Seen /HPF   Wet prep - lab collect     Status: Abnormal    Specimen: Vagina; Swab   Result Value Ref Range    Trichomonas Absent Absent    Yeast Absent Absent    Clue Cells Absent " Absent    WBCs/high power field 3+ (A) None         Nishant Frost PA-C, February 5, 2024

## 2024-02-05 NOTE — PATIENT INSTRUCTIONS
Ciprofloxacin can make you susceptible to tendon injuries. No vigorous activities or heavy lifting while taking this antibiotic. Take the Diflucan at the end of the antibiotic course to treat yeast infection if this develops.

## 2024-02-05 NOTE — TELEPHONE ENCOUNTER
[facility] Call Center    Phone Message    May a detailed message be left on voicemail: yes     Reason for Call: Other: Pt is scheduled 04/08 with ignacia for recurrent UTI. Pt would like to speak to a nurse about her prescribed medication,Cipro, that she was told by her pcp to take for recurrent uti. She would like to get guidance from a urology nurse instead. Please advise. Thank you.     Action Taken: Message routed to:  Other: uro    Travel Screening: Not Applicable

## 2024-02-05 NOTE — TELEPHONE ENCOUNTER
Reason for Call: Other: Pt is scheduled 04/08 with ignacia for recurrent UTI. Pt would like to speak to a nurse about her prescribed medication,Cipro, that she was told by her pcp to take for recurrent uti. She would like to get guidance from a urology nurse instead. Please advise. Thank you.      Called pt to discuss care.  We unfortunately cannot advise as we have not seen her since 2018.  She needs to re-establish care before we can assist.    No answer, unable to leave a message as mailbox full.    Sending eFanst message.    Janet Pena, RN

## 2024-02-06 LAB
BACTERIA UR CULT: ABNORMAL
BACTERIA UR CULT: ABNORMAL

## 2024-02-08 ENCOUNTER — THERAPY VISIT (OUTPATIENT)
Dept: PHYSICAL THERAPY | Facility: CLINIC | Age: 79
End: 2024-02-08
Attending: NURSE PRACTITIONER
Payer: COMMERCIAL

## 2024-02-08 DIAGNOSIS — R32 URINARY INCONTINENCE, UNSPECIFIED TYPE: Primary | ICD-10-CM

## 2024-02-08 DIAGNOSIS — R32 BLADDER LEAK: ICD-10-CM

## 2024-02-08 DIAGNOSIS — N30.01 ACUTE CYSTITIS WITH HEMATURIA: ICD-10-CM

## 2024-02-08 PROCEDURE — 97110 THERAPEUTIC EXERCISES: CPT | Mod: 59 | Performed by: PHYSICAL THERAPIST

## 2024-02-08 PROCEDURE — 97530 THERAPEUTIC ACTIVITIES: CPT | Mod: GP | Performed by: PHYSICAL THERAPIST

## 2024-02-08 PROCEDURE — 97161 PT EVAL LOW COMPLEX 20 MIN: CPT | Mod: GP | Performed by: PHYSICAL THERAPIST

## 2024-02-08 NOTE — PROGRESS NOTES
PHYSICAL THERAPY EVALUATION  Type of Visit: Evaluation    See electronic medical record for Abuse and Falls Screening details.    Subjective       Presenting condition or subjective complaint: Patient reports a history of urinary incontinence in 2018-19, had pelvic floor PT which was very helpful. She fractured her right ankle in 2/2023, stopped her kegels and noted incontinence in the fall of 2023. She reports having 3 urinary tract infections  in 10/2023- 2/2024 with difficulty clearing up the infections. She is currently on a second antibiotic for the 3rd infection- feeling much better.  Currently, she c/o leakage several times daily related to rising from a chair after sitting for 45'-1 hr. She will feel  a strong urge to urinate and leak a small amount on way to toilet.  Date of onset: 01/24/24 (MD order for PT)    Relevant medical history:     Dates & types of surgery: hysterectomy 1998, kidney stones 2008    Prior diagnostic imaging/testing results:       Prior therapy history for the same diagnosis, illness or injury: Yes 2019    Prior Level of Function  Transfers:   Ambulation:   ADL:   IADL:     Living Environment  Social support: With family members   Type of home:     Stairs to enter the home: Yes   Is there a railing: Yes   Ramp:     Stairs inside the home: Yes   Is there a railing: Yes   Help at home:    Equipment owned:       Employment: No retired  Hobbies/Interests:      Patient goals for therapy:      Pain assessment: none     Objective      PELVIC EVALUATION  ADDITIONAL HISTORY:  Sex assigned at birth: Female  Gender identity: Female    Pronouns: She/Her Hers      Bladder History:  Feels bladder filling: Yes  Triggers for feeling of inability to wait to go to the bathroom: No    How long can you wait to urinate:    Gets up at night to urinate: Yes 2  Can stop the flow of urine when urinating: -- (unsure)  Volume of urine usually released: Medium   Other issues: Dribbling after urinating; Bladder  infections  Number of bladder infections in last 12 months: 3  Fluid intake per day: 40 oz none    Medications taken for bladder: Yes antibiotic currently   Activities causing urine leak: Hurrying to the bathroom due to a strong urge to urinate (pee) sit to stand ( after sitting for 1 hr  or longer)  Amount of urine typically leaked: small  Pads used to help with leaking: Yes I use this many pads per day: 1 minipad      Bowel History:  Frequency of bowel movement: daily  Consistency of stool: Soft-formed    Ignores the urge to defecate: No  Other bowel issues:    Length of time spent trying to have a bowel movement:      Sexual Function History:  Sexual orientation:      Sexually active: No  Lubrication used:      Pelvic pain:      Pain or difficulty with orgasms/erection/ejaculation:      State of menopause:    Hormone medications:        Number of previous pregnancies: 2, Number of deliveries: 2, If you have delivered before, did you have any of these issues during delivery: Vaginal delivery, Have you been diagnosed with pelvic prolapse or abdominal separation: No, Do you get regular exercise: Yes, Have you tried pelvic floor strengthening exercises for 4 weeks: No    Discussed reason for referral regarding pelvic health needs and external/internal pelvic floor muscle examination with patient/guardian.  Opportunity provided to ask questions and verbal consent for assessment and intervention was given.    PAIN: none  POSTURE:   LUMBAR SCREEN:   HIP SCREEN:  Strength:    Functional Strength Testing:     PELVIC/SI SCREEN:     PAIN PROVOCATION TEST:   PELVIS/SI SPECIAL TESTS:   BREATHING SYMMETRY:     PELVIC EXAM  External Visual Inspection:  At rest: Normal  With voluntary pelvic floor contraction: minimal perineal elevation and descent  Relaxation of PFM: Yes  Substitution: observed with adductors and gluteal     Integumentary:   Introitus: Unremarkable    External Digital Palpation per Perineum:       Scar:    Location/Type:   Mobility:     Internal Digital Palpation:  Per Vagina:  NA -due to current urinary tract infection and treatment    Per Rectum:        Pelvic Organ Prolapse:       ABDOMINAL ASSESSMENT  Diastasis Rectus Abdominis (KELLY):      Abdominal Activation/Strength:     Scar:   Location/Type:   Mobility:     Fascial Tension/Restriction:     BIOFEEDBACK:  Position:   Surface Electrodes:     Abdominals:     Perianals:       DERMATOMES:   DTR S:     Assessment & Plan   CLINICAL IMPRESSIONS  Medical Diagnosis: acute cystitis with hematuria, bladder leak    Treatment Diagnosis: mixed urinary incontinence   Impression/Assessment: Patient is a 78 year old female with acute cystitis with hematuria, urinary incontinence complaints.  The following significant findings have been identified: Decreased strength and Impaired muscle performance. These impairments interfere with their ability to perform self care tasks and household chores as compared to previous level of function.     Clinical Decision Making (Complexity):  Clinical Presentation: Stable/Uncomplicated  Clinical Presentation Rationale: based on medical and personal factors listed in PT evaluation  Clinical Decision Making (Complexity): Low complexity    PLAN OF CARE  Treatment Interventions:  Interventions: Neuromuscular Re-education, Therapeutic Activity, Therapeutic Exercise, Self-Care/Home Management    Long Term Goals     PT Goal 1  Goal Identifier: urinary incontinence  Goal Description: Decrease urinary leakage to < 1-2 episodes per week (baseline: daily leakage( 2-3 episodes) mostly related to rising from chair after sitting 45'-1 hr or longer.)  Rationale: to maximize safety and independence with self cares;to maximize safety and independence with performance of ADLs and functional tasks  Target Date: 05/02/24      Frequency of Treatment: every other week for  Duration of Treatment: 12 weeks    Recommended Referrals to Other Professionals: Physical  Therapy  Education Assessment:   Learner/Method: Patient;Listening;Pictures/Video    Risks and benefits of evaluation/treatment have been explained.   Patient/Family/caregiver agrees with Plan of Care.     Evaluation Time:     PT Eval, Low Complexity Minutes (62447): 25       Signing Clinician: KELVIN Reece [facility]                                                                                   OUTPATIENT PHYSICAL THERAPY      PLAN OF TREATMENT FOR OUTPATIENT REHABILITATION   Patient's Last Name, First Name, Cely Solis YOB: 1945   Provider's Name   [facility]   Medical Record No.  [id number]     Onset Date: 01/24/24 (MD order for PT)  Start of Care Date: 02/08/24     Medical Diagnosis:  acute cystitis with hematuria, bladder leak      PT Treatment Diagnosis:  mixed urinary incontinence Plan of Treatment  Frequency/Duration: every other week for/ 12 weeks    Certification date from 02/08/24 to 05/02/24         See note for plan of treatment details and functional goals     Kathy Unger PT                         I CERTIFY THE NEED FOR THESE SERVICES FURNISHED UNDER        THIS PLAN OF TREATMENT AND WHILE UNDER MY CARE     (Physician attestation of this document indicates review and certification of the therapy plan).              Referring Provider:  Lulu Puri    Initial Assessment  See Epic Evaluation- Start of Care Date: 02/08/24

## 2024-02-20 ENCOUNTER — OFFICE VISIT (OUTPATIENT)
Dept: FAMILY MEDICINE | Facility: CLINIC | Age: 79
End: 2024-02-20
Payer: COMMERCIAL

## 2024-02-20 ENCOUNTER — TELEPHONE (OUTPATIENT)
Dept: DERMATOLOGY | Facility: CLINIC | Age: 79
End: 2024-02-20

## 2024-02-20 VITALS
WEIGHT: 141.8 LBS | HEART RATE: 75 BPM | BODY MASS INDEX: 24.21 KG/M2 | HEIGHT: 64 IN | SYSTOLIC BLOOD PRESSURE: 153 MMHG | TEMPERATURE: 97.6 F | RESPIRATION RATE: 18 BRPM | OXYGEN SATURATION: 99 % | DIASTOLIC BLOOD PRESSURE: 76 MMHG

## 2024-02-20 DIAGNOSIS — I10 HTN, GOAL BELOW 140/80: ICD-10-CM

## 2024-02-20 DIAGNOSIS — N89.8 VAGINAL ITCHING: ICD-10-CM

## 2024-02-20 DIAGNOSIS — N39.0 RECURRENT UTI: Primary | ICD-10-CM

## 2024-02-20 DIAGNOSIS — L98.9 SKIN LESION: ICD-10-CM

## 2024-02-20 PROCEDURE — 81001 URINALYSIS AUTO W/SCOPE: CPT

## 2024-02-20 PROCEDURE — 36415 COLL VENOUS BLD VENIPUNCTURE: CPT

## 2024-02-20 PROCEDURE — 80053 COMPREHEN METABOLIC PANEL: CPT

## 2024-02-20 PROCEDURE — 99214 OFFICE O/P EST MOD 30 MIN: CPT

## 2024-02-20 RX ORDER — LISINOPRIL 2.5 MG/1
2.5 TABLET ORAL DAILY
Qty: 30 TABLET | Refills: 0 | Status: SHIPPED | OUTPATIENT
Start: 2024-02-20 | End: 2024-03-20 | Stop reason: SINTOL

## 2024-02-20 RX ORDER — LISINOPRIL 2.5 MG/1
2.5 TABLET ORAL DAILY
Qty: 30 TABLET | Refills: 0 | Status: SHIPPED | OUTPATIENT
Start: 2024-02-20 | End: 2024-02-20

## 2024-02-20 RX ORDER — LISINOPRIL 5 MG/1
5 TABLET ORAL DAILY
Qty: 30 TABLET | Refills: 0 | Status: SHIPPED | OUTPATIENT
Start: 2024-02-20 | End: 2024-02-20

## 2024-02-20 NOTE — TELEPHONE ENCOUNTER
This encounter is being sent to inform the clinic that this patient has a referral from Alfonso Dorantes CNP, for the diagnoses of Skin Lesion and has requested that this patient be seen within 1-2 weeks. Based on the availability of our provider(s), we are unable to accommodate this request.    Were all sites offered this patient?  Yes    Does scheduling algorithm request to schedule next available?  Patient has been scheduled for the first available opening with Dr. Luis Byrnes on 9/30/2024.  We have informed the patient that the clinic will review their referral and reach out if a sooner appointment is medically necessary.

## 2024-02-20 NOTE — PROGRESS NOTES
Assessment & Plan     Recurrent UTI  - multiple UTIs since October  - reviewed lifestyle modifications  - will refer to uro/gyn for further assessment and consideration of starting vaginal estrogen  - Adult Uro/Gyn  Referral  - pt requests recheck of UA/C  - UA Macroscopic with reflex to Microscopic and Culture - Lab Collect  - UA Microscopic with Reflex to Culture  - RTC prn for return of symptoms     HTN, goal below 140/80  - reports BP elevated at multiple visits and at home, consistent with dx of HTN  - will check:  - Comprehensive metabolic panel (BMP + Alb, Alk Phos, ALT, AST, Total. Bili, TP)  - if normal, will start low dose lisinopril and follow up in 1mo for blood pressure recheck:  - lisinopril (ZESTRIL) 2.5 MG tablet  Dispense: 30 tablet; Refill: 0  - The uses and side effects, including black box warnings as appropriate, were discussed in detail.  All patient questions were answered.  The patient was instructed to call immediately if any side effects developed.  - PRIMARY CARE FOLLOW-UP SCHEDULING  - advised UC/ER prn sooner for any new worrisome symptoms  - BP log given to pt to track at home, education provided on how to check    Vaginal itching  - will complete:  - Wet prep - Clinic Collect  - based on results, will tx either for dermatitis or yeast    Skin lesion  - scaly, raised, erythematous skin lesion on leg  - consult to derm for potential biopsy and removal  - Adult Dermatology  Referral    RTC prn. The patient verbalized understanding and agreement with the plan today and has no additional questions or concerns at this time.    Bimal Ta is a 78 year old, presenting for the following health issues:  UTI        2/20/2024    10:41 AM   Additional Questions   Roomed by Clover   Accompanied by self         2/20/2024    10:41 AM   Patient Reported Additional Medications   Patient reports taking the following new medications No     History of Present Illness        Hypertension: She presents for follow up of hypertension.  She does check blood pressure  regularly outside of the clinic. Outside blood pressures have been over 140/90. She follows a low salt diet.     She eats 2-3 servings of fruits and vegetables daily.She consumes 0 sweetened beverage(s) daily.She exercises with enough effort to increase her heart rate 60 or more minutes per day.  She exercises with enough effort to increase her heart rate 6 days per week.   She is taking medications regularly.     Genitourinary - Female  Onset/Duration: history of this in the past  Description:   Painful urination (Dysuria): No           Frequency: YES- has improved since  Blood in urine (Hematuria): No  Delay in urine (Hesitency): No  Intensity: mild  Progression of Symptoms:  improving  Accompanying Signs & Symptoms:  Fever/chills: No  Flank pain: No  Nausea and vomiting: No  Vaginal symptoms: discharge  Abdominal/Pelvic Pain: No  History:   History of frequent UTI s: YES  History of kidney stones: YES  Sexually Active: No  Possibility of pregnancy: No  Precipitating or alleviating factors: None  Therapies tried and outcome: Antibiotic Macrobid 100 mg 10 day supply.     Finished treatment on Thursday of last week. With previous infections, it didn't take long for it to come back. Having some vaginal rash following treatment. No current fevers or chills. Trying cranberry supplement as well as physical therapy. Has been using desitin. Vagina is itchy. Has been using monistat on the area too. No abnormal discharge or bleeding.     Blood pressure - checks at home 130s-140s. Last night was 140s systolic. Mother had history of HTN as she got older. No chest pain, shortness of breath, headaches, vision changes, palpations, or edema.       Review of Systems  Constitutional, HEENT, cardiovascular, pulmonary, GI, , musculoskeletal, neuro, skin, endocrine and psych systems are negative, except as otherwise noted.      Objective   "  BP (!) 153/76 (BP Location: Left arm, Patient Position: Sitting, Cuff Size: Adult Small)   Pulse 75   Temp 97.6  F (36.4  C) (Oral)   Resp 18   Ht 1.626 m (5' 4\")   Wt 64.3 kg (141 lb 12.8 oz)   LMP  (LMP Unknown)   SpO2 99%   BMI 24.34 kg/m    Body mass index is 24.34 kg/m .  Physical Exam     General:  alert, oriented, pleasant and conversant. NAD. Non-toxic  HEENT:  normocephalic, atraumatic. Sclera white, conjunctiva clear, EOMs intact.   Neck:  supple, FROM  Chest: chest expansion symmetrical bilaterally, lung sounds clear without wheezes, rhonchi or rales  CV: S1, S2, RRR without murmurs, rubs or gallops. No edema  : patricia rectal area +erythema and erythematous papules. Vaginal canal w/o discharge or lesions of concern.   MSK: steady gait, FROM  Derm: +scaly, irregular, pink lesion on LLE.   Neuro: CNII-XII grossly intact, clear and logical thought and speech  Psych:  cooperative, calm mood and congruent affect    Signed Electronically by: AUDREY Dunlap CNP  "

## 2024-02-20 NOTE — TELEPHONE ENCOUNTER
Pt called to gather more information about skin lesion. Pt stated that it is crusting but denied itching or bleeding. Pt scheduled for soonest opening with Alejandra on 4/10. Pt offered sooner openings at [facility] but pt declined. Pt informed to see if other dermatology clinics have sooner openings but pt declined as she only wants to be seen at [facility].    Karina Nguyễn RN on 2/20/2024 at 1:00 PM

## 2024-02-21 DIAGNOSIS — B37.31 CANDIDIASIS OF VAGINA: Primary | ICD-10-CM

## 2024-02-21 LAB
ALBUMIN SERPL BCG-MCNC: 4.6 G/DL (ref 3.5–5.2)
ALP SERPL-CCNC: 75 U/L (ref 40–150)
ALT SERPL W P-5'-P-CCNC: 14 U/L (ref 0–50)
ANION GAP SERPL CALCULATED.3IONS-SCNC: 9 MMOL/L (ref 7–15)
AST SERPL W P-5'-P-CCNC: 25 U/L (ref 0–45)
BILIRUB SERPL-MCNC: 0.3 MG/DL
BUN SERPL-MCNC: 19.7 MG/DL (ref 8–23)
CALCIUM SERPL-MCNC: 9.5 MG/DL (ref 8.8–10.2)
CHLORIDE SERPL-SCNC: 101 MMOL/L (ref 98–107)
CREAT SERPL-MCNC: 0.81 MG/DL (ref 0.51–0.95)
DEPRECATED HCO3 PLAS-SCNC: 29 MMOL/L (ref 22–29)
EGFRCR SERPLBLD CKD-EPI 2021: 73 ML/MIN/1.73M2
GLUCOSE SERPL-MCNC: 88 MG/DL (ref 70–99)
POTASSIUM SERPL-SCNC: 4.5 MMOL/L (ref 3.4–5.3)
PROT SERPL-MCNC: 7.5 G/DL (ref 6.4–8.3)
SODIUM SERPL-SCNC: 139 MMOL/L (ref 135–145)

## 2024-02-21 RX ORDER — FLUCONAZOLE 150 MG/1
150 TABLET ORAL ONCE
Qty: 1 TABLET | Refills: 0 | Status: SHIPPED | OUTPATIENT
Start: 2024-02-21 | End: 2024-02-21

## 2024-02-28 ENCOUNTER — TELEPHONE (OUTPATIENT)
Dept: UROLOGY | Facility: CLINIC | Age: 79
End: 2024-02-28

## 2024-02-28 DIAGNOSIS — N39.0 RECURRENT URINARY TRACT INFECTION: Primary | ICD-10-CM

## 2024-02-28 NOTE — TELEPHONE ENCOUNTER
UA/UC pended. Will sign closer to patients appointment date. Delayed in inbakset.     Khushboo Benito LPN on 2/28/2024 at 11:10 AM        Future Appointments 2/28/2024 - 8/26/2024        Date Visit Type Length Department Provider              4/8/2024 10:30 AM LAB 10 min [facility] LAB FIRST FLOOR [facility]    Location Instructions:     The clinic is located at [address] in [place].&nbsp; We offer free parking in our on-site lot.              4/8/2024 10:45 AM NEW UROLOGY 45 min  UROLOGY Clara Rios, MICHELLE

## 2024-02-28 NOTE — TELEPHONE ENCOUNTER
Please place UA/UC order for upcoming appointment with Clara Rios.     Sarahi mosley Clinic Assistant- Surgical Specialties

## 2024-03-18 ENCOUNTER — NURSE TRIAGE (OUTPATIENT)
Dept: FAMILY MEDICINE | Facility: CLINIC | Age: 79
End: 2024-03-18
Payer: COMMERCIAL

## 2024-03-18 RX ORDER — NITROFURANTOIN 25; 75 MG/1; MG/1
CAPSULE ORAL
COMMUNITY
Start: 2024-03-17 | End: 2024-04-05

## 2024-03-18 NOTE — TELEPHONE ENCOUNTER
Nurse Triage SBAR    Is this a 2nd Level Triage? NO    Situation: Pt thinks she is having reaction after taking generic Macrobid today at 11 am. Symptom: mild itching on back, improving.     Background: Pt was seen in ED on Saturday and diagnosed with UTI. Pt prescribed nitroFURantoin macrocrystal-monohydrate (MACROBID) 100 MG capsule, 1 capsule two times a day x 5 days. Pt states she took her first dose after getting home today at 11. A few hours later she noted mild itching on her back. Itching has lasted a couple hours, but is getting better. Pt denies swelling, rash, hives, or difficulty breathing.     Assessment: Possible reaction to medication. Pt hesitant to take evening dose of abx concerned she will react the same. Pt has not taken any antihistamines or applied any creams to help with itching. Pt states she has appointment with provider on Wednesday, stated she can discuss this further with provider at that time. RN offered sooner appointment, but pt would like to keep appointment with AUDREY Castelan CNP on Wednesday.       Protocol Recommended Disposition:   See in Office Within 3 Days    Recommendation: Routing to provider to advise, should pt stop abx given itching? Is pt ok to wait until Wednesday for further treatment.     Routed to provider    Does the patient meet one of the following criteria for ADS visit consideration? No   Reason for Disposition   Patient wants to be seen    Additional Information   Negative: Athlete's foot suspected (e.g., itchy rash of feet, especially 3rd-4th web spaces)   Negative: Insect bites suspected   Negative: Jock Itch suspected (e.g., itchy rash on inner thighs near genital area)   Negative: Pubic lice suspected (e.g., genital itching and lice or nits are seen)   Negative: Poison ivy, oak, or sumac rash suspected (e.g., itchy rash after contact with poison ivy)   Negative: Genital itching - female   Negative: Genital itching - male   Negative: Rectal (anus)  "itching   Negative: Localized rash also present   Negative: Patient sounds very sick or weak to the triager   Negative: Looks infected (e.g., spreading redness, red streak, pus)    Answer Assessment - Initial Assessment Questions  1. DESCRIPTION: \"Describe the itching you are having.\" \"Where is it located?\"      Back  2. SEVERITY: \"How bad is it?\"     - MILD: Doesn't interfere with normal activities.    - MODERATE-SEVERE: Interferes with work, school, sleep, or other activities.       Mild.   3. SCRATCHING: \"Are there any scratch marks? Bleeding?\"      no  4. ONSET: \"When did the itching begin?\"       Pt took 1 dose of generic macrobid today at 11 and itching started a few hours later. Itching lasted a couple hours, but is now subsiding.   5. CAUSE: \"What do you think is causing the itching?\"       Generic macrobid. Pt states she has taken brand name Macrobid before without issues, but has never taken the generic version.   6. OTHER SYMPTOMS: \"Do you have any other symptoms?\"       No. Denies tongue/face/throat swelling, SOB, difficulty breathing, rash, hives  7. PREGNANCY: \"Is there any chance you are pregnant?\" \"When was your last menstrual period?\"      no    Protocols used: Itching - [id number]    "

## 2024-03-18 NOTE — TELEPHONE ENCOUNTER
Called back to patient and gave nursing advice recommended by provider. Patient thinks there might be an added ingredient in the macrobid that she hasn't had before which could be causing the itching, but itching is better now. Patient understood plan and mentioned she has a appointment 3/20 with provider.    Jada Marques RN    [facility]- Primary Care

## 2024-03-18 NOTE — TELEPHONE ENCOUNTER
Unable to read UC note, but did read note from ER at  on 3/16/24. At that time, pt was not having urinary symptoms and her urine sample was contaminated so they documented that they did not start her on any antibiotics upon discharge, but did document completing a urine culture that I am unable to see. Does appear per the care everywhere documentation that macrobid was added on 3/17/24:   nitrofurantoin mono-macro (MACROBID) 100 mg oral capsule  Take 1 capsule (100 mg) by mouth twice a day with breakfast and dinner for 5 days. 10 capsule    03/17/2024 03/22/2024     Pharmacist documented:   Preliminary Urine Culture: Greater than 100,000 cfu/ml of Gram Negative Bacilli    Assessment/Recommendations:  Recommend initiating antimicrobial therapy: nitrofurantoin 100 mg BID x5 days.  Recommendation reviewed with Dr. Mosley.    Patient was contacted, informed of positive culture result and need for changes to current antimicrobial treatment. Patient understood plan at this time, had no further questions or concerns and will follow up with PCP or return to ED for further cares as needed.    A prescription for nitrofurantoin 100 mg twice daily for 5 days was e-prescribed to [facility] in [place], [place] ([place]) today at 1446.    Has previously been on Macrobid for UTIs without side effects/allergic reaction. Note back to nursing to notify pt should continue rx if able and to monitor for any new/worrisome symptoms. If develops any worsening symptoms, she should present to UC for in-person assessment. If develops any respiratory symptoms should present to ER / call 911.     AUDREY Dunlap CNP

## 2024-03-20 ENCOUNTER — ANCILLARY PROCEDURE (OUTPATIENT)
Dept: GENERAL RADIOLOGY | Facility: CLINIC | Age: 79
End: 2024-03-20
Payer: COMMERCIAL

## 2024-03-20 ENCOUNTER — OFFICE VISIT (OUTPATIENT)
Dept: FAMILY MEDICINE | Facility: CLINIC | Age: 79
End: 2024-03-20
Payer: COMMERCIAL

## 2024-03-20 VITALS
WEIGHT: 138.2 LBS | TEMPERATURE: 97.3 F | RESPIRATION RATE: 14 BRPM | OXYGEN SATURATION: 97 % | HEIGHT: 64 IN | BODY MASS INDEX: 23.6 KG/M2 | HEART RATE: 71 BPM | DIASTOLIC BLOOD PRESSURE: 73 MMHG | SYSTOLIC BLOOD PRESSURE: 125 MMHG

## 2024-03-20 DIAGNOSIS — K59.01 SLOW TRANSIT CONSTIPATION: Primary | ICD-10-CM

## 2024-03-20 DIAGNOSIS — I10 HTN, GOAL BELOW 140/80: Primary | ICD-10-CM

## 2024-03-20 DIAGNOSIS — R10.32 ABDOMINAL PAIN, LEFT LOWER QUADRANT: ICD-10-CM

## 2024-03-20 DIAGNOSIS — N39.0 FREQUENT UTI: ICD-10-CM

## 2024-03-20 LAB
BASOPHILS # BLD AUTO: 0 10E3/UL (ref 0–0.2)
BASOPHILS NFR BLD AUTO: 1 %
EOSINOPHIL # BLD AUTO: 0.1 10E3/UL (ref 0–0.7)
EOSINOPHIL NFR BLD AUTO: 1 %
ERYTHROCYTE [DISTWIDTH] IN BLOOD BY AUTOMATED COUNT: 12.8 % (ref 10–15)
HCT VFR BLD AUTO: 41.8 % (ref 35–47)
HGB BLD-MCNC: 13.5 G/DL (ref 11.7–15.7)
IMM GRANULOCYTES # BLD: 0 10E3/UL
IMM GRANULOCYTES NFR BLD: 0 %
LYMPHOCYTES # BLD AUTO: 1.9 10E3/UL (ref 0.8–5.3)
LYMPHOCYTES NFR BLD AUTO: 25 %
MCH RBC QN AUTO: 30.3 PG (ref 26.5–33)
MCHC RBC AUTO-ENTMCNC: 32.3 G/DL (ref 31.5–36.5)
MCV RBC AUTO: 94 FL (ref 78–100)
MONOCYTES # BLD AUTO: 0.5 10E3/UL (ref 0–1.3)
MONOCYTES NFR BLD AUTO: 7 %
NEUTROPHILS # BLD AUTO: 5.3 10E3/UL (ref 1.6–8.3)
NEUTROPHILS NFR BLD AUTO: 67 %
PLATELET # BLD AUTO: 247 10E3/UL (ref 150–450)
RBC # BLD AUTO: 4.45 10E6/UL (ref 3.8–5.2)
WBC # BLD AUTO: 7.9 10E3/UL (ref 4–11)

## 2024-03-20 PROCEDURE — 36415 COLL VENOUS BLD VENIPUNCTURE: CPT

## 2024-03-20 PROCEDURE — 74019 RADEX ABDOMEN 2 VIEWS: CPT | Mod: TC | Performed by: RADIOLOGY

## 2024-03-20 PROCEDURE — 85025 COMPLETE CBC W/AUTO DIFF WBC: CPT

## 2024-03-20 PROCEDURE — 99214 OFFICE O/P EST MOD 30 MIN: CPT

## 2024-03-20 PROCEDURE — 80048 BASIC METABOLIC PNL TOTAL CA: CPT

## 2024-03-20 RX ORDER — LOSARTAN POTASSIUM 25 MG/1
25 TABLET ORAL DAILY
Qty: 30 TABLET | Refills: 0 | Status: SHIPPED | OUTPATIENT
Start: 2024-03-20 | End: 2024-04-05

## 2024-03-20 RX ORDER — POLYETHYLENE GLYCOL 3350 17 G/17G
1 POWDER, FOR SOLUTION ORAL DAILY
Qty: 510 G | Refills: 0 | Status: SHIPPED | OUTPATIENT
Start: 2024-03-20

## 2024-03-20 NOTE — PROGRESS NOTES
Assessment & Plan     HTN, goal below 140/80  - well controlled today, though pt reports concerns of cough w/ lisinopril  - will transition to losartan 25mg every day and discontinue lisinopril due to SE  - losartan (COZAAR) 25 MG tablet  Dispense: 30 tablet; Refill: 0  - The uses and side effects, including black box warnings as appropriate, were discussed in detail.  All patient questions were answered.  The patient was instructed to call immediately if any side effects developed. Advised specifically to monitor for symptoms of hypotension and allergy.   - will update BMP to recheck kidney function and electrolytes   - Basic metabolic panel  (Ca, Cl, CO2, Creat, Gluc, K, Na, BUN)  - follow up in 1mo for recheck per pts request     Abdominal pain, left lower quadrant  - persistent, unremarkable CT in ER on Saturday, no red flag symptoms   - joint decision making to complete xr today  - XR Abdomen 2 Views  - discussed potential etiologies and that most likely cause of symptoms was constipation/gas per CT results over the weekend  - will also r/o infectious process  - CBC with platelets and differential  - follow up / POC will be updated pending results   - continue high fiber foods  - reviewed red flag symptoms of blood in stool, vomiting, severe abdominal pain, fever/chills and advised if these occur to return to ER    Frequent UTI  - UTI on culture from ER  - continue macrobid per ER provider  - pt unable to get in with uro/gyn, scheduled with urology but asks about providers at this location  - will place consult with GYN to see if they would be able to help/further workup symptoms   - Ob/Gyn  Referral    Follow up in 1mo per pt's request. RTC prn sooner for worsening/change in symptoms. The patient verbalized understanding and agreement with the plan today and has no additional questions or concerns at this time.      Bimal Ta is a 78 year old, presenting for the following health  issues:  Hypertension        3/20/2024    10:40 AM   Additional Questions   Roomed by krissy   Accompanied by self         3/20/2024    10:40 AM   Patient Reported Additional Medications   Patient reports taking the following new medications macrobid     History of Present Illness       Hypertension: She presents for follow up of hypertension.  She does check blood pressure  regularly outside of the clinic. Outpatient blood pressures have not been over 140/90. She follows a low salt diet.     She eats 4 or more servings of fruits and vegetables daily.She consumes 0 sweetened beverage(s) daily.She exercises with enough effort to increase her heart rate 60 or more minutes per day.  She exercises with enough effort to increase her heart rate 6 days per week.   She is taking medications regularly.     Low dose lisinopril 2.5mg started on 2/20/24, has noticed a dry cough, occurring at night. No respiratory illness, no allergies. Cough started the day after taking the medication. Has been 1/2 tab every day, but today took a full tab.     Home BP readings:  2/22 - 145/80  2/24 - 130/69  2/25 - 123/63  2/27 - 118/61  3/2 - 135/70  3/6 - 132/77  3/13 - 11/63  3/14 - 121/68  3/17 - 118/62    Denies chest pain, shortness of breath, headaches, vision changes, urinary changes, palpations, or edema.     BP Readings from Last 6 Encounters:   03/20/24 125/73   02/20/24 (!) 153/76   02/05/24 (!) 154/77   01/24/24 (!) 158/76   11/17/23 (!) 157/82   10/26/23 139/72     Abdominal pain - reports symptoms started on Thursday/Friday, described as LLQ abdominal pain that spreads over the entire lower abdomen. Went to the ER on Saturday morning. They completed a CT scan -   CT ABDOMEN & PELVIS W/O ORAL W/O IV CON   Final Result   IMPRESSION:   1. No acute finding in the abdomen or pelvis.   2. No upper urinary tract stone or dilation.   3. Essentially unchanged hypodense lesions in the liver, likely benign cysts.   4. Distal colonic  "diverticula, no diverticulitis. At least moderately extensive retained stool in parts of the colon.   REPORT SIGNED BY DR. Luis Roberts     Interval history: reports that her symptoms are somewhat improved, the pain doesn't seem as bad when moving around during the day. At night, when she is trying to go to bed the pain is significant, though. Described as \"kidney stone\" type pain. Has to get up and move around. Tried a heating pad, and tylenol, increasing fiber but she is unsure if it helped at all. Last night was as bad as Friday night. Has been also eating softer foods.  Last bowel movement was yesterday - described as yellowy, soft, no straining to get out. No fevers, chills, vomiting, blood in stool. Does have some urinary frequency, but no dysuria or urgency. Was dx with UTI, started macrobid on Monday, was given 5d course, on day 2 or 3 of antibiotics today.     Review of Systems  Constitutional, HEENT, cardiovascular, pulmonary, GI, , musculoskeletal, neuro, skin, endocrine and psych systems are negative, except as otherwise noted.      Objective    /73 (BP Location: Left arm, Patient Position: Sitting, Cuff Size: Adult Regular)   Pulse 71   Temp 97.3  F (36.3  C) (Oral)   Resp 14   Ht 1.626 m (5' 4\")   Wt 62.7 kg (138 lb 3.2 oz)   LMP  (LMP Unknown)   SpO2 97%   BMI 23.72 kg/m    Body mass index is 23.72 kg/m .  Physical Exam     General:  awake, alert, NAD. Non-toxic. No sweating.   HEENT:  normocephalic. No conjunctivitis or rhinorrhea  Neck:  supple, FROM  Pulm: Unlabored, regular rate. CTAB, full air movement  CV: S1/S2, rrr, no m/r/g. No edema.   Abd: +BS, soft.  Mild diffuse tenderness around epigastric area, tenderness worse in LLQ. No rebound tenderness. No guarding.   MSK: steady gait, FROM  Derm: no rashes or erythema  Neuro: clear and logical thought and speech  Psych: calm mood and congruent affect    Signed Electronically by: AUDREY Dunlap CNP  "

## 2024-03-21 LAB
ANION GAP SERPL CALCULATED.3IONS-SCNC: 10 MMOL/L (ref 7–15)
BUN SERPL-MCNC: 16.8 MG/DL (ref 8–23)
CALCIUM SERPL-MCNC: 10 MG/DL (ref 8.8–10.2)
CHLORIDE SERPL-SCNC: 102 MMOL/L (ref 98–107)
CREAT SERPL-MCNC: 0.85 MG/DL (ref 0.51–0.95)
DEPRECATED HCO3 PLAS-SCNC: 28 MMOL/L (ref 22–29)
EGFRCR SERPLBLD CKD-EPI 2021: 69 ML/MIN/1.73M2
GLUCOSE SERPL-MCNC: 90 MG/DL (ref 70–99)
POTASSIUM SERPL-SCNC: 5.3 MMOL/L (ref 3.4–5.3)
SODIUM SERPL-SCNC: 140 MMOL/L (ref 135–145)

## 2024-03-26 NOTE — PATIENT INSTRUCTIONS
If you have labs or imaging done, the results will automatically release in [employer] without an interpretation.  Your health care professional will review those results and send an interpretation with recommendations as soon as possible, but this may be 1-3 business days.    If you have any questions regarding your visit, please contact your care team.     [employer] Access Services: [phone]  [facility] CLINIC HOURS TELEPHONE NUMBER   Leonila Flanagan, VANESA Wright-STEPHANIE Coleman-STEPHANIE Hernandez-Surgery Scheduler  Kristine-       Monday- [place]  8:00 am-4:00 pm    Tuesday- [place]  8:00 am-4:00 pm    Wednesday- [place] 8:00 am-4:00 pm    Thursday- [place] 8:00 am-4:00 pm    Friday- [place]  8:00 am-4:00 pm [facility]  [address] [place]  [place] [address]  PH: [phone]  Fax: [phone]    Imaging Scheduling all locations  PH: [phone]     [facility]  [address] [place] [address] [address].  [place], [place] [address] [phone]    [facility]  [address] [place] [address]  PH: [phone]     **Surgeries** Our Surgery Schedulers will contact you to schedule. If you do not receive a call within 3 business days, please call [phone].  Urgent Care locations:  [facility]       Monday-Friday   10 am - 8 pm    Saturday and Sunday   9 am - 5 pm   [phone]   If you need a medication refill, please contact your pharmacy. Please allow 3 business days for your refill to be completed.  As always, Thank you for trusting us with your healthcare needs!

## 2024-03-27 ENCOUNTER — NURSE TRIAGE (OUTPATIENT)
Dept: FAMILY MEDICINE | Facility: CLINIC | Age: 79
End: 2024-03-27
Payer: COMMERCIAL

## 2024-03-27 DIAGNOSIS — I10 HTN, GOAL BELOW 140/80: Primary | ICD-10-CM

## 2024-03-27 NOTE — TELEPHONE ENCOUNTER
Called patient and relayed provider message, patient aware. Patient states she has availability next Friday for appt, patient wants to see Alfonso again. Appt scheduled, patient aware of appt time. No further questions or concerns.      EDD BecerraN, RN  [facility] Primary Care [facility]

## 2024-03-27 NOTE — TELEPHONE ENCOUNTER
Provider Response to 2nd Level Triage Request    I have reviewed the RN documentation. My recommendation is:    Let's check a BMP. I will place a future order. Insomnia and depression are not typical side effects. If grogginess is change in LOC or feeling like her blood pressure is low, pt should be evaluated in urgent care for a BP check and further assessment. Please let me know if she has any other concerns/questions.     Thanks,    AUDREY Dunlap CNP

## 2024-03-27 NOTE — TELEPHONE ENCOUNTER
Situation:     Patient called to follow up on newly started BP med, losartan and having side effects.     Background:     Patient seen in clinic 3/20/24 with Alfonso Dorantes.     Assessment & Plan   HTN, goal below 140/80  - well controlled today, though pt reports concerns of cough w/ lisinopril  - will transition to losartan 25mg every day and discontinue lisinopril due to SE  - losartan (COZAAR) 25 MG tablet  Dispense: 30 tablet; Refill: 0  - The uses and side effects, including black box warnings as appropriate, were discussed in detail.  All patient questions were answered.  The patient was instructed to call immediately if any side effects developed. Advised specifically to monitor for symptoms of hypotension and allergy.   - will update BMP to recheck kidney function and electrolytes   - Basic metabolic panel  (Ca, Cl, CO2, Creat, Gluc, K, Na, BUN)  - follow up in 1mo for recheck per pts request     Assessment:    She started losartan on Sunday, 3/24/24.     She last took losartan this morning, 3/27/24.     She notes depression and feeling groggy started right away.   She started experiencing insomnia the next day.   Muscle aches started a couple of days later.     Routing to provider to review and advise.    Grace Chun, EDDN, RN  [facility]    Reason for Disposition   Caller has NON-URGENT medicine question about med that PCP or specialist prescribed and triager unable to answer question    Additional Information   Negative: Intentional drug overdose and suicidal thoughts or ideas   Negative: Drug overdose and triager unable to answer question   Negative: Caller requesting a renewal or refill of a medicine patient is currently taking   Negative: Caller requesting information unrelated to medicine   Negative: Caller requesting information about COVID-19 Vaccine   Negative: Caller requesting information about Emergency Contraception   Negative: Caller requesting information about Combined Birth  Control Pills   Negative: Caller requesting information about Progestin Birth Control Pills   Negative: Caller requesting information about Post-Op pain or medicines   Negative: Caller requesting a prescription antibiotic (such as penicillin) for Strep throat and has a positive culture result   Negative: Caller requesting a prescription anti-viral med (such as Tamiflu) and has influenza (flu) symptoms   Negative: Immunization reaction suspected   Negative: Rash while taking a medicine or within 3 days of stopping it   Negative: Asthma and having symptoms of asthma (cough, wheezing, etc.)   Negative: Symptom of illness (e.g., headache, abdominal pain, earache, vomiting) that are more than mild   Negative: Breastfeeding questions about mother's medicines and diet   Negative: MORE THAN A DOUBLE DOSE of a prescription or over-the-counter (OTC) drug   Negative: DOUBLE DOSE (an extra dose or lesser amount) of prescription drug and any symptoms (e.g., dizziness, nausea, pain, sleepiness)   Negative: DOUBLE DOSE (an extra dose or lesser amount) of over-the-counter (OTC) drug and any symptoms (e.g., dizziness, nausea, pain, sleepiness)   Negative: Took another person's prescription drug   Negative: DOUBLE DOSE (an extra dose or lesser amount) of prescription drug and NO symptoms  (Exception: A double dose of antibiotics.)   Negative: Diabetes drug error or overdose (e.g., took wrong type of insulin or took extra dose)   Negative: Caller has medication question about med NOT prescribed by PCP and triager unable to answer question (e.g., compatibility with other med, storage)   Negative: Prescription not at pharmacy and was prescribed by PCP recently  (Exception: triager has access to EMR and prescription is recorded there. Go to Home Care and confirm for pharmacy.)   Negative: Pharmacy calling with prescription question and triager unable to answer question   Negative: Caller has URGENT medicine question about med that PCP or  specialist prescribed and triager unable to answer question    Protocols used: Medication Question Call-A-OH

## 2024-03-27 NOTE — TELEPHONE ENCOUNTER
Thanks so much for calling her. If she is concerned about side effects, please offer her a same day appointment with myself or Dr. Espinosa (her listed primary care provider) to discuss medication options. If she has any worsening side effects or develops symptoms of hypotension, advise UC for further evaluation.    Thanks so much,  AUDREY Dunlap CNP

## 2024-03-27 NOTE — TELEPHONE ENCOUNTER
"Called patient and relayed message below.     She states her BP right now is 120/77, HR 96. She checked it while on the phone.     She denies light headedness, dizziness, chest pain, shortness of breath.     She states she read that with losartan, depression is a possible side effect. She states her brain just doesn't quite feel clear - it feels \"sluggish\".     She notes she has an OB appointment tomorrow, so she could get the BMP done then. She reports the OB appointment is because she has been having UTIs.     It is unclear if patient has any symptoms of UTI at this time.     Patient states she plans to not take losartan tomorrow as she does not like the side effects.    Routing to provider to review and advise.    Grace Chun RN, BSN, PHN  [facility]  Nurse Triage, Family Practice    "

## 2024-03-28 ENCOUNTER — OFFICE VISIT (OUTPATIENT)
Dept: OBGYN | Facility: CLINIC | Age: 79
End: 2024-03-28
Payer: COMMERCIAL

## 2024-03-28 VITALS
OXYGEN SATURATION: 97 % | HEIGHT: 64 IN | BODY MASS INDEX: 23.56 KG/M2 | WEIGHT: 138 LBS | HEART RATE: 82 BPM | DIASTOLIC BLOOD PRESSURE: 75 MMHG | SYSTOLIC BLOOD PRESSURE: 138 MMHG

## 2024-03-28 DIAGNOSIS — N30.00 ACUTE CYSTITIS WITHOUT HEMATURIA: Primary | ICD-10-CM

## 2024-03-28 DIAGNOSIS — I10 HTN, GOAL BELOW 140/80: ICD-10-CM

## 2024-03-28 DIAGNOSIS — N95.2 VAGINAL ATROPHY: ICD-10-CM

## 2024-03-28 DIAGNOSIS — N39.0 FREQUENT UTI: ICD-10-CM

## 2024-03-28 LAB
ALBUMIN UR-MCNC: 30 MG/DL
APPEARANCE UR: CLEAR
BACTERIA #/AREA URNS HPF: ABNORMAL /HPF
BILIRUB UR QL STRIP: NEGATIVE
COLOR UR AUTO: YELLOW
GLUCOSE UR STRIP-MCNC: NEGATIVE MG/DL
HGB UR QL STRIP: NEGATIVE
HYALINE CASTS #/AREA URNS LPF: ABNORMAL /LPF
KETONES UR STRIP-MCNC: NEGATIVE MG/DL
LEUKOCYTE ESTERASE UR QL STRIP: ABNORMAL
NITRATE UR QL: NEGATIVE
PH UR STRIP: 6.5 [PH] (ref 5–7)
RBC #/AREA URNS AUTO: ABNORMAL /HPF
RENAL EPI CELLS #/AREA URNS HPF: ABNORMAL /HPF
SP GR UR STRIP: 1.02 (ref 1–1.03)
SQUAMOUS #/AREA URNS AUTO: ABNORMAL /LPF
UROBILINOGEN UR STRIP-ACNC: 0.2 E.U./DL
WBC #/AREA URNS AUTO: ABNORMAL /HPF
WBC CLUMPS #/AREA URNS HPF: PRESENT /HPF

## 2024-03-28 PROCEDURE — 80048 BASIC METABOLIC PNL TOTAL CA: CPT

## 2024-03-28 PROCEDURE — 87086 URINE CULTURE/COLONY COUNT: CPT

## 2024-03-28 PROCEDURE — 99204 OFFICE O/P NEW MOD 45 MIN: CPT

## 2024-03-28 PROCEDURE — 36415 COLL VENOUS BLD VENIPUNCTURE: CPT

## 2024-03-28 PROCEDURE — 81001 URINALYSIS AUTO W/SCOPE: CPT

## 2024-03-28 RX ORDER — TRIMETHOPRIM 100 MG/1
100 TABLET ORAL 2 TIMES DAILY
Qty: 6 TABLET | Refills: 0 | Status: SHIPPED | OUTPATIENT
Start: 2024-03-28 | End: 2024-04-05

## 2024-03-28 RX ORDER — ESTRADIOL 10 UG/1
INSERT VAGINAL
Qty: 24 TABLET | Refills: 3 | Status: SHIPPED | OUTPATIENT
Start: 2024-03-28

## 2024-03-28 NOTE — PROGRESS NOTES
Subjective:  Cely is a 78 year old No obstetric history on file.  is here today with the following concerns:    Frequent UTI: reports 4 since this past October.  She was seen in FP and advised to come here for further work-up/evaluation. Denies any symptoms associated with it currently. She was started on Macrobid after ED visit on 3/16. She completed this course but her urine today indicates a UTI. She is frustrated that she still has it. She has appointment with Urology in May. Hx complete hysterectomy.    ROS: Pertinent ROS as above.    Medical history  OB History   No obstetric history on file.      Past Medical History:   Diagnosis Date    Actinic keratosis     Allergic rhinitis     Cataract     Degenerative joint disease     cervical disease    Depression with anxiety     Herpes simplex     HTN, goal below 140/80 3/20/2024    Hypoglycemia     eats small meals and is fine    Impingement syndrome, shoulder     Trimalleolar fracture of ankle, closed 02/08/2023      Past Surgical History:   Procedure Laterality Date    ARTHROSCOPY KNEE Left 9/20/2019    Procedure: Left knee arthroscopy, partial medial meniscectomy and chondral debridement;  Surgeon: Nic Singh MD;  Location: [facility] OR    BLEPHAROPLASTY Bilateral 11/28/2022    Procedure: bilateral upper lid blepharoplasty;  Surgeon: Diego Vela MD;  Location: [facility] OR    CATARACT IOL, RT/LT Left 01/24/2019    COMBINED CYSTOSCOPY, URETEROSCOPY, LASER HOLMIUM LITHOTRIPSY URETER(S) Right 8/23/2018    Procedure: COMBINED CYSTOSCOPY, URETEROSCOPY, LASER HOLMIUM LITHOTRIPSY URETER(S);   Cystoscopy,Right ureteroscopy with laser lithotripsy and stent placement;  Surgeon: Pino Hawk MD;  Location: [facility] OR    HC ENLARGE BREAST WITH IMPLANT      HC LAP,FULGURATE/EXCISE LESIONS      HC REMOVAL OF BREAST IMPLANT      HYSTERECTOMY, PAP NO LONGER INDICATED  1998    ovaries and uterus out for benign reasons(fibroids and ovarian cyst)    LASER YAG CAPSULOTOMY   "01/2020; 2/2020    left eye; right eye    PHACOEMULSIFICATION WITH STANDARD INTRAOCULAR LENS IMPLANT Left 1/24/2019    Procedure: PHACOEMULSIFICATION WITH STANDARD INTRAOCULAR LENS IMPLANT, LEFT;  Surgeon: Wagner Aguilera MD;  Location: [facility] OR    PHACOEMULSIFICATION WITH STANDARD INTRAOCULAR LENS IMPLANT Right 2/14/2019    Procedure: PHACOEMULSIFICATION WITH STANDARD INTRAOCULAR LENS IMPLANT, RIGHT;  Surgeon: Wagner Aguilera MD;  Location: [facility] OR    SINUS SURGERY       ALL/Meds: Her medication and allergy histories were reviewed and are documented in their appropriate chart areas.    SH: Reviewed and documented in the appropriate area of the chart.  FH:  Her family history is reviewed and updated in the chart, today.  PMH: Her past medical, surgical, and obstetric histories were reviewed and updated today in the appropriate chart areas.    Objective:  PE: /75 (BP Location: Left arm, Patient Position: Sitting, Cuff Size: Adult Regular)   Pulse 82   Ht 1.626 m (5' 4\")   Wt 62.6 kg (138 lb)   LMP  (LMP Unknown)   SpO2 97%   BMI 23.69 kg/m    Body mass index is 23.69 kg/m .    Pertinent Physical exam findings:    General Appearance:  healthy, alert, active, no distress   Pelvic:       - Ext: Vulva and perineum are normal without lesion, mass or discharge        - Urethra: normal without discharge or scarring        - Bladder: no tenderness, no masses       - Vagina: atrophic. Unable to feel if any form of prolapse present.       - Cervix: surgically absent       - Uterus: surgically absent       - Adnexa: Normal without masses or tenderness    A/P:  Cely Ontiveros is a 78 year old No obstetric history on file. here today with the following concerns:  (N30.00) Acute cystitis without hematuria  (primary encounter diagnosis)  Comment: Due to recent use of Macrobid, allergy to sulfa and issues with cephalexin, will send in a treatment that she has not recently done and does not have allergy to. " Await culture sensitives for change in antibiotic.  Plan: UA Macroscopic with reflex to Microscopic and         Culture - Lab Collect, Urine Microscopic Exam,         Urine Culture, trimethoprim (TRIMPEX) 100 MG         tablet          (N39.0) Frequent UTI  Plan: estradiol (VAGIFEM) 10 MCG TABS vaginal tablet; Follow up with Urology. Counseled on possible next steps such as vaginal estrogen, pessary and/or surgical management. Trial of vaginal estrogen and follow up with Urology for assessment and next steps.    (N95.2) Vaginal atrophy  Comment: Counseled on how low estrogen post-menopause can lead to vaginal atrophy and increased incidence of UTI.  Plan: estradiol (VAGIFEM) 10 MCG TABS vaginal tablet          Leonila Flanagan, AUDREY CNP

## 2024-03-29 LAB
ANION GAP SERPL CALCULATED.3IONS-SCNC: 14 MMOL/L (ref 7–15)
BACTERIA UR CULT: NORMAL
BUN SERPL-MCNC: 21 MG/DL (ref 8–23)
CALCIUM SERPL-MCNC: 9.2 MG/DL (ref 8.8–10.2)
CHLORIDE SERPL-SCNC: 100 MMOL/L (ref 98–107)
CREAT SERPL-MCNC: 0.83 MG/DL (ref 0.51–0.95)
DEPRECATED HCO3 PLAS-SCNC: 25 MMOL/L (ref 22–29)
EGFRCR SERPLBLD CKD-EPI 2021: 71 ML/MIN/1.73M2
GLUCOSE SERPL-MCNC: 79 MG/DL (ref 70–99)
POTASSIUM SERPL-SCNC: 4.4 MMOL/L (ref 3.4–5.3)
SODIUM SERPL-SCNC: 139 MMOL/L (ref 135–145)

## 2024-04-05 ENCOUNTER — TELEPHONE (OUTPATIENT)
Dept: GASTROENTEROLOGY | Facility: CLINIC | Age: 79
End: 2024-04-05

## 2024-04-05 ENCOUNTER — OFFICE VISIT (OUTPATIENT)
Dept: FAMILY MEDICINE | Facility: CLINIC | Age: 79
End: 2024-04-05
Payer: COMMERCIAL

## 2024-04-05 VITALS
TEMPERATURE: 97 F | DIASTOLIC BLOOD PRESSURE: 71 MMHG | HEIGHT: 64 IN | OXYGEN SATURATION: 98 % | WEIGHT: 139.4 LBS | RESPIRATION RATE: 16 BRPM | HEART RATE: 81 BPM | SYSTOLIC BLOOD PRESSURE: 122 MMHG | BODY MASS INDEX: 23.8 KG/M2

## 2024-04-05 DIAGNOSIS — R10.32 ABDOMINAL PAIN, LEFT LOWER QUADRANT: ICD-10-CM

## 2024-04-05 DIAGNOSIS — N39.0 RECURRENT UTI: ICD-10-CM

## 2024-04-05 DIAGNOSIS — K59.01 SLOW TRANSIT CONSTIPATION: ICD-10-CM

## 2024-04-05 DIAGNOSIS — E28.39 ESTROGEN DEFICIENCY: ICD-10-CM

## 2024-04-05 DIAGNOSIS — I10 HTN, GOAL BELOW 140/90: Primary | ICD-10-CM

## 2024-04-05 PROCEDURE — 99213 OFFICE O/P EST LOW 20 MIN: CPT

## 2024-04-05 NOTE — TELEPHONE ENCOUNTER
[facility] Call Center    Phone Message    May a detailed message be left on voicemail: Yes    Reason for Call: Other: Patient is currently scheduled on 05/16/2024, as visit type New GI Urgent. This is outside the expected timeline for this referral. Patient has been added to the waitlist.      Action Taken: Message routed to:  Other: GI REFERRAL TRIAGE POOL     Travel Screening: Not Applicable

## 2024-04-05 NOTE — PROGRESS NOTES
Assessment & Plan     HTN, goal below 140/90  - history of multiple elevated BP readings in clinic  - was started on medication a few months ago, has been experiencing side effects  - BP controlled today and at home w/o medications the last week   - will trial off medication again, advised to continue to monitor and if BP remains elevated to RTC to consider restarting    Abdominal pain, left lower quadrant  Slow transit constipation  - improved with Miralax and BID fiber, but returned w/ cessation of Miralax  - pt requesting colonoscopy w/ persistent bowel changes  - reviewed lifestyle modifications  - continue fiber  - continue prn Miralax   - will refer to GI for consult   - Adult GI  Referral - Consult Only    Recurrent UTI  - established with OBGYN to start estrogen therapy, pt tolerating well  - upcoming apt with urology     Estrogen deficiency  - history of osteopenia, ankle fracture 2023  - DX Bone Density    RTC prn. The patient verbalized understanding and agreement with the plan today and has no additional questions or concerns at this time.    Bimal Ta is a 78 year old, presenting for the following health issues:  Recheck Medication        4/5/2024    10:41 AM   Additional Questions   Roomed by Clover   Accompanied by self         4/5/2024    10:41 AM   Patient Reported Additional Medications   Patient reports taking the following new medications No     History of Present Illness       Hypertension: She presents for follow up of hypertension.  She does check blood pressure  regularly outside of the clinic. Outpatient blood pressures have not been over 140/90. She follows a low salt diet.     She eats 2-3 servings of fruits and vegetables daily.She consumes 0 sweetened beverage(s) daily.She exercises with enough effort to increase her heart rate 60 or more minutes per day.  She exercises with enough effort to increase her heart rate 7 days per week.   She is taking medications  "regularly.     Stool changes - noticing stools are \"flat\", unable to find an accurate picture w/ bristol stool chart. Pain is still present on left side, worse when lying down. Sometimes after bowel movements has pain in stomach as well. Symptoms improved with miralax. Taking fiber BID and eating a lot of fruits and vegetables. Patient reports having intermittent episodes before, but never persistent. Drinking a lot of water during the day, exercises daily. No fevers/chills, lost some weight but not unexplained, no night sweats. Is interested in a colonoscopy.     Xr at last visit 3/20/24-   IMPRESSION: No free air. Nonobstructive bowel gas pattern. Moderate  amount of formed stool colon. Pelvic phleboliths.    CT from ER 3/16/24 -  IMPRESSION:   1. No acute finding in the abdomen or pelvis.   2.  No upper urinary tract stone or dilation.   3.  Essentially unchanged hypodense lesions in the liver, likely benign cysts.   4.  Distal colonic diverticula, no diverticulitis. At least moderately extensive retained stool in parts of the colon.   REPORT SIGNED BY DR. Luis Roberts     BP - stopped BP meds as they were impacting sleep, losartan also making her feel foggy. Has been checking BP at home and all readings <140/90 since stopping medication.     Review of Systems  Constitutional, HEENT, cardiovascular, pulmonary, GI, , musculoskeletal, neuro, skin, endocrine and psych systems are negative, except as otherwise noted.      Objective    /71 (BP Location: Left arm, Patient Position: Sitting, Cuff Size: Adult Regular)   Pulse 81   Temp 97  F (36.1  C) (Tympanic)   Resp 16   Ht 1.626 m (5' 4\")   Wt 63.2 kg (139 lb 6.4 oz)   LMP  (LMP Unknown)   SpO2 98%   BMI 23.93 kg/m    Body mass index is 23.93 kg/m .  Physical Exam     General:  awake, alert, NAD. Non-toxic  HEENT:  normocephalic. No conjunctivitis or rhinorrhea  Neck:  supple, FROM  Pulm: Unlabored, regular rate. CTAB, full air movement  CV: S1/S2, " rrr, no m/r/g  Abd: +BS, soft, mild tenderness of LLQ  MSK: steady gait, FROM  Derm: no rashes or erythema  Neuro: clear and logical thought and speech  Psych: calm mood and congruent affect    Signed Electronically by: AUDREY Dunlap CNP

## 2024-04-10 ENCOUNTER — OFFICE VISIT (OUTPATIENT)
Dept: DERMATOLOGY | Facility: CLINIC | Age: 79
End: 2024-04-10
Payer: COMMERCIAL

## 2024-04-10 DIAGNOSIS — L57.0 AK (ACTINIC KERATOSIS): ICD-10-CM

## 2024-04-10 DIAGNOSIS — D49.2 NEOPLASM OF SKIN: Primary | ICD-10-CM

## 2024-04-10 DIAGNOSIS — L98.9 SKIN LESION: ICD-10-CM

## 2024-04-10 DIAGNOSIS — L81.4 LENTIGINES: ICD-10-CM

## 2024-04-10 PROCEDURE — 88305 TISSUE EXAM BY PATHOLOGIST: CPT | Performed by: DERMATOLOGY

## 2024-04-10 PROCEDURE — 17000 DESTRUCT PREMALG LESION: CPT | Mod: XS | Performed by: PHYSICIAN ASSISTANT

## 2024-04-10 PROCEDURE — 11102 TANGNTL BX SKIN SINGLE LES: CPT | Performed by: PHYSICIAN ASSISTANT

## 2024-04-10 PROCEDURE — 99212 OFFICE O/P EST SF 10 MIN: CPT | Mod: 25 | Performed by: PHYSICIAN ASSISTANT

## 2024-04-10 PROCEDURE — 17003 DESTRUCT PREMALG LES 2-14: CPT | Performed by: PHYSICIAN ASSISTANT

## 2024-04-10 NOTE — PATIENT INSTRUCTIONS
Patient Education       Proper skin care from [facility]:    -Eliminate harsh soaps as they strip the natural oils from the skin, often resulting in dry itchy skin ( i.e. Dial, Zest, Green[place] Spring)  -Use mild soaps such as Cetaphil or Dove Sensitive Skin in the shower. You do not need to use soap on arms, legs, and trunk every time you shower unless visibly soiled.   -Avoid hot or cold showers.  -After showering, lightly dry off and apply moisturizing within 2-3 minutes. This will help trap moisture in the skin.   -Aggressive use of a moisturizer at least 1-2 times a day to the entire body (including -Vanicream, Cetaphil, Aquaphor or Cerave) and moisturize hands after every washing.  -We recommend using moisturizers that come in a tub that needs to be scooped out, not a pump. This has more of an oil base. It will hold moisture in your skin much better than a water base moisturizer. The above recommended are non-pore clogging.      Wear a sunscreen with at least SPF 30 on your face, ears, neck and V of the chest daily. Wear sunscreen on other areas of the body if those areas are exposed to the sun throughout the day. Sunscreens can contain physical and/or chemical blockers. Physical blockers are less likely to clog pores, these include zinc oxide and titanium dioxide. Reapply every two hour and after swimming.     Sunscreen examples: https://www.ewg.org/sunscreen/    UV radiation  UVA radiation remains constant throughout the day and throughout the year. It is a longer wavelength than UVB and therefore penetrates deeper into the skin leading to immediate and delayed tanning, photoaging, and skin cancer. 70-80% of UVA and UVB radiation occurs between the hours of 10am-2pm.  UVB radiation  UVB radiation causes the most harmful effects and is more significant during the summer months. However, snow and ice can reflect UVB radiation leading to skin damage during the winter months as well. UVB radiation is  responsible for tanning, burning, inflammation, delayed erythema (pinkness), pigmentation (brown spots), and skin cancer.     I recommend self monthly full body exams and yearly full body exams with a dermatology provider. If you develop a new or changing lesion please follow up for examination. Most skin cancers are pink and scaly or pink and pearly. However, we do see blue/brown/black skin cancers.  Consider the ABCDEs of melanoma when giving yourself your monthly full body exam ( don't forget the groin, buttocks, feet, toes, etc). A-asymmetry, B-borders, C-color, D-diameter, E-elevation or evolving. If you see any of these changes please follow up in clinic. If you cannot see your back I recommend purchasing a hand held mirror to use with a larger wall mirror.       Checking for Skin Cancer  You can find cancer early by checking your skin each month. There are 3 kinds of skin cancer. They are melanoma, basal cell carcinoma, and squamous cell carcinoma. Doing monthly skin checks is the best way to find new marks or skin changes. Follow the instructions below for checking your skin.   The ABCDEs of checking moles for melanoma   Check your moles or growths for signs of melanoma using ABCDE:   Asymmetry: the sides of the mole or growth don t match  Border: the edges are ragged, notched, or blurred  Color: the color within the mole or growth varies  Diameter: the mole or growth is larger than 6 mm (size of a pencil eraser)  Evolving: the size, shape, or color of the mole or growth is changing (evolving is not shown in the images below)    Checking for other types of skin cancer  Basal cell carcinoma or squamous cell carcinoma have symptoms such as:     A spot or mole that looks different from all other marks on your skin  Changes in how an area feels, such as itching, tenderness, or pain  Changes in the skin's surface, such as oozing, bleeding, or scaliness  A sore that does not heal  New swelling or redness beyond  the border of a mole    Who s at risk?  Anyone can get skin cancer. But you are at greater risk if you have:   Fair skin, light-colored hair, or light-colored eyes  Many moles or abnormal moles on your skin  A history of sunburns from sunlight or tanning beds  A family history of skin cancer  A history of exposure to radiation or chemicals  A weakened immune system  If you have had skin cancer in the past, you are at risk for recurring skin cancer.   How to check your skin  Do your monthly skin checkups in front of a full-length mirror. Check all parts of your body, including your:   Head (ears, face, neck, and scalp)  Torso (front, back, and sides)  Arms (tops, undersides, upper, and lower armpits)  Hands (palms, backs, and fingers, including under the nails)  Buttocks and genitals  Legs (front, back, and sides)  Feet (tops, soles, toes, including under the nails, and between toes)  If you have a lot of moles, take digital photos of them each month. Make sure to take photos both up close and from a distance. These can help you see if any moles change over time.   Most skin changes are not cancer. But if you see any changes in your skin, call your doctor right away. Only he or she can diagnose a problem. If you have skin cancer, seeing your doctor can be the first step toward getting the treatment that could save your life.   [employer] system last reviewed this educational content on 4/1/2019 2000-2020 [employer]. [address] [place] [address], [place] [address]. All rights reserved. This information is not intended as a substitute for professional medical care. Always follow your healthcare professional's instructions.       Cryotherapy    What is it?  Use of a very cold liquid, such as liquid nitrogen, to freeze and destroy abnormal skin cells that need to be removed    What should I expect?  Tenderness and redness  A small blister that might grow and fill with dark purple blood. There may be crusting.  More  than one treatment may be needed if the lesions do not go away.    How do I care for the treated area?  Gently wash the area with your hands when bathing.  Use a thin layer of Vaseline to help with healing. You may use a Band-Aid.   The area should heal within 7-10 days and may leave behind a pink or lighter color.   Do not use an antibiotic or Neosporin ointment.   You may take acetaminophen (Tylenol) for pain.     Call your Doctor if you have:  Severe pain  Signs of infection (warmth, redness, cloudy yellow drainage, and or a bad smell)  Questions or concerns    Who should I call with questions?      [facility]: [phone]      [facility]: [phone]      For urgent needs outside of business hours call the [facility] at [phone]        and ask for the dermatology resident on call    Wound Care After a Biopsy    What is a skin biopsy?  A skin biopsy allows the doctor to examine a very small piece of tissue under the microscope to determine the diagnosis and the best treatment for the skin condition. A local anesthetic (numbing medicine)  is injected with a very small needle into the skin area to be tested. A small piece of skin is taken from the area. Sometimes a suture (stitch) is used.     What are the risks of a skin biopsy?  I will experience scar, bleeding, swelling, pain, crusting and redness. I may experience incomplete removal or recurrence. Risks of this procedure are excessive bleeding, bruising, infection, nerve damage, numbness, thick (hypertrophic or keloidal) scar and non-diagnostic biopsy.    How should I care for my wound for the first 24 hours?  Keep the wound dry and covered for 24 hours  If it bleeds, hold direct pressure on the area for 15 minutes. If bleeding does not stop then go to the emergency room  Avoid strenuous exercise the first 1-2 days or as your doctor instructs you    How should I care for the wound  after 24 hours?  After 24 hours, remove the bandage  You may bathe or shower as normal  If you had a scalp biopsy, you can shampoo as usual and can use shower water to clean the biopsy site daily  Clean the wound twice a day with gentle soap and water  Do not scrub, be gentle  Apply white petroleum/Vaseline after cleaning the wound with a cotton swab or a clean finger, and keep the site covered with a Bandaid /bandage. Bandages are not necessary with a scalp biopsy  If you are unable to cover the site with a Bandaid /bandage, re-apply ointment 2-3 times a day to keep the site moist. Moisture will help with healing  Avoid strenuous activity for first 1-2 days  Avoid lakes, rivers, pools, and oceans until the stitches are removed or the site is healed    How do I clean my wound?  Wash hands thoroughly with soap or use hand  before all wound care  Clean the wound with gentle soap and water  Apply white petroleum/Vaseline  to wound after it is clean  Replace the Bandaid /bandage to keep the wound covered for the first few days or as instructed by your doctor  If you had a scalp biopsy, warm shower water to the area on a daily basis should suffice    What should I use to clean my wound?   Cotton-tipped applicators (Qtips )  White petroleum jelly (Vaseline ). Use a clean new container and use Q-tips to apply.  Bandaids   as needed  Gentle soap     How should I care for my wound long term?  Do not get your wound dirty  Keep up with wound care for one week or until the area is healed.  A small scab will form and fall off by itself when the area is completely healed. The area will be red and will become pink in color as it heals. Sun protection is very important for how your scar will turn out. Sunscreen with an SPF 30 or greater is recommended once the area is healed.  If you have stitches, stitches need to be removed in 10 days. You may return to our clinic for this or you may have it done locally at your doctor s  office.  You should have some soreness but it should be mild and slowly go away over several days. Talk to your doctor about using tylenol for pain,    When should I call my doctor?  If you have increased:   Pain or swelling  Pus or drainage (clear or slightly yellow drainage is ok)  Temperature over 100F  Spreading redness or warmth around wound    When will I hear about my results?  The biopsy results can take 2-3 weeks to come back. The clinic will call you with the results, send you a IPLogict message, or have you schedule a follow-up clinic or phone time to discuss the results. Contact our clinics if you do not hear from us in 3 weeks.     Who should I call with questions?  [facility]: [phone]   [facility]: [phone]  For urgent needs outside of business hours call the [facility] at [phone] and ask for the dermatology resident on call

## 2024-04-10 NOTE — PROGRESS NOTES
[facility] Dermatology Note  Encounter Date: Apr 10, 2024  Office Visit      Dermatology Problem List:  #NUB, L anterior distal lower leg, S/p Biopsy performed on 4/10/24: Pending results.   1. AKs. cryo  2. Inflamed SK. Liq N2.  3. EIC, right paraspinal ack  - patient to follow up for removal PRN  4. Xerosis cutis  - current tx: gentle skin care    Family history of skin cancer (mother and brother, unknown type)  ____________________________________________    Assessment & Plan:  # Neoplasm of unspecified behavior of the skin (D49.2) on the L anterior distal lower leg. The differential diagnosis includes NMSC vs other.   - Shave biopsy performed today, see procedure note below.  - Photographed today     # Actinic keratosis. X 5  - Cryotherapy performed today, see procedure note below.    # Lentigines - isael forearms  - reassured, discussed etiology  - recommended FBSE in next 6-12mo    Procedures Performed:   - Shave biopsy procedure note, location(s): see above. After discussion of benefits and risks including but not limited to bleeding, infection, scar, incomplete removal, recurrence, and non-diagnostic biopsy, written consent and photographs were obtained. The area was cleaned with isopropyl alcohol. 0.5mL of 1% lidocaine with epinephrine was injected to obtain adequate anesthesia of lesion(s). Shave biopsy at site(s) performed. Hemostasis was achieved with aluminium chloride. Petrolatum ointment and a sterile dressing were applied. The patient tolerated the procedure and no complications were noted. The patient was provided with verbal and written post care instructions.      CRYOTHERAPY PROCEDURE NOTE: 5 lesions in the above locations were treated with liquid nitrogen utilizing a 5-10sec thaw time. Patient was advised that the treated areas will become red, swollen, may develop a blister and then should crust and peal off in the next 1-2 weeks. Post-procedure instructions were  provided.    Follow-up: pending path results    Staff and scribe:    Scribe Disclosure:   I, CODY LOVATO, am serving as a scribe; to document services personally performed by Hope Roberts PA-C -based on data collection and the provider's statements to me.     Provider Disclosure:  I agree with above History, Review of Systems, Physical exam and Plan.  I have reviewed the content of the documentation and have edited it as needed. I have personally performed the services documented here and the documentation accurately represents those services and the decisions I have made.      Electronically signed by:    All risks, benefits and alternatives were discussed with patient.  Patient is in agreement and understands the assessment and plan.  All questions were answered.    Hope Roberts PA-C, [facility]  [facility]: Phone: [phone], Fax: [phone]  [facility]: Phone: [phone],  Fax: [phone]  [facility]: Phone: [phone], Fax: [phone]  ____________________________________________    CC: Derm Problem (Skin lesion-left leg, forehead, under the left lip and the left arm)      Reviewed patients past medical history and pertinent chart review prior to patient's visit today.     HPI:  Ms. Cely Ontiveros is a 78 year old female who presents today as a new patient for spots check. Last seen by Dr. Lewis in 2022 for skin exam.     Today patient reported spots of concern on her L leg, forehead, under the L lip, and L arm.    Patient is otherwise feeling well, without additional concerns.    Labs:  N/A    Physical Exam:  Vitals: LMP  (LMP Unknown)   SKIN: Focused examination of L lower leg, face, and isael arms was performed.   - L anterior distal lower leg there is a 8 mm pink scaly papule    - There is erythematous macules with overlying adherent scale on the: x 4 upper forehead, x 1 chin   - tan  well circumscribed macules on the forearms  - No other lesions of concern on areas examined.         Medications:  Current Outpatient Medications   Medication Sig Dispense Refill    atorvastatin (LIPITOR) 20 MG tablet Take 1 tablet (20 mg) by mouth daily 90 tablet 3    estradiol (VAGIFEM) 10 MCG TABS vaginal tablet Insert 1 tablet intravaginally daily for 2 weeks, followed by twice weekly. 24 tablet 3    fluticasone (FLONASE) 50 MCG/ACT nasal spray Spray 2 sprays in nostril      loratadine (CLARITIN) 10 MG tablet Take 1 tablet (10 mg) by mouth daily 90 tablet 0    polyethylene glycol (MIRALAX) 17 GM/Dose powder Take 17 g (1 Capful) by mouth daily (Patient not taking: Reported on 3/28/2024) 510 g 0    valACYclovir (VALTREX) 500 MG tablet Take 1 tablet (500 mg) by mouth daily 90 tablet 3     No current facility-administered medications for this visit.      Past Medical/Surgical History:   Patient Active Problem List   Diagnosis    Allergic rhinitis    Herpes simplex    CARDIOVASCULAR SCREENING; LDL GOAL LESS THAN 160    Degenerative joint disease    Posterior vitreous detachment of both eyes    Seasonal allergic rhinitis    Hypermetropia    Astigmatism with presbyopia    Blepharitis of both eyes    Epiphora    Chronic otitis externa of left ear, unspecified type    Dysthymic disorder    Meibomian gland dysfunction    Chondromalacia of patella, left    Complex tear of medial meniscus of left knee as current injury, subsequent encounter    Pseudophakia, Yag Caps, of both eyes    Primary osteoarthritis of left knee    S/P left knee arthroscopy    Epiretinal membrane, mild, of left eye    Posterior capsular opacification visually significant of right eye    Dermatochalasis of both upper eyelids    Acute right ankle pain    Ankle stiffness, right    History of eyelid surgery    Acute cystitis with hematuria    Bladder leak    Urinary incontinence    HTN, goal below 140/80    AK (actinic keratosis)    Neoplasm of skin      Past Medical History:   Diagnosis Date    Actinic keratosis     Allergic rhinitis     Cataract     Degenerative joint disease     cervical disease    Depression with anxiety     Herpes simplex     HTN, goal below 140/80 3/20/2024    Hypoglycemia     eats small meals and is fine    Impingement syndrome, shoulder     Neoplasm of skin 4/10/2024    Trimalleolar fracture of ankle, closed 02/08/2023

## 2024-04-10 NOTE — TELEPHONE ENCOUNTER
called and offered a sooner GI appointment to the Pt. Pt accepted an appointment with Prieto Justice on 5/7/2024 at 11 AM for an in-person clinic visit.

## 2024-04-10 NOTE — LETTER
4/10/2024         RE: Cely Ontiveros  [address] [place] [address]        Dear Colleague,    Thank you for referring your patient, Cely Ontiveros, to the [facility]. Please see a copy of my visit note below.    [facility] Dermatology Note  Encounter Date: Apr 10, 2024  Office Visit      Dermatology Problem List:  #NUB, L anterior distal lower leg, S/p Biopsy performed on 4/10/24: Pending results.   1. AKs. cryo  2. Inflamed SK. Liq N2.  3. EIC, right paraspinal ack  - patient to follow up for removal PRN  4. Xerosis cutis  - current tx: gentle skin care    Family history of skin cancer (mother and brother, unknown type)  ____________________________________________    Assessment & Plan:  # Neoplasm of unspecified behavior of the skin (D49.2) on the L anterior distal lower leg. The differential diagnosis includes NMSC vs other.   - Shave biopsy performed today, see procedure note below.  - Photographed today     # Actinic keratosis. X 5  - Cryotherapy performed today, see procedure note below.    # Lentigines - isael forearms  - reassured, discussed etiology  - recommended FBSE in next 6-12mo    Procedures Performed:   - Shave biopsy procedure note, location(s): see above. After discussion of benefits and risks including but not limited to bleeding, infection, scar, incomplete removal, recurrence, and non-diagnostic biopsy, written consent and photographs were obtained. The area was cleaned with isopropyl alcohol. 0.5mL of 1% lidocaine with epinephrine was injected to obtain adequate anesthesia of lesion(s). Shave biopsy at site(s) performed. Hemostasis was achieved with aluminium chloride. Petrolatum ointment and a sterile dressing were applied. The patient tolerated the procedure and no complications were noted. The patient was provided with verbal and written post care instructions.      CRYOTHERAPY PROCEDURE NOTE: 5 lesions in the above locations  were treated with liquid nitrogen utilizing a 5-10sec thaw time. Patient was advised that the treated areas will become red, swollen, may develop a blister and then should crust and peal off in the next 1-2 weeks. Post-procedure instructions were provided.    Follow-up: pending path results    Staff and scribe:    Scribe Disclosure:   I, CODY LISA, am serving as a scribe; to document services personally performed by Hope Roberts PA-C -based on data collection and the provider's statements to me.     Provider Disclosure:  I agree with above History, Review of Systems, Physical exam and Plan.  I have reviewed the content of the documentation and have edited it as needed. I have personally performed the services documented here and the documentation accurately represents those services and the decisions I have made.      Electronically signed by:    All risks, benefits and alternatives were discussed with patient.  Patient is in agreement and understands the assessment and plan.  All questions were answered.    Hope Roberts PA-C, MPAS  [facility]: Phone: [phone], Fax: [phone]  [facility]: Phone: [phone],  Fax: [phone]  [facility]: Phone: [phone], Fax: [phone]  ____________________________________________    CC: Derm Problem (Skin lesion-left leg, forehead, under the left lip and the left arm)      Reviewed patients past medical history and pertinent chart review prior to patient's visit today.     HPI:  Ms. Cely Ontiveros is a 78 year old female who presents today as a new patient for spots check. Last seen by Dr. Lewis in 2022 for skin exam.     Today patient reported spots of concern on her L leg, forehead, under the L lip, and L arm.    Patient is otherwise feeling well, without additional concerns.    Labs:  N/A    Physical Exam:  Vitals: LMP  (LMP Unknown)    SKIN: Focused examination of L lower leg, face, and isael arms was performed.   - L anterior distal lower leg there is a 8 mm pink scaly papule    - There is erythematous macules with overlying adherent scale on the: x 4 upper forehead, x 1 chin   - tan well circumscribed macules on the forearms  - No other lesions of concern on areas examined.         Medications:  Current Outpatient Medications   Medication Sig Dispense Refill     atorvastatin (LIPITOR) 20 MG tablet Take 1 tablet (20 mg) by mouth daily 90 tablet 3     estradiol (VAGIFEM) 10 MCG TABS vaginal tablet Insert 1 tablet intravaginally daily for 2 weeks, followed by twice weekly. 24 tablet 3     fluticasone (FLONASE) 50 MCG/ACT nasal spray Spray 2 sprays in nostril       loratadine (CLARITIN) 10 MG tablet Take 1 tablet (10 mg) by mouth daily 90 tablet 0     polyethylene glycol (MIRALAX) 17 GM/Dose powder Take 17 g (1 Capful) by mouth daily (Patient not taking: Reported on 3/28/2024) 510 g 0     valACYclovir (VALTREX) 500 MG tablet Take 1 tablet (500 mg) by mouth daily 90 tablet 3     No current facility-administered medications for this visit.      Past Medical/Surgical History:   Patient Active Problem List   Diagnosis     Allergic rhinitis     Herpes simplex     CARDIOVASCULAR SCREENING; LDL GOAL LESS THAN 160     Degenerative joint disease     Posterior vitreous detachment of both eyes     Seasonal allergic rhinitis     Hypermetropia     Astigmatism with presbyopia     Blepharitis of both eyes     Epiphora     Chronic otitis externa of left ear, unspecified type     Dysthymic disorder     Meibomian gland dysfunction     Chondromalacia of patella, left     Complex tear of medial meniscus of left knee as current injury, subsequent encounter     Pseudophakia, Yag Caps, of both eyes     Primary osteoarthritis of left knee     S/P left knee arthroscopy     Epiretinal membrane, mild, of left eye     Posterior capsular opacification visually significant of  right eye     Dermatochalasis of both upper eyelids     Acute right ankle pain     Ankle stiffness, right     History of eyelid surgery     Acute cystitis with hematuria     Bladder leak     Urinary incontinence     HTN, goal below 140/80     AK (actinic keratosis)     Neoplasm of skin     Past Medical History:   Diagnosis Date     Actinic keratosis      Allergic rhinitis      Cataract      Degenerative joint disease     cervical disease     Depression with anxiety      Herpes simplex      HTN, goal below 140/80 3/20/2024     Hypoglycemia     eats small meals and is fine     Impingement syndrome, shoulder      Neoplasm of skin 4/10/2024     Trimalleolar fracture of ankle, closed 02/08/2023                        Again, thank you for allowing me to participate in the care of your patient.        Sincerely,        Hope Roberts PA-C

## 2024-04-12 LAB
PATH REPORT.COMMENTS IMP SPEC: NORMAL
PATH REPORT.COMMENTS IMP SPEC: NORMAL
PATH REPORT.FINAL DX SPEC: NORMAL
PATH REPORT.GROSS SPEC: NORMAL
PATH REPORT.MICROSCOPIC SPEC OTHER STN: NORMAL
PATH REPORT.RELEVANT HX SPEC: NORMAL

## 2024-04-15 PROBLEM — R32 URINARY INCONTINENCE: Status: RESOLVED | Noted: 2024-02-08 | Resolved: 2024-04-15

## 2024-04-15 PROBLEM — N30.01 ACUTE CYSTITIS WITH HEMATURIA: Status: RESOLVED | Noted: 2024-02-08 | Resolved: 2024-04-15

## 2024-04-15 PROBLEM — R32 BLADDER LEAK: Status: RESOLVED | Noted: 2024-02-08 | Resolved: 2024-04-15

## 2024-04-23 ENCOUNTER — TELEPHONE (OUTPATIENT)
Dept: UROLOGY | Facility: CLINIC | Age: 79
End: 2024-04-23
Payer: COMMERCIAL

## 2024-04-23 DIAGNOSIS — N39.0 RECURRENT URINARY TRACT INFECTION: Primary | ICD-10-CM

## 2024-04-23 NOTE — TELEPHONE ENCOUNTER
Patient is scheduled with Clara Rios PA-C on 5/13/24 at 1045 am .     Patient is now scheduled a lab appointment to complete a urine sample on 5/13/24 at 1030 am.     UA/UC pended will be signed closer to patient scheduled appointment. Routed to the pool. Message delayed closer to patient appointment.    Mychart sent to patient informing them of the lab appointment for the urine sample.     Khushboo Benito LPN on 4/23/2024 at 1:56 PM

## 2024-04-26 ENCOUNTER — TELEPHONE (OUTPATIENT)
Dept: DERMATOLOGY | Facility: CLINIC | Age: 79
End: 2024-04-26
Payer: COMMERCIAL

## 2024-04-26 NOTE — TELEPHONE ENCOUNTER
"Writer spoke to patient who states she has an itchy rash that presents as red spots that are elevated. These red spots are very itchy. Patient thinks this could be related to her eating sugar but she is not sure. Patient states she was previously prescribed fluconazole which has helped her in the past.     Itchy - pt thinks its candida. Waist to breasts. Very uncomfortable. Had one fluconazole tablet left over from when she went to the ER in February for this same issue. She took it yesterday and states \"it seems it is helping a bit\".     Patient has also tried numerous topicals including one for vaginitis which did not help.     No other symptoms besides severe itching. Denies shortness of breath. Writer advised patient if that changes to seek treatment at the ER. Patient confirmed understanding verbally.     Patient requesting fluconazole tablets urgently.     Photo in chart sent via Andela.     Tiffanie Doshi RN              "

## 2024-04-26 NOTE — TELEPHONE ENCOUNTER
[facility] Call Center    Phone Message    May a detailed message be left on voicemail: yes     Reason for Call: Symptoms or Concerns     If patient has red-flag symptoms, warm transfer to triage line    Current symptom or concern: Sugar - rash that is itchy    Symptoms have been present for:  3 day(s) - this is off and on  going     Has patient previously been seen for this? Yes    By : Meme Reynolds MD    [facility]   [address] [place] [address]   [phone]     Date: 11/19/23    Are there any new or worsening symptoms? Yes: mostly if having sugar. This is off and on. Please call pt back to discuss. Thanks     Action Taken: Message routed to:  Adult Clinics: Dermatology p [phone]    Travel Screening: Not Applicable

## 2024-04-28 ENCOUNTER — OFFICE VISIT (OUTPATIENT)
Dept: URGENT CARE | Facility: URGENT CARE | Age: 79
End: 2024-04-28
Payer: COMMERCIAL

## 2024-04-28 VITALS
DIASTOLIC BLOOD PRESSURE: 77 MMHG | BODY MASS INDEX: 23.69 KG/M2 | SYSTOLIC BLOOD PRESSURE: 157 MMHG | WEIGHT: 138 LBS | RESPIRATION RATE: 16 BRPM | TEMPERATURE: 96.9 F | OXYGEN SATURATION: 98 % | HEART RATE: 82 BPM

## 2024-04-28 DIAGNOSIS — B37.2 YEAST INFECTION OF THE SKIN: Primary | ICD-10-CM

## 2024-04-28 PROCEDURE — 99213 OFFICE O/P EST LOW 20 MIN: CPT | Performed by: PREVENTIVE MEDICINE

## 2024-04-28 RX ORDER — FLUCONAZOLE 150 MG/1
150 TABLET ORAL WEEKLY
Qty: 4 TABLET | Refills: 0 | Status: SHIPPED | OUTPATIENT
Start: 2024-04-28 | End: 2024-05-20

## 2024-04-28 RX ORDER — HYDROXYZINE HYDROCHLORIDE 25 MG/1
25 TABLET, FILM COATED ORAL
Qty: 20 TABLET | Refills: 0 | Status: SHIPPED | OUTPATIENT
Start: 2024-04-28 | End: 2024-07-02

## 2024-04-28 NOTE — PROGRESS NOTES
"Assessment & Plan     Yeast infection of the skin  -Consistent with intertrigo  -had nasal drainage with topical Nystatin in the past and did not help much with symptoms   -keep area dry  -Zeasorb powder over the counter.  -Hydroxyzine refilled for itching, sedation side effect reviewed   - fluconazole (DIFLUCAN) 150 MG tablet  Dispense: 4 tablet; Refill: 0, per Up to Date:   \"Intertrigo, refractory to topical therapy (off-label use): Oral: 150 mg once weekly for 4 weeks \"  - hydrOXYzine HCl (ATARAX) 25 MG tablet  Dispense: 20 tablet; Refill: 0  -no signs of secondary bacterial infection      15 minutes spent by me on the date of the encounter doing chart review, history and exam, documentation and further activities per the note        Return if symptoms worsen or fail to improve.    Danyelle Espinosa MD MPH    [facility] 78 [facility] who presents to clinic today for the following health issues:  Chief Complaint   Patient presents with    Derm Problem     Vaginal itching and discharge for couple days, symptoms cleared up with Monostat cream. Symptoms usually spread to stomach, now has rash on stomach that is not going away.     HPI      Has a history of candidal intertrigo.  No fever  No bleeding  No drainage  Has been in contact with DERM, My chart messages reviewed   Itching++  Would like a refill on Hydroxyzine as this helps with itching at night   No spreading redness  May get painful  Vaginal symptoms have gone away completely  No history of diabetes  No recent antibiotics   Localized to a fold of skin on the anterior abdominal wall.  No trauma     Review of Systems  Constitutional, HEENT, cardiovascular, pulmonary, gi and gu systems are negative, except as otherwise noted.      Objective    BP (!) 157/77 (BP Location: Left arm, Patient Position: Sitting, Cuff Size: Adult Regular)   Pulse 82   Temp 96.9  F (36.1  C) (Tympanic)   Resp 16   " Wt 62.6 kg (138 lb)   LMP  (LMP Unknown)   SpO2 98%   BMI 23.69 kg/m    Physical Exam   GENERAL APPEARANCE: healthy, alert and no distress  EYES: Eyes grossly normal to inspection and conjunctivae and sclerae normal  RESP: lungs clear to auscultation - no rales, rhonchi or wheezes  CV: regular rates and rhythm, normal S1 S2  ABDOMEN: soft, non-tender and no rebound or guarding   MS: extremities normal- no gross deformities noted  SKIN: on the abdomen, just below the costal margins, the patient has bilateral small skin folds, in this there is redness, some erythematous papules, no drainage, no bleeding.   NEURO: Normal strength and tone, mentation intact and speech normal  PSYCH: mentation appears normal

## 2024-04-29 NOTE — TELEPHONE ENCOUNTER
"Alverto Villanueva MD  You; [facility]; Clara Gorman MD; Allyson Barron MD3 days ago     MC  Spoke with patient via telephone.  She states that she developed a very itchy rash comprised of \"little red bumps\" underneath her bilateral breasts a few days ago.  She had a single dose of fluconazole 150 mg left over and took that and notes a little bit of improvement.  She has not had any relief from using over-the-counter cortisone or antifungal lotion or Sarna lotion.    I evaluated the photo patient uploaded and and not able to visualize anything diagnostic given the quality and angle of the photo.  I am hesitant to prescribe more systemic antifungals without knowing the etiology of the patient's eruption.  We typically dose fluconazole 150 mg weekly for cutaneous fungal/yeast infections, so this dose may be sufficient if it is a fungal or yeast infection.  I did ask Dr. Barron if we would be able to have the resident on-call see the patient in her acute care clinic on Monday, 4/29 and she graciously accepted.  She and the WHIT for Mon 4/29, Dr. Gorman, are CCed as FYI.    Recommended continuing low-dose over-the-counter topical corticosteroids, topical antifungals and Sarna lotion for symptom relief.    Derm nursing staff, please schedule patient at the end of Dr. Barron's acute care clinic on Monday 4/29.  If patient has improved, she can cancel her appointment.    Alverto Villanueva MD PGY-3  Dermatology Resident     "

## 2024-05-07 ENCOUNTER — OFFICE VISIT (OUTPATIENT)
Dept: GASTROENTEROLOGY | Facility: CLINIC | Age: 79
End: 2024-05-07
Payer: COMMERCIAL

## 2024-05-07 VITALS
OXYGEN SATURATION: 96 % | BODY MASS INDEX: 23.19 KG/M2 | SYSTOLIC BLOOD PRESSURE: 144 MMHG | WEIGHT: 135.1 LBS | DIASTOLIC BLOOD PRESSURE: 83 MMHG | HEART RATE: 91 BPM

## 2024-05-07 DIAGNOSIS — K57.90 DIVERTICULOSIS: Primary | ICD-10-CM

## 2024-05-07 DIAGNOSIS — K59.00 CONSTIPATION, UNSPECIFIED CONSTIPATION TYPE: ICD-10-CM

## 2024-05-07 DIAGNOSIS — R10.32 ABDOMINAL PAIN, LEFT LOWER QUADRANT: ICD-10-CM

## 2024-05-07 PROCEDURE — 99203 OFFICE O/P NEW LOW 30 MIN: CPT | Performed by: PHYSICIAN ASSISTANT

## 2024-05-07 ASSESSMENT — PAIN SCALES - GENERAL: PAINLEVEL: NO PAIN (0)

## 2024-05-07 NOTE — LETTER
5/7/2024         RE: Cely Ontiveros  [address] [place] [address]        Dear Colleague,    Thank you for referring your patient, Cely Ontiveros, to the [facility]. Please see a copy of my visit note below.    GI CLINIC VISIT    CC/REFERRING MD:  Alfnoso Dorantes  REASON FOR CONSULTATION: LLQ abdominal pain    ASSESSMENT/PLAN:  79 y/o F presents for evaluation of LLQ abdominal pain and changing bowel pattern.     #LLQ abdominal pain  #constipation  #diverticulosis  Patient reports that a few months ago she developed severe LLQ abdominal pain -- was seen in the ED, CT was concerning for diverticulosis and constipation. She was started on miralax and metamucil and had significant relief with this. She is currently taking metamucil BID, and will have 1-2 BM daily that are formed and soft. Notes that she was taking large amounts of benadryl to treat itching from a yeast infection, and wonders if that may have caused her symptoms. She does have some LLQ abdominal pain with laying flat or on her left side, improved with passing gas -- I do wonder if she is still mildly constipated. Will plan to obtain AXR to evaluate further. Do not feel she warrants a colonoscopy at this time, as stool pattern has returned ton her baseline - she does have yearly FIT testing that has been negative. She has no other alarm symptoms. Will have increase metamucil to 2tbsp daily, may need to consider adding miralax if AXR concerning for constipation.   --obtain AXR to evaluate stool burden.  --increase metamucil to 2tbsp daily  --could consider adding miralax      RTC PRN    Thank you for this consultation.  It was a pleasure to participate in the care of this patient; please contact us with any further questions.       30 minutes spent on the date of the encounter doing chart review, review of outside records, review of test results, patient visit, and documentation    This note was created with  voice recognition software, and while reviewed for accuracy, typos may remain.    Prieto Justice PA-C  Division of Gastroenterology, Hepatology and Nutrition  [facility]      HPI  79 y/o F presents for evaluation of LLQ abdominal pain and changing bowel pattern.     Patient reports that she developed LLQ abdominal pain about two months ago - she was seen in the ED on 3/16/24, she had a CT abd/pelvis which showed concern for retained stool and diverticulosis. ER provider recommended use of metamucil 1 tsp BID - she notes that symptoms improved. She also took miralax for a couple of weeks, she did this feel this was helpful. Of note patient was being treated for a yeast infection, with diflucan and benadryl - and is wondering if the benadryl is what caused your constipation. She notes symptoms improved greatly with the use of metamucil. She notes that prior to her symptoms starting, she would have 1-2 BM daily, described as a bristol type 4. When her abdominal pain began, she was having harder stools that were more difficult to push, was still having a BM daily. Denies BRBPR. Since starting metamucil BID, she will generally have a BM 2x/d, soft but formed. Denies pushing/straining to have a BM. She has noticed LLQ abdominal pain has resolved, but has some LLQ abdominal pain that is present when she is laying flat or on her left side - notices pain resolves when she releases gas. Denies nausea or vomiting. Denies abdominal bloating currently.     Denies NSAIDs or Tylenol. Denies use of OTC herbal supplements/weight loss products.      Denies use of ETOH and tobacco products. No recreational drug use.     No family history of GI related malignancy (esophageal, gastric, pancreatic, liver or colon). Granddaughter has Crohn's disease. No family hx of celiac disease.     ROS:    No fevers or chills  No weight loss  No shortness of breath or wheezing  No chest pain or pressure  No  odynophagia or dysphagia  No BRBPR, hematochezia, melena  No anxiety or depression    PROBLEM LIST  Patient Active Problem List    Diagnosis Date Noted     AK (actinic keratosis) 04/10/2024     Priority: Medium     Neoplasm of skin 04/10/2024     Priority: Medium     HTN, goal below 140/80 03/20/2024     Priority: Medium     History of eyelid surgery 11/28/2023     Priority: Medium     Acute right ankle pain 04/12/2023     Priority: Medium     Ankle stiffness, right 04/12/2023     Priority: Medium     Dermatochalasis of both upper eyelids 10/05/2022     Priority: Medium     Added automatically from request for surgery [id number]       Epiretinal membrane, mild, of left eye 01/29/2020     Priority: Medium     Posterior capsular opacification visually significant of right eye 01/29/2020     Priority: Medium     S/P left knee arthroscopy 10/29/2019     Priority: Medium     Primary osteoarthritis of left knee 09/09/2019     Priority: Medium     Pseudophakia, Yag Caps, of both eyes 03/13/2019     Priority: Medium     Chondromalacia of patella, left 01/22/2019     Priority: Medium     Complex tear of medial meniscus of left knee as current injury, subsequent encounter 01/22/2019     Priority: Medium     Meibomian gland dysfunction 12/03/2018     Priority: Medium     Chronic otitis externa of left ear, unspecified type 03/08/2017     Priority: Medium     Dysthymic disorder 03/08/2017     Priority: Medium     Epiphora 10/12/2014     Priority: Medium     Hypermetropia 10/10/2013     Priority: Medium     Astigmatism with presbyopia 10/10/2013     Priority: Medium     Blepharitis of both eyes 10/10/2013     Priority: Medium     Seasonal allergic rhinitis 08/16/2013     Priority: Medium     Posterior vitreous detachment of both eyes 10/09/2012     Priority: Medium     Degenerative joint disease      Priority: Medium     CARDIOVASCULAR SCREENING; LDL GOAL LESS THAN 160 10/31/2010     Priority: Medium     Herpes simplex       Priority: Medium     Recurs left buttock  Zostavax 2/10  IMO update changed this record. Please review for accuracy       Allergic rhinitis      Priority: Medium       PERTINENT PAST MEDICAL HISTORY:    Past Medical History:   Diagnosis Date     Actinic keratosis      Allergic rhinitis      Cataract      Degenerative joint disease     cervical disease     Depression with anxiety      Herpes simplex      HTN, goal below 140/80 3/20/2024     Hypoglycemia     eats small meals and is fine     Impingement syndrome, shoulder      Neoplasm of skin 4/10/2024     Trimalleolar fracture of ankle, closed 02/08/2023       PREVIOUS SURGERIES:    Past Surgical History:   Procedure Laterality Date     ARTHROSCOPY KNEE Left 9/20/2019    Procedure: Left knee arthroscopy, partial medial meniscectomy and chondral debridement;  Surgeon: Nic Singh MD;  Location: [facility] OR     BLEPHAROPLASTY Bilateral 11/28/2022    Procedure: bilateral upper lid blepharoplasty;  Surgeon: Diego Vela MD;  Location: [facility] OR     CATARACT IOL, RT/LT Left 01/24/2019     COMBINED CYSTOSCOPY, URETEROSCOPY, LASER HOLMIUM LITHOTRIPSY URETER(S) Right 8/23/2018    Procedure: COMBINED CYSTOSCOPY, URETEROSCOPY, LASER HOLMIUM LITHOTRIPSY URETER(S);   Cystoscopy,Right ureteroscopy with laser lithotripsy and stent placement;  Surgeon: Pino Hawk MD;  Location: [facility] OR     HC ENLARGE BREAST WITH IMPLANT       HC LAP,FULGURATE/EXCISE LESIONS       HC REMOVAL OF BREAST IMPLANT       HYSTERECTOMY, PAP NO LONGER INDICATED  1998    ovaries and uterus out for benign reasons(fibroids and ovarian cyst)     LASER YAG CAPSULOTOMY  01/2020; 2/2020    left eye; right eye     PHACOEMULSIFICATION WITH STANDARD INTRAOCULAR LENS IMPLANT Left 1/24/2019    Procedure: PHACOEMULSIFICATION WITH STANDARD INTRAOCULAR LENS IMPLANT, LEFT;  Surgeon: Wagner Aguilera MD;  Location: [facility] OR     PHACOEMULSIFICATION WITH STANDARD INTRAOCULAR LENS IMPLANT Right 2/14/2019     Procedure: PHACOEMULSIFICATION WITH STANDARD INTRAOCULAR LENS IMPLANT, RIGHT;  Surgeon: Wagner Aguilera MD;  Location: [facility] OR     SINUS SURGERY         PREVIOUS ENDOSCOPY:  None. She has FIT testing yearly.    ALLERGIES:     Allergies   Allergen Reactions     Sulfa Antibiotics Swelling     Cats Swelling     Lips swollen     Ciprofloxacin Hcl      Wool Fiber      Keflex [Cephalexin] Other (See Comments)     Yeast infection  Rash (?Hives)         PERTINENT MEDICATIONS:    Current Outpatient Medications:      atorvastatin (LIPITOR) 20 MG tablet, Take 1 tablet (20 mg) by mouth daily, Disp: 90 tablet, Rfl: 3     estradiol (VAGIFEM) 10 MCG TABS vaginal tablet, Insert 1 tablet intravaginally daily for 2 weeks, followed by twice weekly., Disp: 24 tablet, Rfl: 3     fluconazole (DIFLUCAN) 150 MG tablet, Take 1 tablet (150 mg) by mouth once a week for 4 doses, Disp: 4 tablet, Rfl: 0     fluticasone (FLONASE) 50 MCG/ACT nasal spray, Spray 2 sprays in nostril, Disp: , Rfl:      hydrOXYzine HCl (ATARAX) 25 MG tablet, Take 1 tablet (25 mg) by mouth nightly as needed for itching, Disp: 20 tablet, Rfl: 0     loratadine (CLARITIN) 10 MG tablet, Take 1 tablet (10 mg) by mouth daily, Disp: 90 tablet, Rfl: 0     polyethylene glycol (MIRALAX) 17 GM/Dose powder, Take 17 g (1 Capful) by mouth daily (Patient not taking: Reported on 3/28/2024), Disp: 510 g, Rfl: 0     valACYclovir (VALTREX) 500 MG tablet, Take 1 tablet (500 mg) by mouth daily, Disp: 90 tablet, Rfl: 3    SOCIAL HISTORY:    Social History     Socioeconomic History     Marital status: Single     Spouse name: Not on file     Number of children: Not on file     Years of education: Not on file     Highest education level: Not on file   Occupational History     Employer: [employer]   Tobacco Use     Smoking status: Never     Smokeless tobacco: Never   Vaping Use     Vaping status: Never Used   Substance and Sexual Activity     Alcohol use: No     Drug use: No      Sexual activity: Not Currently     Partners: Male     Birth control/protection: Post-menopausal   Other Topics Concern     Parent/sibling w/ CABG, MI or angioplasty before 65F 55M? No   Social History Narrative     Not on file     Social Determinants of Health     Financial Resource Strain: High Risk (12/30/2021)    [facility]    Financial Resource Strain      Difficulty of Paying Living Expenses: Not on file      Difficulty of Paying Living Expenses: Not on file   Food Insecurity: Not on file   Transportation Needs: Not on file   Physical Activity: Not on file   Stress: Not on file   Social Connections: Unknown (12/30/2021)    Received from [employer] [facility], [employer] [facility]    Social Connections      Frequency of Communication with Friends and Family: Not on file   Interpersonal Safety: Not At Risk (3/16/2024)    [facility] , [facility]     Humiliation, Afraid, Rape, and Kick questionnaire      Fear of Current or Ex-Partner: No      Emotionally Abused: No      Physically Abused: No      Sexually Abused: No   Housing Stability: Not on file       FAMILY HISTORY:  FH of CRC: none  FH of IBD: none  Family History   Problem Relation Age of Onset     Hypertension Mother      Arthritis Mother      Cardiovascular Mother      Circulatory Mother      Heart Disease Mother      Lipids Mother      Obesity Mother      Osteoporosis Mother      Cancer Mother         skin     Glaucoma Mother      Cancer - colorectal Father      Cancer Father      Macular Degeneration Father      Diabetes No family hx of      Cerebrovascular Disease No family hx of      Thyroid Disease No family hx of        Past/family/social history reviewed and no changes    PHYSICAL EXAMINATION:  Constitutional: aaox3, cooperative, pleasant, not dyspneic/diaphoretic, no acute  distress  Vitals reviewed: BP (!) 144/83 (BP Location: Left arm, Patient Position: Sitting, Cuff Size: Adult Regular)   Pulse 91   Wt 61.3 kg (135 lb 1.6 oz)   LMP  (LMP Unknown)   SpO2 96%   BMI 23.19 kg/m    Wt:   Wt Readings from Last 2 Encounters:   04/28/24 62.6 kg (138 lb)   04/05/24 63.2 kg (139 lb 6.4 oz)      Eyes: Sclera anicteric/injected  Respiratory: Unlabored breathing  Abd: soft, Nondistended, nontender, no peritoneal signs  Skin: warm, perfused, no jaundice  Psych: Normal affect  MSK: Normal gait          Again, thank you for allowing me to participate in the care of your patient.        Sincerely,        Prieto Justice PA-C

## 2024-05-07 NOTE — PATIENT INSTRUCTIONS
It was a pleasure taking care of you today.  I've included a brief summary of our discussion and care plan from today's visit below.  Please review this information with your primary care provider.  ______________________________________________________________________    My recommendations are summarized as follows:  --please obtain abdominal x-ray so we can evaluate your stool burden.   --would increase metamucil to 2tbsp daily   --based on what x-ray shows, would have you use miralax as well.   --want to make sure you are getting >25g of fiber in a day, daily exercise, no smoking.     -- please see scheduling information provided below     Return to GI Clinic in PRN.    ______________________________________________________________________    How do I schedule labs, imaging studies, or procedures that were ordered in clinic today?     Labs: To schedule lab appointment at the [facility], use my chart or call [phone]. If you have a [facility] lab closer to home where you are regularly seen you can give them a call.     Procedures: If a colonoscopy, upper endoscopy, breath test, esophageal manometry, or pH impedence was ordered today, our endoscopy team will call you to schedule this. If you have not heard from our endoscopy team within a week, please call [phone] to schedule.     Imaging Studies: If you were scheduled for a CT scan, X-ray, MRI, ultrasound, HIDA scan or other imaging study, please call [phone] to have this scheduled.     Referral: If a referral to another specialty was ordered, expect a phone call or follow instructions above. If you have not heard from anyone regarding your referral in a week, please call our clinic to check the status.     Who do I call with any questions after my visit?  Please be in touch if there are any further questions that arise following today's visit.  There are multiple ways to contact your gastroenterology  care team.      During business hours, you may reach a Gastroenterology nurse at [phone]    To schedule or reschedule an appointment, please call [phone].     You can always send a secure message through Leanplum.  [employer]um messages are answered by your nurse or doctor typically within 24 hours.  Please allow extra time on weekends and holidays.      For urgent/emergent questions after business hours, you may reach the on-call GI Fellow by contacting the [facility]  at [phone].     How will I get the results of any tests ordered?    You will receive all of your results.  If you have signed up for goviralt, any tests ordered at your visit will be available to you after your provider reviews them.  Typically this takes 1-2 weeks.  If there are urgent results that require a change in your care plan, your provider or nurse will call you to discuss the next steps.      What is Leanplum?  Leanplum is a secure way for you to access all of your healthcare records from the [facility].  It is a web based computer program, so you can sign on to it from any location.  It also allows you to send secure messages to your care team.  I recommend signing up for Leanplum access if you have not already done so and are comfortable with using a computer.      How to I schedule a follow-up visit?  If you did not schedule a follow-up visit today, please call [phone] to schedule a follow-up office visit.      Sincerely,    Prieto Justice PA-C  Division of Gastroenterology, Hepatology & Nutrition  [facility]

## 2024-05-07 NOTE — PROGRESS NOTES
GI CLINIC VISIT    CC/REFERRING MD:  Alfonso Dorantes  REASON FOR CONSULTATION: LLQ abdominal pain    ASSESSMENT/PLAN:  77 y/o F presents for evaluation of LLQ abdominal pain and changing bowel pattern.     #LLQ abdominal pain  #constipation  #diverticulosis  Patient reports that a few months ago she developed severe LLQ abdominal pain -- was seen in the ED, CT was concerning for diverticulosis and constipation. She was started on miralax and metamucil and had significant relief with this. She is currently taking metamucil BID, and will have 1-2 BM daily that are formed and soft. Notes that she was taking large amounts of benadryl to treat itching from a yeast infection, and wonders if that may have caused her symptoms. She does have some LLQ abdominal pain with laying flat or on her left side, improved with passing gas -- I do wonder if she is still mildly constipated. Will plan to obtain AXR to evaluate further. Do not feel she warrants a colonoscopy at this time, as stool pattern has returned ton her baseline - she does have yearly FIT testing that has been negative. She has no other alarm symptoms. Will have increase metamucil to 2tbsp daily, may need to consider adding miralax if AXR concerning for constipation.   --obtain AXR to evaluate stool burden.  --increase metamucil to 2tbsp daily  --could consider adding miralax      RTC PRN    Thank you for this consultation.  It was a pleasure to participate in the care of this patient; please contact us with any further questions.       30 minutes spent on the date of the encounter doing chart review, review of outside records, review of test results, patient visit, and documentation    This note was created with voice recognition software, and while reviewed for accuracy, typos may remain.    Prieto Justice PA-C  Division of Gastroenterology, Hepatology and Nutrition  [facility]  77 y/o F presents for evaluation of  LLQ abdominal pain and changing bowel pattern.     Patient reports that she developed LLQ abdominal pain about two months ago - she was seen in the ED on 3/16/24, she had a CT abd/pelvis which showed concern for retained stool and diverticulosis. ER provider recommended use of metamucil 1 tsp BID - she notes that symptoms improved. She also took miralax for a couple of weeks, she did this feel this was helpful. Of note patient was being treated for a yeast infection, with diflucan and benadryl - and is wondering if the benadryl is what caused your constipation. She notes symptoms improved greatly with the use of metamucil. She notes that prior to her symptoms starting, she would have 1-2 BM daily, described as a bristol type 4. When her abdominal pain began, she was having harder stools that were more difficult to push, was still having a BM daily. Denies BRBPR. Since starting metamucil BID, she will generally have a BM 2x/d, soft but formed. Denies pushing/straining to have a BM. She has noticed LLQ abdominal pain has resolved, but has some LLQ abdominal pain that is present when she is laying flat or on her left side - notices pain resolves when she releases gas. Denies nausea or vomiting. Denies abdominal bloating currently.     Denies NSAIDs or Tylenol. Denies use of OTC herbal supplements/weight loss products.      Denies use of ETOH and tobacco products. No recreational drug use.     No family history of GI related malignancy (esophageal, gastric, pancreatic, liver or colon). Granddaughter has Crohn's disease. No family hx of celiac disease.     ROS:    No fevers or chills  No weight loss  No shortness of breath or wheezing  No chest pain or pressure  No odynophagia or dysphagia  No BRBPR, hematochezia, melena  No anxiety or depression    PROBLEM LIST  Patient Active Problem List    Diagnosis Date Noted    AK (actinic keratosis) 04/10/2024     Priority: Medium    Neoplasm of skin 04/10/2024     Priority:  Medium    HTN, goal below 140/80 03/20/2024     Priority: Medium    History of eyelid surgery 11/28/2023     Priority: Medium    Acute right ankle pain 04/12/2023     Priority: Medium    Ankle stiffness, right 04/12/2023     Priority: Medium    Dermatochalasis of both upper eyelids 10/05/2022     Priority: Medium     Added automatically from request for surgery [id number]      Epiretinal membrane, mild, of left eye 01/29/2020     Priority: Medium    Posterior capsular opacification visually significant of right eye 01/29/2020     Priority: Medium    S/P left knee arthroscopy 10/29/2019     Priority: Medium    Primary osteoarthritis of left knee 09/09/2019     Priority: Medium    Pseudophakia, Yag Caps, of both eyes 03/13/2019     Priority: Medium    Chondromalacia of patella, left 01/22/2019     Priority: Medium    Complex tear of medial meniscus of left knee as current injury, subsequent encounter 01/22/2019     Priority: Medium    Meibomian gland dysfunction 12/03/2018     Priority: Medium    Chronic otitis externa of left ear, unspecified type 03/08/2017     Priority: Medium    Dysthymic disorder 03/08/2017     Priority: Medium    Epiphora 10/12/2014     Priority: Medium    Hypermetropia 10/10/2013     Priority: Medium    Astigmatism with presbyopia 10/10/2013     Priority: Medium    Blepharitis of both eyes 10/10/2013     Priority: Medium    Seasonal allergic rhinitis 08/16/2013     Priority: Medium    Posterior vitreous detachment of both eyes 10/09/2012     Priority: Medium    Degenerative joint disease      Priority: Medium    CARDIOVASCULAR SCREENING; LDL GOAL LESS THAN 160 10/31/2010     Priority: Medium    Herpes simplex      Priority: Medium     Recurs left buttock  Zostavax 2/10  IMO update changed this record. Please review for accuracy      Allergic rhinitis      Priority: Medium       PERTINENT PAST MEDICAL HISTORY:    Past Medical History:   Diagnosis Date    Actinic keratosis     Allergic rhinitis      Cataract     Degenerative joint disease     cervical disease    Depression with anxiety     Herpes simplex     HTN, goal below 140/80 3/20/2024    Hypoglycemia     eats small meals and is fine    Impingement syndrome, shoulder     Neoplasm of skin 4/10/2024    Trimalleolar fracture of ankle, closed 02/08/2023       PREVIOUS SURGERIES:    Past Surgical History:   Procedure Laterality Date    ARTHROSCOPY KNEE Left 9/20/2019    Procedure: Left knee arthroscopy, partial medial meniscectomy and chondral debridement;  Surgeon: Nic Singh MD;  Location: [facility] OR    BLEPHAROPLASTY Bilateral 11/28/2022    Procedure: bilateral upper lid blepharoplasty;  Surgeon: Diego Vela MD;  Location: [facility] OR    CATARACT IOL, RT/LT Left 01/24/2019    COMBINED CYSTOSCOPY, URETEROSCOPY, LASER HOLMIUM LITHOTRIPSY URETER(S) Right 8/23/2018    Procedure: COMBINED CYSTOSCOPY, URETEROSCOPY, LASER HOLMIUM LITHOTRIPSY URETER(S);   Cystoscopy,Right ureteroscopy with laser lithotripsy and stent placement;  Surgeon: Pino Hawk MD;  Location: [facility] OR    HC ENLARGE BREAST WITH IMPLANT      HC LAP,FULGURATE/EXCISE LESIONS      HC REMOVAL OF BREAST IMPLANT      HYSTERECTOMY, PAP NO LONGER INDICATED  1998    ovaries and uterus out for benign reasons(fibroids and ovarian cyst)    LASER YAG CAPSULOTOMY  01/2020; 2/2020    left eye; right eye    PHACOEMULSIFICATION WITH STANDARD INTRAOCULAR LENS IMPLANT Left 1/24/2019    Procedure: PHACOEMULSIFICATION WITH STANDARD INTRAOCULAR LENS IMPLANT, LEFT;  Surgeon: Wagner Aguilera MD;  Location: [facility] OR    PHACOEMULSIFICATION WITH STANDARD INTRAOCULAR LENS IMPLANT Right 2/14/2019    Procedure: PHACOEMULSIFICATION WITH STANDARD INTRAOCULAR LENS IMPLANT, RIGHT;  Surgeon: Wagner Aguilera MD;  Location: [facility] OR    SINUS SURGERY         PREVIOUS ENDOSCOPY:  None. She has FIT testing yearly.    ALLERGIES:     Allergies   Allergen Reactions    Sulfa Antibiotics Swelling    Cats Swelling     Lips  swollen    Ciprofloxacin Hcl     Wool Fiber     Keflex [Cephalexin] Other (See Comments)     Yeast infection  Rash (?Hives)         PERTINENT MEDICATIONS:    Current Outpatient Medications:     atorvastatin (LIPITOR) 20 MG tablet, Take 1 tablet (20 mg) by mouth daily, Disp: 90 tablet, Rfl: 3    estradiol (VAGIFEM) 10 MCG TABS vaginal tablet, Insert 1 tablet intravaginally daily for 2 weeks, followed by twice weekly., Disp: 24 tablet, Rfl: 3    fluconazole (DIFLUCAN) 150 MG tablet, Take 1 tablet (150 mg) by mouth once a week for 4 doses, Disp: 4 tablet, Rfl: 0    fluticasone (FLONASE) 50 MCG/ACT nasal spray, Spray 2 sprays in nostril, Disp: , Rfl:     hydrOXYzine HCl (ATARAX) 25 MG tablet, Take 1 tablet (25 mg) by mouth nightly as needed for itching, Disp: 20 tablet, Rfl: 0    loratadine (CLARITIN) 10 MG tablet, Take 1 tablet (10 mg) by mouth daily, Disp: 90 tablet, Rfl: 0    polyethylene glycol (MIRALAX) 17 GM/Dose powder, Take 17 g (1 Capful) by mouth daily (Patient not taking: Reported on 3/28/2024), Disp: 510 g, Rfl: 0    valACYclovir (VALTREX) 500 MG tablet, Take 1 tablet (500 mg) by mouth daily, Disp: 90 tablet, Rfl: 3    SOCIAL HISTORY:    Social History     Socioeconomic History    Marital status: Single     Spouse name: Not on file    Number of children: Not on file    Years of education: Not on file    Highest education level: Not on file   Occupational History     Employer: [employer]   Tobacco Use    Smoking status: Never    Smokeless tobacco: Never   Vaping Use    Vaping status: Never Used   Substance and Sexual Activity    Alcohol use: No    Drug use: No    Sexual activity: Not Currently     Partners: Male     Birth control/protection: Post-menopausal   Other Topics Concern    Parent/sibling w/ CABG, MI or angioplasty before 65F 55M? No   Social History Narrative    Not on file     Social Determinants of Health     Financial Resource Strain: High Risk (12/30/2021)    Received from [facility], [facility]    Financial Resource Strain     Difficulty of Paying Living Expenses: Not on file     Difficulty of Paying Living Expenses: Not on file   Food Insecurity: Not on file   Transportation Needs: Not on file   Physical Activity: Not on file   Stress: Not on file   Social Connections: Unknown (12/30/2021)    [facility], [facility]    Social Connections     Frequency of Communication with Friends and Family: Not on file   Interpersonal Safety: Not At Risk (3/16/2024)    [facility]     Humiliation, Afraid, Rape, and Kick questionnaire     Fear of Current or Ex-Partner: No     Emotionally Abused: No     Physically Abused: No     Sexually Abused: No   Housing Stability: Not on file       FAMILY HISTORY:  FH of CRC: none  FH of IBD: none  Family History   Problem Relation Age of Onset    Hypertension Mother     Arthritis Mother     Cardiovascular Mother     Circulatory Mother     Heart Disease Mother     Lipids Mother     Obesity Mother     Osteoporosis Mother     Cancer Mother         skin    Glaucoma Mother     Cancer - colorectal Father     Cancer Father     Macular Degeneration Father     Diabetes No family hx of     Cerebrovascular Disease No family hx of     Thyroid Disease No family hx of        Past/family/social history reviewed and no changes    PHYSICAL EXAMINATION:  Constitutional: aaox3, cooperative, pleasant, not dyspneic/diaphoretic, no acute distress  Vitals reviewed: BP (!) 144/83 (BP Location: Left arm, Patient Position: Sitting, Cuff Size: Adult Regular)   Pulse 91   Wt 61.3 kg (135 lb 1.6 oz)   LMP  (LMP Unknown)   SpO2 96%   BMI 23.19 kg/m    Wt:   Wt Readings from Last 2 Encounters:   04/28/24 62.6 kg (138 lb)   04/05/24 63.2 kg (139 lb 6.4 oz)      Eyes: Sclera anicteric/injected  Respiratory:  Unlabored breathing  Abd: soft, Nondistended, nontender, no peritoneal signs  Skin: warm, perfused, no jaundice  Psych: Normal affect  MSK: Normal gait

## 2024-05-13 ENCOUNTER — ANCILLARY PROCEDURE (OUTPATIENT)
Dept: GENERAL RADIOLOGY | Facility: CLINIC | Age: 79
End: 2024-05-13
Attending: PHYSICIAN ASSISTANT
Payer: COMMERCIAL

## 2024-05-13 ENCOUNTER — LAB (OUTPATIENT)
Dept: LAB | Facility: CLINIC | Age: 79
End: 2024-05-13
Payer: COMMERCIAL

## 2024-05-13 ENCOUNTER — OFFICE VISIT (OUTPATIENT)
Dept: UROLOGY | Facility: CLINIC | Age: 79
End: 2024-05-13
Attending: EMERGENCY MEDICINE
Payer: COMMERCIAL

## 2024-05-13 DIAGNOSIS — Z87.442 HISTORY OF KIDNEY STONES: ICD-10-CM

## 2024-05-13 DIAGNOSIS — N39.41 URGE INCONTINENCE: ICD-10-CM

## 2024-05-13 DIAGNOSIS — M62.89 HIGH-TONE PELVIC FLOOR DYSFUNCTION: ICD-10-CM

## 2024-05-13 DIAGNOSIS — R10.32 ABDOMINAL PAIN, LEFT LOWER QUADRANT: ICD-10-CM

## 2024-05-13 DIAGNOSIS — N95.2 VAGINAL ATROPHY: ICD-10-CM

## 2024-05-13 DIAGNOSIS — N39.0 RECURRENT URINARY TRACT INFECTION: ICD-10-CM

## 2024-05-13 DIAGNOSIS — K59.00 CONSTIPATION, UNSPECIFIED CONSTIPATION TYPE: ICD-10-CM

## 2024-05-13 DIAGNOSIS — N39.0 RECURRENT UTI: Primary | ICD-10-CM

## 2024-05-13 DIAGNOSIS — R39.15 URINARY URGENCY: ICD-10-CM

## 2024-05-13 LAB
ALBUMIN UR-MCNC: NEGATIVE MG/DL
APPEARANCE UR: CLEAR
BILIRUB UR QL STRIP: NEGATIVE
COLOR UR AUTO: YELLOW
GLUCOSE UR STRIP-MCNC: NEGATIVE MG/DL
HGB UR QL STRIP: ABNORMAL
KETONES UR STRIP-MCNC: NEGATIVE MG/DL
LEUKOCYTE ESTERASE UR QL STRIP: NEGATIVE
NITRATE UR QL: NEGATIVE
PH UR STRIP: 6.5 [PH] (ref 5–7)
RBC #/AREA URNS AUTO: ABNORMAL /HPF
SKIP: ABNORMAL
SP GR UR STRIP: 1.02 (ref 1–1.03)
SQUAMOUS #/AREA URNS AUTO: ABNORMAL /LPF
UROBILINOGEN UR STRIP-MCNC: NORMAL MG/DL
WBC #/AREA URNS AUTO: ABNORMAL /HPF

## 2024-05-13 PROCEDURE — 74019 RADEX ABDOMEN 2 VIEWS: CPT | Mod: GC | Performed by: STUDENT IN AN ORGANIZED HEALTH CARE EDUCATION/TRAINING PROGRAM

## 2024-05-13 PROCEDURE — 51798 US URINE CAPACITY MEASURE: CPT | Performed by: PHYSICIAN ASSISTANT

## 2024-05-13 PROCEDURE — 99204 OFFICE O/P NEW MOD 45 MIN: CPT | Mod: 25 | Performed by: PHYSICIAN ASSISTANT

## 2024-05-13 PROCEDURE — 81001 URINALYSIS AUTO W/SCOPE: CPT

## 2024-05-13 NOTE — PROGRESS NOTES
Urology Clinic    Name: Cely Ontiveros    MRN: [id number]   YOB: 1945  Accompanied at today's visit by:self              Assessment and Plan:   78 year old female with hx of UTIs, hx of urethral dilation, hx of kidney stone extraction in 2018, high tone pelvic floor, myofascial tenderness, levator spasm, vaginal atrophy, hx of constipation, UUI    - PVR WNL   - UA shows 2-5 RBC. Discussed AUA guidelines for hematuria workup. Discussed potential etiologies of hematuria. Discussed hematuria work-up. Patient would like to hold off on further workup at this time and would like to repeat UA/micro at next encounter. Discussed with patient if persists consider workup at that time.  - offered CT urogram and cysto for hx of UTIs, however patient states he is doing well and would like to hold off on further workup at this time as symptoms well controlled with estrogen and probiotic. Recommend further workup if UTIs return.  - continue estrogen suppository  - continue probiotic.  - follow-up in 3 months with repeat UA/micro.  - discussed how high tone pelvic floor and constipation could contribute to change in stream. Discussed possible cysto given hx of urethral dilation. Since patient able to empty bladder well and stream angle not overly bothersome would like to continue to monitor.  - offered referral to PFPT and patient declined and plans to continue kegel exercises.  - continue fiber supplement per GI recommendations.   - consider repeat imaging in the future for hx of kidney stones.  - consider myrbetriq if UUI returns  - After discussing the assessment and plan with patient, patient verbalized understanding and agreed to the above plan. All questions answered.     31 minutes were spent today on the date of the encounter in reviewing the EMR, direct patient care, coordination of care and documentation in addition to exam, reviewing labs, reviewing urology notes from the past.     Clara Rios  MICHELLE  May 13, 2024    Patient Care Team:  Alfonso Dorantes APRN CNP as PCP - General (Nurse Practitioner Primary Care)  Diego Vela MD as MD (Ophthalmology)  Brandt Richardson OD as Referring Physician (Optometry)  Geovanni Kelley MD as Assigned Musculoskeletal Provider  Clara Rios PA-C as Physician Assistant  Alfonso Dorantes APRN CNP as Nurse Practitioner (Nurse Practitioner Primary Care)  Luis Byrnes MD as MD (Dermatology)  Leonila Flanagan APRN CNP as Nurse Practitioner (OB/Gyn)  Andrew Keller PA-C as Physician Assistant (Gastroenterology)  Alfonso Dorantes APRN CNP as Assigned PCP  Leonila Flanagan APRN CNP as Assigned OBGYN Provider  Hope Roberts PA-C as Assigned Surgical Provider  PATRICIO REZA          Chief Complaint:   Liliya          History of Present Illness:   May 13, 2024    HISTORY: Cely Ontiveros is a 78 year old female as a new consultation for concerns of hx of UTIs. She has a long history of urgency frequency, urge incontinence and nocturia per EMR.  She saw Dr Carballo in about 2013 for UTIs and hematuria.  She was then diagnosed with a 0.5cm kidney stone, was recommended ESWL and she did not do it. Then saw Dr. Hawk for her hx of stone and followed with Dr. Hawk for this in 2018 with stone extraction on 8/23/18. Has not been seen since that time. Of note, had recent CT on 3/16/24 that was unremarkable from urologic standpoint and no stones present. States she has had urethral stretching in the past which she thinks helped with her UTIs. Typical UTI symptoms include burning with urination, left lower abdominal pain and increased urinary urgency/frequency. Denies gross hematuria except when had kidney stone per patient. States she saw her PCP and an OB/GYN recently for UTIs. Was started on estrogen suppository and probiotic. States since starting these therapies she has not had any further UTIs and is pleased by this. When not having UTI symptoms reports voiding once very  couple hours. Does report urinary urgency and UUI, though has not had incontinence since resuming kegel exercises. Does report different angle of stream, but feels like she empties completely. Not sexually active. No prolapse symptoms. No hx of smoking. Hx of constipation. Recently started metamucil and has been helping. Also recently saw GI who thought she could increase the metamucil dose. PMH includes skin cancer, dysthymic disorder, DJD, herpes simpelx, HTN. PSH includes knee surgery, breast surgery, total hysterectomy, eye surgeries, sinus surgery. Patient voices no other concerns at this time.            Past Medical History:     Past Medical History:   Diagnosis Date    Actinic keratosis     Allergic rhinitis     Cataract     Degenerative joint disease     cervical disease    Depression with anxiety     Herpes simplex     HTN, goal below 140/80 3/20/2024    Hypoglycemia     eats small meals and is fine    Impingement syndrome, shoulder     Neoplasm of skin 4/10/2024    Trimalleolar fracture of ankle, closed 02/08/2023            Past Surgical History:     Past Surgical History:   Procedure Laterality Date    ARTHROSCOPY KNEE Left 9/20/2019    Procedure: Left knee arthroscopy, partial medial meniscectomy and chondral debridement;  Surgeon: Nic Singh MD;  Location: [facility] OR    BLEPHAROPLASTY Bilateral 11/28/2022    Procedure: bilateral upper lid blepharoplasty;  Surgeon: Diego Vela MD;  Location: [facility]    CATARACT IOL, RT/LT Left 01/24/2019    COMBINED CYSTOSCOPY, URETEROSCOPY, LASER HOLMIUM LITHOTRIPSY URETER(S) Right 8/23/2018    Procedure: COMBINED CYSTOSCOPY, URETEROSCOPY, LASER HOLMIUM LITHOTRIPSY URETER(S);   Cystoscopy,Right ureteroscopy with laser lithotripsy and stent placement;  Surgeon: Pino Hawk MD;  Location: [facility] OR    HC ENLARGE BREAST WITH IMPLANT      HC LAP,FULGURATE/EXCISE LESIONS      HC REMOVAL OF BREAST IMPLANT      HYSTERECTOMY, PAP NO LONGER INDICATED  1998    ovaries  and uterus out for benign reasons(fibroids and ovarian cyst)    LASER YAG CAPSULOTOMY  01/2020; 2/2020    left eye; right eye    PHACOEMULSIFICATION WITH STANDARD INTRAOCULAR LENS IMPLANT Left 1/24/2019    Procedure: PHACOEMULSIFICATION WITH STANDARD INTRAOCULAR LENS IMPLANT, LEFT;  Surgeon: Wagner Aguilera MD;  Location: [facility]    PHACOEMULSIFICATION WITH STANDARD INTRAOCULAR LENS IMPLANT Right 2/14/2019    Procedure: PHACOEMULSIFICATION WITH STANDARD INTRAOCULAR LENS IMPLANT, RIGHT;  Surgeon: Wagner Aguilera MD;  Location: [facility]    SINUS SURGERY              Social History:     Social History     Tobacco Use    Smoking status: Never    Smokeless tobacco: Never   Substance Use Topics    Alcohol use: No            Family History:     Family History   Problem Relation Age of Onset    Hypertension Mother     Arthritis Mother     Cardiovascular Mother     Circulatory Mother     Heart Disease Mother     Lipids Mother     Obesity Mother     Osteoporosis Mother     Cancer Mother         skin    Glaucoma Mother     Cancer - colorectal Father     Cancer Father     Macular Degeneration Father     Diabetes No family hx of     Cerebrovascular Disease No family hx of     Thyroid Disease No family hx of               Allergies:     Allergies   Allergen Reactions    Sulfa Antibiotics Swelling    Cats Swelling     Lips swollen    Ciprofloxacin Hcl     Wool Fiber     Keflex [Cephalexin] Other (See Comments)     Yeast infection  Rash (?Hives)              Medications:     Current Outpatient Medications   Medication Sig Dispense Refill    atorvastatin (LIPITOR) 20 MG tablet Take 1 tablet (20 mg) by mouth daily 90 tablet 3    estradiol (VAGIFEM) 10 MCG TABS vaginal tablet Insert 1 tablet intravaginally daily for 2 weeks, followed by twice weekly. 24 tablet 3    fluticasone (FLONASE) 50 MCG/ACT nasal spray Spray 2 sprays in nostril      hydrOXYzine HCl (ATARAX) 25 MG tablet Take 1 tablet (25 mg) by mouth nightly as  needed for itching 20 tablet 0    loratadine (CLARITIN) 10 MG tablet Take 1 tablet (10 mg) by mouth daily 90 tablet 0    polyethylene glycol (MIRALAX) 17 GM/Dose powder Take 17 g (1 Capful) by mouth daily 510 g 0    valACYclovir (VALTREX) 500 MG tablet Take 1 tablet (500 mg) by mouth daily 90 tablet 3    fluconazole (DIFLUCAN) 150 MG tablet Take 1 tablet (150 mg) by mouth once a week for 4 doses (Patient not taking: Reported on 5/7/2024) 4 tablet 0     No current facility-administered medications for this visit.             Review of Systems:    ROS: 14 point ROS neg other than the symptoms noted above in the HPI.          Physical Exam:   not currently breastfeeding.  Data Unavailable, There is no height or weight on file to calculate BMI., 0 lbs 0 oz  Gen appearance: Age-appropriate appearing female in NAD.   HEENT:  EOMI, conjunctiva clear/white. Normal ROM of neck for age.   Psych:  alert , In no acute distress.  Neuro:  A&Ox3  Skin:  Clear of obvious rashes, ecchymoses.  Resp:  Normal respiratory effort; not in acute respiratory distress.   Vasc:  Regular rate.  lymph:  No obvious LE edema bilaterally.     exam:  Vulva is normal in appearance. Urethra normal.. Negative for NEIL with reduction of speculum when instructed to cough. Vaginal mucosa atrophic. High tone pelvic floor with myofacial tenderness and left levator spasm to palpation. No obvious masses, lesions, ulcers, bleeding noted on internal or external exam.          Data:    PVR  0ml    Labs:  UA RESULTS:  Recent Labs   Lab Test 05/13/24  1028 03/28/24  1111   COLOR Yellow Yellow   APPEARANCE Clear Clear   URINEGLC Negative Negative   URINEBILI Negative Negative   URINEKETONE Negative Negative   SG 1.017 1.020   UBLD Trace* Negative   URINEPH 6.5 6.5   PROTEIN Negative 30*   UROBILINOGEN  --  0.2   NITRITE Negative Negative   LEUKEST Negative Moderate*   RBCU 2-5* 0-2   WBCU None Seen 10-25*       UC   3/28/24 mixed [place]  2/5/24>100,000 Proteus  mirabilis (resistant to macrobid)  1/24/24 50,000-100,000 E. Coli (pansensitive)  10/26/23 50,000-100,000 E. Coli (pansensitive)    Creatinine   Date Value Ref Range Status   03/28/2024 0.83 0.51 - 0.95 mg/dL Final   01/20/2021 0.83 0.52 - 1.04 mg/dL Final       Office Visit on 04/10/2024   Component Date Value Ref Range Status    Case Report 04/10/2024    Final                    Value:Surgical Pathology Report                         Case: [id number]                                  Authorizing Provider:  Hope Roberts PA-C     Collected:           04/10/2024 03:41 PM          Ordering Location:     [facility]   Received:            04/10/2024 04:39 PM                                 [facility]                                                                  Pathologist:           Shaquille Puri MD                                                         Specimen:    Skin, L anterior distal lower leg                                                          Final Diagnosis 04/10/2024    Final                    Value:This result contains rich text formatting which cannot be displayed here.    Clinical Information 04/10/2024    Final                    Value:This result contains rich text formatting which cannot be displayed here.    Gross Description 04/10/2024    Final                    Value:This result contains rich text formatting which cannot be displayed here.    Microscopic Description 04/10/2024    Final                    Value:This result contains rich text formatting which cannot be displayed here.    Performing Labs 04/10/2024    Final                    Value:This result contains rich text formatting which cannot be displayed here.       Imaging:  3/16/24  MPRESSION:    1.  No acute finding in the abdomen or pelvis.    2.  No upper urinary tract stone or dilation.    3.  Essentially unchanged hypodense lesions in the liver, likely benign cysts.    4.  Distal colonic diverticula,  no diverticulitis. At least moderately extensive retained stool in parts of the colon.    REPORT SIGNED BY DR. Luis Roberts  Narrative    EXAM: CT ABDOMEN & PELVIS W/O ORAL W/O IV CON    DATE: 3/16/2024 10:36 AM    CLINICAL DATA: Left lower quadrant pain.    COMPARISON: 8/11/2018    TECHNIQUE: Axial, sagittal, and coronal images of the abdomen and pelvis were reconstructed from helical data acquired without contrast administration.    FINDINGS: Lung bases are clear.    2.2 cm hypodense lesion with a few punctate calcifications at its periphery near dome of the right hepatic lobe unchanged. A few smaller hypodensities scattered elsewhere in the liver are also similar to before, probable cysts.    Partially contracted gallbladder, spleen, pancreas, and adrenal glands have a normal noncontrast appearance.    No renal or ureteral stone on either side. No hydronephrosis, hydroureter, or surrounding inflammation. No abnormal renal parenchymal lesion or cortical scar by these noncontrast images.    Stomach, small bowel, and the appendix are normal. At least moderately extensive retained stool in parts of the colon. There are diverticula along distal descending and sigmoid colon, no adjacent inflammation. No abnormally thick walled or dilated bowel. No pneumatosis or pneumoperitoneum.    Decompressed urinary bladder has normal contours. No bladder stone. Hysterectomy, no adnexal abnormality.    No fluid collection, mass, or lymphadenopathy. Mild aortic and arterial calcifications from atherosclerosis, no aneurysm.    Disc and facet degeneration along the imaged spine. Bilateral spondylolysis redemonstrated at L5 with grade I anterolisthesis L5 on S1 similar to before. No new or acute bone finding.

## 2024-05-13 NOTE — PATIENT INSTRUCTIONS
UTI Prevention:    - Recommend cranberry supplement 1gm twice-daily or Vitamin C 1 gm twice daily.   - AZO as needed; if symptoms do not improve after 24-48 hours after taking AZO as needed advise contacting clinic.   - Probiotic daily; recommend for Florajen 3 or any womens probiotic will do  - Make sure you stay hydrated with about 60-80oz of water per day.   - Void after sexual intercourse.   - Recommend good vulvar hygiene such as wearing loose cotton underwear and avoiding scented hygenic products/wipes/soaps or detergents. Wipe front to back after voiding/defecation. Avoid sitting in soiled clothing and keeping vulva dry.   - If you develop symptoms of UTI, please contact clinic. However, if you develop fevers  greater than 100.4 degrees fahrenheit, flank pain or blood in your urine, recommend going to urgent care or ER.   - Make sure to drink plenty of water each day and to really push fluids when have symptoms of a UTI.  -continue estrogen suppositories as helps prevent UTI.  - D-mannose 2gm daily.   _______________________________________________

## 2024-05-13 NOTE — NURSING NOTE
Cely Ontiveros's goals for this visit include:   Chief Complaint   Patient presents with    UTI     Recurrent UTI       She requests these members of her care team be copied on today's visit information:     PCP: Alfonso Dorantes    Referring Provider:  Nishant Frost PA-C  [address] [place] [address]    LMP  (LMP Unknown)     Do you need any medication refills at today's visit? Soledad Almendarez [facility]

## 2024-05-17 PROBLEM — M25.571 ACUTE RIGHT ANKLE PAIN: Status: RESOLVED | Noted: 2023-04-12 | Resolved: 2024-05-17

## 2024-05-17 NOTE — PROGRESS NOTES
DISCHARGE  Reason for Discharge: Patient has failed to schedule further appointments.    Equipment Issued: home exercise program    Discharge Plan: Patient to continue home program.    Referring Provider:  Dr. Geovanni Kelley MD       05/24/23 0500   Appointment Info   Signing clinician's name / credentials Kalen Cunningham DPT   Total/Authorized Visits 8 per PT POC   Visits Used 7   Medical Diagnosis Closed fracture of right ankle, initial encounter  Acute right ankle pain  Ankle stiffness, right   PT Tx Diagnosis R ankle pain and stiffness   Quick Adds Certification   Progress Note/Certification   Start of Care Date 04/12/23   Onset of illness/injury or Date of Surgery 02/08/23   Therapy Frequency 1x daily/2 weeks   Predicted Duration 4 weeks   Certification date from 04/12/23   Certification date to 07/12/23   Progress Note Due Date 05/23/23   Progress Note Completed Date 05/17/23   GOALS   PT Goals 2   PT Goal 1   Goal Identifier Ambulation   Goal Description The patient will ambulate for 30 minutes independently without the use of a brace utilizing proper gait mechanics   Rationale to maximize safety and independence with transportation;to maximize safety and independence within the community;to maximize safety and independence within the home;to maximize safety and independence with performance of ADLs and functional tasks   Goal Progress The patient currently ambulates 30 minutes with an orthotic lift in her R shoe noting a pain level of 1/10   Target Date 07/05/23   Date Met 05/24/23   PT Goal 2   Goal Identifier Stairs   Goal Description The patient will ascend/descend 12 steps with a normal reciprocal pattern utilizing a railing on the R   Rationale to maximize safety and independence with transportation;to maximize safety and independence within the community;to maximize safety and independence within the home;to maximize safety and independence with performance of ADLs and functional tasks    Goal Progress The patient currently ascends/descends 28 steps with a normal reciprocal pattern independently noting a pain level of 1/10   Target Date 07/05/23   Date Met 05/24/23   Subjective Report   Subjective Report The patient presents to the clinic stating she is somewhat disappointed that her ankle has not improved more when viewed through imaging, but states that she feels she continues to improve. She reports she has been walking and ascending/descending stairs independently, has been going to the gym to exercise, and feels she is able to perform the majority of her daily tasks without difficulty. Notes she would like to try uldrasound at her next appointment.   Treatment Interventions (PT)   Interventions Therapeutic Procedure/Exercise;Neuromuscular Re-education;Therapeutic Activity   Therapeutic Procedure/Exercise   Therapeutic Procedures: strength, endurance, ROM, flexibility minutes (15767) 19   Ther Proc 1 plantarflexion stretch   Ther Proc 1 - Details x45s in long sitting and in standing with PT overpressure   PTRx Ther Proc 1 Standing IT Band Stretch Using Wall   PTRx Ther Proc 1 - Details x30s   PTRx Ther Proc 2 Standing Gastroc Stretch   PTRx Ther Proc 2 - Details x45s   PTRx Ther Proc 3 Hip AROM Standing Abduction   PTRx Ther Proc 3 - Details 2x10   PTRx Ther Proc 4 Walking Backwards   PTRx Ther Proc 4 - Details x60s   PTRx Ther Proc 5 Tandem Stance   PTRx Ther Proc 5 - Details x30s each   PTRx Ther Proc 6 Ankle Eversion Strengthening With Theraband   PTRx Ther Proc 6 - Details green TBx20   PTRx Ther Proc 7 Theraband Ankle Inversion   PTRx Ther Proc 7 - Details green TBx20   PTRx Ther Proc 8 Theraband Ankle Dorsiflexion   PTRx Ther Proc 8 - Details black TB x20   PTRx Ther Proc 9 Theraband Ankle Plantarflexion   PTRx Ther Proc 9 - Details black TB x20   PTRx Ther Proc 10 Repeated Hip External Rotation with Overpressure in Sitting   PTRx Ther Proc 10 - Details x45s   PTRx Ther Proc 11 Charisse  Gather   PTRx Ther Proc 11 - Details x60s   Therapeutic Activity   Therapeutic Activities: dynamic activities to improve functional performance minutes (56387) 8   Ther Act 1 Stair navigation   Ther Act 1 - Details 5x7, 1x28 in a normal reciprocal pattern without the use of a railing   Skilled Intervention SBAx1, verbal and visual cuing given to limit required knee flexion by performing ankle plantarflexion   Patient Response/Progress Patient notes <1/10 pain   Manual Therapy   Manual Therapy: Mobilization, MFR, MLD, friction massage minutes (88721) 11   Manual Therapy 1 Soft tissue mobilization to R anterior ankle   Manual Therapy 1 - Details utilizing lacrosse ball with sustained ankle plantarflexion   Skilled Intervention Patient education related to perfroming self soft tissue mobilization   Patient Response/Progress Patient notes she would like to continue with massage after her discharge   Plan   Plan for next session Asses for potential discharge   Total Session Time   Timed Code Treatment Minutes 38   Total Treatment Time (sum of timed and untimed services) 38

## 2024-05-20 ENCOUNTER — ANCILLARY PROCEDURE (OUTPATIENT)
Dept: BONE DENSITY | Facility: CLINIC | Age: 79
End: 2024-05-20
Payer: COMMERCIAL

## 2024-05-20 DIAGNOSIS — E28.39 ESTROGEN DEFICIENCY: ICD-10-CM

## 2024-05-20 PROCEDURE — 77080 DXA BONE DENSITY AXIAL: CPT | Performed by: RADIOLOGY

## 2024-05-20 PROCEDURE — 77081 DXA BONE DENSITY APPENDICULR: CPT | Mod: XU | Performed by: RADIOLOGY

## 2024-05-28 ENCOUNTER — TELEPHONE (OUTPATIENT)
Dept: GASTROENTEROLOGY | Facility: CLINIC | Age: 79
End: 2024-05-28
Payer: COMMERCIAL

## 2024-05-28 DIAGNOSIS — K59.00 CONSTIPATION, UNSPECIFIED CONSTIPATION TYPE: Primary | ICD-10-CM

## 2024-05-28 NOTE — TELEPHONE ENCOUNTER
Call back to patient who is reporting that she is still feeling constipated. She reports being very active and drinking lots of water daily. She is also taking a fiber supplement daily and the miralax 2 capfuls daily as directed. She is having 2-3 small soft bowel movements daily. She feels like she is not emptying completely. States that she has been having abdominal pain as well like she used to when she was constipated. Writer will reach out to provider and call patient back.  Sosa Rutledge RN

## 2024-05-28 NOTE — TELEPHONE ENCOUNTER
Prieto Justice PA-C Heckt, Angie, RN  Caller: Unspecified (Today, 12:07 PM)  Ish Swan,    We can have her repeat an AXR to see her stool burden? I think a colonoscopy would be unlikely to be beneficial. It sounds like she is having BM daily, and it may be a pelvic floor issue. She can certainly follow up with the pelvic floor center to evaluate for pelvic floor dysfunction. I reviewed last CT and it looked like she was quite constipated on that.    Call to update patient on the above info from provider. She will schedule an xray and then is interested in a referral to the pelvic floor center.  Writer will update provider.  Sosa Rutledge RN

## 2024-05-29 ENCOUNTER — DOCUMENTATION ONLY (OUTPATIENT)
Dept: GASTROENTEROLOGY | Facility: CLINIC | Age: 79
End: 2024-05-29

## 2024-05-29 ENCOUNTER — ANCILLARY PROCEDURE (OUTPATIENT)
Dept: GENERAL RADIOLOGY | Facility: CLINIC | Age: 79
End: 2024-05-29
Attending: PHYSICIAN ASSISTANT
Payer: COMMERCIAL

## 2024-05-29 DIAGNOSIS — K59.00 CONSTIPATION, UNSPECIFIED CONSTIPATION TYPE: ICD-10-CM

## 2024-05-29 PROCEDURE — 74019 RADEX ABDOMEN 2 VIEWS: CPT | Mod: TC | Performed by: RADIOLOGY

## 2024-05-29 NOTE — PROGRESS NOTES
Per Sosa Rutledge RN's request, [facility] faxed Pelvic Floor Center (PFC) referral and last office note to the [facility] at [phone] on May 29, 2024.     Sosa Rutledge RN  P [facility] Front End Staff_Betty Robbins  Hi there, can you please enter a referral for the pelvic floor center for possible pelvic floor dysfunstion per Prieto.  Thanks,  Sosa Rutledge RN      The fax was confirmed successful on our end through [employer].  Order scanned into patient's chart. Notified patient that their order was faxed to [facility].    Lilian White [facility]

## 2024-06-10 ENCOUNTER — OFFICE VISIT (OUTPATIENT)
Dept: FAMILY MEDICINE | Facility: CLINIC | Age: 79
End: 2024-06-10
Payer: COMMERCIAL

## 2024-06-10 VITALS
WEIGHT: 137 LBS | HEART RATE: 85 BPM | SYSTOLIC BLOOD PRESSURE: 138 MMHG | HEIGHT: 64 IN | DIASTOLIC BLOOD PRESSURE: 63 MMHG | BODY MASS INDEX: 23.39 KG/M2 | OXYGEN SATURATION: 100 % | TEMPERATURE: 98 F | RESPIRATION RATE: 16 BRPM

## 2024-06-10 DIAGNOSIS — Z13.1 SCREENING FOR DIABETES MELLITUS: ICD-10-CM

## 2024-06-10 DIAGNOSIS — I10 HTN, GOAL BELOW 140/90: ICD-10-CM

## 2024-06-10 DIAGNOSIS — M85.88 OSTEOPENIA OF LUMBAR SPINE: ICD-10-CM

## 2024-06-10 DIAGNOSIS — K59.01 SLOW TRANSIT CONSTIPATION: Primary | ICD-10-CM

## 2024-06-10 DIAGNOSIS — R25.1 SHAKINESS: ICD-10-CM

## 2024-06-10 DIAGNOSIS — E78.5 DYSLIPIDEMIA: ICD-10-CM

## 2024-06-10 LAB — HBA1C MFR BLD: 5.4 % (ref 0–5.6)

## 2024-06-10 PROCEDURE — 80061 LIPID PANEL: CPT

## 2024-06-10 PROCEDURE — 83036 HEMOGLOBIN GLYCOSYLATED A1C: CPT

## 2024-06-10 PROCEDURE — 99214 OFFICE O/P EST MOD 30 MIN: CPT

## 2024-06-10 PROCEDURE — 80048 BASIC METABOLIC PNL TOTAL CA: CPT

## 2024-06-10 PROCEDURE — G2211 COMPLEX E/M VISIT ADD ON: HCPCS

## 2024-06-10 PROCEDURE — 36415 COLL VENOUS BLD VENIPUNCTURE: CPT

## 2024-06-10 ASSESSMENT — PAIN SCALES - GENERAL: PAINLEVEL: NO PAIN (0)

## 2024-06-10 NOTE — PROGRESS NOTES
Assessment & Plan     Slow transit constipation  - reports constipation is (finally) improving with dietary changes  - developed significant side effects after taking 2-3 cap fulls of Miralax per day (as prescribed by GI per patient's report), so she stopped this and started different dietary changes  - no concerns today   - advised if symptoms return or worsen to follow up prn with GI vs myself  - REVIEW OF HEALTH MAINTENANCE PROTOCOL ORDERS    Shakiness  - reports when taking higher dose of Miralax started to feel shaky, pt requesting her electrolytes checked and screen for diabetes today though symptoms have improved  - Basic metabolic panel  (Ca, Cl, CO2, Creat, Gluc, K, Na, BUN)  - Hemoglobin A1c  - advised to stop OTC potassium and monitor sodium intake as she was increasing these due to how she was feeling, will consider supplements prn pending results  - if symptoms return, advised to notify the clinic / schedule follow up for further workup    HTN, goal below 140/90  - stable today   - Basic metabolic panel  (Ca, Cl, CO2, Creat, Gluc, K, Na, BUN)    Dyslipidemia  - on statin, inquiring if she can stop this medication, no SE  - last lipid panel was showing well controlled cholesterol   - advised fasting labs, pt declined and requested non-fasting labs today instead  - Lipid panel reflex to direct LDL Non-fasting    Osteopenia of lumbar spine  - reviewed dexa scan with patient and apologized as when I interpreted the results I accidentally reviewed the FRAX scores incorrectly, updated pt of correct interpretation  - advised to continue lifestyle modifications include ensuring adequate consumption of calcium and Vitamin D, getting adequate amounts of exercise, smoking cessation if you smoke, fall prevention, and avoidance of heavy alcohol use. The general goal for women is we should be getting 1,200mg of elemental calcium daily (supplementation plus diet) and 800 international units of vitamin D daily   -  "can consider rechecking dexa in 2 years w/ substantial decrease in density of lumbar spine    Screening for diabetes mellitus  - Hemoglobin A1c    RTC prn. The patient verbalized understanding and agreement with the plan today and has no additional questions or concerns at this time.    Bimal Ta is a 78 year old, presenting for the following health issues:  Follow Up and bone density scan        6/10/2024    10:27 AM   Additional Questions   Roomed by reynaldo   Accompanied by self     History of Present Illness       Reason for visit:  Bone density consult    She eats 4 or more servings of fruits and vegetables daily.She consumes 0 sweetened beverage(s) daily.She exercises with enough effort to increase her heart rate 60 or more minutes per day.  She exercises with enough effort to increase her heart rate 6 days per week.   She is taking medications regularly.     Constipation - taking miralax, but was feeling shaky with this as she was taking 2-3 cap fulls per day per GI (per patient). Instead, she started changing diet which has been helping. Has been taking OTC potassium supplement daily for the shakiness and reports the symptoms have resolved. Reports they stopped after she stopped the miralax. Her constipation has been well managed with her dietary changes. No chest pain, abdominal pain, shortness of breath, urinary changes, palpations, or edema. Was having some soreness around her anal area, reports this has been improving.     Review of Systems  Constitutional, neuro, ENT, endocrine, pulmonary, cardiac, gastrointestinal, genitourinary, musculoskeletal, integument and psychiatric systems are negative, except as otherwise noted.      Objective    /63 (BP Location: Left arm, Patient Position: Sitting, Cuff Size: Adult Regular)   Pulse 85   Temp 98  F (36.7  C) (Oral)   Resp 16   Ht 1.626 m (5' 4\")   Wt 62.1 kg (137 lb)   LMP  (LMP Unknown)   SpO2 100%   BMI 23.52 kg/m    Body mass index " is 23.52 kg/m .  Physical Exam     General:  awake, alert, NAD. Non-toxic  HEENT:  normocephalic. No conjunctivitis or rhinorrhea  Neck:  supple, FROM  Pulm: Unlabored, regular rate. CTAB, full air movement  CV: S1/S2, rrr, no m/r/g  MSK: steady gait, FROM  /rectal: pt declines  Derm: no rashes or erythema  Neuro: clear and logical thought and speech  Psych: calm mood and congruent affect    Signed Electronically by: AUDREY Dunlap CNP

## 2024-06-11 LAB
ANION GAP SERPL CALCULATED.3IONS-SCNC: 11 MMOL/L (ref 7–15)
BUN SERPL-MCNC: 18.3 MG/DL (ref 8–23)
CALCIUM SERPL-MCNC: 9.8 MG/DL (ref 8.8–10.2)
CHLORIDE SERPL-SCNC: 101 MMOL/L (ref 98–107)
CHOLEST SERPL-MCNC: 155 MG/DL
CREAT SERPL-MCNC: 0.8 MG/DL (ref 0.51–0.95)
DEPRECATED HCO3 PLAS-SCNC: 27 MMOL/L (ref 22–29)
EGFRCR SERPLBLD CKD-EPI 2021: 74 ML/MIN/1.73M2
FASTING STATUS PATIENT QL REPORTED: NO
FASTING STATUS PATIENT QL REPORTED: NO
GLUCOSE SERPL-MCNC: 89 MG/DL (ref 70–99)
HDLC SERPL-MCNC: 80 MG/DL
LDLC SERPL CALC-MCNC: 42 MG/DL
NONHDLC SERPL-MCNC: 75 MG/DL
POTASSIUM SERPL-SCNC: 4.9 MMOL/L (ref 3.4–5.3)
SODIUM SERPL-SCNC: 139 MMOL/L (ref 135–145)
TRIGL SERPL-MCNC: 164 MG/DL

## 2024-07-02 ENCOUNTER — OFFICE VISIT (OUTPATIENT)
Dept: FAMILY MEDICINE | Facility: CLINIC | Age: 79
End: 2024-07-02
Payer: COMMERCIAL

## 2024-07-02 VITALS
SYSTOLIC BLOOD PRESSURE: 136 MMHG | BODY MASS INDEX: 23.29 KG/M2 | HEIGHT: 64 IN | OXYGEN SATURATION: 98 % | HEART RATE: 81 BPM | DIASTOLIC BLOOD PRESSURE: 77 MMHG | TEMPERATURE: 97.6 F | WEIGHT: 136.4 LBS | RESPIRATION RATE: 16 BRPM

## 2024-07-02 DIAGNOSIS — N95.2 VAGINAL ATROPHY: ICD-10-CM

## 2024-07-02 DIAGNOSIS — I10 HTN, GOAL BELOW 140/90: ICD-10-CM

## 2024-07-02 DIAGNOSIS — N39.0 FREQUENT UTI: ICD-10-CM

## 2024-07-02 DIAGNOSIS — Z00.00 ENCOUNTER FOR MEDICARE ANNUAL WELLNESS EXAM: Primary | ICD-10-CM

## 2024-07-02 DIAGNOSIS — Z91.89 FRAMINGHAM CARDIAC RISK >20% IN NEXT 10 YEARS: ICD-10-CM

## 2024-07-02 DIAGNOSIS — Z12.31 ENCOUNTER FOR SCREENING MAMMOGRAM FOR BREAST CANCER: ICD-10-CM

## 2024-07-02 DIAGNOSIS — Z12.11 COLON CANCER SCREENING: ICD-10-CM

## 2024-07-02 PROCEDURE — 99213 OFFICE O/P EST LOW 20 MIN: CPT | Mod: 25

## 2024-07-02 PROCEDURE — G0439 PPPS, SUBSEQ VISIT: HCPCS

## 2024-07-02 RX ORDER — ESTRADIOL 10 UG/1
INSERT VAGINAL
Qty: 24 TABLET | Refills: 3 | Status: CANCELLED | OUTPATIENT
Start: 2024-07-02

## 2024-07-02 RX ORDER — ATORVASTATIN CALCIUM 20 MG/1
20 TABLET, FILM COATED ORAL DAILY
Qty: 90 TABLET | Refills: 3 | Status: SHIPPED | OUTPATIENT
Start: 2024-07-02

## 2024-07-02 RX ORDER — LORATADINE 10 MG/1
10 TABLET ORAL DAILY
Qty: 90 TABLET | Refills: 0 | Status: CANCELLED | OUTPATIENT
Start: 2024-07-02

## 2024-07-02 SDOH — HEALTH STABILITY: PHYSICAL HEALTH: ON AVERAGE, HOW MANY DAYS PER WEEK DO YOU ENGAGE IN MODERATE TO STRENUOUS EXERCISE (LIKE A BRISK WALK)?: 6 DAYS

## 2024-07-02 SDOH — HEALTH STABILITY: PHYSICAL HEALTH: ON AVERAGE, HOW MANY MINUTES DO YOU ENGAGE IN EXERCISE AT THIS LEVEL?: 70 MIN

## 2024-07-02 ASSESSMENT — PAIN SCALES - GENERAL: PAINLEVEL: NO PAIN (0)

## 2024-07-02 ASSESSMENT — SOCIAL DETERMINANTS OF HEALTH (SDOH): HOW OFTEN DO YOU GET TOGETHER WITH FRIENDS OR RELATIVES?: MORE THAN THREE TIMES A WEEK

## 2024-07-02 NOTE — PROGRESS NOTES
Preventive Care Visit  [facility]  AUDREY Dunlap CNP, Nurse Practitioner Primary Care  Jul 2, 2024    Assessment & Plan     Encounter for Medicare annual wellness exam  - PRIMARY CARE FOLLOW-UP SCHEDULING    HTN, goal below 140/90  - well controlled, not currently on medications     [place] cardiac risk >20% in next 10 years  - on statin, last lipid panel shows well controlled LDL with elevated triglycerides, but pt not fasting   - atorvastatin (LIPITOR) 20 MG tablet  Dispense: 90 tablet; Refill: 3  - The uses and side effects, including black box warnings as appropriate, were discussed in detail.  All patient questions were answered.  The patient was instructed to call immediately if any side effects developed.    Frequent UTI  - well controlled  - reviewed POC per urology, upcoming apt in Sept for hematuria follow up   - working on home pelvic floor PT    Vaginal atrophy  - well controlled with vaginal estrogen  - followed by urology and OBGYN     Encounter for screening mammogram for breast cancer  - MA Screen Bilateral w/Mahad    Colon cancer screening  - Fecal colorectal cancer screen (FIT)    Counseling  Appropriate preventive services were discussed with this patient, including applicable screening as appropriate for fall prevention, nutrition, physical activity, Tobacco-use cessation, weight loss and cognition.  Checklist reviewing preventive services available has been given to the patient.  Reviewed patient's diet, addressing concerns and/or questions.     RTC in 1 year for annual wellness, prn sooner. The patient verbalized understanding and agreement with the plan today and has no additional questions or concerns at this time.    Bimal Ta is a 78 year old, presenting for the following:  Physical        7/2/2024    10:25 AM   Additional Questions   Roomed by DM   Accompanied by self        Health Care Directive  Patient does not have a Health Care  Directive or Living Will: Discussed advance care planning with patient; information given to patient to review.    HPI        7/2/2024   General Health   How would you rate your overall physical health? Good   Feel stress (tense, anxious, or unable to sleep) Not at all          7/2/2024   Nutrition   Diet: Regular (no restrictions)          7/2/2024   Exercise   Days per week of moderate/strenous exercise 6 days   Average minutes spent exercising at this level 70 min          7/2/2024   Social Factors   Frequency of gathering with friends or relatives More than three times a week   Worry food won't last until get money to buy more No   Food not last or not have enough money for food? No   Do you have housing? (Housing is defined as stable permanent housing and does not include staying ouside in a car, in a tent, in an abandoned building, in an overnight shelter, or couch-surfing.) Yes   Are you worried about losing your housing? No   Lack of transportation? No   Unable to get utilities (heat,electricity)? No          7/2/2024   Fall Risk   Fallen 2 or more times in the past year? No    No   Trouble with walking or balance? No    No       Multiple values from one day are sorted in reverse-chronological order          7/2/2024   Activities of Daily Living- Home Safety   Needs help with the following daily activites None of the above   Safety concerns in the home None of the above          7/2/2024   Dental   Dentist two times every year? Yes          7/2/2024   Hearing Screening   Hearing concerns? None of the above          7/2/2024   Driving Risk Screening   Patient/family members have concerns about driving No          7/2/2024   General Alertness/Fatigue Screening   Have you been more tired than usual lately? No          7/2/2024   Urinary Incontinence Screening   Bothered by leaking urine in past 6 months No          7/2/2024   TB Screening   Were you born outside of the [place]? No      Today's PHQ-9 Score:        7/2/2024    10:23 AM   PHQ-9 SCORE   PHQ-9 Total Score MyChart 0   PHQ-9 Total Score 0         7/2/2024   Substance Use   Alcohol more than 3/day or more than 7/wk Not Applicable   Do you have a current opioid prescription? No   How severe/bad is pain from 1 to 10? 0/10 (No Pain)   Do you use any other substances recreationally? No    (!) PRESCRIPTION DRUGS       Multiple values from one day are sorted in reverse-chronological order     Social History     Tobacco Use    Smoking status: Never    Smokeless tobacco: Never   Vaping Use    Vaping status: Never Used   Substance Use Topics    Alcohol use: No    Drug use: No     Mammogram Screening - After age 74- determine frequency with patient based on health status, life expectancy and patient goals    Reviewed and updated as needed this visit by Provider   Tobacco  Allergies  Meds  Problems  Med Hx  Surg Hx  Fam Hx            Past Medical History:   Diagnosis Date    Actinic keratosis     Allergic rhinitis     Cataract     Degenerative joint disease     cervical disease    Depression with anxiety     Herpes simplex     HTN, goal below 140/80 3/20/2024    Hypoglycemia     eats small meals and is fine    Impingement syndrome, shoulder     Neoplasm of skin 4/10/2024    Trimalleolar fracture of ankle, closed 02/08/2023     Past Surgical History:   Procedure Laterality Date    ARTHROSCOPY KNEE Left 9/20/2019    Procedure: Left knee arthroscopy, partial medial meniscectomy and chondral debridement;  Surgeon: Nic Singh MD;  Location: [facility] OR    BLEPHAROPLASTY Bilateral 11/28/2022    Procedure: bilateral upper lid blepharoplasty;  Surgeon: Diego Vela MD;  Location: [facility] OR    CATARACT IOL, RT/LT Left 01/24/2019    COMBINED CYSTOSCOPY, URETEROSCOPY, LASER HOLMIUM LITHOTRIPSY URETER(S) Right 8/23/2018    Procedure: COMBINED CYSTOSCOPY, URETEROSCOPY, LASER HOLMIUM LITHOTRIPSY URETER(S);   Cystoscopy,Right ureteroscopy with laser lithotripsy and stent  placement;  Surgeon: Pino Hawk MD;  Location: [facility] OR    HC ENLARGE BREAST WITH IMPLANT      HC LAP,FULGURATE/EXCISE LESIONS      HC REMOVAL OF BREAST IMPLANT      HYSTERECTOMY, PAP NO LONGER INDICATED  1998    ovaries and uterus out for benign reasons(fibroids and ovarian cyst)    LASER YAG CAPSULOTOMY  01/2020; 2/2020    left eye; right eye    PHACOEMULSIFICATION WITH STANDARD INTRAOCULAR LENS IMPLANT Left 1/24/2019    Procedure: PHACOEMULSIFICATION WITH STANDARD INTRAOCULAR LENS IMPLANT, LEFT;  Surgeon: Wagner Aguilera MD;  Location: [facility]    PHACOEMULSIFICATION WITH STANDARD INTRAOCULAR LENS IMPLANT Right 2/14/2019    Procedure: PHACOEMULSIFICATION WITH STANDARD INTRAOCULAR LENS IMPLANT, RIGHT;  Surgeon: Wagner Aguilera MD;  Location: [facility] OR    SINUS SURGERY       OB History   No obstetric history on file.     Current providers sharing in care for this patient include:  Patient Care Team:  Alfonso Dorantes APRN CNP as PCP - General (Nurse Practitioner Primary Care)  Diego Vela MD as MD (Ophthalmology)  Brandt Richardson OD as Referring Physician (Optometry)  Geovanni Kelley MD as Assigned Musculoskeletal Provider  Clara Rios PA-C as Physician Assistant  Alfonso Dorantes APRN CNP as Nurse Practitioner (Nurse Practitioner Primary Care)  Luis Byrnes MD as MD (Dermatology)  Leonila Flanagan APRN CNP as Nurse Practitioner (OB/Gyn)  Andrew Keller PA-C as Physician Assistant (Gastroenterology)  Alfonso Dorantes APRN CNP as Assigned PCP  Leonila Flanagan APRN CNP as Assigned OBGYN Provider  Clara Rios PA-C as Assigned Surgical Provider  Prieto Justice PA-C as Assigned Gastroenterology Provider    The following health maintenance items are reviewed in Epic and correct as of today:  Health Maintenance   Topic Date Due    INFLUENZA VACCINE (1) 09/01/2024    EYE EXAM  11/28/2024    PHQ-9  01/02/2025    LIPID  06/10/2025    ANNUAL REVIEW OF HM ORDERS  06/10/2025    MEDICARE  "ANNUAL WELLNESS VISIT  07/02/2025    FALL RISK ASSESSMENT  07/02/2025    GLUCOSE  06/10/2027    DEXA  05/20/2029    ADVANCE CARE PLANNING  07/02/2029    DTAP/TDAP/TD IMMUNIZATION (5 - Td or Tdap) 03/23/2032    DEPRESSION ACTION PLAN  Completed    Pneumococcal Vaccine: 65+ Years  Completed    ZOSTER IMMUNIZATION  Completed    RSV VACCINE (Pregnancy & 60+)  Completed    COVID-19 Vaccine  Completed    IPV IMMUNIZATION  Aged Out    HPV IMMUNIZATION  Aged Out    MENINGITIS IMMUNIZATION  Aged Out    RSV MONOCLONAL ANTIBODY  Aged Out    COLORECTAL CANCER SCREENING  Discontinued     Review of Systems  CONSTITUTIONAL: NEGATIVE for fever, chills, change in weight  INTEGUMENTARY/SKIN: NEGATIVE for worrisome rashes, moles or lesions  EYES: NEGATIVE for vision changes or irritation  ENT/MOUTH: NEGATIVE for ear, mouth and throat problems  RESP: NEGATIVE for significant cough or SOB  BREAST: NEGATIVE for masses, tenderness or discharge  CV: NEGATIVE for chest pain, palpitations or peripheral edema  GI: NEGATIVE for nausea, abdominal pain, heartburn, or change in bowel habits  : NEGATIVE for frequency, dysuria, or hematuria  MUSCULOSKELETAL: NEGATIVE for significant arthralgias or myalgia  NEURO: NEGATIVE for weakness, dizziness or paresthesias  ENDOCRINE: NEGATIVE for temperature intolerance, skin/hair changes  HEME: NEGATIVE for bleeding problems  PSYCHIATRIC: NEGATIVE for changes in mood or affect     Objective    Exam  /77   Pulse 81   Temp 97.6  F (36.4  C)   Resp 16   Ht 1.626 m (5' 4\")   Wt 61.9 kg (136 lb 6.4 oz)   LMP  (LMP Unknown)   SpO2 98%   BMI 23.41 kg/m     Estimated body mass index is 23.41 kg/m  as calculated from the following:    Height as of this encounter: 1.626 m (5' 4\").    Weight as of this encounter: 61.9 kg (136 lb 6.4 oz).    Physical Exam  GENERAL: alert and no distress  EYES: Eyes grossly normal to inspection, PERRL and conjunctivae and sclerae normal  HENT: ear canals and TM's normal, " nose and mouth without ulcers or lesions  NECK: no adenopathy, no asymmetry, masses, or scars  RESP: lungs clear to auscultation - no rales, rhonchi or wheezes  BREAST: normal without masses, tenderness or nipple discharge and no palpable axillary masses or adenopathy  CV: regular rate and rhythm, normal S1 S2, no S3 or S4, no murmur, click or rub, no peripheral edema  ABDOMEN: soft, nontender, no hepatosplenomegaly, no masses and bowel sounds normal   (female): pt declines  MS: no gross musculoskeletal defects noted, no edema  SKIN: no suspicious lesions or rashes  NEURO: Normal strength and tone, mentation intact and speech normal  PSYCH: mentation appears normal, affect normal/bright        7/2/2024   Mini Cog   Clock Draw Score 2 Normal   3 Item Recall 3 objects recalled   Mini Cog Total Score 5      Vision Screen  Reason Vision Screen Not Completed: Patient had exam in last 12 months (2024)    Signed Electronically by: AUDREY Dunlap CNP    Answers submitted by the patient for this visit:  Patient Health Questionnaire (Submitted on 7/2/2024)  If you checked off any problems, how difficult have these problems made it for you to do your work, take care of things at home, or get along with other people?: Not difficult at all  PHQ9 TOTAL SCORE: 0

## 2024-07-02 NOTE — PATIENT INSTRUCTIONS
"Patient Education   Preventive Care Advice   This is general advice we often give to help people stay healthy. Your care team may have specific advice just for you. Please talk to your care team about your own preventive care needs.  Lifestyle  Exercise at least 150 minutes each week (30 minutes a day, 5 days a week).  Do muscle strengthening activities 2 days a week. These help control your weight and prevent disease.  No smoking.  Wear sunscreen to prevent skin cancer.  Have your home tested for radon every 2 to 5 years. Radon is a colorless, odorless gas that can harm your lungs. To learn more, go to www.health.[facility].mn.us and search for \"Radon in Homes.\"  Keep guns unloaded and locked up in a safe place like a safe or gun vault, or, use a gun lock and hide the keys. Always lock away bullets separately. To learn more, visit [employer].mn.gov and search for \"safe gun storage.\"  Nutrition  Eat 5 or more servings of fruits and vegetables each day.  Try wheat bread, brown rice and whole grain pasta (instead of white bread, rice, and pasta).  Get enough calcium and vitamin D. Check the label on foods and aim for 100% of the RDA (recommended daily allowance).  Regular exams  Have a dental exam and cleaning every 6 months.  See your health care team every year to talk about:  Any changes in your health.  Any medicines your care team has prescribed.  Preventive care, family planning, and ways to prevent chronic diseases.  Shots (vaccines)   HPV shots (up to age 26), if you've never had them before.  Hepatitis B shots (up to age 59), if you've never had them before.  COVID-19 shot: Get this shot when it's due.  Flu shot: Get a flu shot every year.  Tetanus shot: Get a tetanus shot every 10 years.  Pneumococcal, hepatitis A, and RSV shots: Ask your care team if you need these based on your risk.  Shingles shot (for age 50 and up).  General health tests  Diabetes screening:  Starting at age 35, Get screened for diabetes at least " every 3 years.  If you are younger than age 35, ask your care team if you should be screened for diabetes.  Cholesterol test: At age 39, start having a cholesterol test every 5 years, or more often if advised.  Bone density scan (DEXA): At age 50, ask your care team if you should have this scan for osteoporosis (brittle bones).  Hepatitis C: Get tested at least once in your life.  Abdominal aortic aneurysm screening: Talk to your doctor about having this screening if you:  Have ever smoked; and  Are biologically male; and  Are between the ages of 65 and 75.  STIs (sexually transmitted infections)  Before age 24: Ask your care team if you should be screened for STIs.  After age 24: Get screened for STIs if you're at risk. You are at risk for STIs (including HIV) if:  You are sexually active with more than one person.  You don't use condoms every time.  You or a partner was diagnosed with a sexually transmitted infection.  If you are at risk for HIV, ask about PrEP medicine to prevent HIV.  Get tested for HIV at least once in your life, whether you are at risk for HIV or not.  Cancer screening tests  Cervical cancer screening: If you have a cervix, begin getting regular cervical cancer screening tests at age 21. Most people who have regular screenings with normal results can stop after age 65. Talk about this with your provider.  Breast cancer scan (mammogram): If you've ever had breasts, begin having regular mammograms starting at age 40. This is a scan to check for breast cancer.  Colon cancer screening: It is important to start screening for colon cancer at age 45.  Have a colonoscopy test every 10 years (or more often if you're at risk) Or, ask your provider about stool tests like a FIT test every year or Cologuard test every 3 years.  To learn more about your testing options, visit: www.[employer]/206970.pdf.  For help making a decision, visit: nallely/de72635.  Prostate cancer screening test: If you have a  prostate and are age 55 to 69, ask your provider if you would benefit from a yearly prostate cancer screening test.  Lung cancer screening: If you are a current or former smoker age 50 to 80, ask your care team if ongoing lung cancer screenings are right for you.  For informational purposes only. Not to replace the advice of your health care provider. Copyright   2023 [facility]. All rights reserved. Clinically reviewed by the  [employer] [facility]. [employer] 392900 - REV 04/24.  Substance Use Disorder: Care Instructions  Overview     You can improve your life and health by stopping your use of alcohol or drugs. When you don't drink or use drugs, you may feel and sleep better. You may get along better with your family, friends, and coworkers. There are medicines and programs that can help with substance use disorder.  How can you care for yourself at home?  Here are some ways to help you stay sober and prevent relapse.  If you have been given medicine to help keep you sober or reduce your cravings, be sure to take it exactly as prescribed.  Talk to your doctor about programs that can help you stop using drugs or drinking alcohol.  Do not keep alcohol or drugs in your home.  Plan ahead. Think about what you'll say if other people ask you to drink or use drugs. Try not to spend time with people who drink or use drugs.  Use the time and money spent on drinking or drugs to do something that's important to you.  Preventing a relapse  Have a plan to deal with relapse. Learn to recognize changes in your thinking that lead you to drink or use drugs. Get help before you start to drink or use drugs again.  Try to stay away from situations, friends, or places that may lead you to drink or use drugs.  If you feel the need to drink alcohol or use drugs again, seek help right away. Call a trusted friend or family member. Some people get support from organizations such as Narcotics Anonymous or [employer]  [employer] or from treatment facilities.  If you relapse, get help as soon as you can. Some people make a plan with another person that outlines what they want that person to do for them if they relapse. The plan usually includes how to handle the relapse and who to notify in case of relapse.  Don't give up. Remember that a relapse doesn't mean that you have failed. Use the experience to learn the triggers that lead you to drink or use drugs. Then quit again. Recovery is a lifelong process. Many people have several relapses before they are able to quit for good.  Follow-up care is a key part of your treatment and safety. Be sure to make and go to all appointments, and call your doctor if you are having problems. It's also a good idea to know your test results and keep a list of the medicines you take.  When should you call for help?   Call 911  anytime you think you may need emergency care. For example, call if you or someone else:    Has overdosed or has withdrawal signs. Be sure to tell the emergency workers that you are or someone else is using or trying to quit using drugs. Overdose or withdrawal signs may include:  Losing consciousness.  Seizure.  Seeing or hearing things that aren't there (hallucinations).     Is thinking or talking about suicide or harming others.   Where to get help 24 hours a day, 7 days a week   If you or someone you know talks about suicide, self-harm, a mental health crisis, a substance use crisis, or any other kind of emotional distress, get help right away. You can:    Call the Suicide and Crisis Lifeline at 988.     Call 6-174-613-TALK (1-633.910.8578).     Text HOME to 807154 to access the Crisis Text Line.   Consider saving these numbers in your phone.  Go to [employer] for more information or to chat online.  Call your doctor now or seek immediate medical care if:    You are having withdrawal symptoms. These may include nausea or vomiting, sweating, shakiness, and anxiety.  "  Watch closely for changes in your health, and be sure to contact your doctor if:    You have a relapse.     You need more help or support to stop.   Where can you learn more?  Go to https://www.[employer].net/patiented  Enter H573 in the search box to learn more about \"Substance Use Disorder: Care Instructions.\"  Current as of: November 15, 2023               Content Version: 14.0    9135-7913 [employer].   Care instructions adapted under license by your healthcare professional. If you have questions about a medical condition or this instruction, always ask your healthcare professional. [employer] disclaims any warranty or liability for your use of this information.         "

## 2024-07-06 PROCEDURE — 82274 ASSAY TEST FOR BLOOD FECAL: CPT

## 2024-07-08 LAB — HEMOCCULT STL QL IA: NEGATIVE

## 2024-08-07 ENCOUNTER — TELEPHONE (OUTPATIENT)
Dept: UROLOGY | Facility: CLINIC | Age: 79
End: 2024-08-07
Payer: COMMERCIAL

## 2024-08-07 ENCOUNTER — MYC MEDICAL ADVICE (OUTPATIENT)
Dept: UROLOGY | Facility: CLINIC | Age: 79
End: 2024-08-07
Payer: COMMERCIAL

## 2024-08-07 DIAGNOSIS — N39.0 RECURRENT UTI: Primary | ICD-10-CM

## 2024-08-07 NOTE — TELEPHONE ENCOUNTER
Future UA/UC ordered in preparation for 9/16/24 visit with Clara Rios PA-C. Expected date of 9/13/24 for lab order.    Lacy Nayak RN, BSN

## 2024-08-07 NOTE — TELEPHONE ENCOUNTER
Patient is scheduled for an upcoming appointment with Clara Rios PA-C  on 9/16/24.     Per last note patient was to: - follow-up in 3 months with repeat UA/micro     Orders placed: UA/UC pended    Patient is scheduled 9/13/24 to complete UA/UC     Routed to the [facility] to have RN or provider sign.     Future Appointments 8/7/2024 - 2/3/2025        Date Visit Type Length Department Provider     8/20/2024 11:15 AM MYC 3D SCREENING MAMMOGRAM 15 min [facility] MAMMOGRAPHY [facility]    Location Instructions:     [place] [address]  Parking Imaging customers can park in the lots on either side of the driveway leading to the West Entrance of the building.  Entrance and check-in location Enter through the West Entrance doors. Proceed straight ahead, through the lobby, to Check-In B (adjacent to the [facility]). Please check-in at with the registration staff at the desk labeled Check-In B.              9/13/2024 11:15 AM LAB 15 min [facility] LABORATORY [facility]    Location Instructions:     The clinic is located at [address] in [place].&nbsp; We offer free parking in our on-site lot.              9/16/2024 10:30 AM RETURN PATIENT 30 min [facility] UROLOGY Clara Rios PA-C              11/6/2024 10:45 AM RETURN DERMATOLOGY 15 min [facility] DERM Hope Roberts PA-C

## 2024-08-20 ENCOUNTER — ANCILLARY PROCEDURE (OUTPATIENT)
Dept: MAMMOGRAPHY | Facility: CLINIC | Age: 79
End: 2024-08-20
Payer: COMMERCIAL

## 2024-08-20 DIAGNOSIS — B00.9 HERPES SIMPLEX VIRUS INFECTION: ICD-10-CM

## 2024-08-20 DIAGNOSIS — Z12.31 ENCOUNTER FOR SCREENING MAMMOGRAM FOR BREAST CANCER: ICD-10-CM

## 2024-08-20 PROCEDURE — 77063 BREAST TOMOSYNTHESIS BI: CPT | Mod: GC

## 2024-08-20 PROCEDURE — 77067 SCR MAMMO BI INCL CAD: CPT | Mod: GC

## 2024-08-20 RX ORDER — VALACYCLOVIR HYDROCHLORIDE 500 MG/1
500 TABLET, FILM COATED ORAL DAILY
Qty: 90 TABLET | Refills: 2 | Status: SHIPPED | OUTPATIENT
Start: 2024-08-20

## 2024-09-11 ENCOUNTER — OFFICE VISIT (OUTPATIENT)
Dept: PODIATRY | Facility: CLINIC | Age: 79
End: 2024-09-11
Payer: COMMERCIAL

## 2024-09-11 VITALS — HEART RATE: 80 BPM | DIASTOLIC BLOOD PRESSURE: 70 MMHG | SYSTOLIC BLOOD PRESSURE: 138 MMHG

## 2024-09-11 DIAGNOSIS — M77.42 METATARSALGIA OF BOTH FEET: ICD-10-CM

## 2024-09-11 DIAGNOSIS — L84 CALLUS: ICD-10-CM

## 2024-09-11 DIAGNOSIS — M77.41 METATARSALGIA OF BOTH FEET: ICD-10-CM

## 2024-09-11 DIAGNOSIS — B35.9 TINEA: Primary | ICD-10-CM

## 2024-09-11 PROCEDURE — 99213 OFFICE O/P EST LOW 20 MIN: CPT | Performed by: PODIATRIST

## 2024-09-11 NOTE — LETTER
9/11/2024      Cely Ontiveros  [address] [place] [address]      Dear Colleague,    Thank you for referring your patient, Cely Ontiveros, to the [facility]. Please see a copy of my visit note below.    Patient complains of a rash on right foot.  Has had this for 3 months.  Has skin discoloration and itching.  Has been using antifungal Lamisil spray.  This has helped the symptoms.  Ankle fracture last year on the right that healed well.  Also would like new orthotics.  These help with her metatarsalgia.  She likes to walk for exercise.  Also has callus right second toe.  No drainage.    ROS:  see above         Allergies   Allergen Reactions     Sulfa Antibiotics Swelling     Cats Swelling     Lips swollen     Ciprofloxacin Hcl      Wool Fiber      Keflex [Cephalexin] Other (See Comments)     Yeast infection  Rash (?Hives)         Current Outpatient Medications   Medication Sig Dispense Refill     atorvastatin (LIPITOR) 20 MG tablet Take 1 tablet (20 mg) by mouth daily 90 tablet 3     estradiol (VAGIFEM) 10 MCG TABS vaginal tablet Insert 1 tablet intravaginally daily for 2 weeks, followed by twice weekly. 24 tablet 3     fluticasone (FLONASE) 50 MCG/ACT nasal spray Spray 2 sprays in nostril       loratadine (CLARITIN) 10 MG tablet Take 1 tablet (10 mg) by mouth daily 90 tablet 0     polyethylene glycol (MIRALAX) 17 GM/Dose powder Take 17 g (1 Capful) by mouth daily 510 g 0     valACYclovir (VALTREX) 500 MG tablet Take 1 tablet (500 mg) by mouth daily 90 tablet 2       Patient Active Problem List   Diagnosis     Allergic rhinitis     Herpes simplex     CARDIOVASCULAR SCREENING; LDL GOAL LESS THAN 160     Degenerative joint disease     Posterior vitreous detachment of both eyes     Seasonal allergic rhinitis     Hypermetropia     Astigmatism with presbyopia     Blepharitis of both eyes     Epiphora     Chronic otitis externa of left ear, unspecified type     Dysthymic disorder      Meibomian gland dysfunction     Chondromalacia of patella, left     Complex tear of medial meniscus of left knee as current injury, subsequent encounter     Pseudophakia, Yag Caps, of both eyes     Primary osteoarthritis of left knee     S/P left knee arthroscopy     Epiretinal membrane, mild, of left eye     Posterior capsular opacification visually significant of right eye     Dermatochalasis of both upper eyelids     Ankle stiffness, right     History of eyelid surgery     HTN, goal below 140/90     AK (actinic keratosis)     Neoplasm of skin     Osteopenia of lumbar spine     Dyslipidemia     Slow transit constipation       Past Medical History:   Diagnosis Date     Actinic keratosis      Allergic rhinitis      Cataract      Degenerative joint disease     cervical disease     Depression with anxiety      Herpes simplex      HTN, goal below 140/80 3/20/2024     Hypoglycemia     eats small meals and is fine     Impingement syndrome, shoulder      Neoplasm of skin 4/10/2024     Trimalleolar fracture of ankle, closed 02/08/2023       Past Surgical History:   Procedure Laterality Date     ARTHROSCOPY KNEE Left 9/20/2019    Procedure: Left knee arthroscopy, partial medial meniscectomy and chondral debridement;  Surgeon: Nic Singh MD;  Location: [facility] OR     BLEPHAROPLASTY Bilateral 11/28/2022    Procedure: bilateral upper lid blepharoplasty;  Surgeon: Diego Vela MD;  Location: [facility] OR     CATARACT IOL, RT/LT Left 01/24/2019     COMBINED CYSTOSCOPY, URETEROSCOPY, LASER HOLMIUM LITHOTRIPSY URETER(S) Right 8/23/2018    Procedure: COMBINED CYSTOSCOPY, URETEROSCOPY, LASER HOLMIUM LITHOTRIPSY URETER(S);   Cystoscopy,Right ureteroscopy with laser lithotripsy and stent placement;  Surgeon: Pino Hawk MD;  Location: [facility] OR     HC ENLARGE BREAST WITH IMPLANT       HC LAP,FULGURATE/EXCISE LESIONS       HC REMOVAL OF BREAST IMPLANT       HYSTERECTOMY, PAP NO LONGER INDICATED  1998    ovaries and uterus out for  benign reasons(fibroids and ovarian cyst)     LASER YAG CAPSULOTOMY  01/2020; 2/2020    left eye; right eye     PHACOEMULSIFICATION WITH STANDARD INTRAOCULAR LENS IMPLANT Left 1/24/2019    Procedure: PHACOEMULSIFICATION WITH STANDARD INTRAOCULAR LENS IMPLANT, LEFT;  Surgeon: Wagner Aguilera MD;  Location: [facility] OR     PHACOEMULSIFICATION WITH STANDARD INTRAOCULAR LENS IMPLANT Right 2/14/2019    Procedure: PHACOEMULSIFICATION WITH STANDARD INTRAOCULAR LENS IMPLANT, RIGHT;  Surgeon: Wagner Aguilera MD;  Location: [facility] OR     SINUS SURGERY         Family History   Problem Relation Age of Onset     Hypertension Mother      Arthritis Mother      Cardiovascular Mother      Circulatory Mother      Heart Disease Mother      Lipids Mother      Obesity Mother      Osteoporosis Mother      Cancer Mother         skin     Glaucoma Mother      Cancer - colorectal Father      Cancer Father      Macular Degeneration Father      Diabetes No family hx of      Cerebrovascular Disease No family hx of      Thyroid Disease No family hx of        Social History     Tobacco Use     Smoking status: Never     Smokeless tobacco: Never   Substance Use Topics     Alcohol use: No         Exam:    Vitals: /70   Pulse 80   LMP  (LMP Unknown)   BMI: There is no height or weight on file to calculate BMI.  Height: Data Unavailable    Constitutional/ general:  Pt is in no apparent distress, appears well-nourished.  Cooperative with history and physical exam.     Psych:  The patient answered questions appropriately.  Normal affect.  Seems to have reasonable expectations, in terms of treatment.     Lungs:  Non labored breathing, non labored speech. No cough.  No audible wheezing. Even, quiet breathing.       Vascular:  positive pedal pulses bilaterally.  CFT < 3 sec.  negative ankle edema.  negative pedal hair growth.    Neuro:  Alert and oriented x 3. Coordinated gait.  Light touch sensation is intact     Musculoskeletal:    Lower  extremity muscle strength is normal.  Patient is ambulatory without an assistive device or brace.   Normal arch with weightbearing on left and somewhat pronated on right..  No forefoot or rear foot deformities noted.  MS 5/5 all compartments.  Normal ROM all fore foot and rearfoot joints.  No equinus.     Derm: thin.  Right foot slightly erythematous and interspaces.  No vesicles.  No gross breakdown.  Callus medial right second toe.    A/P  right tinea pedis           Metatarsalgia          callus    Discussed all options with patient.  Continue Lamisil cream until this resolves.  Has allergies to well.  She states that she has stitching.  Discussed that this is not unusual for a while.  Discussed trying to use lambswool and interspaces.  Could also get socks with toes.  Silicone pad for callus.  Prescription for orthotics for metatarsalgia.  RTC as needed.    Calin Clifton DPM, FACFAS        Again, thank you for allowing me to participate in the care of your patient.        Sincerely,        Calin Clifton DPM

## 2024-09-11 NOTE — PROGRESS NOTES
Patient complains of a rash on right foot.  Has had this for 3 months.  Has skin discoloration and itching.  Has been using antifungal Lamisil spray.  This has helped the symptoms.  Ankle fracture last year on the right that healed well.  Also would like new orthotics.  These help with her metatarsalgia.  She likes to walk for exercise.  Also has callus right second toe.  No drainage.    ROS:  see above         Allergies   Allergen Reactions    Sulfa Antibiotics Swelling    Cats Swelling     Lips swollen    Ciprofloxacin Hcl     Wool Fiber     Keflex [Cephalexin] Other (See Comments)     Yeast infection  Rash (?Hives)         Current Outpatient Medications   Medication Sig Dispense Refill    atorvastatin (LIPITOR) 20 MG tablet Take 1 tablet (20 mg) by mouth daily 90 tablet 3    estradiol (VAGIFEM) 10 MCG TABS vaginal tablet Insert 1 tablet intravaginally daily for 2 weeks, followed by twice weekly. 24 tablet 3    fluticasone (FLONASE) 50 MCG/ACT nasal spray Spray 2 sprays in nostril      loratadine (CLARITIN) 10 MG tablet Take 1 tablet (10 mg) by mouth daily 90 tablet 0    polyethylene glycol (MIRALAX) 17 GM/Dose powder Take 17 g (1 Capful) by mouth daily 510 g 0    valACYclovir (VALTREX) 500 MG tablet Take 1 tablet (500 mg) by mouth daily 90 tablet 2       Patient Active Problem List   Diagnosis    Allergic rhinitis    Herpes simplex    CARDIOVASCULAR SCREENING; LDL GOAL LESS THAN 160    Degenerative joint disease    Posterior vitreous detachment of both eyes    Seasonal allergic rhinitis    Hypermetropia    Astigmatism with presbyopia    Blepharitis of both eyes    Epiphora    Chronic otitis externa of left ear, unspecified type    Dysthymic disorder    Meibomian gland dysfunction    Chondromalacia of patella, left    Complex tear of medial meniscus of left knee as current injury, subsequent encounter    Pseudophakia, Yag Caps, of both eyes    Primary osteoarthritis of left knee    S/P left knee arthroscopy     Epiretinal membrane, mild, of left eye    Posterior capsular opacification visually significant of right eye    Dermatochalasis of both upper eyelids    Ankle stiffness, right    History of eyelid surgery    HTN, goal below 140/90    AK (actinic keratosis)    Neoplasm of skin    Osteopenia of lumbar spine    Dyslipidemia    Slow transit constipation       Past Medical History:   Diagnosis Date    Actinic keratosis     Allergic rhinitis     Cataract     Degenerative joint disease     cervical disease    Depression with anxiety     Herpes simplex     HTN, goal below 140/80 3/20/2024    Hypoglycemia     eats small meals and is fine    Impingement syndrome, shoulder     Neoplasm of skin 4/10/2024    Trimalleolar fracture of ankle, closed 02/08/2023       Past Surgical History:   Procedure Laterality Date    ARTHROSCOPY KNEE Left 9/20/2019    Procedure: Left knee arthroscopy, partial medial meniscectomy and chondral debridement;  Surgeon: Nic Singh MD;  Location: [facility]    BLEPHAROPLASTY Bilateral 11/28/2022    Procedure: bilateral upper lid blepharoplasty;  Surgeon: Diego Vela MD;  Location: [facility] OR    CATARACT IOL, RT/LT Left 01/24/2019    COMBINED CYSTOSCOPY, URETEROSCOPY, LASER HOLMIUM LITHOTRIPSY URETER(S) Right 8/23/2018    Procedure: COMBINED CYSTOSCOPY, URETEROSCOPY, LASER HOLMIUM LITHOTRIPSY URETER(S);   Cystoscopy,Right ureteroscopy with laser lithotripsy and stent placement;  Surgeon: Pino Hawk MD;  Location: [facility] OR    HC ENLARGE BREAST WITH IMPLANT      HC LAP,FULGURATE/EXCISE LESIONS      HC REMOVAL OF BREAST IMPLANT      HYSTERECTOMY, PAP NO LONGER INDICATED  1998    ovaries and uterus out for benign reasons(fibroids and ovarian cyst)    LASER YAG CAPSULOTOMY  01/2020; 2/2020    left eye; right eye    PHACOEMULSIFICATION WITH STANDARD INTRAOCULAR LENS IMPLANT Left 1/24/2019    Procedure: PHACOEMULSIFICATION WITH STANDARD INTRAOCULAR LENS IMPLANT, LEFT;  Surgeon: Wagner Aguilera,  MD;  Location: [facility]    PHACOEMULSIFICATION WITH STANDARD INTRAOCULAR LENS IMPLANT Right 2/14/2019    Procedure: PHACOEMULSIFICATION WITH STANDARD INTRAOCULAR LENS IMPLANT, RIGHT;  Surgeon: Wagner Aguilera MD;  Location: [facility]    SINUS SURGERY         Family History   Problem Relation Age of Onset    Hypertension Mother     Arthritis Mother     Cardiovascular Mother     Circulatory Mother     Heart Disease Mother     Lipids Mother     Obesity Mother     Osteoporosis Mother     Cancer Mother         skin    Glaucoma Mother     Cancer - colorectal Father     Cancer Father     Macular Degeneration Father     Diabetes No family hx of     Cerebrovascular Disease No family hx of     Thyroid Disease No family hx of        Social History     Tobacco Use    Smoking status: Never    Smokeless tobacco: Never   Substance Use Topics    Alcohol use: No         Exam:    Vitals: /70   Pulse 80   LMP  (LMP Unknown)   BMI: There is no height or weight on file to calculate BMI.  Height: Data Unavailable    Constitutional/ general:  Pt is in no apparent distress, appears well-nourished.  Cooperative with history and physical exam.     Psych:  The patient answered questions appropriately.  Normal affect.  Seems to have reasonable expectations, in terms of treatment.     Lungs:  Non labored breathing, non labored speech. No cough.  No audible wheezing. Even, quiet breathing.       Vascular:  positive pedal pulses bilaterally.  CFT < 3 sec.  negative ankle edema.  negative pedal hair growth.    Neuro:  Alert and oriented x 3. Coordinated gait.  Light touch sensation is intact     Musculoskeletal:    Lower extremity muscle strength is normal.  Patient is ambulatory without an assistive device or brace.   Normal arch with weightbearing on left and somewhat pronated on right..  No forefoot or rear foot deformities noted.  MS 5/5 all compartments.  Normal ROM all fore foot and rearfoot joints.  No equinus.     Derm: thin.   Right foot slightly erythematous and interspaces.  No vesicles.  No gross breakdown.  Callus medial right second toe.    A/P  right tinea pedis           Metatarsalgia          callus    Discussed all options with patient.  Continue Lamisil cream until this resolves.  Has allergies to well.  She states that she has stitching.  Discussed that this is not unusual for a while.  Discussed trying to use lambswool and interspaces.  Could also get socks with toes.  Silicone pad for callus.  Prescription for orthotics for metatarsalgia.  RTC as needed.    Calin Clifton, DEISY, FACFAS

## 2024-09-11 NOTE — PATIENT INSTRUCTIONS
We wish you continued good healing. If you have any questions or concerns, please do not hesitate to contact us at  [phone]    [facility] (secure e-mail communication and access to your chart) to send a message or to make an appointment.    Please remember to call and schedule a follow up appointment if one was recommended at your earliest convenience.     PODIATRY CLINIC HOURS  TELEPHONE NUMBER    Dr. Calin CORONADOPKARLI FACFAS        Clinics:  [facility]  Tuesday 1PM-6PM  [place]  Wednesday 745AM-330PM  [place]  Monday 2nd,4th  830AM-4PM  Thursday/Friday 745AM-230PM     [facility] APPOINTMENTS  [phone]    [place] APPOINTMENTS  [phone]          If you need a medication refill, please contact us you may need lab work and/or a follow up visit prior to your refill (i.e. Antifungal medications).  If MRI needed please call Imaging at [phone]   HOW DO I GET MY KNEE SCOOTER? Knee scooters can be picked up at ANY Medical Supply stores with your knee scooter Prescription.  OR  Bring your signed prescription to an [facility] showroom.   Set up an appointment for your custom Orthotics. Call any Orthotics locations call [phone]         What are Prescription Custom Orthotics?  Custom orthotics are specially-made devices designed to support and comfort your feet. Prescription orthotics are crafted for you and no one else. They match the contours of your feet precisely and are designed for the way you move. Orthotics are only manufactured after a podiatrist has conducted a complete evaluation of your feet, ankles, and legs, so the orthotic can accommodate your unique foot structure and pathology.  Prescription orthotics are divided into two categories:  Functional orthotics are designed to control abnormal motion. They may be used to treat foot pain caused by abnormal motion; they can also be used to  treat injuries such as shin splints or tendinitis. Functional orthotics are usually crafted of a semi-rigid material such as plastic or graphite.  Accommodative orthotics are softer and meant to provide additional cushioning and support. They can be used to treat diabetic foot ulcers, painful calluses on the bottom of the foot, and other uncomfortable conditions.  Podiatrists use orthotics to treat foot problems such as plantar fasciitis, bursitis, tendinitis, diabetic foot ulcers, and foot, ankle, and heel pain. Clinical research studies have shown that podiatrist-prescribed foot orthotics decrease foot pain and improve function.  Orthotics typically cost more than shoe inserts purchased in a retail store, but the additional cost is usually well worth it. Unlike shoe inserts, orthotics are molded to fit each individual foot, so you can be sure that your orthotics fit and do what they're supposed to do. Prescription orthotics are also made of top-notch materials and last many years when cared for properly. Insurance often helps pay for prescription orthotics.  What are Shoe Inserts?   You've seen them at the grocery store and at the mall. You've probably even seen them on TV and online. Shoe inserts are any kind of non-prescription foot support designed to be worn inside a shoe. Pre-packaged, mass produced, arch supports are shoe inserts. So are the  custom-made  insoles and foot supports that you can order online or at retail stores. Unless the device has been prescribed by a doctor and crafted for your specific foot, it's a shoe insert, not a custom orthotic device--despite what the ads might say.  Shoe inserts can be very helpful for a variety of foot ailments, including flat arches and foot and leg pain. They can cushion your feet, provide comfort, and support your arches, but they can't correct biomechanical foot problems or cure long-standing foot issues.  The most common types of shoe inserts are:  Arch  supports: Some people have high arches. Others have low arches or flat feet. Arch supports generally have a  bumped-up  appearance and are designed to support the foot's natural arch.   Insoles: Insoles slip into your shoe to provide extra cushioning and support. Insoles are often made of gel, foam, or plastic.   Heel liners: Heel liners, sometimes called heel pads or heel cups, provide extra cushioning in the heel region. They may be especially useful for patients who have foot pain caused by age-related thinning of the heels' natural fat pads.   Foot cushions: Do your shoes rub against your heel or your toes? Foot cushions come in many different shapes and sizes and can be used as a barrier between you and your shoe.  Choosing an Over-the-Counter Shoe Insert  Selecting a shoe insert from the wide variety of devices on the market can be overwhelming. Here are some podiatrist-tested tips to help you find the insert that best meets your needs:  Consider your health. Do you have diabetes? Problems with circulation? An over-the-counter insert may not be your best bet. Diabetes and poor circulation increase your risk of foot ulcers and infections, so schedule an appointment with a podiatrist. He or she can help you select a solution that won't cause additional health problems.   Think about the purpose. Are you planning to run a marathon, or do you just need a little arch support in your work shoes? Look for a product that fits your planned level of activity.   Bring your shoes. For the insert to be effective, it has to fit into your shoes. So bring your sneakers, dress shoes, or work boots--whatever you plan to wear with your insert. Look for an insert that will fit the contours of your shoe.   Try them on. If all possible, slip the insert into your shoe and try it out. Walk around a little. How does it feel? Don't assume that feelings of pressure will go away with continued wear. (If you can't try the inserts at the  store, ask about the store's return policy and hold on to your receipt.)    Please call one of the [facility] locations below to schedule an appointment. If you received a prescription please bring it with you to your appointment. Some locations are limited to what they carry.    Office Locations    [facility]  [address] [place] [address]  Home Medical Equipment, [address]   Phone: [phone]   Orthotics and Prosthetics, [address]   Phone: [phone]    [facility]  [address]   [place] [address]   Phone: [phone]    [facility]ld[facility]   [address]  [place] [address]  Phone: [phone]    [facility]  [address]  [place] [address]  Phone: [phone]    [facility]  [address]  [address]  [place] [address]  Phone: [phone]    [facility]  [address] [address]   Phone: [phone]    [facility]  [address] [place] [address]  Phone: [phone]    [facility]  [address]  [place] [address]  Phone: [phone]

## 2024-09-13 ENCOUNTER — TELEPHONE (OUTPATIENT)
Dept: FAMILY MEDICINE | Facility: CLINIC | Age: 79
End: 2024-09-13

## 2024-09-13 ENCOUNTER — LAB (OUTPATIENT)
Dept: LAB | Facility: CLINIC | Age: 79
End: 2024-09-13
Payer: COMMERCIAL

## 2024-09-13 ENCOUNTER — TELEPHONE (OUTPATIENT)
Dept: UROLOGY | Facility: CLINIC | Age: 79
End: 2024-09-13

## 2024-09-13 DIAGNOSIS — B37.31 CANDIDIASIS OF VAGINA: Primary | ICD-10-CM

## 2024-09-13 DIAGNOSIS — N39.0 RECURRENT UTI: ICD-10-CM

## 2024-09-13 DIAGNOSIS — N89.8 VAGINAL ITCHING: ICD-10-CM

## 2024-09-13 LAB
ALBUMIN UR-MCNC: NEGATIVE MG/DL
APPEARANCE UR: CLEAR
BACTERIA #/AREA URNS HPF: ABNORMAL /HPF
BILIRUB UR QL STRIP: NEGATIVE
CLUE CELLS: ABNORMAL
COLOR UR AUTO: YELLOW
GLUCOSE UR STRIP-MCNC: NEGATIVE MG/DL
HGB UR QL STRIP: ABNORMAL
KETONES UR STRIP-MCNC: NEGATIVE MG/DL
LEUKOCYTE ESTERASE UR QL STRIP: NEGATIVE
NITRATE UR QL: NEGATIVE
PH UR STRIP: 7 [PH] (ref 5–7)
RBC #/AREA URNS AUTO: ABNORMAL /HPF
SP GR UR STRIP: 1.01 (ref 1–1.03)
SQUAMOUS #/AREA URNS AUTO: ABNORMAL /LPF
TRICHOMONAS, WET PREP: ABNORMAL
UROBILINOGEN UR STRIP-ACNC: 0.2 E.U./DL
WBC #/AREA URNS AUTO: ABNORMAL /HPF
WBC'S/HIGH POWER FIELD, WET PREP: ABNORMAL
YEAST, WET PREP: PRESENT

## 2024-09-13 PROCEDURE — 87210 SMEAR WET MOUNT SALINE/INK: CPT

## 2024-09-13 PROCEDURE — 81001 URINALYSIS AUTO W/SCOPE: CPT

## 2024-09-13 RX ORDER — FLUCONAZOLE 150 MG/1
150 TABLET ORAL ONCE
Qty: 1 TABLET | Refills: 0 | Status: SHIPPED | OUTPATIENT
Start: 2024-09-13 | End: 2024-09-13

## 2024-09-13 NOTE — TELEPHONE ENCOUNTER
"----- Message from Alfonso Dorantes sent at 9/13/2024 12:54 PM CDT -----    Nursing, please call patient -     Ish Ta,    I see you completed a wet prep. I placed this order a few months ago. Have you been experiencing any new symptoms?    Thanks,  Alfonso Dorantes, APRN CNP      Called patient re: result message above. Patient states that she is not having any symptoms, other than a slight odor and urinary frequency. However, UA/UC was ordered by urology. Patient thought this test was ordered to do a \"while back\" but wasn't sure why. Patient mostly concerned for urinary symptoms - but states she already has a call out to urology asking for next steps on those results.      Routing to provider as FYI only, if no follow up needed patient does not need call back. Thank you!      Lela Grossman, RN, BSN  [facility]  "

## 2024-09-13 NOTE — TELEPHONE ENCOUNTER
The wet prep came back positive for a yeast infection. W/ her symptoms, we can treat for this. Sometimes yeast infections can cause dysuria. I will place an order for a 1 time dose of diflucan 150mg orally. Please let me know if you have any side effects. This medication is typically well tolerated. If symptoms persist, please let us know and we will repeat the dose x1 in 72 hours. Please watch for any allergic reaction if you have never been on this medication before.     Thanks,  AUDREY Dunlap CNP

## 2024-09-13 NOTE — TELEPHONE ENCOUNTER
Relayed to patient per Clara Rios PA-C that her UA is not suggestive of an infection.     Khushboo Benito LPN on 9/13/2024 at 1:17 PM

## 2024-09-13 NOTE — TELEPHONE ENCOUNTER
Patient updated on the below, no questions at this time, verbalized understanding.      Cuca Tracy RN

## 2024-09-13 NOTE — TELEPHONE ENCOUNTER
[facility] Call Center    Phone Message    May a detailed message be left on voicemail: yes     Reason for Call: Other: Patient calls about UA results and is wondering if she can get a call back today to see if she needs to start on medication.      Action Taken: Message routed to:  Adult Clinics: Urology p [id number]    Travel Screening: Not Applicable

## 2024-09-16 ENCOUNTER — TELEPHONE (OUTPATIENT)
Dept: FAMILY MEDICINE | Facility: CLINIC | Age: 79
End: 2024-09-16

## 2024-09-16 ENCOUNTER — OFFICE VISIT (OUTPATIENT)
Dept: UROLOGY | Facility: CLINIC | Age: 79
End: 2024-09-16
Payer: COMMERCIAL

## 2024-09-16 DIAGNOSIS — Z87.442 HISTORY OF KIDNEY STONES: ICD-10-CM

## 2024-09-16 DIAGNOSIS — N39.41 URGE INCONTINENCE: ICD-10-CM

## 2024-09-16 DIAGNOSIS — N39.0 RECURRENT UTI: Primary | ICD-10-CM

## 2024-09-16 DIAGNOSIS — R39.15 URINARY URGENCY: ICD-10-CM

## 2024-09-16 DIAGNOSIS — M62.89 HIGH-TONE PELVIC FLOOR DYSFUNCTION: ICD-10-CM

## 2024-09-16 DIAGNOSIS — K59.00 CONSTIPATION, UNSPECIFIED CONSTIPATION TYPE: ICD-10-CM

## 2024-09-16 DIAGNOSIS — N95.2 VAGINAL ATROPHY: ICD-10-CM

## 2024-09-16 DIAGNOSIS — B37.31 CANDIDIASIS OF VAGINA: Primary | ICD-10-CM

## 2024-09-16 PROCEDURE — 99214 OFFICE O/P EST MOD 30 MIN: CPT | Performed by: PHYSICIAN ASSISTANT

## 2024-09-16 RX ORDER — FLUCONAZOLE 150 MG/1
150 TABLET ORAL ONCE
Qty: 1 TABLET | Refills: 0 | Status: SHIPPED | OUTPATIENT
Start: 2024-09-16 | End: 2024-09-16

## 2024-09-16 NOTE — TELEPHONE ENCOUNTER
General Call    Contacts       Contact Date/Time Type Contact Phone/Fax    09/16/2024 12:59 PM CDT Phone (Incoming) Cely Ontiveros (Self) [phone] (H)          Reason for Call:  Pt is still having symptoms with vaginitis, was told to call back if still having sypmtoms.     What are your questions or concerns:      Date of last appointment with provider:     Could we [facility] or would you prefer to receive a phone call?:   Patient would prefer a phone call   Okay to leave a detailed message?: Yes at Cell number on file:    Telephone Information:    [phone]

## 2024-09-16 NOTE — PROGRESS NOTES
Urology Clinic      Name: Cely Ontiveros    MRN: [id number]   YOB: 1945  Accompanied at today's visit by:self                 Chief Complaint:   Hx of UTIs          History of Present Illness:   September 16, 2024    HISTORY:   We have been following 78 year old Cely Ontiveros for hx of UTIs, hx of urethral dilation, hx of kidney stone extraction in 2018, high tone pelvic floor, myofascial tenderness, levator spasm, vaginal atrophy, hx of constipation, UUI. Followed with Dr. Carballo in 2013 for UTIs and hematuria.  She was then diagnosed with a 0.5cm kidney stone, was recommended ESWL and she did not do it. Then saw Dr. Hawk for her hx of stone and followed with Dr. Hawk for this in 2018 with stone extraction on 8/23/18. Has not been seen since that time. Of note, had recent CT on 3/16/24 that was unremarkable from urologic standpoint and no stones present. Has reported having urethral stretching in the past which she thinks helped with her UTIs. Typical UTI symptoms include burning with urination, left lower abdominal pain and increased urinary urgency/frequency. Last seen on 5/13/24 and was doing well with no further UTIs using estrogen cram and taking probiotic. Did notes 2-5 RBC on UA/micro at that time. Discussed hematuria workup and AUA guidelines and patient was not interested in further workup. Here today for follow-up. States she continues to do well with estrogen and probiotic. Did have repeat UA last week that showed 2-5 RBC, however did have a yeast infection at that time. No gross hematuria or stone symptoms. Not interested in PFPT. Not bothered by rare UUI. Hx of hysterectomy Patient voices no other concerns at this time.            Allergies:     Allergies   Allergen Reactions    Sulfa Antibiotics Swelling    Cats Swelling     Lips swollen    Ciprofloxacin Hcl     Wool Fiber     Keflex [Cephalexin] Other (See Comments)     Yeast infection  Rash (?Hives)              Medications:      Current Outpatient Medications   Medication Sig Dispense Refill    atorvastatin (LIPITOR) 20 MG tablet Take 1 tablet (20 mg) by mouth daily 90 tablet 3    estradiol (VAGIFEM) 10 MCG TABS vaginal tablet Insert 1 tablet intravaginally daily for 2 weeks, followed by twice weekly. 24 tablet 3    fluticasone (FLONASE) 50 MCG/ACT nasal spray Spray 2 sprays in nostril      loratadine (CLARITIN) 10 MG tablet Take 1 tablet (10 mg) by mouth daily 90 tablet 0    polyethylene glycol (MIRALAX) 17 GM/Dose powder Take 17 g (1 Capful) by mouth daily 510 g 0    valACYclovir (VALTREX) 500 MG tablet Take 1 tablet (500 mg) by mouth daily 90 tablet 2     No current facility-administered medications for this visit.               Past  Surgical History:     Past Surgical History:   Procedure Laterality Date    ARTHROSCOPY KNEE Left 9/20/2019    Procedure: Left knee arthroscopy, partial medial meniscectomy and chondral debridement;  Surgeon: Nic Singh MD;  Location: [facility]    BLEPHAROPLASTY Bilateral 11/28/2022    Procedure: bilateral upper lid blepharoplasty;  Surgeon: Diego Vela MD;  Location: [facility]    CATARACT IOL, RT/LT Left 01/24/2019    COMBINED CYSTOSCOPY, URETEROSCOPY, LASER HOLMIUM LITHOTRIPSY URETER(S) Right 8/23/2018    Procedure: COMBINED CYSTOSCOPY, URETEROSCOPY, LASER HOLMIUM LITHOTRIPSY URETER(S);   Cystoscopy,Right ureteroscopy with laser lithotripsy and stent placement;  Surgeon: Pino Hawk MD;  Location: [facility]    HC ENLARGE BREAST WITH IMPLANT      HC LAP,FULGURATE/EXCISE LESIONS      HC REMOVAL OF BREAST IMPLANT      HYSTERECTOMY, PAP NO LONGER INDICATED  1998    ovaries and uterus out for benign reasons(fibroids and ovarian cyst)    LASER YAG CAPSULOTOMY  01/2020; 2/2020    left eye; right eye    PHACOEMULSIFICATION WITH STANDARD INTRAOCULAR LENS IMPLANT Left 1/24/2019    Procedure: PHACOEMULSIFICATION WITH STANDARD INTRAOCULAR LENS IMPLANT, LEFT;  Surgeon: Wagner Aguilera MD;   Location: [facility]    PHACOEMULSIFICATION WITH STANDARD INTRAOCULAR LENS IMPLANT Right 2/14/2019    Procedure: PHACOEMULSIFICATION WITH STANDARD INTRAOCULAR LENS IMPLANT, RIGHT;  Surgeon: Wagner Aguilera MD;  Location: [facility]    SINUS SURGERY               Physical Exam:   There were no vitals filed for this visit.  PSYCH: NAD  EYES: EOMI  NEURO: AAO x3    LABS:   Creatinine   Date Value Ref Range Status   06/10/2024 0.80 0.51 - 0.95 mg/dL Final   01/20/2021 0.83 0.52 - 1.04 mg/dL Final            Assessment and Plan:   78 year old is a pleasant female who has hx of UTIs, hx of urethral dilation, hx of kidney stone extraction in 2018, high tone pelvic floor, myofascial tenderness, levator spasm, vaginal atrophy, hx of constipation, UUI.     Plan:  - not interested in hematuria workup. Would like to repeat UA/micro since recent UA was when had yeast infection.  - plan to repeat imaging at next encounter for hx of stones.  - Would recommend further workup if UTIs return with cysto and CT urogram.   - continue estrogen suppository  - continue probiotic.  - follow-up in 3 months.  - continue GI recommendations  - consider myrbetriq if UUI returns  - contact clinic if develops s/s of UTI in the future.  - patient has questions about prophylaxis for yeast infections. Advised to further discuss with her OB/GYN.   - After discussing the assessment and plan with patient, patient verbalizes understanding and agrees to the above plan. All questions answered.     Other orders as below:  Orders Placed This Encounter   Procedures    UA without Microscopic [[id number]]    Urine Microscopic Exam     14 minutes spent on the date of the encounter doing chart review, review of labs, review of test results, interpretation of tests, patient visit and documentation.      Clara Rios PA-C  Urology  September 16, 2024      Patient Care Team:  Alfonso Dorantes APRN CNP as PCP - General (Nurse Practitioner Primary Care)  Dane  MD Diego as MD (Ophthalmology)  Brandt Richardson OD as Referring Physician (Optometry)  Geovanni Kelley MD as Assigned Musculoskeletal Provider  Clara Rios PA-C as Physician Assistant  Alfonso Dorantes APRN CNP as Nurse Practitioner (Nurse Practitioner Primary Care)  Luis Byrnes MD as MD (Dermatology)  Leonila Flanagan APRN CNP as Nurse Practitioner (OB/Gyn)  Andrew Keller PA-C as Physician Assistant (Gastroenterology)  Alfonso Dorantes APRN CNP as Assigned PCP  Leonila Flanagan APRN CNP as Assigned OBGYN Provider  Clara Rios PA-C as Assigned Surgical Provider  Prieto Justice PA-C as Assigned Gastroenterology Provider  SELF, REFERRED

## 2024-09-16 NOTE — TELEPHONE ENCOUNTER
Let's repeat the fluconazole x1.    If still having symptoms after 72 hours, should present to clinic or UC for appointment.    Thanks,  AUDREY Dunlap CNP

## 2024-09-16 NOTE — NURSING NOTE
Cely Ontiveros's goals for this visit include:   Chief Complaint   Patient presents with    RECHECK     UTIs, hx of urethral dilation, hx of kidney stone extraction in 2018, high tone pelvic floor, myofascial tenderness, levator spasm, vaginal atrophy       She requests these members of her care team be copied on today's visit information:       PCP: Alfonso Dorantes    Referring Provider:  Referred Self, MD  No address on file    LMP  (LMP Unknown)     Do you need any medication refills at today's visit?     Khushboo Benito LPN on 9/16/2024 at 10:05 AM

## 2024-10-09 ENCOUNTER — OFFICE VISIT (OUTPATIENT)
Dept: URGENT CARE | Facility: URGENT CARE | Age: 79
End: 2024-10-09
Payer: COMMERCIAL

## 2024-10-09 VITALS
SYSTOLIC BLOOD PRESSURE: 152 MMHG | RESPIRATION RATE: 14 BRPM | TEMPERATURE: 98.9 F | BODY MASS INDEX: 23.22 KG/M2 | HEART RATE: 97 BPM | WEIGHT: 135.3 LBS | DIASTOLIC BLOOD PRESSURE: 66 MMHG | OXYGEN SATURATION: 96 %

## 2024-10-09 DIAGNOSIS — J06.9 UPPER RESPIRATORY TRACT INFECTION, UNSPECIFIED TYPE: Primary | ICD-10-CM

## 2024-10-09 LAB
DEPRECATED S PYO AG THROAT QL EIA: NEGATIVE
FLUAV AG SPEC QL IA: NEGATIVE
FLUBV AG SPEC QL IA: NEGATIVE
GROUP A STREP BY PCR: NOT DETECTED

## 2024-10-09 PROCEDURE — 87804 INFLUENZA ASSAY W/OPTIC: CPT | Performed by: PHYSICIAN ASSISTANT

## 2024-10-09 PROCEDURE — 99214 OFFICE O/P EST MOD 30 MIN: CPT | Performed by: PHYSICIAN ASSISTANT

## 2024-10-09 PROCEDURE — 87635 SARS-COV-2 COVID-19 AMP PRB: CPT | Performed by: PHYSICIAN ASSISTANT

## 2024-10-09 PROCEDURE — 87651 STREP A DNA AMP PROBE: CPT | Performed by: PHYSICIAN ASSISTANT

## 2024-10-09 RX ORDER — BENZONATATE 200 MG/1
200 CAPSULE ORAL 3 TIMES DAILY PRN
Qty: 30 CAPSULE | Refills: 0 | Status: SHIPPED | OUTPATIENT
Start: 2024-10-09

## 2024-10-09 ASSESSMENT — ENCOUNTER SYMPTOMS
EYES NEGATIVE: 1
SORE THROAT: 0
ENDOCRINE NEGATIVE: 1
BACK PAIN: 0
DIZZINESS: 0
CARDIOVASCULAR NEGATIVE: 1
DIARRHEA: 0
HEADACHES: 1
WOUND: 0
PALPITATIONS: 0
SINUS PAIN: 0
SINUS PRESSURE: 0
SHORTNESS OF BREATH: 0
MUSCULOSKELETAL NEGATIVE: 1
LIGHT-HEADEDNESS: 0
WEAKNESS: 0
NAUSEA: 0
JOINT SWELLING: 0
RHINORRHEA: 0
NECK STIFFNESS: 0
NECK PAIN: 0
FEVER: 0
MYALGIAS: 0
ALLERGIC/IMMUNOLOGIC NEGATIVE: 1
VOMITING: 0
ARTHRALGIAS: 0
CHILLS: 0
COUGH: 1

## 2024-10-09 NOTE — PROGRESS NOTES
Chief Complaint:     Chief Complaint   Patient presents with    Urgent Care     Sx started couple days ago with chills, ears are cracking and off and on hurt (the ears pain noticed first but now doesn't hurt), headache, stuffy nose, and cough, kind of thought allergies at first but seem to be worse, want covid, throat hurts times to times-think from the drainage     Chills    Cough    Headache    Otalgia       Results for orders placed or performed in visit on 10/09/24   Influenza A & B Antigen - Clinic Collect     Status: Normal    Specimen: Nose; Swab   Result Value Ref Range    Influenza A antigen Negative Negative    Influenza B antigen Negative Negative    Narrative    Test results must be correlated with clinical data. If necessary, results should be confirmed by a molecular assay or viral culture.   Streptococcus A Rapid Screen w/Reflex to PCR - Clinic Collect     Status: Normal    Specimen: Throat; Swab   Result Value Ref Range    Group A Strep antigen Negative Negative       Medical Decision Making:    Vital signs reviewed by Tapan Cortes PA-C  BP (!) 152/66 (BP Location: Left arm, Patient Position: Sitting, Cuff Size: Adult Regular)   Pulse 97   Temp 98.9  F (37.2  C) (Tympanic)   Resp 14   Wt 61.4 kg (135 lb 4.8 oz)   LMP  (LMP Unknown)   SpO2 96%   BMI 23.22 kg/m      Differential Diagnosis:  URI Adult/Peds:  Bronchitis-viral, Influenza, Pneumonia, and Viral upper respiratory illness     ASSESSMENT    1. Upper respiratory tract infection, unspecified type      PLAN    Patient is in no acute distress.    Temp is 98.9 in clinic today, lung sounds were clear, and O2 sats at 96% on RA.    RST was negative.  We will call with PCR results only if positive.  Influenza was negative.    COVID swab collected in clinic today.  Rest, Push fluids, vaporizer, elevation of head of bed.  Ibuprofen and or Tylenol for any fever or body aches.  Rx for Tessalon cough suppressant- PRN- as discussed.   If symptoms  worsen, recheck immediately otherwise follow up with your PCP in 1 week if symptoms are not improving.  Worrisome symptoms discussed with instructions to go to the ED.  Patient verbalized understanding and agreed with this plan.    Labs:    Results for orders placed or performed in visit on 10/09/24   Influenza A & B Antigen - Clinic Collect     Status: Normal    Specimen: Nose; Swab   Result Value Ref Range    Influenza A antigen Negative Negative    Influenza B antigen Negative Negative    Narrative    Test results must be correlated with clinical data. If necessary, results should be confirmed by a molecular assay or viral culture.   Streptococcus A Rapid Screen w/Reflex to PCR - Clinic Collect     Status: Normal    Specimen: Throat; Swab   Result Value Ref Range    Group A Strep antigen Negative Negative        Vital signs reviewed by Tapan Cortes PA-C  BP (!) 152/66 (BP Location: Left arm, Patient Position: Sitting, Cuff Size: Adult Regular)   Pulse 97   Temp 98.9  F (37.2  C) (Tympanic)   Resp 14   Wt 61.4 kg (135 lb 4.8 oz)   LMP  (LMP Unknown)   SpO2 96%   BMI 23.22 kg/m      Current Meds      Current Outpatient Medications:     atorvastatin (LIPITOR) 20 MG tablet, Take 1 tablet (20 mg) by mouth daily, Disp: 90 tablet, Rfl: 3    benzonatate (TESSALON) 200 MG capsule, Take 1 capsule (200 mg) by mouth 3 times daily as needed for cough., Disp: 30 capsule, Rfl: 0    fluticasone (FLONASE) 50 MCG/ACT nasal spray, Spray 2 sprays in nostril, Disp: , Rfl:     valACYclovir (VALTREX) 500 MG tablet, Take 1 tablet (500 mg) by mouth daily, Disp: 90 tablet, Rfl: 2    estradiol (VAGIFEM) 10 MCG TABS vaginal tablet, Insert 1 tablet intravaginally daily for 2 weeks, followed by twice weekly. (Patient not taking: Reported on 10/9/2024), Disp: 24 tablet, Rfl: 3    loratadine (CLARITIN) 10 MG tablet, Take 1 tablet (10 mg) by mouth daily (Patient not taking: Reported on 10/9/2024), Disp: 90 tablet, Rfl: 0     polyethylene glycol (MIRALAX) 17 GM/Dose powder, Take 17 g (1 Capful) by mouth daily (Patient not taking: Reported on 10/9/2024), Disp: 510 g, Rfl: 0      Respiratory History    occasional episodes of bronchitis and pneumonia      SUBJECTIVE    HPI: Cely Ontiveros is an 78 year old female who presents with chest congestion, cough nonproductive, occasional, headache, and nasal congestion.  Symptoms began 2  days ago and has unchanged.  There is no shortness of breath, wheezing, and chest pain.  Patient is eating and drinking well.  No fever, nausea, vomiting, or diarrhea.    Patient denies any recent travel or exposure to known COVID positive tested individual.      ROS:     Review of Systems   Constitutional:  Negative for chills and fever.   HENT:  Positive for congestion. Negative for ear discharge, ear pain, rhinorrhea, sinus pressure, sinus pain and sore throat.    Eyes: Negative.    Respiratory:  Positive for cough. Negative for shortness of breath.    Cardiovascular: Negative.  Negative for chest pain and palpitations.   Gastrointestinal:  Negative for diarrhea, nausea and vomiting.   Endocrine: Negative.    Genitourinary: Negative.    Musculoskeletal: Negative.  Negative for arthralgias, back pain, joint swelling, myalgias, neck pain and neck stiffness.   Skin: Negative.  Negative for rash and wound.   Allergic/Immunologic: Negative.  Negative for immunocompromised state.   Neurological:  Positive for headaches. Negative for dizziness, weakness and light-headedness.         Family History   Family History   Problem Relation Age of Onset    Hypertension Mother     Arthritis Mother     Cardiovascular Mother     Circulatory Mother     Heart Disease Mother     Lipids Mother     Obesity Mother     Osteoporosis Mother     Cancer Mother         skin    Glaucoma Mother     Cancer - colorectal Father     Cancer Father     Macular Degeneration Father     Diabetes No family hx of     Cerebrovascular Disease No family hx  of     Thyroid Disease No family hx of         Problem history  Patient Active Problem List   Diagnosis    Allergic rhinitis    Herpes simplex    CARDIOVASCULAR SCREENING; LDL GOAL LESS THAN 160    Degenerative joint disease    Posterior vitreous detachment of both eyes    Seasonal allergic rhinitis    Hypermetropia    Astigmatism with presbyopia    Blepharitis of both eyes    Epiphora    Chronic otitis externa of left ear, unspecified type    Dysthymic disorder    Meibomian gland dysfunction    Chondromalacia of patella, left    Complex tear of medial meniscus of left knee as current injury, subsequent encounter    Pseudophakia, Yag Caps, of both eyes    Primary osteoarthritis of left knee    S/P left knee arthroscopy    Epiretinal membrane, mild, of left eye    Posterior capsular opacification visually significant of right eye    Dermatochalasis of both upper eyelids    Ankle stiffness, right    History of eyelid surgery    HTN, goal below 140/90    AK (actinic keratosis)    Neoplasm of skin    Osteopenia of lumbar spine    Dyslipidemia    Slow transit constipation        Allergies  Allergies   Allergen Reactions    Sulfa Antibiotics Swelling    Cats Swelling     Lips swollen    Ciprofloxacin Hcl     Wool Fiber     Keflex [Cephalexin] Other (See Comments)     Yeast infection  Rash (?Hives)          Social History  Social History     Socioeconomic History    Marital status: Single     Spouse name: Not on file    Number of children: Not on file    Years of education: Not on file    Highest education level: Not on file   Occupational History     Employer: [employer]   Tobacco Use    Smoking status: Never    Smokeless tobacco: Never   Vaping Use    Vaping status: Never Used   Substance and Sexual Activity    Alcohol use: No    Drug use: No    Sexual activity: Not Currently     Partners: Male     Birth control/protection: Post-menopausal   Other Topics Concern    Parent/sibling w/ CABG, MI or angioplasty before 65F  55M? No   Social History Narrative    Not on file     Social Determinants of Health     Financial Resource Strain: Low Risk  (7/2/2024)    Financial Resource Strain     Within the past 12 months, have you or your family members you live with been unable to get utilities (heat, electricity) when it was really needed?: No   Food Insecurity: Low Risk  (7/2/2024)    Food Insecurity     Within the past 12 months, did you worry that your food would run out before you got money to buy more?: No     Within the past 12 months, did the food you bought just not last and you didn t have money to get more?: No   Transportation Needs: Low Risk  (7/2/2024)    Transportation Needs     Within the past 12 months, has lack of transportation kept you from medical appointments, getting your medicines, non-medical meetings or appointments, work, or from getting things that you need?: No   Physical Activity: Sufficiently Active (7/2/2024)    Exercise Vital Sign     Days of Exercise per Week: 6 days     Minutes of Exercise per Session: 70 min   Stress: No Stress Concern Present (7/2/2024)    [employer] - Occupational Stress Questionnaire     Feeling of Stress : Not at all   Social Connections: Unknown (7/2/2024)    Social Connection and Isolation Panel [NHANES]     Frequency of Communication with Friends and Family: Not on file     Frequency of Social Gatherings with Friends and Family: More than three times a week     Attends [employer] Services: Not on file     Active Member of Clubs or Organizations: Not on file     Attends Club or Organization Meetings: Not on file     Marital Status: Not on file   Interpersonal Safety: Low Risk  (7/2/2024)    Interpersonal Safety     Do you feel physically and emotionally safe where you currently live?: Yes     Within the past 12 months, have you been hit, slapped, kicked or otherwise physically hurt by someone?: No     Within the past 12 months, have you been humiliated  or emotionally abused in other ways by your partner or ex-partner?: No   Housing Stability: Low Risk  (7/2/2024)    Housing Stability     Do you have housing? : Yes     Are you worried about losing your housing?: No        OBJECTIVE     Vital signs reviewed by Tapan Cortes PA-C  BP (!) 152/66 (BP Location: Left arm, Patient Position: Sitting, Cuff Size: Adult Regular)   Pulse 97   Temp 98.9  F (37.2  C) (Tympanic)   Resp 14   Wt 61.4 kg (135 lb 4.8 oz)   LMP  (LMP Unknown)   SpO2 96%   BMI 23.22 kg/m       Physical Exam  Vitals and nursing note reviewed.   Constitutional:       General: She is not in acute distress.     Appearance: She is well-developed. She is not ill-appearing, toxic-appearing or diaphoretic.   HENT:      Head: Normocephalic and atraumatic.      Right Ear: Tympanic membrane and external ear normal. No drainage, swelling or tenderness. Tympanic membrane is not perforated, erythematous, retracted or bulging.      Left Ear: Tympanic membrane and external ear normal. No drainage, swelling or tenderness. Tympanic membrane is not perforated, erythematous, retracted or bulging.      Nose: No mucosal edema, congestion or rhinorrhea.      Right Sinus: No maxillary sinus tenderness or frontal sinus tenderness.      Left Sinus: No maxillary sinus tenderness or frontal sinus tenderness.      Mouth/Throat:      Pharynx: No pharyngeal swelling, oropharyngeal exudate, posterior oropharyngeal erythema or uvula swelling.      Tonsils: No tonsillar abscesses.   Eyes:      Pupils: Pupils are equal, round, and reactive to light.   Neck:      Trachea: Trachea normal.   Cardiovascular:      Rate and Rhythm: Normal rate and regular rhythm.      Heart sounds: Normal heart sounds, S1 normal and S2 normal. No murmur heard.     No friction rub. No gallop.   Pulmonary:      Effort: Pulmonary effort is normal. No respiratory distress.      Breath sounds: Normal breath sounds. No decreased breath sounds, wheezing,  rhonchi or rales.   Abdominal:      General: Bowel sounds are normal. There is no distension.      Palpations: Abdomen is soft. Abdomen is not rigid. There is no mass.      Tenderness: There is no abdominal tenderness. There is no guarding or rebound.   Musculoskeletal:      Cervical back: Full passive range of motion without pain, normal range of motion and neck supple.   Lymphadenopathy:      Cervical: No cervical adenopathy.   Skin:     General: Skin is warm and dry.   Neurological:      Mental Status: She is alert and oriented to person, place, and time.      Cranial Nerves: No cranial nerve deficit.      Deep Tendon Reflexes: Reflexes are normal and symmetric.   Psychiatric:         Behavior: Behavior normal. Behavior is cooperative.         Thought Content: Thought content normal.         Judgment: Judgment normal.           Tapan Cortes PA-C  10/9/2024, 11:01 AM

## 2024-10-10 ENCOUNTER — NURSE TRIAGE (OUTPATIENT)
Dept: FAMILY MEDICINE | Facility: CLINIC | Age: 79
End: 2024-10-10
Payer: COMMERCIAL

## 2024-10-10 DIAGNOSIS — U07.1 INFECTION DUE TO 2019 NOVEL CORONAVIRUS: Primary | ICD-10-CM

## 2024-10-10 LAB — SARS-COV-2 RNA RESP QL NAA+PROBE: POSITIVE

## 2024-10-10 NOTE — TELEPHONE ENCOUNTER
atorvastatin (LIPITOR) 20 MG tablet - needs to be adjusted - patient instructed.     Patient reports that she was seen in urgent care yesterday and tested positive for COVID.  She is interested in Paxlovid. I gathered information and will call her back.    Her symptom started 10/7/2024   Current symtoms: ST, upset stomach, headache, no energy, congestion and a cough. She reports her symptoms are the same as they were yesterday.   She does take St. Wort     RN COVID TREATMENT VISIT  10/10/24      The patient has been triaged and does not require a higher level of care.    Cely Ontiveros  78 year old  Current weight? 135 lbs    Has the patient been seen by a primary care provider at an [facility] or [facility] within the past two years? Yes.   Have you been in close proximity to/do you have a known exposure to a person with a confirmed case of influenza? No.     General treatment eligibility:  Date of positive COVID test (PCR or at home)?  10/9/2024    Are you or have you been hospitalized for this COVID-19 infection? No.   Have you received monoclonal antibodies or antiviral treatment for COVID-19 since this positive test? No.   Do you have any of the following conditions that place you at risk of being very sick from COVID-19?   - Age 50 years or older  Yes, patient has at least one high risk condition as noted above.     Current COVID symptoms:   - fever or chills  - cough  - fatigue  - headache  - sore throat  - congestion or runny nose  - nausea or vomiting  Yes. Patient has at least one symptom as selected.     How many days since symptoms started? 5 days or less. Established patient, 12 years or older weighing at least 88.2 lbs, who has symptoms that started in the past 5 days, has not been hospitalized nor received treatment already, and is at risk for being very sick from COVID-19.     Treatment eligibility by RN:  Are you currently pregnant or nursing? No  Do you have a  clinically significant hypersensitivity to nirmatrelvir or ritonavir, or toxic epidermal necrolysis (TEN) or Woodward-Vel Syndrome? No  Do you have a history of hepatitis, any hepatic impairment on the Problem List (such as Child-Cade Class C, cirrhosis, fatty liver disease, alcoholic liver disease), or was the last liver lab (hepatic panel, ALT, AST, ALK Phos, bilirubin) elevated in the past 6 months? No  Do you have any history of severe renal impairment (eGFR < 30mL/min)? No    Is patient eligible to continue? Yes, patient meets all eligibility requirements for the RN COVID treatment (as denoted by all no responses above).     Current Outpatient Medications   Medication Sig Dispense Refill    atorvastatin (LIPITOR) 20 MG tablet Take 1 tablet (20 mg) by mouth daily 90 tablet 3    benzonatate (TESSALON) 200 MG capsule Take 1 capsule (200 mg) by mouth 3 times daily as needed for cough. 30 capsule 0    estradiol (VAGIFEM) 10 MCG TABS vaginal tablet Insert 1 tablet intravaginally daily for 2 weeks, followed by twice weekly. (Patient not taking: Reported on 10/9/2024) 24 tablet 3    fluticasone (FLONASE) 50 MCG/ACT nasal spray Spray 2 sprays in nostril      loratadine (CLARITIN) 10 MG tablet Take 1 tablet (10 mg) by mouth daily (Patient not taking: Reported on 10/9/2024) 90 tablet 0    polyethylene glycol (MIRALAX) 17 GM/Dose powder Take 17 g (1 Capful) by mouth daily (Patient not taking: Reported on 10/9/2024) 510 g 0    valACYclovir (VALTREX) 500 MG tablet Take 1 tablet (500 mg) by mouth daily 90 tablet 2       Medications from List 1 of the standing order (on medications that exclude the use of Paxlovid) that patient is taking: NONE. Is patient taking [place]'s Wort? No  Is patient taking [place]'s Wort or any meds from List 1? No.   Medications from List 2 of the standing order (on meds that provider needs to adjust) that patient is taking: NONE. Is patient on any of the meds from List 2? No.   Medications  from List 3 of standing order (on meds that a RN needs to adjust) that patient is taking: atorvastatin (Lipitor): Instructed patient to stop atorvastatin while taking Paxlovid and restart atorvastatin 1 day after the completion of Paxlovid.      Is patient on any meds from List 3? Yes. Patient is on meds from list 3. No meds require a provider visit and at least one med required RN to adjust.     Paxlovid has an approximate 90% reduction in hospitalization. Paxlovid can possibly cause altered sense of taste, diarrhea (loose, watery stools), high blood pressure, muscle aches.     Would patient like a Paxlovid prescription?   Yes.   Lab Results   Component Value Date    GFRESTIMATED 74 06/10/2024       Was last eGFR reduced? No, eGFR 60 or greater/ No Result on record. Patient can receive the normal renal function dose. Paxlovid Rx sent to [facility] in [place]     Temporary change to home medications: Atorvastatin.  Patient instructed    All medication adjustments (holds, etc) were discussed with the patient and patient was asked to repeat back (teachback) their med adjustment.  Did patient understand med adjustment? Yes, patient repeated back and understood correctly.        Reviewed the following instructions with the patient:    Paxlovid (nimatrelvir and ritonavir)    How it works  Two medicines (nirmatrelvir and ritonavir) are taken together. They stop the virus from growing. Less amount of virus is easier for your body to fight.    How to take  Medicine comes in a daily container with both medicine tablets. Take by mouth twice daily (once in the morning, once at night) for 5 days.  The number of tablets to take varies by patient.  Don't chew or break capsules. Swallow whole.    When to take  Take as soon as possible after positive COVID-19 test result, and within 5 days of your first symptoms.    Possible side effects  Can cause altered sense of taste, diarrhea (loose, watery stools),  high blood pressure, muscle aches.    Stefania Nelson RN     11/19/2023    GFR Estimate   Date Value Ref Range Status   06/10/2024 74 >60 mL/min/1.73m2 Final   01/20/2021 68 >60 mL/min/[1.73_m2] Final     Comment:     Non  GFR Calc  Starting 12/18/2018, serum creatinine based estimated GFR (eGFR) will be   calculated using the Chronic Kidney Disease Epidemiology Collaboration   (CKD-EPI) equation.       ALT   Date Value Ref Range Status   02/20/2024 14 0 - 50 U/L Final     Comment:     Reference intervals for this test were updated on 6/12/2023 to more accurately reflect our healthy population. There may be differences in the flagging of prior results with similar values performed with this method. Interpretation of those prior results can be made in the context of the updated reference intervals.     01/20/2021 18 0 - 50 U/L Final     AST   Date Value Ref Range Status   02/20/2024 25 0 - 45 U/L Final     Comment:     Reference intervals for this test were updated on 6/12/2023 to more accurately reflect our healthy population. There may be differences in the flagging of prior results with similar values performed with this method. Interpretation of those prior results can be made in the context of the updated reference intervals.   01/20/2021 18 0 - 45 U/L Final     Alkaline Phosphatase   Date Value Ref Range Status   02/20/2024 75 40 - 150 U/L Final     Comment:     Reference intervals for this test were updated on 11/14/2023 to more accurately reflect our healthy population. There may be differences in the flagging of prior results with similar values performed with this method. Interpretation of those prior results can be made in the context of the updated reference intervals.   01/20/2021 61 40 - 150 U/L Final     Bilirubin Total   Date Value Ref Range Status   02/20/2024 0.3 <=1.2 mg/dL Final   01/20/2021 0.4 0.2 - 1.3 mg/dL Final       Additional Information   Negative: SEVERE difficulty  breathing (e.g., struggling for each breath, speaks in single words)   Negative: Difficult to awaken or acting confused (e.g., disoriented, slurred speech)   Negative: Bluish (or gray) lips or face now   Negative: Shock suspected (e.g., cold/pale/clammy skin, too weak to stand, low BP, rapid pulse)   Negative: Sounds like a life-threatening emergency to the triager   Negative: SEVERE or constant chest pain or pressure  (Exception: Mild central chest pain, present only when coughing.)   Negative: MODERATE difficulty breathing (e.g., speaks in phrases, SOB even at rest, pulse 100-120)   Negative: Headache and stiff neck (can't touch chin to chest)   Negative: Oxygen level (e.g., pulse oximetry) 90% or lower   Negative: Chest pain or pressure  (Exception: MILD central chest pain, present only when coughing.)   Negative: Drinking very little and dehydration suspected (e.g., no urine > 12 hours, very dry mouth, very lightheaded)   Negative: Patient sounds very sick or weak to the triager    Protocols used: Coronavirus (COVID-19) Diagnosed or [id number]

## 2024-12-15 ENCOUNTER — OFFICE VISIT (OUTPATIENT)
Dept: URGENT CARE | Facility: URGENT CARE | Age: 79
End: 2024-12-15
Payer: COMMERCIAL

## 2024-12-15 VITALS
DIASTOLIC BLOOD PRESSURE: 71 MMHG | OXYGEN SATURATION: 99 % | TEMPERATURE: 96.8 F | HEART RATE: 81 BPM | WEIGHT: 136 LBS | RESPIRATION RATE: 18 BRPM | SYSTOLIC BLOOD PRESSURE: 160 MMHG | BODY MASS INDEX: 23.34 KG/M2

## 2024-12-15 DIAGNOSIS — R30.0 BURNING WITH URINATION: Primary | ICD-10-CM

## 2024-12-15 DIAGNOSIS — R30.0 DYSURIA: ICD-10-CM

## 2024-12-15 DIAGNOSIS — N89.8 VAGINAL ITCHING: ICD-10-CM

## 2024-12-15 DIAGNOSIS — N89.8 VAGINAL IRRITATION: ICD-10-CM

## 2024-12-15 LAB
ALBUMIN UR-MCNC: NEGATIVE MG/DL
APPEARANCE UR: CLEAR
BACTERIA #/AREA URNS HPF: ABNORMAL /HPF
BILIRUB UR QL STRIP: NEGATIVE
CLUE CELLS: ABNORMAL
COLOR UR AUTO: YELLOW
GLUCOSE UR STRIP-MCNC: NEGATIVE MG/DL
HGB UR QL STRIP: ABNORMAL
KETONES UR STRIP-MCNC: NEGATIVE MG/DL
LEUKOCYTE ESTERASE UR QL STRIP: NEGATIVE
NITRATE UR QL: NEGATIVE
PH UR STRIP: 5.5 [PH] (ref 5–7)
RBC #/AREA URNS AUTO: ABNORMAL /HPF
SP GR UR STRIP: 1.01 (ref 1–1.03)
SQUAMOUS #/AREA URNS AUTO: ABNORMAL /LPF
TRICHOMONAS, WET PREP: ABNORMAL
UROBILINOGEN UR STRIP-ACNC: 0.2 E.U./DL
WBC #/AREA URNS AUTO: ABNORMAL /HPF
WBC'S/HIGH POWER FIELD, WET PREP: ABNORMAL
YEAST, WET PREP: ABNORMAL

## 2024-12-15 PROCEDURE — 99214 OFFICE O/P EST MOD 30 MIN: CPT | Performed by: INTERNAL MEDICINE

## 2024-12-15 PROCEDURE — 87210 SMEAR WET MOUNT SALINE/INK: CPT | Performed by: INTERNAL MEDICINE

## 2024-12-15 PROCEDURE — 87086 URINE CULTURE/COLONY COUNT: CPT | Performed by: INTERNAL MEDICINE

## 2024-12-15 PROCEDURE — 81001 URINALYSIS AUTO W/SCOPE: CPT | Performed by: INTERNAL MEDICINE

## 2024-12-15 RX ORDER — NITROFURANTOIN 25; 75 MG/1; MG/1
100 CAPSULE ORAL 2 TIMES DAILY
Qty: 10 CAPSULE | Refills: 0 | Status: SHIPPED | OUTPATIENT
Start: 2024-12-15 | End: 2024-12-20

## 2024-12-15 NOTE — PATIENT INSTRUCTIONS
Urine test looks ok  Wet prep is normal     Discussed could be estrogen deficiency    Please recheck with your primary or GYN if recurring    Per your request, I started you antibiotics  Macrobid 2 x day for 5 days

## 2024-12-15 NOTE — PROGRESS NOTES
ASSESSMENT AND PLAN:      ICD-10-CM    1. Burning with urination  R30.0 Urine Culture Aerobic Bacterial - lab collect     nitroFURantoin macrocrystal-monohydrate (MACROBID) 100 MG capsule      2. Vaginal irritation  N89.8 Wet prep - lab collect     Wet prep - lab collect      3. Vaginal itching  N89.8 Wet prep - lab collect     Wet prep - lab collect      4. Dysuria  R30.0 UA Macroscopic with reflex to Microscopic and Culture - Lab Collect     UA Macroscopic with reflex to Microscopic and Culture - Lab Collect     UA Microscopic with Reflex to Culture     nitroFURantoin macrocrystal-monohydrate (MACROBID) 100 MG capsule        Patient Instructions       Urine test looks ok  Wet prep is normal     Discussed could be estrogen deficiency    Please recheck with your primary or GYN if recurring    Per your request, I started you antibiotics  Macrobid 2 x day for 5 days  Return if symptoms worsen or fail to improve.        Jayashree Polanco MD  [facility]    Subjective     Cely Ontiveros is a 78 year old who presents for Patient presents with:  UTI: Onset this morning burning while urinating and cloudy urine, a little vaginal itching and irritation.     an established patient of [facility].    UTI    Onset of burning symptoms was this morning    Current and associated symptoms burning and cloudy urine.  Slight vaginal itching  Treatment and measures tried Increase fluid intake  Predisposing factors include none  Patient denies flank pain, temperature > 101 degrees F., and vomiting      Changed estrogen tabs to weekly from 2 x week due to breast tendersness    Review of Systems        Objective    BP (!) 160/71 (BP Location: Left arm, Patient Position: Sitting, Cuff Size: Adult Regular)   Pulse 81   Temp 96.8  F (36  C) (Tympanic)   Resp 18   Wt 61.7 kg (136 lb)   LMP  (LMP Unknown)   SpO2 99%   BMI 23.34 kg/m    Physical Exam  Vitals reviewed.   Constitutional:       Appearance: Normal  appearance.   Abdominal:      Palpations: Abdomen is soft.      Tenderness: There is no abdominal tenderness. There is no right CVA tenderness or left CVA tenderness.   Neurological:      Mental Status: She is alert.            Results for orders placed or performed in visit on 12/15/24 (from the past 24 hours)   Wet prep - lab collect    Specimen: Vagina; Swab   Result Value Ref Range    Trichomonas Absent Absent    Yeast Absent Absent    Clue Cells Absent Absent    WBCs/high power field 1+ (A) None   UA Macroscopic with reflex to Microscopic and Culture - Lab Collect    Specimen: Urine, Midstream   Result Value Ref Range    Color Urine Yellow Colorless, Straw, Light Yellow, Yellow    Appearance Urine Clear Clear    Glucose Urine Negative Negative mg/dL    Bilirubin Urine Negative Negative    Ketones Urine Negative Negative mg/dL    Specific Gravity Urine 1.010 1.003 - 1.035    Blood Urine Trace (A) Negative    pH Urine 5.5 5.0 - 7.0    Protein Albumin Urine Negative Negative mg/dL    Urobilinogen Urine 0.2 0.2, 1.0 E.U./dL    Nitrite Urine Negative Negative    Leukocyte Esterase Urine Negative Negative   UA Microscopic with Reflex to Culture   Result Value Ref Range    Bacteria Urine Few (A) None Seen /HPF    RBC Urine 0-2 0-2 /HPF /HPF    WBC Urine 0-5 0-5 /HPF /HPF    Squamous Epithelials Urine Many (A) None Seen /LPF    Narrative    Urine Culture not indicated

## 2024-12-16 LAB — BACTERIA UR CULT: NORMAL

## 2025-01-23 NOTE — TELEPHONE ENCOUNTER
REASON FOR VISIT: Right knee pain for a month, believes she tore her meniscus, no diagnosis, has not been evaluated prior    DATE OF APPT: 1/30/2025   NOTES (FOR ALL VISITS) STATUS DETAILS   OFFICE NOTE from referring provider N/A Self   EMG N/A    MEDICATION LIST N/A    IMAGING  (FOR ALL VISITS)     XR N/A    MRI (HEAD, NECK, SPINE) N/A    CT (HEAD, NECK, SPINE) N/A

## 2025-01-30 ENCOUNTER — OFFICE VISIT (OUTPATIENT)
Dept: ORTHOPEDICS | Facility: CLINIC | Age: 80
End: 2025-01-30
Payer: COMMERCIAL

## 2025-01-30 ENCOUNTER — ANCILLARY PROCEDURE (OUTPATIENT)
Dept: GENERAL RADIOLOGY | Facility: CLINIC | Age: 80
End: 2025-01-30
Attending: FAMILY MEDICINE
Payer: COMMERCIAL

## 2025-01-30 ENCOUNTER — PRE VISIT (OUTPATIENT)
Dept: ORTHOPEDICS | Facility: CLINIC | Age: 80
End: 2025-01-30

## 2025-01-30 DIAGNOSIS — G89.29 BILATERAL CHRONIC KNEE PAIN: Primary | ICD-10-CM

## 2025-01-30 DIAGNOSIS — M25.561 BILATERAL CHRONIC KNEE PAIN: Primary | ICD-10-CM

## 2025-01-30 DIAGNOSIS — M25.561 BILATERAL CHRONIC KNEE PAIN: ICD-10-CM

## 2025-01-30 DIAGNOSIS — M25.562 BILATERAL CHRONIC KNEE PAIN: Primary | ICD-10-CM

## 2025-01-30 DIAGNOSIS — G89.29 BILATERAL CHRONIC KNEE PAIN: ICD-10-CM

## 2025-01-30 DIAGNOSIS — M25.562 BILATERAL CHRONIC KNEE PAIN: ICD-10-CM

## 2025-01-30 PROCEDURE — 73564 X-RAY EXAM KNEE 4 OR MORE: CPT | Mod: LT | Performed by: RADIOLOGY

## 2025-01-30 ASSESSMENT — PAIN SCALES - GENERAL: PAINLEVEL_OUTOF10: NO PAIN (0)

## 2025-01-30 NOTE — NURSING NOTE
Chief Complaint   Patient presents with    Right Knee - Pain, New Patient     Right knee pain for a couple months       There were no vitals filed for this visit.    There is no height or weight on file to calculate BMI.      GIANNI Edward NREMT

## 2025-01-30 NOTE — LETTER
1/30/2025      Cely Ontiveros  [address] [facility] [id number]      Dear Colleague,    Thank you for referring your patient, Cely Ontiveros, to the [facility] [place]. Please see a copy of my visit note below.    CHIEF COMPLAINT:  Pain and New Patient of the Right Knee (Right knee pain for a couple months)       HISTORY OF PRESENT ILLNESS  Ms. Ontiveros is a 79 year old female who presents to clinic today with bilateral knee pain.  Cely has right knee pain that is longstanding, this has gotten worse over the past couple of months with no single inciting event.  She has been seen in the past for her right knee and has been told that she may have meniscus tear.  She also has pain in her left knee that is similar, although not as severe.        Additional history: as documented    MEDICAL HISTORY  Patient Active Problem List   Diagnosis     Allergic rhinitis     Herpes simplex     CARDIOVASCULAR SCREENING; LDL GOAL LESS THAN 160     Degenerative joint disease     Posterior vitreous detachment of both eyes     Seasonal allergic rhinitis     Hypermetropia     Astigmatism with presbyopia     Blepharitis of both eyes     Epiphora     Chronic otitis externa of left ear, unspecified type     Dysthymic disorder     Meibomian gland dysfunction     Chondromalacia of patella, left     Complex tear of medial meniscus of left knee as current injury, subsequent encounter     Pseudophakia, Yag Caps, of both eyes     Primary osteoarthritis of left knee     S/P left knee arthroscopy     Epiretinal membrane, mild, of left eye     Posterior capsular opacification visually significant of right eye     Dermatochalasis of both upper eyelids     Ankle stiffness, right     History of eyelid surgery     HTN, goal below 140/90     AK (actinic keratosis)     Neoplasm of skin     Osteopenia of lumbar spine     Dyslipidemia     Slow transit constipation       Current Outpatient Medications    Medication Sig Dispense Refill     atorvastatin (LIPITOR) 20 MG tablet Take 1 tablet (20 mg) by mouth daily 90 tablet 3     fluticasone (FLONASE) 50 MCG/ACT nasal spray Spray 2 sprays in nostril       valACYclovir (VALTREX) 500 MG tablet Take 1 tablet (500 mg) by mouth daily 90 tablet 2     benzonatate (TESSALON) 200 MG capsule Take 1 capsule (200 mg) by mouth 3 times daily as needed for cough. (Patient not taking: Reported on 1/30/2025) 30 capsule 0     estradiol (VAGIFEM) 10 MCG TABS vaginal tablet Insert 1 tablet intravaginally daily for 2 weeks, followed by twice weekly. (Patient not taking: Reported on 10/9/2024) 24 tablet 3     loratadine (CLARITIN) 10 MG tablet Take 1 tablet (10 mg) by mouth daily (Patient not taking: Reported on 10/9/2024) 90 tablet 0     polyethylene glycol (MIRALAX) 17 GM/Dose powder Take 17 g (1 Capful) by mouth daily (Patient not taking: Reported on 10/9/2024) 510 g 0       Allergies   Allergen Reactions     Sulfa Antibiotics Swelling     Cats Swelling     Lips swollen     Ciprofloxacin Hcl      Wool Fiber      Keflex [Cephalexin] Other (See Comments)     Yeast infection  Rash (?Hives)         Family History   Problem Relation Age of Onset     Hypertension Mother      Arthritis Mother      Cardiovascular Mother      Circulatory Mother      Heart Disease Mother      Lipids Mother      Obesity Mother      Osteoporosis Mother      Cancer Mother         skin     Glaucoma Mother      Cancer - colorectal Father      Cancer Father      Macular Degeneration Father      Diabetes No family hx of      Cerebrovascular Disease No family hx of      Thyroid Disease No family hx of        Additional medical/Social/Surgical histories reviewed in [facility] and updated as appropriate.        PHYSICAL EXAM  General  - normal appearance, in no obvious distress  Musculoskeletal - right and left knee  - stance: mildly antalgic gait  - inspection: trace effusion in right  - palpation: medial joint line  tenderness  - ROM: 120 degrees flexion, 0 degrees extension, painful active ROM  - strength: 5/5 in flexion, 5/5 in extension  - special tests:  (-) Obed  (-) varus at 0 and 30 degrees flexion  (-) valgus at 0 and 30 degrees flexion  Neuro  - no sensory or motor deficit, grossly normal coordination, normal muscle tone         I ordered and independently reviewed new x-rays of her knees, these do show mild tricompartmental osteoarthritis.    Her symptoms     ASSESSMENT & PLAN  Ms. Ontiveros is a 79 year old female who presents to clinic today with bilateral knee pain right, worse than left.    I reviewed her previous x-rays in the room with her, these are x-rays of her right knee from 2022 do reveal mild to moderate osteoarthritis.    Do raise concern for an intra-articular issue in each knee, given the chronicity and her level of pain I am ordering MRI imaging.  Get in touch with her with results.    It was a pleasure seeing Cely today.    Luis Le DO, [facility]  Primary Care Sports Medicine      This note was constructed using Dragon dictation software, please excuse any minor errors in spelling, grammar, or syntax.      Again, thank you for allowing me to participate in the care of your patient.        Sincerely,        Luis Le DO    Electronically signed

## 2025-01-30 NOTE — PROGRESS NOTES
CHIEF COMPLAINT:  Pain and New Patient of the Right Knee (Right knee pain for a couple months)       HISTORY OF PRESENT ILLNESS  Ms. Ontiveros is a 79 year old female who presents to clinic today with bilateral knee pain.  Cely has right knee pain that is longstanding, this has gotten worse over the past couple of months with no single inciting event.  She has been seen in the past for her right knee and has been told that she may have meniscus tear.  She also has pain in her left knee that is similar, although not as severe.        Additional history: as documented    MEDICAL HISTORY  Patient Active Problem List   Diagnosis    Allergic rhinitis    Herpes simplex    CARDIOVASCULAR SCREENING; LDL GOAL LESS THAN 160    Degenerative joint disease    Posterior vitreous detachment of both eyes    Seasonal allergic rhinitis    Hypermetropia    Astigmatism with presbyopia    Blepharitis of both eyes    Epiphora    Chronic otitis externa of left ear, unspecified type    Dysthymic disorder    Meibomian gland dysfunction    Chondromalacia of patella, left    Complex tear of medial meniscus of left knee as current injury, subsequent encounter    Pseudophakia, Yag Caps, of both eyes    Primary osteoarthritis of left knee    S/P left knee arthroscopy    Epiretinal membrane, mild, of left eye    Posterior capsular opacification visually significant of right eye    Dermatochalasis of both upper eyelids    Ankle stiffness, right    History of eyelid surgery    HTN, goal below 140/90    AK (actinic keratosis)    Neoplasm of skin    Osteopenia of lumbar spine    Dyslipidemia    Slow transit constipation       Current Outpatient Medications   Medication Sig Dispense Refill    atorvastatin (LIPITOR) 20 MG tablet Take 1 tablet (20 mg) by mouth daily 90 tablet 3    fluticasone (FLONASE) 50 MCG/ACT nasal spray Spray 2 sprays in nostril      valACYclovir (VALTREX) 500 MG tablet Take 1 tablet (500 mg) by mouth daily 90 tablet 2    benzonatate  (TESSALON) 200 MG capsule Take 1 capsule (200 mg) by mouth 3 times daily as needed for cough. (Patient not taking: Reported on 1/30/2025) 30 capsule 0    estradiol (VAGIFEM) 10 MCG TABS vaginal tablet Insert 1 tablet intravaginally daily for 2 weeks, followed by twice weekly. (Patient not taking: Reported on 10/9/2024) 24 tablet 3    loratadine (CLARITIN) 10 MG tablet Take 1 tablet (10 mg) by mouth daily (Patient not taking: Reported on 10/9/2024) 90 tablet 0    polyethylene glycol (MIRALAX) 17 GM/Dose powder Take 17 g (1 Capful) by mouth daily (Patient not taking: Reported on 10/9/2024) 510 g 0       Allergies   Allergen Reactions    Sulfa Antibiotics Swelling    Cats Swelling     Lips swollen    Ciprofloxacin Hcl     Wool Fiber     Keflex [Cephalexin] Other (See Comments)     Yeast infection  Rash (?Hives)         Family History   Problem Relation Age of Onset    Hypertension Mother     Arthritis Mother     Cardiovascular Mother     Circulatory Mother     Heart Disease Mother     Lipids Mother     Obesity Mother     Osteoporosis Mother     Cancer Mother         skin    Glaucoma Mother     Cancer - colorectal Father     Cancer Father     Macular Degeneration Father     Diabetes No family hx of     Cerebrovascular Disease No family hx of     Thyroid Disease No family hx of        Additional medical/Social/[facility] and updated as appropriate.        PHYSICAL EXAM  General  - normal appearance, in no obvious distress  Musculoskeletal - right and left knee  - stance: mildly antalgic gait  - inspection: trace effusion in right  - palpation: medial joint line tenderness  - ROM: 120 degrees flexion, 0 degrees extension, painful active ROM  - strength: 5/5 in flexion, 5/5 in extension  - special tests:  (-) Obed  (-) varus at 0 and 30 degrees flexion  (-) valgus at 0 and 30 degrees flexion  Neuro  - no sensory or motor deficit, grossly normal coordination, normal muscle tone         I ordered  and independently reviewed new x-rays of her knees, these do show mild tricompartmental osteoarthritis.    Her symptoms     ASSESSMENT & PLAN  Ms. Ontiveros is a 79 year old female who presents to clinic today with bilateral knee pain right, worse than left.    I reviewed her previous x-rays in the room with her, these are x-rays of her right knee from 2022 do reveal mild to moderate osteoarthritis.    Do raise concern for an intra-articular issue in each knee, given the chronicity and her level of pain I am ordering MRI imaging.  Get in touch with her with results.    It was a pleasure seeing Cely today.    Luis Le DO, [facility]  Primary Care Sports Medicine      This note was constructed using Dragon dictation software, please excuse any minor errors in spelling, grammar, or syntax.

## 2025-03-08 ENCOUNTER — HEALTH MAINTENANCE LETTER (OUTPATIENT)
Age: 80
End: 2025-03-08

## 2025-03-27 ENCOUNTER — ANCILLARY PROCEDURE (OUTPATIENT)
Dept: MRI IMAGING | Facility: CLINIC | Age: 80
End: 2025-03-27
Attending: FAMILY MEDICINE
Payer: COMMERCIAL

## 2025-03-27 DIAGNOSIS — M25.562 BILATERAL CHRONIC KNEE PAIN: ICD-10-CM

## 2025-03-27 DIAGNOSIS — M25.561 BILATERAL CHRONIC KNEE PAIN: ICD-10-CM

## 2025-03-27 DIAGNOSIS — G89.29 BILATERAL CHRONIC KNEE PAIN: ICD-10-CM

## 2025-03-27 PROCEDURE — 73721 MRI JNT OF LWR EXTRE W/O DYE: CPT | Mod: LT | Performed by: RADIOLOGY

## 2025-03-27 PROCEDURE — 73721 MRI JNT OF LWR EXTRE W/O DYE: CPT | Mod: RT | Performed by: RADIOLOGY

## 2025-04-29 ENCOUNTER — OFFICE VISIT (OUTPATIENT)
Dept: ORTHOPEDICS | Facility: CLINIC | Age: 80
End: 2025-04-29
Payer: COMMERCIAL

## 2025-04-29 DIAGNOSIS — M17.0 BILATERAL PRIMARY OSTEOARTHRITIS OF KNEE: Primary | ICD-10-CM

## 2025-04-29 PROCEDURE — 20610 DRAIN/INJ JOINT/BURSA W/O US: CPT | Mod: 50 | Performed by: FAMILY MEDICINE

## 2025-04-29 PROCEDURE — 99213 OFFICE O/P EST LOW 20 MIN: CPT | Mod: 25 | Performed by: FAMILY MEDICINE

## 2025-04-29 RX ORDER — LIDOCAINE HYDROCHLORIDE 10 MG/ML
3 INJECTION, SOLUTION INFILTRATION; PERINEURAL
Status: COMPLETED | OUTPATIENT
Start: 2025-04-29 | End: 2025-04-29

## 2025-04-29 RX ADMIN — LIDOCAINE HYDROCHLORIDE 3 ML: 10 INJECTION, SOLUTION INFILTRATION; PERINEURAL at 10:36

## 2025-04-29 NOTE — PROGRESS NOTES
HISTORY OF PRESENT ILLNESS  Ms. Ontiveros is a pleasant 79 year old female following up with bilateral knee pain.  Cely is here to review her MRI imaging.  Unfortunately her knees are still bothering her quite a bit, she has pain with most activities.     PHYSICAL EXAM  General  - normal appearance, in no obvious distress  Musculoskeletal -right and left knee  - stance: normal gait without limp  - inspection: trace effusion bilaterally  - palpation: no joint line tenderness  - ROM: 135 degrees flexion, -5 degrees extension  - special tests:  (-) varus at 0 and 30 degrees flexion  (-) valgus at 0 and 30 degrees flexion    Neuro  - no sensory or motor deficit, grossly normal coordination, normal muscle tone  Skin  - no ecchymosis, erythema, warmth, or induration, no obvious rash        ASSESSMENT & PLAN  Ms. Ontiveros is a 79 year old female following up with bilateral knee pain.    I reviewed her MR imaging in the room with her.  Her left knee MRI reads:  1. Since comparison from 2018, increased area of intermediate signal  with articular surface violation involving anterior horn, body and  posterior horn of the medial meniscus. Findings maybe due interim  partial menisectomy; however, retear cannot be completely excluded  especially given new signal change more anteriorly.  2. 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  3. Moderate semimembranosus bursal effusion extending laterally,  mildly increased since 2018.     Her right knee MR reads:  1. Horizontal oblique tearing of the body and posterior horn of the  medial meniscus   2. Grade IV chondromalacia in the patellofemoral compartment. Suspect  tiny foci of Grade 3/4 chondromalacia within the medial and lateral  compartments.  3. Query lateral meniscus posterior root ligament degeneration/partial  tear.     We discussed these results and their implications.  Given her chondromalacia I do think focusing treatment towards  osteoarthritis would be the most effective thing moving forward.  Through shared decision making we did decide to inject her knees today with hyaluronic acid.    Cely is going to keep a close eye on her pain, if she is not experiencing significant relief we should follow-up in the coming weeks.    It was a pleasure seeing Cely.        Luis Le DO, CAM    Greater than 20 minutes were spent on the day of visit reviewing records, in direct face-to-face consultation and exam, and documentation independent of the procedure.       This note was constructed using Dragon dictation software, please excuse any minor errors in spelling, grammar, or syntax.    Large Joint Injection/Arthocentesis: bilateral knee    Date/Time: 4/29/2025 10:36 AM    Performed by: Luis Le DO  Authorized by: Luis Le DO    Indications:  Pain  Needle Size:  22 G  Guidance: landmark guided    Approach:  Lateral  Location:  Knee  Laterality:  Bilateral      Medications (Right):  48 mg hylan 48 MG/6ML; 3 mL lidocaine 1 %  Medications (Left):  48 mg hylan 48 MG/6ML; 3 mL lidocaine 1 %  Outcome:  Tolerated well, no immediate complications  Procedure discussed: discussed risks, benefits, and alternatives    Consent Given by:  Patient  Timeout: timeout called immediately prior to procedure    Prep: patient was prepped and draped in usual sterile fashion         Knee Injections with Hyaluronic Acid    The patient was informed of the risks and the benefits of the procedure and a written consent was signed.    The patient's right knee was prepped with chlorhexidine in sterile fashion.   Synvisc One solution removed from packaging and inspected for consistency with regards to volume and packaging standard.  Injection was performed using sterile technique.  A 1.5-inch 22-gauge needle was used to enter the lateral aspect of the knee.  Injection performed without difficulty.    The patient's left knee was prepped with chlorhexidine  in sterile fashion.   Synvisc One solution removed from packaging and inspected for consistency with regards to volume and packaging standard.  Injection was performed using sterile technique.  A 1.5-inch 22-gauge needle was used to enter the lateral aspect of the knee.  Injection performed without difficulty.    There were no complications. The patient tolerated the procedure well. There was negligible bleeding.   The patient was instructed to ice the knee upon leaving clinic and refrain from overuse over the next 3 days.   The patient was instructed to call or go to the emergency room with any unusual pain, swelling, redness, or if otherwise concerned.

## 2025-04-29 NOTE — NURSING NOTE
[facility], SPORTS MEDICINE and PODIATRY  [address] [place] [address]  Dept: [phone]  ______________________________________________________________________________    Patient: Cely Ontiveros   : 1945   MRN: [id number]   2025    INVASIVE PROCEDURE SAFETY CHECKLIST    Date: 2025   Procedure: Bilateral knee injection  Patient Name: Cely Ontiveros  MRN: [id number]  YOB: 1945    Action: Complete sections as appropriate. Any discrepancy results in a HARD COPY until resolved.     PRE PROCEDURE:  Patient ID verified with 2 identifiers (name and  or MRN): Yes  Procedure and site verified with patient/designee (when able): Yes  Accurate consent documentation in medical record: Yes  H&P (or appropriate assessment) documented in medical record: Yes  H&P must be up to 20 days prior to procedure and updates within 24 hours of procedure as applicable: NA  Relevant diagnostic and radiology test results appropriately labeled and displayed as applicable: NA  Procedure site(s) marked with provider initials: NA    TIMEOUT:  Time-Out performed immediately prior to starting procedure, including verbal and active participation of all team members addressing the following:Yes  * Correct patient identify  * Confirmed that the correct side and site are marked  * An accurate procedure consent form  * Agreement on the procedure to be done  * Correct patient position  * Relevant images and results are properly labeled and appropriately displayed  * The need to administer antibiotics or fluids for irrigation purposes during the procedure as applicable   * Safety precautions based on patient history or medication use    DURING PROCEDURE: Verification of correct person, site, and procedures any time the responsibility for care of the patient is transferred to another member of the care team.       Prior to injection, verified patient identity using patient's name and  date of birth.  Due to injection administration, patient instructed to remain in clinic for 15 minutes  afterwards, and to report any adverse reaction to me immediately.    Joint injection was performed.      Drug Amount Wasted:  None.  Vial/Syringe: Syringe  Expiration Date:  12/30/2027      Wagner Velázquez, EMT  April 29, 2025

## 2025-04-29 NOTE — LETTER
4/29/2025      Cely Ontiveros  [address] [facility] [address]      Dear Colleague,    Thank you for referring your patient, Cely Ontiveros, to the [facility] [place]. Please see a copy of my visit note below.    HISTORY OF PRESENT ILLNESS  Ms. Ontiveros is a pleasant 79 year old female following up with bilateral knee pain.  Cely is here to review her MRI imaging.  Unfortunately her knees are still bothering her quite a bit, she has pain with most activities.     PHYSICAL EXAM  General  - normal appearance, in no obvious distress  Musculoskeletal -right and left knee  - stance: normal gait without limp  - inspection: trace effusion bilaterally  - palpation: no joint line tenderness  - ROM: 135 degrees flexion, -5 degrees extension  - special tests:  (-) varus at 0 and 30 degrees flexion  (-) valgus at 0 and 30 degrees flexion    Neuro  - no sensory or motor deficit, grossly normal coordination, normal muscle tone  Skin  - no ecchymosis, erythema, warmth, or induration, no obvious rash        ASSESSMENT & PLAN  Ms. Ontiveros is a 79 year old female following up with bilateral knee pain.    I reviewed her MR imaging in the room with her.  Her left knee MRI reads:  1. Since comparison from 2018, increased area of intermediate signal  with articular surface violation involving anterior horn, body and  posterior horn of the medial meniscus. Findings maybe due interim  partial menisectomy; however, retear cannot be completely excluded  especially given new signal change more anteriorly.  2. 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  3. Moderate semimembranosus bursal effusion extending laterally,  mildly increased since 2018.     Her right knee MR reads:  1. Horizontal oblique tearing of the body and posterior horn of the  medial meniscus   2. Grade IV chondromalacia in the patellofemoral compartment. Suspect  tiny foci of Grade 3/4  chondromalacia within the medial and lateral  compartments.  3. Query lateral meniscus posterior root ligament degeneration/partial  tear.     We discussed these results and their implications.  Given her chondromalacia I do think focusing treatment towards osteoarthritis would be the most effective thing moving forward.  Through shared decision making we did decide to inject her knees today with hyaluronic acid.    Cely is going to keep a close eye on her pain, if she is not experiencing significant relief we should follow-up in the coming weeks.    It was a pleasure seeing Cely.        Luis Le DO, RANJIT    Greater than 20 minutes were spent on the day of visit reviewing records, in direct face-to-face consultation and exam, and documentation independent of the procedure.       This note was constructed using Dragon dictation software, please excuse any minor errors in spelling, grammar, or syntax.    Large Joint Injection/Arthocentesis: bilateral knee    Date/Time: 4/29/2025 10:36 AM    Performed by: Luis Le DO  Authorized by: Luis Le DO    Indications:  Pain  Needle Size:  22 G  Guidance: landmark guided    Approach:  Lateral  Location:  Knee  Laterality:  Bilateral      Medications (Right):  48 mg hylan 48 MG/6ML; 3 mL lidocaine 1 %  Medications (Left):  48 mg hylan 48 MG/6ML; 3 mL lidocaine 1 %  Outcome:  Tolerated well, no immediate complications  Procedure discussed: discussed risks, benefits, and alternatives    Consent Given by:  Patient  Timeout: timeout called immediately prior to procedure    Prep: patient was prepped and draped in usual sterile fashion         Knee Injections with Hyaluronic Acid    The patient was informed of the risks and the benefits of the procedure and a written consent was signed.    The patient's right knee was prepped with chlorhexidine in sterile fashion.   Synvisc One solution removed from packaging and inspected for consistency with regards to  volume and packaging standard.  Injection was performed using sterile technique.  A 1.5-inch 22-gauge needle was used to enter the lateral aspect of the knee.  Injection performed without difficulty.    The patient's left knee was prepped with chlorhexidine in sterile fashion.   Synvisc One solution removed from packaging and inspected for consistency with regards to volume and packaging standard.  Injection was performed using sterile technique.  A 1.5-inch 22-gauge needle was used to enter the lateral aspect of the knee.  Injection performed without difficulty.    There were no complications. The patient tolerated the procedure well. There was negligible bleeding.   The patient was instructed to ice the knee upon leaving clinic and refrain from overuse over the next 3 days.   The patient was instructed to call or go to the emergency room with any unusual pain, swelling, redness, or if otherwise concerned.            Again, thank you for allowing me to participate in the care of your patient.        Sincerely,        Luis Le DO    Electronically signed

## 2025-04-29 NOTE — NURSING NOTE
Chief Complaint   Patient presents with    Left Knee - Pain, Follow Up     Injection    Right Knee - Pain, Follow Up     Injection       There were no vitals filed for this visit.    There is no height or weight on file to calculate BMI.      GIANNI Edward NREMT

## 2025-05-07 DIAGNOSIS — N39.0 FREQUENT UTI: ICD-10-CM

## 2025-05-07 DIAGNOSIS — N95.2 VAGINAL ATROPHY: ICD-10-CM

## 2025-05-29 RX ORDER — ESTRADIOL 10 UG/1
TABLET, FILM COATED VAGINAL
Qty: 24 TABLET | Refills: 0 | OUTPATIENT
Start: 2025-05-29

## 2025-05-29 NOTE — TELEPHONE ENCOUNTER
"Leonila Flanagan, APRN CNP to  Ob/Gyn Triage  (Selected Message)      5/29/25 10:41 AM  \"Pt needs follow up at this time. Once she schedules I am happy to place justino refill for her.\"    Sent pt a legalPAD appt reminder.    Virginia Page RN- OB/GYN group         "

## 2025-06-04 ENCOUNTER — OFFICE VISIT (OUTPATIENT)
Dept: OPTOMETRY | Facility: CLINIC | Age: 80
End: 2025-06-04
Payer: COMMERCIAL

## 2025-06-04 DIAGNOSIS — H52.03 HYPEROPIA OF BOTH EYES: ICD-10-CM

## 2025-06-04 DIAGNOSIS — H04.203 BILATERAL EPIPHORA: ICD-10-CM

## 2025-06-04 DIAGNOSIS — Z98.890 HISTORY OF EYELID SURGERY: ICD-10-CM

## 2025-06-04 DIAGNOSIS — Z01.00 EXAMINATION OF EYES AND VISION: Primary | ICD-10-CM

## 2025-06-04 DIAGNOSIS — H04.123 DRY EYE SYNDROME OF BOTH EYES: ICD-10-CM

## 2025-06-04 DIAGNOSIS — H35.372 EPIRETINAL MEMBRANE (ERM) OF LEFT EYE: ICD-10-CM

## 2025-06-04 DIAGNOSIS — H52.203 ASTIGMATISM OF BOTH EYES WITH PRESBYOPIA: ICD-10-CM

## 2025-06-04 DIAGNOSIS — H02.88B MEIBOMIAN GLAND DYSFUNCTION (MGD) OF UPPER AND LOWER LIDS OF BOTH EYES: ICD-10-CM

## 2025-06-04 DIAGNOSIS — Z96.1 PSEUDOPHAKIA OF BOTH EYES: ICD-10-CM

## 2025-06-04 DIAGNOSIS — H02.88A MEIBOMIAN GLAND DYSFUNCTION (MGD) OF UPPER AND LOWER LIDS OF BOTH EYES: ICD-10-CM

## 2025-06-04 DIAGNOSIS — H10.13 ALLERGIC CONJUNCTIVITIS OF BOTH EYES: ICD-10-CM

## 2025-06-04 DIAGNOSIS — H18.513 CORNEAL GUTTATA OF BOTH EYES: ICD-10-CM

## 2025-06-04 DIAGNOSIS — H52.4 ASTIGMATISM OF BOTH EYES WITH PRESBYOPIA: ICD-10-CM

## 2025-06-04 PROCEDURE — 92015 DETERMINE REFRACTIVE STATE: CPT | Performed by: OPTOMETRIST

## 2025-06-04 PROCEDURE — 92014 COMPRE OPH EXAM EST PT 1/>: CPT | Performed by: OPTOMETRIST

## 2025-06-04 RX ORDER — CYCLOSPORINE 0.5 MG/ML
1 EMULSION OPHTHALMIC 2 TIMES DAILY
Qty: 60 EACH | Refills: 11 | Status: SHIPPED | OUTPATIENT
Start: 2025-06-04 | End: 2026-06-04

## 2025-06-04 RX ORDER — AZELASTINE HYDROCHLORIDE 0.5 MG/ML
1 SOLUTION/ DROPS OPHTHALMIC 2 TIMES DAILY PRN
Qty: 6 ML | Refills: 11 | Status: SHIPPED | OUTPATIENT
Start: 2025-06-04 | End: 2026-06-04

## 2025-06-04 ASSESSMENT — KERATOMETRY
OD_AXISANGLE2_DEGREES: 100
OS_AXISANGLE_DEGREES: 178
OS_K2POWER_DIOPTERS: 43.75
OD_AXISANGLE_DEGREES: 010
OS_K1POWER_DIOPTERS: 42.75
OS_AXISANGLE2_DEGREES: 088
OD_K2POWER_DIOPTERS: 44.00
OD_K1POWER_DIOPTERS: 42.75

## 2025-06-04 ASSESSMENT — LAGOPHTHALMOS
OS_LAGOPHTHALMOS: 0
OD_LAGOPHTHALMOS: 0

## 2025-06-04 ASSESSMENT — VISUAL ACUITY
OS_CC: 20/30
OS_CC: 20/25
CORRECTION_TYPE: GLASSES
OD_CC+: -2
OS_CC+: +2
OD_CC: 20/20 -1
OD_CC: 20/25
METHOD: SNELLEN - LINEAR

## 2025-06-04 ASSESSMENT — REFRACTION_WEARINGRX
OD_SPHERE: -1.25
OS_ADD: +2.75
OS_CYLINDER: +1.25
OD_CYLINDER: +1.50
OD_ADD: +2.75
OS_SPHERE: -1.00
OD_AXIS: 017
SPECS_TYPE: PAL
OS_AXIS: 180

## 2025-06-04 ASSESSMENT — MARGIN REFLEX DISTANCE
OS_MRD1: 2
OD_MRD1: 2

## 2025-06-04 ASSESSMENT — CONF VISUAL FIELD
OD_SUPERIOR_NASAL_RESTRICTION: 0
OS_SUPERIOR_NASAL_RESTRICTION: 0
OS_INFERIOR_TEMPORAL_RESTRICTION: 0
OD_INFERIOR_TEMPORAL_RESTRICTION: 0
OD_INFERIOR_NASAL_RESTRICTION: 0
OS_NORMAL: 1
OD_NORMAL: 1
OD_SUPERIOR_TEMPORAL_RESTRICTION: 0
OS_INFERIOR_NASAL_RESTRICTION: 0
OS_SUPERIOR_TEMPORAL_RESTRICTION: 0

## 2025-06-04 ASSESSMENT — SLIT LAMP EXAM - LIDS
COMMENTS: MEIBOMIAN GLAND DYSFUNCTION
COMMENTS: MEIBOMIAN GLAND DYSFUNCTION

## 2025-06-04 ASSESSMENT — REFRACTION_MANIFEST
OS_CYLINDER: +1.25
OS_AXIS: 180
OS_ADD: +2.75
OD_CYLINDER: +1.50
OS_SPHERE: -1.00
OD_AXIS: 017
OD_SPHERE: -1.25
OD_ADD: +2.75

## 2025-06-04 ASSESSMENT — CUP TO DISC RATIO
OD_RATIO: 0.2
OS_RATIO: 0.3

## 2025-06-04 ASSESSMENT — LEVATOR FUNCTION
OD_LEVATOR: 14
OS_LEVATOR: 14

## 2025-06-04 ASSESSMENT — EXTERNAL EXAM - RIGHT EYE: OD_EXAM: NORMAL

## 2025-06-04 ASSESSMENT — EXTERNAL EXAM - LEFT EYE: OS_EXAM: NORMAL

## 2025-06-04 ASSESSMENT — TONOMETRY
IOP_METHOD: ICARE
OS_IOP_MMHG: 21.5
OD_IOP_MMHG: 21.6

## 2025-06-04 NOTE — LETTER
6/4/2025      Cely Ontiveros  [address]  [facility] [address]      Dear Colleague,    Thank you for referring your patient, Cely Ontiveros, to the [facility]. Please see a copy of my visit note below.    Chief Complaint   Patient presents with     Annual Eye Exam         Last Eye Exam: 11/28/2023  Dilated Previously: Yes    What are you currently using to see?  Glasses PALs - wears full time       Distance Vision Acuity: Satisfied with vision    Near Vision Acuity: Satisfied with vision while reading and using computer with glasses    Eye Comfort: watery- had plugged duct many years ago-wants it checked out again- Worse in winter or when outside and windy.  Do you use eye drops? : Yes: Genteal 1-2 x/ day. Wants to possibly try Restasis- Uses Ocusoft wipes in the am and pm- then warm washcloth to heat eyes.  Wondering if something better.  Occupation or Hobbies: Retired    History of cataract surgery with Dr. Ken Aguilera   Right eye-2-   Left eye-1-      Yag caps with Dr. Peter  Right eye-2/6/2020  Left eye-1/29/2020     BULB- 11/2/2022- Dr. Diego White    Optometric Assistant        Medical, surgical and family histories reviewed and updated 6/4/2025.       OBJECTIVE: See Ophthalmology exam    ASSESSMENT:    ICD-10-CM    1. Examination of eyes and vision  Z01.00 EYE EXAM (SIMPLE-NONBILLABLE)      2. Hyperopia of both eyes  H52.03 REFRACTION      3. Astigmatism of both eyes with presbyopia  H52.203 REFRACTION    H52.4       4. Pseudophakia, Yag Caps, of both eyes  Z96.1 EYE EXAM (SIMPLE-NONBILLABLE)      5. Meibomian gland dysfunction (MGD) of upper and lower lids of both eyes  H02.88A EYE EXAM (SIMPLE-NONBILLABLE)    H02.88B       6. Dry eye syndrome of both eyes  H04.123 EYE EXAM (SIMPLE-NONBILLABLE)     cycloSPORINE (RESTASIS) 0.05 % ophthalmic emulsion      7. Bilateral epiphora  H04.203 EYE EXAM (SIMPLE-NONBILLABLE)     Adult  Eye  Referral      8. Epiretinal membrane (ERM) of left eye  H35.372 EYE EXAM (SIMPLE-NONBILLABLE)      9. Allergic conjunctivitis of both eyes  H10.13 EYE EXAM (SIMPLE-NONBILLABLE)     azelastine (OPTIVAR) 0.05 % ophthalmic solution      10. History of eyelid surgery  Z98.890 EYE EXAM (SIMPLE-NONBILLABLE)      11. 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  No change in eyeglass prescription.    Heat to the eyes daily for 10-15 minutes nightly with warm washcloth or reusable gel masks from the pharmacy or  [employer] heat masks can be purchased at [employer].    Ocusoft Hypochlor- spray solution onto cotton pad.  Close eyes and gently apply to eyelids and eyelashes using side to side motion.  Use morning and evening. Or Ocusoft Oust foam cleanser.    Restasis- 1 drop both eyes once in the am and once in the pm.  Artificial tears as needed during the day.    Wrap sunglasses while outside.    Optivar- 1 drop both eyes 2 x day daily for 4-6 weeks then as needed for itchy eyes.      Referral to Dr. Diego Vela at the [facility] for evaluation.  [phone] if tearing continues.    Return in 1 year for a complete eye exam or sooner if needed.    Brandt Richardson, OD           Again, thank you for allowing me to participate in the care of your patient.        Sincerely,        Brandt Richardson, OD    Electronically signed

## 2025-06-04 NOTE — PROGRESS NOTES
Chief Complaint   Patient presents with    Annual Eye Exam         Last Eye Exam: 11/28/2023  Dilated Previously: Yes    What are you currently using to see?  Glasses PALs - wears full time       Distance Vision Acuity: Satisfied with vision    Near Vision Acuity: Satisfied with vision while reading and using computer with glasses    Eye Comfort: watery- had plugged duct many years ago-wants it checked out again- Worse in winter or when outside and windy.  Do you use eye drops? : Yes: Genteal 1-2 x/ day. Wants to possibly try Restasis- Uses Ocusoft wipes in the am and pm- then warm washcloth to heat eyes.  Wondering if something better.  Occupation or Hobbies: Retired    History of cataract surgery with Dr. Ken Aguilera   Right eye-2-   Left eye-1-      Yag caps with Dr. Peter  Right eye-2/6/2020  Left eye-1/29/2020     BULB- 11/2/2022- Dr. Diego White    Optometric Assistant        Medical, surgical and family histories reviewed and updated 6/4/2025.       OBJECTIVE: See Ophthalmology exam    ASSESSMENT:    ICD-10-CM    1. Examination of eyes and vision  Z01.00 EYE EXAM (SIMPLE-NONBILLABLE)      2. Hyperopia of both eyes  H52.03 REFRACTION      3. Astigmatism of both eyes with presbyopia  H52.203 REFRACTION    H52.4       4. Pseudophakia, Yag Caps, of both eyes  Z96.1 EYE EXAM (SIMPLE-NONBILLABLE)      5. Meibomian gland dysfunction (MGD) of upper and lower lids of both eyes  H02.88A EYE EXAM (SIMPLE-NONBILLABLE)    H02.88B       6. Dry eye syndrome of both eyes  H04.123 EYE EXAM (SIMPLE-NONBILLABLE)     cycloSPORINE (RESTASIS) 0.05 % ophthalmic emulsion      7. Bilateral epiphora  H04.203 EYE EXAM (SIMPLE-NONBILLABLE)     Adult Eye  Referral      8. Epiretinal membrane (ERM) of left eye  H35.372 EYE EXAM (SIMPLE-NONBILLABLE)      9. Allergic conjunctivitis of both eyes  H10.13 EYE EXAM (SIMPLE-NONBILLABLE)     azelastine (OPTIVAR) 0.05 % ophthalmic solution       10. History of eyelid surgery  Z98.890 EYE EXAM (SIMPLE-NONBILLABLE)      11. 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  No change in eyeglass prescription.    Heat to the eyes daily for 10-15 minutes nightly with warm washcloth or reusable gel masks from the pharmacy or  ipnexus heat masks can be purchased at [employer].    Ocusoft Hypochlor- spray solution onto cotton pad.  Close eyes and gently apply to eyelids and eyelashes using side to side motion.  Use morning and evening. Or Ocusoft Oust foam cleanser.    Restasis- 1 drop both eyes once in the am and once in the pm.  Artificial tears as needed during the day.    Wrap sunglasses while outside.    Optivar- 1 drop both eyes 2 x day daily for 4-6 weeks then as needed for itchy eyes.      Referral to Dr. Diego Vela at the [facility] for evaluation.  [phone] if tearing continues.    Return in 1 year for a complete eye exam or sooner if needed.    Brandt Richardson, OD

## 2025-06-04 NOTE — PATIENT INSTRUCTIONS
Eyeglass prescription given.  No change in eyeglass prescription.    Heat to the eyes daily for 10-15 minutes nightly with warm washcloth or reusable gel masks from the pharmacy or  Iencuentra heat masks can be purchased at [employer].    Ocusoft Hypochlor- spray solution onto cotton pad.  Close eyes and gently apply to eyelids and eyelashes using side to side motion.  Use morning and evening. Or Ocusoft Oust foam cleanser.    Restasis- 1 drop both eyes once in the am and once in the pm.  Artificial tears as needed during the day.    Wrap sunglasses while outside.    Optivar- 1 drop both eyes 2 x day daily for 4-6 weeks then as needed for itchy eyes.      Referral to Dr. Diego Vela at the [facility] for evaluation.  [phone] if tearing continues.    Return in 1 year for a complete eye exam or sooner if needed.    Brandt Richardson, OD    The affects of the dilating drops last for 4- 6 hours.  You will be more sensitive to light and vision will be blurry up close.  Do not drive if you do not feel comfortable.  Mydriatic sunglasses were given if needed.      Optometry Providers       Clinic Locations                                 Telephone Number   Dr. Daysi Catalan   [facility] [phone]     [facility] Hours:                [facility] Hours:       [facility] Hours:   [address]   [address]     [address] [place] [address]   [place] [address]    [place] [address]  Phone: [phone]                    Phone: [phone]     Phone: [phone]                      Monday 8:00-6:00                          Monday 8:00-6:00                          Monday 8:00-6:00              Tuesday 8:00-6:00                          Tuesday 8:00-6:00                           Tuesday 8:00-6:00              Wednesday 8:00-6:00                  Wednesday 8:00-6:00 Wednesday 8:00-6:00      Thursday 8:00-6:00 Thursday 8:00-6:00                         Thursday 8:00-6:00 Friday 8:00-5:00                              Friday 8:00-5:00 Friday 8:00-5:00    [facility] Hours:   [address] [place] [address]  [phone]    Monday 9:00-6:00 Tuesday 9:00-6:00 Wednesday 9:00-6:00 Thursday 9:00-6:00 Friday 9:00-5:00  As always, Thank you for trusting us with your health care needs!    There is a combination of three treatments which can greatly improve symptoms of dry eyes.     Artificial tears  Heat (eyes closed)  Eyelid and eyelash cleansing (eyes closed)     Use one drop of artificial tears both eyes 4 x daily.  Once in the morning, lunch, dinner and bedtime. Continue to use the drops regardless if your eyes are comfortable or not.  Artificial tears work best as a preventative and not as well after your eyes are starting to bother you.  It may take 4- 6 weeks of using the drops before you notice improvement.  If after that time you are still having problems schedule an appointment for an evaluation and discussion of different treatments such as Restasis or Xiidra.  Dry eyes are a chronic condition and you may have more symptoms at certain times of the year.    Excess tearing can be due to the right tears not working properly or a blockage in the tear drainage system.  You can try using artificial tears 1 drop both eyes 4 x day.  If the excess tearing is bothersome after 4-6 weeks of treatment then we can send you for further testing.  This would entail a referral to our oculoplastic specialist Dr. Diego Vela at the [facility] [place]-[phone].    Recommended brands are:    Systane Complete  Systane Ultra  Systane Balance  Refresh Advanced Optive  Refresh  Relieva  Blink    Recommended brands for contact lens wearers are:    Systane contacts  Refresh contacts  Blink contacts    If you are using drops more than 4 x day or have sensitivities to preservatives I recommend non preserved artificial tears.  These come in 1 use vials.  They can be used every 1-2 hours.  Do not reuse the vials.    Recommended brands are:    Refresh Optive Mehrdad-3  Systane- preservative free  Refresh-  preservative free  Blink- preservative free    Gels or ointment can be used at night.    Recommended brands are:    Systane Gel  Refresh Gel  Blink Gel  Genteal Gel    Systane night time (ointment)  Refresh Celluvisc  Refresh PM (ointment)    Optase dry eye spray.  Spray to eyelids 3-4 x daily.  This can be purchased on Amazon.      Visine, Clear Eyes or Murine (drops that get the red out) can irritate the eyes and cause a rebound effect where the eyes become more red and you end up using more drops.  Avoid drops containing tetrahydrozoline, naphazoline, phenylephrine, oxymetazoline.      OTC Lumify is a newer product that gives immediate redness relief without the rebound effect.  Use as needed to take the redness out.    Artificial tears may be used with other drops (such as allergy, glaucoma, antibiotics) around the same time.  Be sure to wait 5 minutes in between drops.    Heat to the eyelids can also improve your symptoms of dry eyes.  Corinne heat masks can be purchased at [employer] to be used nightly for 10-15 minutes.  Other options are gel masks that can be put in the microwave and purchased at most pharmacies.      Tea Tree Oil eyelid cleansers recommended are Ocusoft Oust foam cleanser to cleanse eyelids/lashes at night and in the am. Other options are Blephadex or Cliradex eyelid wipes.  KEEP EYES CLOSED when using these products.  These can be purchased on amazon.com   A good product for make up remover with tea tree oil is WeLoveEyes.  This can be found at [email or web address] or  amazon.com.    Other good eyelid cleansers have hypochlorous which removes excess bacteria and is safe around the eyes. Products are Avenova, Ocusoft Hypochlor or Heyedrate. Spray solution onto cotton pad, close eyes and gently apply to eyelids and eyelashes using side to side motion.  You can also KEEP EYES CLOSED spray and rub into eyelashes.  You do not need to rinse it off. Use morning and evening. These products can be found on Amazon.  You can check with your local pharmacy and see if they can order if for you if they don't have it.    Other brands of eyelid cleansing wipes are:    Ocusoft wipes  Systane wipes    A great eye make up line is [email or web address]

## 2025-06-05 ENCOUNTER — PATIENT OUTREACH (OUTPATIENT)
Dept: CARE COORDINATION | Facility: CLINIC | Age: 80
End: 2025-06-05
Payer: COMMERCIAL

## 2025-06-19 ENCOUNTER — OFFICE VISIT (OUTPATIENT)
Dept: PODIATRY | Facility: CLINIC | Age: 80
End: 2025-06-19
Payer: COMMERCIAL

## 2025-06-19 DIAGNOSIS — M77.41 METATARSALGIA OF BOTH FEET: Primary | ICD-10-CM

## 2025-06-19 DIAGNOSIS — M77.42 METATARSALGIA OF BOTH FEET: Primary | ICD-10-CM

## 2025-06-19 NOTE — LETTER
6/19/2025      Cely Ontiveros  [address] [place] [address]      Dear Colleague,    Thank you for referring your patient, Cely Ontiveros, to the [facility]. Please see a copy of my visit note below.    Past Medical History:   Diagnosis Date     Actinic keratosis      Allergic rhinitis      Cataract      Degenerative joint disease     cervical disease     Depression with anxiety      Herpes simplex      HTN, goal below 140/80 3/20/2024     Hypoglycemia     eats small meals and is fine     Impingement syndrome, shoulder      Neoplasm of skin 4/10/2024     Trimalleolar fracture of ankle, closed 02/08/2023     Patient Active Problem List   Diagnosis     Allergic rhinitis     Herpes simplex     CARDIOVASCULAR SCREENING; LDL GOAL LESS THAN 160     Degenerative joint disease     Posterior vitreous detachment of both eyes     Seasonal allergic rhinitis     Hypermetropia     Astigmatism with presbyopia     Blepharitis of both eyes     Epiphora     Chronic otitis externa of left ear, unspecified type     Dysthymic disorder     Meibomian gland dysfunction     Chondromalacia of patella, left     Complex tear of medial meniscus of left knee as current injury, subsequent encounter     Pseudophakia, Yag Caps, of both eyes     Primary osteoarthritis of left knee     S/P left knee arthroscopy     Epiretinal membrane, mild, of left eye     Posterior capsular opacification visually significant of right eye     Dermatochalasis of both upper eyelids     Ankle stiffness, right     History of eyelid surgery     HTN, goal below 140/90     AK (actinic keratosis)     Neoplasm of skin     Osteopenia of lumbar spine     Dyslipidemia     Slow transit constipation     Past Surgical History:   Procedure Laterality Date     ARTHROSCOPY KNEE Left 9/20/2019    Procedure: Left knee arthroscopy, partial medial meniscectomy and chondral debridement;  Surgeon: Nic Singh MD;  Location:  OR      BLEPHAROPLASTY Bilateral 11/28/2022    Procedure: bilateral upper lid blepharoplasty;  Surgeon: Diego Vela MD;  Location: [facility] OR     CATARACT IOL, RT/LT Left 01/24/2019     COMBINED CYSTOSCOPY, URETEROSCOPY, LASER HOLMIUM LITHOTRIPSY URETER(S) Right 8/23/2018    Procedure: COMBINED CYSTOSCOPY, URETEROSCOPY, LASER HOLMIUM LITHOTRIPSY URETER(S);   Cystoscopy,Right ureteroscopy with laser lithotripsy and stent placement;  Surgeon: Pino Hawk MD;  Location: [facility] OR     HC ENLARGE BREAST WITH IMPLANT       HC LAP,FULGURATE/EXCISE LESIONS       HC REMOVAL OF BREAST IMPLANT       HYSTERECTOMY, PAP NO LONGER INDICATED  1998    ovaries and uterus out for benign reasons(fibroids and ovarian cyst)     LASER YAG CAPSULOTOMY  01/2020; 2/2020    left eye; right eye     PHACOEMULSIFICATION WITH STANDARD INTRAOCULAR LENS IMPLANT Left 1/24/2019    Procedure: PHACOEMULSIFICATION WITH STANDARD INTRAOCULAR LENS IMPLANT, LEFT;  Surgeon: Wagner Aguilera MD;  Location: [facility] OR     PHACOEMULSIFICATION WITH STANDARD INTRAOCULAR LENS IMPLANT Right 2/14/2019    Procedure: PHACOEMULSIFICATION WITH STANDARD INTRAOCULAR LENS IMPLANT, RIGHT;  Surgeon: Wagner Aguilera MD;  Location: [facility] OR     SINUS SURGERY       Social History     Socioeconomic History     Marital status: Single     Spouse name: Not on file     Number of children: Not on file     Years of education: Not on file     Highest education level: Not on file   Occupational History     Employer: [employer]   Tobacco Use     Smoking status: Never     Smokeless tobacco: Never   Vaping Use     Vaping status: Never Used   Substance and Sexual Activity     Alcohol use: No     Drug use: No     Sexual activity: Not Currently     Partners: Male     Birth control/protection: Post-menopausal   Other Topics Concern     Parent/sibling w/ CABG, MI or angioplasty before 65F 55M? No   Social History Narrative     Not on file     Social Drivers of Health     Financial  Resource Strain: Low Risk  (7/2/2024)    Financial Resource Strain      Within the past 12 months, have you or your family members you live with been unable to get utilities (heat, electricity) when it was really needed?: No   Food Insecurity: Low Risk  (7/2/2024)    Food Insecurity      Within the past 12 months, did you worry that your food would run out before you got money to buy more?: No      Within the past 12 months, did the food you bought just not last and you didn t have money to get more?: No   Transportation Needs: Low Risk  (7/2/2024)    Transportation Needs      Within the past 12 months, has lack of transportation kept you from medical appointments, getting your medicines, non-medical meetings or appointments, work, or from getting things that you need?: No   Physical Activity: Sufficiently Active (7/2/2024)    Exercise Vital Sign      Days of Exercise per Week: 6 days      Minutes of Exercise per Session: 70 min   Stress: No Stress Concern Present (7/2/2024)    [employer] [place] [employer] - Occupational Stress Questionnaire      Feeling of Stress : Not at all   Social Connections: Unknown (7/2/2024)    Social Connection and Isolation Panel [NHANES]      Frequency of Communication with Friends and Family: Not on file      Frequency of Social Gatherings with Friends and Family: More than three times a week      Attends [employer] Services: Not on file      Active Member of Clubs or Organizations: Not on file      Attends Club or Organization Meetings: Not on file      Marital Status: Not on file   Interpersonal Safety: Not At Risk (10/21/2024)    Received from [facility]     Humiliation, Afraid, Rape, and Kick questionnaire      Fear of Current or Ex-Partner: No      Emotionally Abused: No      Physically Abused: No      Sexually Abused: No   Housing Stability: Low Risk  (7/2/2024)    Housing Stability      Do you have housing? : Yes      Are you worried about losing your  housing?: No     Family History   Problem Relation Age of Onset     Hypertension Mother      Arthritis Mother      Cardiovascular Mother      Circulatory Mother      Heart Disease Mother      Lipids Mother      Obesity Mother      Osteoporosis Mother      Cancer Mother         skin     Glaucoma Mother      Cancer - colorectal Father      Cancer Father      Macular Degeneration Father      Diabetes No family hx of      Cerebrovascular Disease No family hx of      Thyroid Disease No family hx of            Subjective findings 79-year-old presents for orthotics.  She relates she needs a prescription for new orthotics, she relates she gets metatarsalgia and uses foot orthotics that helps with this, she gets them at weekly labs and has last had him about 2 years ago.    Objective findings unremarkable.    Assessment and plan metatarsalgia bilaterally.  Prescription for custom orthotics given.  Previous notes reviewed.  Return to clinic and see me as needed.                                      Straightforward level of medical decision making.        Again, thank you for allowing me to participate in the care of your patient.        Sincerely,        Wagner Morrow DPM    Electronically signed

## 2025-06-19 NOTE — PROGRESS NOTES
Past Medical History:   Diagnosis Date    Actinic keratosis     Allergic rhinitis     Cataract     Degenerative joint disease     cervical disease    Depression with anxiety     Herpes simplex     HTN, goal below 140/80 3/20/2024    Hypoglycemia     eats small meals and is fine    Impingement syndrome, shoulder     Neoplasm of skin 4/10/2024    Trimalleolar fracture of ankle, closed 02/08/2023     Patient Active Problem List   Diagnosis    Allergic rhinitis    Herpes simplex    CARDIOVASCULAR SCREENING; LDL GOAL LESS THAN 160    Degenerative joint disease    Posterior vitreous detachment of both eyes    Seasonal allergic rhinitis    Hypermetropia    Astigmatism with presbyopia    Blepharitis of both eyes    Epiphora    Chronic otitis externa of left ear, unspecified type    Dysthymic disorder    Meibomian gland dysfunction    Chondromalacia of patella, left    Complex tear of medial meniscus of left knee as current injury, subsequent encounter    Pseudophakia, Yag Caps, of both eyes    Primary osteoarthritis of left knee    S/P left knee arthroscopy    Epiretinal membrane, mild, of left eye    Posterior capsular opacification visually significant of right eye    Dermatochalasis of both upper eyelids    Ankle stiffness, right    History of eyelid surgery    HTN, goal below 140/90    AK (actinic keratosis)    Neoplasm of skin    Osteopenia of lumbar spine    Dyslipidemia    Slow transit constipation     Past Surgical History:   Procedure Laterality Date    ARTHROSCOPY KNEE Left 9/20/2019    Procedure: Left knee arthroscopy, partial medial meniscectomy and chondral debridement;  Surgeon: Nic Singh MD;  Location: [facility]    BLEPHAROPLASTY Bilateral 11/28/2022    Procedure: bilateral upper lid blepharoplasty;  Surgeon: Diego Vela MD;  Location: [facility] OR    CATARACT IOL, RT/LT Left 01/24/2019    COMBINED CYSTOSCOPY, URETEROSCOPY, LASER HOLMIUM LITHOTRIPSY URETER(S) Right 8/23/2018    Procedure: COMBINED  CYSTOSCOPY, URETEROSCOPY, LASER HOLMIUM LITHOTRIPSY URETER(S);   Cystoscopy,Right ureteroscopy with laser lithotripsy and stent placement;  Surgeon: Pino Hawk MD;  Location: [facility] OR    HC ENLARGE BREAST WITH IMPLANT      HC LAP,FULGURATE/EXCISE LESIONS      HC REMOVAL OF BREAST IMPLANT      HYSTERECTOMY, PAP NO LONGER INDICATED  1998    ovaries and uterus out for benign reasons(fibroids and ovarian cyst)    LASER YAG CAPSULOTOMY  01/2020; 2/2020    left eye; right eye    PHACOEMULSIFICATION WITH STANDARD INTRAOCULAR LENS IMPLANT Left 1/24/2019    Procedure: PHACOEMULSIFICATION WITH STANDARD INTRAOCULAR LENS IMPLANT, LEFT;  Surgeon: Wagner Aguilera MD;  Location: [facility]    PHACOEMULSIFICATION WITH STANDARD INTRAOCULAR LENS IMPLANT Right 2/14/2019    Procedure: PHACOEMULSIFICATION WITH STANDARD INTRAOCULAR LENS IMPLANT, RIGHT;  Surgeon: Wagner Aguilera MD;  Location: [facility] OR    SINUS SURGERY       Social History     Socioeconomic History    Marital status: Single     Spouse name: Not on file    Number of children: Not on file    Years of education: Not on file    Highest education level: Not on file   Occupational History     Employer: [employer]   Tobacco Use    Smoking status: Never    Smokeless tobacco: Never   Vaping Use    Vaping status: Never Used   Substance and Sexual Activity    Alcohol use: No    Drug use: No    Sexual activity: Not Currently     Partners: Male     Birth control/protection: Post-menopausal   Other Topics Concern    Parent/sibling w/ CABG, MI or angioplasty before 65F 55M? No   Social History Narrative    Not on file     Social Drivers of Health     Financial Resource Strain: Low Risk  (7/2/2024)    Financial Resource Strain     Within the past 12 months, have you or your family members you live with been unable to get utilities (heat, electricity) when it was really needed?: No   Food Insecurity: Low Risk  (7/2/2024)    Food Insecurity     Within the past 12 months,  did you worry that your food would run out before you got money to buy more?: No     Within the past 12 months, did the food you bought just not last and you didn t have money to get more?: No   Transportation Needs: Low Risk  (7/2/2024)    Transportation Needs     Within the past 12 months, has lack of transportation kept you from medical appointments, getting your medicines, non-medical meetings or appointments, work, or from getting things that you need?: No   Physical Activity: Sufficiently Active (7/2/2024)    Exercise Vital Sign     Days of Exercise per Week: 6 days     Minutes of Exercise per Session: 70 min   Stress: No Stress Concern Present (7/2/2024)    [place] [employer] - Occupational Stress Questionnaire     Feeling of Stress : Not at all   Social Connections: Unknown (7/2/2024)    Social Connection and Isolation Panel [NHANES]     Frequency of Communication with Friends and Family: Not on file     Frequency of Social Gatherings with Friends and Family: More than three times a week     Attends Anabaptism Services: Not on file     Active Member of Clubs or Organizations: Not on file     Attends Club or Organization Meetings: Not on file     Marital Status: Not on file   Interpersonal Safety: Not At Risk (10/21/2024)    Received from [facility]     Humiliation, Afraid, Rape, and Kick questionnaire     Fear of Current or Ex-Partner: No     Emotionally Abused: No     Physically Abused: No     Sexually Abused: No   Housing Stability: Low Risk  (7/2/2024)    Housing Stability     Do you have housing? : Yes     Are you worried about losing your housing?: No     Family History   Problem Relation Age of Onset    Hypertension Mother     Arthritis Mother     Cardiovascular Mother     Circulatory Mother     Heart Disease Mother     Lipids Mother     Obesity Mother     Osteoporosis Mother     Cancer Mother         skin    Glaucoma Mother     Cancer - colorectal Father     Cancer  Father     Macular Degeneration Father     Diabetes No family hx of     Cerebrovascular Disease No family hx of     Thyroid Disease No family hx of            Subjective findings 79-year-old presents for orthotics.  She relates she needs a prescription for new orthotics, she relates she gets metatarsalgia and uses foot orthotics that helps with this, she gets them at weekly labs and has last had him about 2 years ago.    Objective findings unremarkable.    Assessment and plan metatarsalgia bilaterally.  Prescription for custom orthotics given.  Previous notes reviewed.  Return to clinic and see me as needed.                                      Straightforward level of medical decision making.

## 2025-06-19 NOTE — NURSING NOTE
Cely Ontiveros's chief complaint for this visit includes:  Chief Complaint   Patient presents with    Consult     Orthotic evaluation     PCP: Danyelle Espinosa    Referring Provider:  Referred Self, MD  No address on file    There were no vitals taken for this visit.  Data Unavailable     Do you need any medication refills at today's visit? NO    Allergies   Allergen Reactions    Sulfa Antibiotics Swelling    Cats Swelling     Lips swollen    Ciprofloxacin Hcl     Wool Fiber     Keflex [Cephalexin] Other (See Comments)     Yeast infection  Rash (?Hives)         Cathi Wade LPN

## 2025-07-09 ENCOUNTER — OFFICE VISIT (OUTPATIENT)
Dept: FAMILY MEDICINE | Facility: CLINIC | Age: 80
End: 2025-07-09
Payer: COMMERCIAL

## 2025-07-09 VITALS
HEIGHT: 64 IN | OXYGEN SATURATION: 98 % | SYSTOLIC BLOOD PRESSURE: 130 MMHG | WEIGHT: 136 LBS | HEART RATE: 90 BPM | RESPIRATION RATE: 16 BRPM | TEMPERATURE: 97.7 F | DIASTOLIC BLOOD PRESSURE: 70 MMHG | BODY MASS INDEX: 23.22 KG/M2

## 2025-07-09 DIAGNOSIS — Z12.11 SCREEN FOR COLON CANCER: ICD-10-CM

## 2025-07-09 DIAGNOSIS — Z00.00 ENCOUNTER FOR MEDICARE ANNUAL WELLNESS EXAM: Primary | ICD-10-CM

## 2025-07-09 DIAGNOSIS — N39.0 FREQUENT UTI: ICD-10-CM

## 2025-07-09 DIAGNOSIS — I10 HTN, GOAL BELOW 140/90: ICD-10-CM

## 2025-07-09 DIAGNOSIS — B00.9 HERPES SIMPLEX VIRUS INFECTION: ICD-10-CM

## 2025-07-09 DIAGNOSIS — Z13.6 SCREENING FOR CARDIOVASCULAR CONDITION: ICD-10-CM

## 2025-07-09 DIAGNOSIS — Z91.89 FRAMINGHAM CARDIAC RISK >20% IN NEXT 10 YEARS: ICD-10-CM

## 2025-07-09 DIAGNOSIS — Z13.220 LIPID SCREENING: ICD-10-CM

## 2025-07-09 DIAGNOSIS — N95.2 VAGINAL ATROPHY: ICD-10-CM

## 2025-07-09 RX ORDER — ATORVASTATIN CALCIUM 20 MG/1
20 TABLET, FILM COATED ORAL DAILY
Qty: 90 TABLET | Refills: 3 | Status: SHIPPED | OUTPATIENT
Start: 2025-07-09

## 2025-07-09 RX ORDER — VALACYCLOVIR HYDROCHLORIDE 500 MG/1
500 TABLET, FILM COATED ORAL DAILY
Qty: 90 TABLET | Refills: 3 | Status: SHIPPED | OUTPATIENT
Start: 2025-07-09

## 2025-07-09 RX ORDER — ESTRADIOL 10 UG/1
TABLET, FILM COATED VAGINAL
Qty: 12 TABLET | Refills: 3 | Status: SHIPPED | OUTPATIENT
Start: 2025-07-09

## 2025-07-09 ASSESSMENT — PATIENT HEALTH QUESTIONNAIRE - PHQ9
10. IF YOU CHECKED OFF ANY PROBLEMS, HOW DIFFICULT HAVE THESE PROBLEMS MADE IT FOR YOU TO DO YOUR WORK, TAKE CARE OF THINGS AT HOME, OR GET ALONG WITH OTHER PEOPLE: NOT DIFFICULT AT ALL
SUM OF ALL RESPONSES TO PHQ QUESTIONS 1-9: 1
SUM OF ALL RESPONSES TO PHQ QUESTIONS 1-9: 1

## 2025-07-09 NOTE — PATIENT INSTRUCTIONS
Patient Education   Preventive Care Advice   This is general advice given by our system to help you stay healthy. However, your care team may have specific advice just for you. Please talk to your care team about your preventive care needs.  Nutrition  Eat 5 or more servings of fruits and vegetables each day.  Try wheat bread, brown rice and whole grain pasta (instead of white bread, rice, and pasta).  Get enough calcium and vitamin D. Check the label on foods and aim for 100% of the RDA (recommended daily allowance).  Lifestyle  Exercise at least 150 minutes each week  (30 minutes a day, 5 days a week).  Do muscle strengthening activities 2 days a week. These help control your weight and prevent disease.  No smoking.  Wear sunscreen to prevent skin cancer.  Have a dental exam and cleaning every 6 months.  Yearly exams  See your health care team every year to talk about:  Any changes in your health.  Any medicines your care team has prescribed.  Preventive care, family planning, and ways to prevent chronic diseases.  Shots (vaccines)   HPV shots (up to age 26), if you've never had them before.  Hepatitis B shots (up to age 59), if you've never had them before.  COVID-19 shot: Get this shot when it's due.  Flu shot: Get a flu shot every year.  Tetanus shot: Get a tetanus shot every 10 years.  Pneumococcal, hepatitis A, and RSV shots: Ask your care team if you need these based on your risk.  Shingles shot (for age 50 and up)  General health tests  Diabetes screening:  Starting at age 35, Get screened for diabetes at least every 3 years.  If you are younger than age 35, ask your care team if you should be screened for diabetes.  Cholesterol test: At age 39, start having a cholesterol test every 5 years, or more often if advised.  Bone density scan (DEXA): At age 50, ask your care team if you should have this scan for osteoporosis (brittle bones).  Hepatitis C: Get tested at least once in your life.  STIs (sexually  transmitted infections)  Before age 24: Ask your care team if you should be screened for STIs.  After age 24: Get screened for STIs if you're at risk. You are at risk for STIs (including HIV) if:  You are sexually active with more than one person.  You don't use condoms every time.  You or a partner was diagnosed with a sexually transmitted infection.  If you are at risk for HIV, ask about PrEP medicine to prevent HIV.  Get tested for HIV at least once in your life, whether you are at risk for HIV or not.  Cancer screening tests  Cervical cancer screening: If you have a cervix, begin getting regular cervical cancer screening tests starting at age 21.  Breast cancer scan (mammogram): If you've ever had breasts, begin having regular mammograms starting at age 40. This is a scan to check for breast cancer.  Colon cancer screening: It is important to start screening for colon cancer at age 45.  Have a colonoscopy test every 10 years (or more often if you're at risk) Or, ask your provider about stool tests like a FIT test every year or Cologuard test every 3 years.  To learn more about your testing options, visit:   .  For help making a decision, visit:   https://bit.ly/tf36329.  Prostate cancer screening test: If you have a prostate, ask your care team if a prostate cancer screening test (PSA) at age 55 is right for you.  Lung cancer screening: If you are a current or former smoker ages 50 to 80, ask your care team if ongoing lung cancer screenings are right for you.  For informational purposes only. Not to replace the advice of your health care provider. Copyright   2023 [employer]. All rights reserved. Clinically reviewed by the [facility]. [employer] [id number] - REV 01/24.

## 2025-07-09 NOTE — PROGRESS NOTES
Preventive Care Visit  [facility] BJORNBRAN Espinosa MD, Family Medicine  Jul 9, 2025      Assessment & Plan     Encounter for Medicare annual wellness exam  -preventive guidelines reviewed  -will get PCV 20 at her pharmacy   - PRIMARY CARE FOLLOW-UP SCHEDULING  - REVIEW OF HEALTH MAINTENANCE PROTOCOL ORDERS  - Lipid panel reflex to direct LDL Non-fasting  - PRIMARY CARE FOLLOW-UP SCHEDULING    HTN, goal below 150/90  -readings are normal at home  -not on medication   - BASIC METABOLIC PANEL  - Albumin Random Urine Quantitative with Creat Ratio    Screening for cardiovascular condition  -check lipid panel     Lipid screening  - Lipid panel reflex to direct LDL Non-fasting    Vaginal atrophy  - well controlled with vaginal estrogen  -mammogram up to date  -Has had hysterectomy     [place] cardiac risk >20% in next 10 years  - on statin, last lipid panel shows well controlled LDL with elevated triglycerides, but pt not fasting   - atorvastatin (LIPITOR) 20 MG tablet  Dispense: 90 tablet; Refill: 3  - The uses and side effects, including black box warnings as appropriate, were discussed in detail.  All patient questions were answered.  The patient was instructed to call immediately if any side effects developed.    Screen for colon cancer  -would like to continue with FIT testing, understands screening recommendations for colon cancer after age 80 years   - Fecal colorectal cancer screen (FIT)    Reviewed preventive health counseling, as reflected in patient instructions       Regular exercise       Healthy diet/nutrition       Vision screening       Osteoporosis prevention/bone health       Colorectal Cancer Screening    Follow-up    Follow-up Visit   Expected date:  Jul 16, 2026 (Approximate)      Follow Up Appointment Details:     Follow-up with whom?: PCP    Follow-Up for what?: Medicare Wellness    Welcome or Annual?: Annual Wellness    How?: In Person                 Subjective    Cely is a 79 year old, presenting for the following:  Physical        7/9/2025    10:34 AM   Additional Questions   Roomed by Chaka   Accompanied by self          Wt Readings from Last 2 Encounters:   07/09/25 61.7 kg (136 lb)   12/15/24 61.7 kg (136 lb)      DEXA 5/24: osteopenia    No changes.  Diet+ Eats really good  Veggies and protein+  Calcium+ and takes a supplement   Daily exercise and strength training  Mood OK  Sleep is ok    Not taking Blood pressure medication   Thinks has white coat  Home Blood pressure this morning was 125/75.    HPI    Refills on vaginal estrogen:  No vaginal bleeding, no spotting.  Using one a week since twice weekly caused breast pain  No breast pain  No nipple discharge   Has had a hysterectomy++   No history of strokes  No stent in heart     Valtrex daily, has tolerated without side effects  No genital herpes   One spot on left buttock     Hyperlipidemia Follow-Up    Are you regularly taking any medication or supplement to lower your cholesterol?   Yes- statin   Are you having muscle aches or other side effects that you think could be caused by your cholesterol lowering medication?  No    Advance Care Planning    Discussed advance care planning with patient; informed AVS has link to [facility] [employer].        7/2/2024   General Health   How would you rate your overall physical health? Good   Feel stress (tense, anxious, or unable to sleep) Not at all         7/2/2024   Nutrition   Diet: Regular (no restrictions)         7/2/2024   Exercise   Days per week of moderate/strenous exercise 6 days   Average minutes spent exercising at this level 70 min         7/2/2024   Social Factors   Frequency of gathering with friends or relatives More than three times a week   Worry food won't last until get money to buy more No   Food not last or not have enough money for food? No   Do you have housing? (Housing is defined as stable permanent housing and does not include staying outside in a  car, in a tent, in an abandoned building, in an overnight shelter, or couch-surfing.) Yes   Are you worried about losing your housing? No   Lack of transportation? No   Unable to get utilities (heat,electricity)? No         7/2/2024   Activities of Daily Living- Home Safety   Needs help with the following daily activites None of the above   Safety concerns in the home None of the above         7/2/2024   Dental   Dentist two times every year? Yes         7/2/2024   Hearing Screening   Hearing concerns? None of the above           7/2/2024   Urinary Incontinence Screening   Bothered by leaking urine in past 6 months No       Today's PHQ-9 Score:       7/9/2025    10:28 AM   PHQ-9 SCORE   PHQ-9 Total Score MyChart 1 (Minimal depression)   PHQ-9 Total Score 1        Patient-reported         7/2/2024   Substance Use   Alcohol more than 3/day or more than 7/wk Not Applicable   Do you have a current opioid prescription? No   How severe/bad is pain from 1 to 10? 0/10 (No Pain)   Do you use any other substances recreationally? No    (!) PRESCRIPTION DRUGS       Multiple values from one day are sorted in reverse-chronological order     Social History     Tobacco Use    Smoking status: Never     Passive exposure: Never    Smokeless tobacco: Never   Vaping Use    Vaping status: Never Used   Substance Use Topics    Alcohol use: No    Drug use: No           8/20/2024   LAST FHS-7 RESULTS   1st degree relative breast or ovarian cancer No   Any relative bilateral breast cancer No   Any male have breast cancer No   Any ONE woman have BOTH breast AND ovarian cancer No   Any woman with breast cancer before 50yrs No   2 or more relatives with breast AND/OR ovarian cancer No   2 or more relatives with breast AND/OR bowel cancer No        Mammogram Screening - Mammography discussed and declined            Reviewed and updated as needed this visit by Provider   Tobacco  Allergies  Meds  Problems  Med Hx  Surg Hx  Fam Hx             Past Medical History:   Diagnosis Date    Actinic keratosis     Allergic rhinitis     Cataract     Degenerative joint disease     cervical disease    Depression with anxiety     Herpes simplex     HTN, goal below 140/80 3/20/2024    Hypoglycemia     eats small meals and is fine    Impingement syndrome, shoulder     Neoplasm of skin 4/10/2024    Trimalleolar fracture of ankle, closed 02/08/2023     Past Surgical History:   Procedure Laterality Date    ARTHROSCOPY KNEE Left 9/20/2019    Procedure: Left knee arthroscopy, partial medial meniscectomy and chondral debridement;  Surgeon: Nic Singh MD;  Location: [facility]    BLEPHAROPLASTY Bilateral 11/28/2022    Procedure: bilateral upper lid blepharoplasty;  Surgeon: Diego Vela MD;  Location: [facility]    CATARACT IOL, RT/LT Left 01/24/2019    COMBINED CYSTOSCOPY, URETEROSCOPY, LASER HOLMIUM LITHOTRIPSY URETER(S) Right 8/23/2018    Procedure: COMBINED CYSTOSCOPY, URETEROSCOPY, LASER HOLMIUM LITHOTRIPSY URETER(S);   Cystoscopy,Right ureteroscopy with laser lithotripsy and stent placement;  Surgeon: Pino Hawk MD;  Location: [facility]    HC ENLARGE BREAST WITH IMPLANT      HC LAP,FULGURATE/EXCISE LESIONS      HC REMOVAL OF BREAST IMPLANT      HYSTERECTOMY, PAP NO LONGER INDICATED  1998    ovaries and uterus out for benign reasons(fibroids and ovarian cyst)    LASER YAG CAPSULOTOMY  01/2020; 2/2020    left eye; right eye    PHACOEMULSIFICATION WITH STANDARD INTRAOCULAR LENS IMPLANT Left 1/24/2019    Procedure: PHACOEMULSIFICATION WITH STANDARD INTRAOCULAR LENS IMPLANT, LEFT;  Surgeon: Wagner Aguilera MD;  Location: [facility]    PHACOEMULSIFICATION WITH STANDARD INTRAOCULAR LENS IMPLANT Right 2/14/2019    Procedure: PHACOEMULSIFICATION WITH STANDARD INTRAOCULAR LENS IMPLANT, RIGHT;  Surgeon: Wagner Aguilera MD;  Location: [facility]    SINUS SURGERY       Lab work is in process  Labs reviewed in EPIC  BP Readings from Last 3 Encounters:   07/09/25  130/70   12/15/24 (!) 160/71   10/09/24 (!) 152/66    Wt Readings from Last 3 Encounters:   07/09/25 61.7 kg (136 lb)   12/15/24 61.7 kg (136 lb)   10/09/24 61.4 kg (135 lb 4.8 oz)                  Patient Active Problem List   Diagnosis    Allergic rhinitis    Herpes simplex    CARDIOVASCULAR SCREENING; LDL GOAL LESS THAN 160    Degenerative joint disease    Posterior vitreous detachment of both eyes    Seasonal allergic rhinitis    Hypermetropia    Astigmatism with presbyopia    Blepharitis of both eyes    Epiphora    Chronic otitis externa of left ear, unspecified type    Dysthymic disorder    Meibomian gland dysfunction    Chondromalacia of patella, left    Complex tear of medial meniscus of left knee as current injury, subsequent encounter    Pseudophakia, Yag Caps, of both eyes    Primary osteoarthritis of left knee    S/P left knee arthroscopy    Epiretinal membrane, mild, of left eye    Posterior capsular opacification visually significant of right eye    Dermatochalasis of both upper eyelids    Ankle stiffness, right    History of eyelid surgery    HTN, goal below 140/90    AK (actinic keratosis)    Neoplasm of skin    Osteopenia of lumbar spine    Dyslipidemia    Slow transit constipation     Past Surgical History:   Procedure Laterality Date    ARTHROSCOPY KNEE Left 9/20/2019    Procedure: Left knee arthroscopy, partial medial meniscectomy and chondral debridement;  Surgeon: Nic Singh MD;  Location: [facility]    BLEPHAROPLASTY Bilateral 11/28/2022    Procedure: bilateral upper lid blepharoplasty;  Surgeon: Diego Vela MD;  Location: [facility]    CATARACT IOL, RT/LT Left 01/24/2019    COMBINED CYSTOSCOPY, URETEROSCOPY, LASER HOLMIUM LITHOTRIPSY URETER(S) Right 8/23/2018    Procedure: COMBINED CYSTOSCOPY, URETEROSCOPY, LASER HOLMIUM LITHOTRIPSY URETER(S);   Cystoscopy,Right ureteroscopy with laser lithotripsy and stent placement;  Surgeon: Pino Hawk MD;  Location: [facility]    HC ENLARGE BREAST  WITH IMPLANT      HC LAP,FULGURATE/EXCISE LESIONS      HC REMOVAL OF BREAST IMPLANT      HYSTERECTOMY, PAP NO LONGER INDICATED  1998    ovaries and uterus out for benign reasons(fibroids and ovarian cyst)    LASER YAG CAPSULOTOMY  01/2020; 2/2020    left eye; right eye    PHACOEMULSIFICATION WITH STANDARD INTRAOCULAR LENS IMPLANT Left 1/24/2019    Procedure: PHACOEMULSIFICATION WITH STANDARD INTRAOCULAR LENS IMPLANT, LEFT;  Surgeon: Wagner Aguilera MD;  [facility]    PHACOEMULSIFICATION WITH STANDARD INTRAOCULAR LENS IMPLANT Right 2/14/2019    Procedure: PHACOEMULSIFICATION WITH STANDARD INTRAOCULAR LENS IMPLANT, RIGHT;  Surgeon: Wagner Aguilera MD;  Location: [facility]    SINUS SURGERY         Social History     Tobacco Use    Smoking status: Never     Passive exposure: Never    Smokeless tobacco: Never   Substance Use Topics    Alcohol use: No     Family History   Problem Relation Age of Onset    Hypertension Mother     Arthritis Mother     Cardiovascular Mother     Circulatory Mother     Heart Disease Mother     Lipids Mother     Obesity Mother     Osteoporosis Mother     Cancer Mother         skin    Glaucoma Mother     Cancer - colorectal Father     Cancer Father     Macular Degeneration Father     Diabetes No family hx of     Cerebrovascular Disease No family hx of     Thyroid Disease No family hx of          Current Outpatient Medications   Medication Sig Dispense Refill    atorvastatin (LIPITOR) 20 MG tablet Take 1 tablet (20 mg) by mouth daily. 90 tablet 3    azelastine (OPTIVAR) 0.05 % ophthalmic solution Place 1 drop into both eyes 2 times daily as needed (for allergies). 6 mL 11    cycloSPORINE (RESTASIS) 0.05 % ophthalmic emulsion Place 1 drop into both eyes 2 times daily. 60 each 11    estradiol (VAGIFEM) 10 MCG TABS vaginal tablet Insert 1 tablet intravaginally once weekly. 12 tablet 3    fluticasone (FLONASE) 50 MCG/ACT nasal spray Spray 2 sprays in nostril      valACYclovir  (VALTREX) 500 MG tablet Take 1 tablet (500 mg) by mouth daily. 90 tablet 3    loratadine (CLARITIN) 10 MG tablet Take 1 tablet (10 mg) by mouth daily (Patient not taking: Reported on 7/9/2025) 90 tablet 0     Allergies   Allergen Reactions    Sulfa Antibiotics Swelling    Cats Swelling     Lips swollen    Ciprofloxacin Hcl     Wool Fiber     Keflex [Cephalexin] Other (See Comments)     Yeast infection  Rash (?Hives)       Current providers sharing in care for this patient include:  Patient Care Team:  Danyelle Espinosa MD as PCP - General (Family Medicine)  Diego Vela MD as MD (Ophthalmology)  Brandt Richardson OD as Referring Physician (Optometry)  Clara Rios PA-C as Physician Assistant  Luis Byrnes MD as MD (Dermatology)  Leonila Flanagan APRN CNP as Nurse Practitioner (OB/Gyn)  Andrew Keller PA-C as Physician Assistant (Gastroenterology)  Leonila Flanagan APRN CNP as Assigned OBGYN Provider  Clara Rios PA-C as Assigned Surgical Provider  Prieto Justice PA-C as Assigned Gastroenterology Provider  Brandt Richardson OD (Optometry)  Hope Roberts PA-C as Assigned Dermatology Provider  Jorge Mcgregor MD as Assigned PCP  Diego Vela MD as MD (Ophthalmology)  Wagner Morrow DPM as Assigned Musculoskeletal Provider    The following health maintenance items are reviewed in Epic and correct as of today:  Health Maintenance   Topic Date Due    BMP  06/10/2025    LIPID  06/10/2025    FALL RISK ASSESSMENT  07/02/2025    INFLUENZA VACCINE (1) 09/01/2025    PHQ-9  01/09/2026    EYE EXAM  06/04/2026    MEDICARE ANNUAL WELLNESS VISIT  07/09/2026    ANNUAL REVIEW OF HM ORDERS  07/09/2026    DEXA  05/20/2029    ADVANCE CARE PLANNING  07/02/2029    DTAP/TDAP/TD VACCINE (5 - Td or Tdap) 03/23/2032    DEPRESSION ACTION PLAN  Completed    PNEUMOCOCCAL VACCINE 50+ YEARS  Completed    ZOSTER VACCINE  Completed    RSV VACCINE  Completed    COVID-19 VACCINE  Completed    HPV VACCINE  Aged Out     "MENINGITIS VACCINE  Aged Out    COLORECTAL CANCER SCREENING  Discontinued         Review of Systems  Constitutional, HEENT, cardiovascular, pulmonary, gi and gu systems are negative, except as otherwise noted.     Objective    Exam  /70   Pulse 90   Temp 97.7  F (36.5  C) (Temporal)   Resp 16   Ht 1.623 m (5' 3.9\")   Wt 61.7 kg (136 lb)   SpO2 98%   BMI 23.42 kg/m     Estimated body mass index is 23.42 kg/m  as calculated from the following:    Height as of this encounter: 1.623 m (5' 3.9\").    Weight as of this encounter: 61.7 kg (136 lb).    Physical Exam  GENERAL APPEARANCE: healthy, alert and no distress  EYES: Eyes grossly normal to inspection and conjunctivae and sclerae normal  NECK: no adenopathy and trachea midline and normal to palpation  RESP: lungs clear to auscultation - no rales, rhonchi or wheezes  CV: regular rates and rhythm, normal S1 S2, no S3 or S4 and no murmur, click or rub  ABDOMEN: soft, non-tender and no rebound or guarding   MS: extremities normal- no gross deformities noted and peripheral pulses normal  NEURO: Normal strength and tone, mentation intact and speech normal  PSYCH: mentation appears normal           7/9/2025   Mini Cog   Clock Draw Score 2 Normal    2 Normal   3 Item Recall 3 objects recalled   Mini Cog Total Score 5       Multiple values from one day are sorted in reverse-chronological order             7/2/2024   Vision Screen   Reason Vision Screen Not Completed Patient had exam in last 12 months        Data saved with a previous flowsheet row definition       Signed Electronically by: Danyelle Espinosa MD    Answers submitted by the patient for this visit:  Patient Health Questionnaire (Submitted on 7/9/2025)  If you checked off any problems, how difficult have these problems made it for you to do your work, take care of things at home, or get along with other people?: Not difficult at all  PHQ9 TOTAL SCORE: 1    "

## 2025-07-10 LAB
ANION GAP SERPL CALCULATED.3IONS-SCNC: 11 MMOL/L (ref 7–15)
BUN SERPL-MCNC: 25 MG/DL (ref 8–23)
CALCIUM SERPL-MCNC: 9.6 MG/DL (ref 8.8–10.4)
CHLORIDE SERPL-SCNC: 101 MMOL/L (ref 98–107)
CHOLEST SERPL-MCNC: 158 MG/DL
CREAT SERPL-MCNC: 0.9 MG/DL (ref 0.51–0.95)
CREAT UR-MCNC: 113 MG/DL
EGFRCR SERPLBLD CKD-EPI 2021: 64 ML/MIN/1.73M2
FASTING STATUS PATIENT QL REPORTED: NO
FASTING STATUS PATIENT QL REPORTED: NO
GLUCOSE SERPL-MCNC: 105 MG/DL (ref 70–99)
HCO3 SERPL-SCNC: 25 MMOL/L (ref 22–29)
HDLC SERPL-MCNC: 93 MG/DL
LDLC SERPL CALC-MCNC: 48 MG/DL
MICROALBUMIN UR-MCNC: 15.2 MG/L
MICROALBUMIN/CREAT UR: 13.45 MG/G CR (ref 0–25)
NONHDLC SERPL-MCNC: 65 MG/DL
POTASSIUM SERPL-SCNC: 4.6 MMOL/L (ref 3.4–5.3)
SODIUM SERPL-SCNC: 137 MMOL/L (ref 135–145)
TRIGL SERPL-MCNC: 87 MG/DL

## 2025-08-25 ENCOUNTER — OFFICE VISIT (OUTPATIENT)
Dept: DERMATOLOGY | Facility: CLINIC | Age: 80
End: 2025-08-25
Payer: COMMERCIAL

## 2025-08-25 DIAGNOSIS — L81.4 SOLAR LENTIGO: ICD-10-CM

## 2025-08-25 DIAGNOSIS — L82.1 SEBORRHEIC KERATOSIS: ICD-10-CM

## 2025-08-25 DIAGNOSIS — D22.9 MULTIPLE MELANOCYTIC NEVI: ICD-10-CM

## 2025-08-25 DIAGNOSIS — Z12.83 SKIN CANCER SCREENING: Primary | ICD-10-CM

## 2025-08-25 DIAGNOSIS — D18.01 CHERRY ANGIOMA: ICD-10-CM

## 2025-08-25 PROCEDURE — 99213 OFFICE O/P EST LOW 20 MIN: CPT | Mod: GC | Performed by: DERMATOLOGY

## 2025-08-25 PROCEDURE — 1126F AMNT PAIN NOTED NONE PRSNT: CPT | Performed by: DERMATOLOGY

## 2025-08-25 ASSESSMENT — PAIN SCALES - GENERAL: PAINLEVEL_OUTOF10: NO PAIN (0)

## (undated) DEVICE — SOL WATER IRRIG 1000ML BOTTLE 07139-09

## (undated) DEVICE — MARKER SKIN DOUBLE TIP W/FLEXI-RULER W/LABELS

## (undated) DEVICE — BNDG ELASTIC 6"X5YDS UNSTERILE 6611-60

## (undated) DEVICE — PREP CHLORAPREP 26ML TINTED ORANGE  260815

## (undated) DEVICE — ESU EYE HIGH TEMP 65410-183

## (undated) DEVICE — PUMP SYSTEM SINGLE ACTION M0067201000

## (undated) DEVICE — DRAPE STERI U 1015

## (undated) DEVICE — GLOVE PROTEXIS POWDER FREE 8.0 ORTHOPEDIC 2D73ET80

## (undated) DEVICE — SOL NACL 0.9% IRRIG 3000ML BAG 07972-08

## (undated) DEVICE — GLOVE PROTEXIS W/NEU-THERA 8.0  2D73TE80

## (undated) DEVICE — EYE PACK CUSTOM CATARACT AS12127-01

## (undated) DEVICE — DRAPE C-ARM 60X42" 1013

## (undated) DEVICE — DRAPE SHEET 3/4 78X60"

## (undated) DEVICE — PACK MINOR EYE

## (undated) DEVICE — GLOVE PROTEXIS BLUE W/NEU-THERA 7.5  2D73EB75

## (undated) DEVICE — GUIDEWIRE AMPLATZ 0.035"X145CM  SUPER STIFF 640-104

## (undated) DEVICE — Device

## (undated) DEVICE — GUIDEWIRE SENSOR DUAL FLEX STR 0.035"X150CM M0066703080

## (undated) DEVICE — DRSG STERI STRIP 1/2X4" R1547

## (undated) DEVICE — SU PROLENE 3-0 PS-2 18" 8687H

## (undated) DEVICE — BNDG ESMARK 6" STERILE

## (undated) DEVICE — BASKET NITINOL TIPLESS HALO  1.5FRX120CM 554120

## (undated) DEVICE — SPECIMEN CONTAINER 4OZ

## (undated) DEVICE — ESU ELEC NDL 1" COATED/INSULATED E1465

## (undated) DEVICE — DRSG TELFA 2X3"

## (undated) DEVICE — SHEATH URETERAL ACCESS NAVIGATOR 11/13FRX36CM 250-203

## (undated) DEVICE — SOL WATER INJ 2000ML BAG 07118-07

## (undated) DEVICE — GLOVE PROTEXIS W/NEU-THERA 7.5  2D73TE75

## (undated) DEVICE — BLADE DYONICS INCISOR PLUS ELITE 3.5MM 72200095

## (undated) DEVICE — SYR 30ML LL W/O NDL

## (undated) DEVICE — NDL 30GA 0.5" 305106

## (undated) DEVICE — SYR 03ML LL W/O NDL

## (undated) DEVICE — CATH URETERAL DL 6FR FLEX-TIP 10FRX50CM G17323 AQ-022610

## (undated) DEVICE — GOWN XLG DISP 9545

## (undated) DEVICE — PACK ARTHROSCOPY KNEE SOP15AKFSM

## (undated) DEVICE — KIT ENDO FIRST STEP DISINFECTANT 200ML W/POUCH EP-4

## (undated) RX ORDER — CEFAZOLIN SODIUM 2 G/100ML
INJECTION, SOLUTION INTRAVENOUS
Status: DISPENSED
Start: 2019-09-20

## (undated) RX ORDER — FENTANYL CITRATE 50 UG/ML
INJECTION, SOLUTION INTRAMUSCULAR; INTRAVENOUS
Status: DISPENSED
Start: 2019-09-20

## (undated) RX ORDER — PROPOFOL 10 MG/ML
INJECTION, EMULSION INTRAVENOUS
Status: DISPENSED
Start: 2019-09-20

## (undated) RX ORDER — LIDOCAINE HYDROCHLORIDE 20 MG/ML
INJECTION, SOLUTION INFILTRATION; PERINEURAL
Status: DISPENSED
Start: 2019-09-20

## (undated) RX ORDER — BUPIVACAINE HYDROCHLORIDE 2.5 MG/ML
INJECTION, SOLUTION INFILTRATION; PERINEURAL
Status: DISPENSED
Start: 2019-09-20

## (undated) RX ORDER — PHENYLEPHRINE HCL IN 0.9% NACL 1 MG/10 ML
SYRINGE (ML) INTRAVENOUS
Status: DISPENSED
Start: 2019-09-20

## (undated) RX ORDER — ONDANSETRON 2 MG/ML
INJECTION INTRAMUSCULAR; INTRAVENOUS
Status: DISPENSED
Start: 2019-02-14

## (undated) RX ORDER — ACETAMINOPHEN 325 MG/1
TABLET ORAL
Status: DISPENSED
Start: 2018-08-23

## (undated) RX ORDER — ONDANSETRON 2 MG/ML
INJECTION INTRAMUSCULAR; INTRAVENOUS
Status: DISPENSED
Start: 2019-09-20

## (undated) RX ORDER — ACETAMINOPHEN 325 MG/1
TABLET ORAL
Status: DISPENSED
Start: 2019-02-14

## (undated) RX ORDER — ACETAMINOPHEN 325 MG/1
TABLET ORAL
Status: DISPENSED
Start: 2019-01-24

## (undated) RX ORDER — GLYCOPYRROLATE 0.2 MG/ML
INJECTION INTRAMUSCULAR; INTRAVENOUS
Status: DISPENSED
Start: 2019-09-20

## (undated) RX ORDER — PROPOFOL 10 MG/ML
INJECTION, EMULSION INTRAVENOUS
Status: DISPENSED
Start: 2022-11-28

## (undated) RX ORDER — ACETAMINOPHEN 325 MG/1
TABLET ORAL
Status: DISPENSED
Start: 2022-11-28

## (undated) RX ORDER — CEFAZOLIN SODIUM 2 G/100ML
INJECTION, SOLUTION INTRAVENOUS
Status: DISPENSED
Start: 2018-08-23

## (undated) RX ORDER — FENTANYL CITRATE 50 UG/ML
INJECTION, SOLUTION INTRAMUSCULAR; INTRAVENOUS
Status: DISPENSED
Start: 2019-02-14

## (undated) RX ORDER — ACETAMINOPHEN 325 MG/1
TABLET ORAL
Status: DISPENSED
Start: 2019-09-20

## (undated) RX ORDER — MEPERIDINE HYDROCHLORIDE 25 MG/ML
INJECTION INTRAMUSCULAR; INTRAVENOUS; SUBCUTANEOUS
Status: DISPENSED
Start: 2018-08-23

## (undated) RX ORDER — FENTANYL CITRATE 50 UG/ML
INJECTION, SOLUTION INTRAMUSCULAR; INTRAVENOUS
Status: DISPENSED
Start: 2022-11-28

## (undated) RX ORDER — DEXAMETHASONE SODIUM PHOSPHATE 4 MG/ML
INJECTION, SOLUTION INTRA-ARTICULAR; INTRALESIONAL; INTRAMUSCULAR; INTRAVENOUS; SOFT TISSUE
Status: DISPENSED
Start: 2019-09-20

## (undated) RX ORDER — LIDOCAINE HYDROCHLORIDE 10 MG/ML
INJECTION, SOLUTION EPIDURAL; INFILTRATION; INTRACAUDAL; PERINEURAL
Status: DISPENSED
Start: 2019-01-24